# Patient Record
Sex: FEMALE | Race: BLACK OR AFRICAN AMERICAN | NOT HISPANIC OR LATINO
[De-identification: names, ages, dates, MRNs, and addresses within clinical notes are randomized per-mention and may not be internally consistent; named-entity substitution may affect disease eponyms.]

---

## 2017-01-13 PROBLEM — Z00.00 ENCOUNTER FOR PREVENTIVE HEALTH EXAMINATION: Status: ACTIVE | Noted: 2017-01-13

## 2017-01-25 ENCOUNTER — APPOINTMENT (OUTPATIENT)
Dept: ORTHOPEDIC SURGERY | Facility: CLINIC | Age: 82
End: 2017-01-25

## 2017-02-10 ENCOUNTER — APPOINTMENT (OUTPATIENT)
Dept: ORTHOPEDIC SURGERY | Facility: CLINIC | Age: 82
End: 2017-02-10

## 2017-02-23 ENCOUNTER — APPOINTMENT (OUTPATIENT)
Dept: ORTHOPEDIC SURGERY | Facility: CLINIC | Age: 82
End: 2017-02-23

## 2017-03-06 ENCOUNTER — APPOINTMENT (OUTPATIENT)
Dept: ORTHOPEDIC SURGERY | Facility: CLINIC | Age: 82
End: 2017-03-06

## 2017-03-13 ENCOUNTER — APPOINTMENT (OUTPATIENT)
Dept: ORTHOPEDIC SURGERY | Facility: CLINIC | Age: 82
End: 2017-03-13

## 2017-03-13 VITALS — HEIGHT: 65 IN | WEIGHT: 262 LBS | BODY MASS INDEX: 43.65 KG/M2

## 2017-03-13 VITALS
BODY MASS INDEX: 43.65 KG/M2 | SYSTOLIC BLOOD PRESSURE: 133 MMHG | DIASTOLIC BLOOD PRESSURE: 104 MMHG | HEART RATE: 139 BPM | HEIGHT: 65 IN | WEIGHT: 262 LBS

## 2017-03-13 DIAGNOSIS — M51.36 OTHER INTERVERTEBRAL DISC DEGENERATION, LUMBAR REGION: ICD-10-CM

## 2017-03-16 ENCOUNTER — INPATIENT (INPATIENT)
Facility: HOSPITAL | Age: 82
LOS: 6 days | Discharge: ANOTHER HOSPITAL | End: 2017-03-23
Attending: INTERNAL MEDICINE | Admitting: INTERNAL MEDICINE
Payer: MEDICARE

## 2017-03-16 VITALS
HEIGHT: 64 IN | RESPIRATION RATE: 20 BRPM | OXYGEN SATURATION: 92 % | DIASTOLIC BLOOD PRESSURE: 85 MMHG | WEIGHT: 266.1 LBS | SYSTOLIC BLOOD PRESSURE: 178 MMHG | HEART RATE: 86 BPM

## 2017-03-16 DIAGNOSIS — S81.812A LACERATION WITHOUT FOREIGN BODY, LEFT LOWER LEG, INITIAL ENCOUNTER: ICD-10-CM

## 2017-03-16 DIAGNOSIS — I63.9 CEREBRAL INFARCTION, UNSPECIFIED: ICD-10-CM

## 2017-03-16 DIAGNOSIS — I10 ESSENTIAL (PRIMARY) HYPERTENSION: ICD-10-CM

## 2017-03-16 DIAGNOSIS — I48.2 CHRONIC ATRIAL FIBRILLATION: ICD-10-CM

## 2017-03-16 DIAGNOSIS — J15.6 PNEUMONIA DUE TO OTHER GRAM-NEGATIVE BACTERIA: ICD-10-CM

## 2017-03-16 LAB
ALBUMIN SERPL ELPH-MCNC: 3.4 G/DL — SIGNIFICANT CHANGE UP (ref 3.3–5)
ALP SERPL-CCNC: 151 U/L — HIGH (ref 40–120)
ALT FLD-CCNC: 27 U/L — SIGNIFICANT CHANGE UP (ref 12–78)
ANION GAP SERPL CALC-SCNC: 6 MMOL/L — SIGNIFICANT CHANGE UP (ref 5–17)
APTT BLD: 35.3 SEC — SIGNIFICANT CHANGE UP (ref 27.5–37.4)
AST SERPL-CCNC: 32 U/L — SIGNIFICANT CHANGE UP (ref 15–37)
BASE EXCESS BLDA CALC-SCNC: 2.6 MMOL/L — HIGH (ref -2–2)
BASOPHILS # BLD AUTO: 0 K/UL — SIGNIFICANT CHANGE UP (ref 0–0.2)
BASOPHILS NFR BLD AUTO: 1.1 % — SIGNIFICANT CHANGE UP (ref 0–2)
BILIRUB SERPL-MCNC: 0.6 MG/DL — SIGNIFICANT CHANGE UP (ref 0.2–1.2)
BLOOD GAS COMMENTS: SIGNIFICANT CHANGE UP
BLOOD GAS COMMENTS: SIGNIFICANT CHANGE UP
BLOOD GAS SOURCE: SIGNIFICANT CHANGE UP
BUN SERPL-MCNC: 9 MG/DL — SIGNIFICANT CHANGE UP (ref 7–23)
CALCIUM SERPL-MCNC: 9.1 MG/DL — SIGNIFICANT CHANGE UP (ref 8.5–10.1)
CHLORIDE SERPL-SCNC: 105 MMOL/L — SIGNIFICANT CHANGE UP (ref 96–108)
CK MB BLD-MCNC: 2.2 % — SIGNIFICANT CHANGE UP (ref 0–3.5)
CK MB CFR SERPL CALC: 3.4 NG/ML — SIGNIFICANT CHANGE UP (ref 0.5–3.6)
CK SERPL-CCNC: 158 U/L — SIGNIFICANT CHANGE UP (ref 26–192)
CO2 SERPL-SCNC: 32 MMOL/L — HIGH (ref 22–31)
CREAT SERPL-MCNC: 0.63 MG/DL — SIGNIFICANT CHANGE UP (ref 0.5–1.3)
EOSINOPHIL # BLD AUTO: 0 K/UL — SIGNIFICANT CHANGE UP (ref 0–0.5)
EOSINOPHIL NFR BLD AUTO: 0.5 % — SIGNIFICANT CHANGE UP (ref 0–6)
GLUCOSE SERPL-MCNC: 85 MG/DL — SIGNIFICANT CHANGE UP (ref 70–99)
HCO3 BLDA-SCNC: 27 MMOL/L — SIGNIFICANT CHANGE UP (ref 21–29)
HCT VFR BLD CALC: 43.8 % — SIGNIFICANT CHANGE UP (ref 34.5–45)
HGB BLD-MCNC: 13.9 G/DL — SIGNIFICANT CHANGE UP (ref 11.5–15.5)
HOROWITZ INDEX BLDA+IHG-RTO: 28 — SIGNIFICANT CHANGE UP
INR BLD: 1.3 RATIO — HIGH (ref 0.88–1.16)
LACTATE SERPL-SCNC: 1.9 MMOL/L — SIGNIFICANT CHANGE UP (ref 0.7–2)
LYMPHOCYTES # BLD AUTO: 1.3 K/UL — SIGNIFICANT CHANGE UP (ref 1–3.3)
LYMPHOCYTES # BLD AUTO: 28.2 % — SIGNIFICANT CHANGE UP (ref 13–44)
MCHC RBC-ENTMCNC: 27.6 PG — SIGNIFICANT CHANGE UP (ref 27–34)
MCHC RBC-ENTMCNC: 31.7 GM/DL — LOW (ref 32–36)
MCV RBC AUTO: 87 FL — SIGNIFICANT CHANGE UP (ref 80–100)
MONOCYTES # BLD AUTO: 0.4 K/UL — SIGNIFICANT CHANGE UP (ref 0–0.9)
MONOCYTES NFR BLD AUTO: 7.9 % — SIGNIFICANT CHANGE UP (ref 2–14)
NEUTROPHILS # BLD AUTO: 2.8 K/UL — SIGNIFICANT CHANGE UP (ref 1.8–7.4)
NEUTROPHILS NFR BLD AUTO: 62.3 % — SIGNIFICANT CHANGE UP (ref 43–77)
NT-PROBNP SERPL-SCNC: 3400 PG/ML — HIGH (ref 0–450)
PCO2 BLDA: 41 MMHG — SIGNIFICANT CHANGE UP (ref 32–46)
PH BLD: 7.43 — SIGNIFICANT CHANGE UP (ref 7.35–7.45)
PLATELET # BLD AUTO: 153 K/UL — SIGNIFICANT CHANGE UP (ref 150–400)
PO2 BLDA: 92 MMHG — SIGNIFICANT CHANGE UP (ref 74–108)
POTASSIUM SERPL-MCNC: 3.9 MMOL/L — SIGNIFICANT CHANGE UP (ref 3.5–5.3)
POTASSIUM SERPL-SCNC: 3.9 MMOL/L — SIGNIFICANT CHANGE UP (ref 3.5–5.3)
PROT SERPL-MCNC: 7.3 GM/DL — SIGNIFICANT CHANGE UP (ref 6–8.3)
PROTHROM AB SERPL-ACNC: 14.6 SEC — HIGH (ref 10–13.1)
RBC # BLD: 5.04 M/UL — SIGNIFICANT CHANGE UP (ref 3.8–5.2)
RBC # FLD: 15.9 % — HIGH (ref 11–15)
SAO2 % BLDA: 97 % — HIGH (ref 92–96)
SODIUM SERPL-SCNC: 143 MMOL/L — SIGNIFICANT CHANGE UP (ref 135–145)
TROPONIN I SERPL-MCNC: 0.06 NG/ML — HIGH (ref 0.01–0.04)
WBC # BLD: 4.5 K/UL — SIGNIFICANT CHANGE UP (ref 3.8–10.5)
WBC # FLD AUTO: 4.5 K/UL — SIGNIFICANT CHANGE UP (ref 3.8–10.5)

## 2017-03-16 PROCEDURE — 70496 CT ANGIOGRAPHY HEAD: CPT | Mod: 26,59

## 2017-03-16 PROCEDURE — 99291 CRITICAL CARE FIRST HOUR: CPT

## 2017-03-16 PROCEDURE — 72125 CT NECK SPINE W/O DYE: CPT | Mod: 26

## 2017-03-16 PROCEDURE — 76377 3D RENDER W/INTRP POSTPROCES: CPT | Mod: 26

## 2017-03-16 PROCEDURE — 71010: CPT | Mod: 26,76

## 2017-03-16 PROCEDURE — 99285 EMERGENCY DEPT VISIT HI MDM: CPT

## 2017-03-16 PROCEDURE — 70450 CT HEAD/BRAIN W/O DYE: CPT | Mod: 26,76

## 2017-03-16 RX ORDER — BENZOCAINE AND MENTHOL 5; 1 G/100ML; G/100ML
1 LIQUID ORAL THREE TIMES A DAY
Qty: 0 | Refills: 0 | Status: COMPLETED | OUTPATIENT
Start: 2017-03-16 | End: 2017-03-17

## 2017-03-16 RX ORDER — ALTEPLASE 100 MG
9 KIT INTRAVENOUS ONCE
Qty: 0 | Refills: 0 | Status: COMPLETED | OUTPATIENT
Start: 2017-03-16 | End: 2017-03-16

## 2017-03-16 RX ORDER — HYDRALAZINE HCL 50 MG
5 TABLET ORAL ONCE
Qty: 0 | Refills: 0 | Status: COMPLETED | OUTPATIENT
Start: 2017-03-16 | End: 2017-03-17

## 2017-03-16 RX ORDER — MIDAZOLAM HYDROCHLORIDE 1 MG/ML
2 INJECTION, SOLUTION INTRAMUSCULAR; INTRAVENOUS ONCE
Qty: 0 | Refills: 0 | Status: DISCONTINUED | OUTPATIENT
Start: 2017-03-16 | End: 2017-03-16

## 2017-03-16 RX ORDER — SODIUM CHLORIDE 9 MG/ML
3 INJECTION INTRAMUSCULAR; INTRAVENOUS; SUBCUTANEOUS ONCE
Qty: 0 | Refills: 0 | Status: COMPLETED | OUTPATIENT
Start: 2017-03-16 | End: 2017-03-16

## 2017-03-16 RX ORDER — ASPIRIN/CALCIUM CARB/MAGNESIUM 324 MG
325 TABLET ORAL ONCE
Qty: 0 | Refills: 0 | Status: DISCONTINUED | OUTPATIENT
Start: 2017-03-16 | End: 2017-03-16

## 2017-03-16 RX ORDER — DIGOXIN 250 MCG
0.25 TABLET ORAL ONCE
Qty: 0 | Refills: 0 | Status: COMPLETED | OUTPATIENT
Start: 2017-03-16 | End: 2017-03-16

## 2017-03-16 RX ORDER — TETANUS TOXOID, REDUCED DIPHTHERIA TOXOID AND ACELLULAR PERTUSSIS VACCINE, ADSORBED 5; 2.5; 8; 8; 2.5 [IU]/.5ML; [IU]/.5ML; UG/.5ML; UG/.5ML; UG/.5ML
0.5 SUSPENSION INTRAMUSCULAR ONCE
Qty: 0 | Refills: 0 | Status: COMPLETED | OUTPATIENT
Start: 2017-03-16 | End: 2017-03-16

## 2017-03-16 RX ORDER — ALTEPLASE 100 MG
81 KIT INTRAVENOUS ONCE
Qty: 0 | Refills: 0 | Status: COMPLETED | OUTPATIENT
Start: 2017-03-16 | End: 2017-03-16

## 2017-03-16 RX ORDER — METOPROLOL TARTRATE 50 MG
5 TABLET ORAL ONCE
Qty: 0 | Refills: 0 | Status: COMPLETED | OUTPATIENT
Start: 2017-03-16 | End: 2017-03-16

## 2017-03-16 RX ADMIN — ALTEPLASE 81 MILLIGRAM(S): KIT at 18:27

## 2017-03-16 RX ADMIN — MIDAZOLAM HYDROCHLORIDE 2 MILLIGRAM(S): 1 INJECTION, SOLUTION INTRAMUSCULAR; INTRAVENOUS at 17:41

## 2017-03-16 RX ADMIN — Medication 5 MILLIGRAM(S): at 17:35

## 2017-03-16 RX ADMIN — SODIUM CHLORIDE 3 MILLILITER(S): 9 INJECTION INTRAMUSCULAR; INTRAVENOUS; SUBCUTANEOUS at 14:04

## 2017-03-16 RX ADMIN — MIDAZOLAM HYDROCHLORIDE 2 MILLIGRAM(S): 1 INJECTION, SOLUTION INTRAMUSCULAR; INTRAVENOUS at 17:42

## 2017-03-16 RX ADMIN — ALTEPLASE 540 MILLIGRAM(S): KIT at 19:27

## 2017-03-16 RX ADMIN — Medication 0.25 MILLIGRAM(S): at 21:40

## 2017-03-16 NOTE — CONSULT NOTE ADULT - ASSESSMENT
consult dict  confused and later afib r gaze preferance left heparesis  tpa given  9 cc stat and 81 cc over 1 hr.  discuss with family risk and benefit  for cta brain  will follow
84F PMH HTN, a-fib non compliant with anticoagulation, diastolic heart failure admitted to medicine for SOB with dry cough r/o PNA along with leg leg laceration requiring sutures . While waiting for medical bed in ER, pt developed a-fib RVR, acute mental status change with dysarthria, left hemiplegia due to acute R MCA infarct s/p tPA.     1. NEURO  - post tPA pt with significant improvement in speech. is now able to move left arm and leg spontaneously  - lethargy also improved  - no antiplatelets within 24 hrs of tPA, monitor for signs of bleed  - serial neuro checks  - 2D echo   - carotid dopplers  - speech and swallow eval  - PT/OT  - check lipid profile and HbA1C  - neurology consult called in ER  - avoid hypotension, maintain -170s in setting of acute ischemic stroke    2. CV  - afib RVR  - s/p cardizem and metoprolol push  - if remains in a-fib RVR can treat with amiodarone vs digoxin to avoid lower BP too much in setting of acute ischemic cerebral infarct  - ultimately will need anticoagulation for a-fib, full dose anticoagulation on hold now as pt is s/p tPA  - lasix diuresis as needed for underlying CHF    3. Pulm  - SOB with dry cough can be cardiac in origin vs pulmonary origin  - empirically on levaquin for the time being  - repeat CXR and follow up WBC  - if remains afebrile and without leukocytosis will d/c antibiotics    4. GEN  - pt full code  - plan of care and pt's condition d/w daughters    Total Critical Care Time: 65 min

## 2017-03-16 NOTE — ED PROVIDER NOTE - NEUROLOGICAL, MLM
Alert and oriented, no focal deficits, no motor or sensory deficits. Alert and oriented, no focal deficits, no motor or sensory deficits, pt states her numbness resolved

## 2017-03-16 NOTE — ED PROVIDER NOTE - SKIN WOUND TYPE
LACERATION(S) chronic hyperpigmented hardened skin of the lower extermities bilatearlly/LACERATION(S)

## 2017-03-16 NOTE — ED PROVIDER NOTE - MEDICAL DECISION MAKING DETAILS
pt presented with sob x 3 days, chest xray shows bilateral lung opacities, o2 sat on room air was 93%

## 2017-03-16 NOTE — H&P ADULT. - HISTORY OF PRESENT ILLNESS
84 year old female with h/o HTN presents today c/o sob for the last few days associated with a dry cough (-) chest pain (-) fevers (-). pt has h/o afib which pt has stopped taking coumadin, pt noted to have acute stroke in the ER, tPA was given and pt was transfer to ICU.

## 2017-03-16 NOTE — ED ADULT NURSE REASSESSMENT NOTE - NS ED NURSE REASSESS COMMENT FT1
1705pt found confused speech garbled left arm paresis 1709 code stroke called 1711 rapid response called see rapid response sheet for v/s and medications given pt transferred to ct accompanied by multiple md and rn versed 2mg ivp given  1827 activase 9ml given ivp by neurologist dr buckley tpa 81 ml over 1 hr in progress  at 1830

## 2017-03-16 NOTE — ED PROVIDER NOTE - OBJECTIVE STATEMENT
84 year old female with h/o HTN presents today c/o sob for the last few days associated with a dry cough (-) chest pain (-) fevers (-) recent travel (-) sick contacts (-) bodyaches (-) chills 84 year old female with h/o HTN presents today c/o sob for the last few days associated with a dry cough (-) chest pain (-) fevers (-) recent travel (-) sick contacts (-) bodyaches (-) chills, pt also c/o left arm paresthesia this morning at 10am which started in the left forearm radiating into the upper arm which resolved by the time she came to the ER (-) headache (-) lower extremity weakness

## 2017-03-17 LAB
ANION GAP SERPL CALC-SCNC: 11 MMOL/L — SIGNIFICANT CHANGE UP (ref 5–17)
APTT BLD: 38.1 SEC — HIGH (ref 27.5–37.4)
BUN SERPL-MCNC: 8 MG/DL — SIGNIFICANT CHANGE UP (ref 7–23)
CALCIUM SERPL-MCNC: 8.8 MG/DL — SIGNIFICANT CHANGE UP (ref 8.5–10.1)
CHLORIDE SERPL-SCNC: 105 MMOL/L — SIGNIFICANT CHANGE UP (ref 96–108)
CHOLEST SERPL-MCNC: 152 MG/DL — SIGNIFICANT CHANGE UP (ref 10–199)
CK MB BLD-MCNC: 1.8 % — SIGNIFICANT CHANGE UP (ref 0–3.5)
CK MB CFR SERPL CALC: 3.1 NG/ML — SIGNIFICANT CHANGE UP (ref 0.5–3.6)
CK SERPL-CCNC: 169 U/L — SIGNIFICANT CHANGE UP (ref 26–192)
CO2 SERPL-SCNC: 28 MMOL/L — SIGNIFICANT CHANGE UP (ref 22–31)
CREAT SERPL-MCNC: 0.66 MG/DL — SIGNIFICANT CHANGE UP (ref 0.5–1.3)
GLUCOSE SERPL-MCNC: 90 MG/DL — SIGNIFICANT CHANGE UP (ref 70–99)
HBA1C BLD-MCNC: 5.7 % — HIGH (ref 4–5.6)
HCT VFR BLD CALC: 40.6 % — SIGNIFICANT CHANGE UP (ref 34.5–45)
HDLC SERPL-MCNC: 44 MG/DL — SIGNIFICANT CHANGE UP (ref 40–125)
HGB BLD-MCNC: 13.2 G/DL — SIGNIFICANT CHANGE UP (ref 11.5–15.5)
INR BLD: 1.76 RATIO — HIGH (ref 0.88–1.16)
LIPID PNL WITH DIRECT LDL SERPL: 97 MG/DL — SIGNIFICANT CHANGE UP
MAGNESIUM SERPL-MCNC: 1.9 MG/DL — SIGNIFICANT CHANGE UP (ref 1.8–2.4)
MCHC RBC-ENTMCNC: 28.1 PG — SIGNIFICANT CHANGE UP (ref 27–34)
MCHC RBC-ENTMCNC: 32.5 GM/DL — SIGNIFICANT CHANGE UP (ref 32–36)
MCV RBC AUTO: 86.6 FL — SIGNIFICANT CHANGE UP (ref 80–100)
PHOSPHATE SERPL-MCNC: 2.8 MG/DL — SIGNIFICANT CHANGE UP (ref 2.5–4.5)
PLATELET # BLD AUTO: 162 K/UL — SIGNIFICANT CHANGE UP (ref 150–400)
POTASSIUM SERPL-MCNC: 3.3 MMOL/L — LOW (ref 3.5–5.3)
POTASSIUM SERPL-SCNC: 3.3 MMOL/L — LOW (ref 3.5–5.3)
PROTHROM AB SERPL-ACNC: 19.8 SEC — HIGH (ref 10–13.1)
RBC # BLD: 4.69 M/UL — SIGNIFICANT CHANGE UP (ref 3.8–5.2)
RBC # FLD: 15.7 % — HIGH (ref 11–15)
SODIUM SERPL-SCNC: 144 MMOL/L — SIGNIFICANT CHANGE UP (ref 135–145)
T4 FREE SERPL-MCNC: 1.7 NG/DL — SIGNIFICANT CHANGE UP (ref 0.9–1.8)
TOTAL CHOLESTEROL/HDL RATIO MEASUREMENT: 3.5 RATIO — SIGNIFICANT CHANGE UP (ref 3.3–7.1)
TRIGL SERPL-MCNC: 57 MG/DL — SIGNIFICANT CHANGE UP (ref 10–149)
TROPONIN I SERPL-MCNC: 0.03 NG/ML — SIGNIFICANT CHANGE UP (ref 0.01–0.04)
TSH SERPL-MCNC: 1.08 UIU/ML — SIGNIFICANT CHANGE UP (ref 0.36–3.74)
WBC # BLD: 5.3 K/UL — SIGNIFICANT CHANGE UP (ref 3.8–10.5)
WBC # FLD AUTO: 5.3 K/UL — SIGNIFICANT CHANGE UP (ref 3.8–10.5)

## 2017-03-17 PROCEDURE — 93306 TTE W/DOPPLER COMPLETE: CPT | Mod: 26

## 2017-03-17 PROCEDURE — 70450 CT HEAD/BRAIN W/O DYE: CPT | Mod: 26

## 2017-03-17 PROCEDURE — 99291 CRITICAL CARE FIRST HOUR: CPT

## 2017-03-17 RX ORDER — DIGOXIN 250 MCG
0.25 TABLET ORAL EVERY 6 HOURS
Qty: 0 | Refills: 0 | Status: COMPLETED | OUTPATIENT
Start: 2017-03-17 | End: 2017-03-17

## 2017-03-17 RX ORDER — ENOXAPARIN SODIUM 100 MG/ML
120 INJECTION SUBCUTANEOUS EVERY 12 HOURS
Qty: 0 | Refills: 0 | Status: DISCONTINUED | OUTPATIENT
Start: 2017-03-17 | End: 2017-03-20

## 2017-03-17 RX ORDER — METOPROLOL TARTRATE 50 MG
50 TABLET ORAL
Qty: 0 | Refills: 0 | Status: DISCONTINUED | OUTPATIENT
Start: 2017-03-17 | End: 2017-03-20

## 2017-03-17 RX ORDER — ATORVASTATIN CALCIUM 80 MG/1
80 TABLET, FILM COATED ORAL AT BEDTIME
Qty: 0 | Refills: 0 | Status: DISCONTINUED | OUTPATIENT
Start: 2017-03-17 | End: 2017-03-23

## 2017-03-17 RX ORDER — METOPROLOL TARTRATE 50 MG
5 TABLET ORAL ONCE
Qty: 0 | Refills: 0 | Status: COMPLETED | OUTPATIENT
Start: 2017-03-17 | End: 2017-03-17

## 2017-03-17 RX ORDER — METOPROLOL TARTRATE 50 MG
12.5 TABLET ORAL
Qty: 0 | Refills: 0 | Status: DISCONTINUED | OUTPATIENT
Start: 2017-03-17 | End: 2017-03-17

## 2017-03-17 RX ORDER — ASPIRIN/CALCIUM CARB/MAGNESIUM 324 MG
81 TABLET ORAL DAILY
Qty: 0 | Refills: 0 | Status: DISCONTINUED | OUTPATIENT
Start: 2017-03-17 | End: 2017-03-23

## 2017-03-17 RX ORDER — LOSARTAN POTASSIUM 100 MG/1
50 TABLET, FILM COATED ORAL DAILY
Qty: 0 | Refills: 0 | Status: DISCONTINUED | OUTPATIENT
Start: 2017-03-18 | End: 2017-03-23

## 2017-03-17 RX ORDER — POTASSIUM CHLORIDE 20 MEQ
20 PACKET (EA) ORAL ONCE
Qty: 0 | Refills: 0 | Status: COMPLETED | OUTPATIENT
Start: 2017-03-17 | End: 2017-03-17

## 2017-03-17 RX ADMIN — Medication 50 MILLIGRAM(S): at 18:16

## 2017-03-17 RX ADMIN — ENOXAPARIN SODIUM 120 MILLIGRAM(S): 100 INJECTION SUBCUTANEOUS at 21:36

## 2017-03-17 RX ADMIN — Medication 0.25 MILLIGRAM(S): at 06:25

## 2017-03-17 RX ADMIN — Medication 0.25 MILLIGRAM(S): at 12:16

## 2017-03-17 RX ADMIN — TETANUS TOXOID, REDUCED DIPHTHERIA TOXOID AND ACELLULAR PERTUSSIS VACCINE, ADSORBED 0.5 MILLILITER(S): 5; 2.5; 8; 8; 2.5 SUSPENSION INTRAMUSCULAR at 21:30

## 2017-03-17 RX ADMIN — BENZOCAINE AND MENTHOL 1 LOZENGE: 5; 1 LIQUID ORAL at 21:36

## 2017-03-17 RX ADMIN — ATORVASTATIN CALCIUM 80 MILLIGRAM(S): 80 TABLET, FILM COATED ORAL at 21:35

## 2017-03-17 RX ADMIN — BENZOCAINE AND MENTHOL 1 LOZENGE: 5; 1 LIQUID ORAL at 06:26

## 2017-03-17 RX ADMIN — Medication 5 MILLIGRAM(S): at 00:36

## 2017-03-17 RX ADMIN — Medication 5 MILLIGRAM(S): at 11:04

## 2017-03-17 RX ADMIN — Medication 20 MILLIEQUIVALENT(S): at 06:26

## 2017-03-17 RX ADMIN — Medication 81 MILLIGRAM(S): at 21:35

## 2017-03-17 NOTE — SWALLOW BEDSIDE ASSESSMENT ADULT - SWALLOW EVAL: DIAGNOSIS
Pt is an 84 y o female admitted with pneumonia and laceration to the left leg presented with oropharyngeal phases of the swallow marked by decreased mastication, reduced bolus formation causing moderate lingual and lateral stasis with soft solid, and suspect delay in swallow trigger. No overt signs of aspiration. Pt is an 84 y o female admitted with right CVA, pneumonia, and laceration to the left leg presented with oropharyngeal phases of the swallow marked by decreased mastication, reduced bolus formation causing moderate lingual and lateral stasis with soft solid, and suspect delay in swallow trigger. No overt signs of aspiration.

## 2017-03-17 NOTE — PHYSICAL THERAPY INITIAL EVALUATION ADULT - MODIFIED CLINICAL TEST OF SENSORY INTEGRATION IN BALANCE TEST
Barthel Index: Feeding Score _10__, Bathing Score _0__, Grooming Score _0__, Dressing Score _0__, Bowels Score _10__, Bladder Score _10__, Toilet Score _0__, Transfers Score _0__, Mobility Score _0__, Stairs Score _0__,     Total Score _30__

## 2017-03-17 NOTE — PHYSICAL THERAPY INITIAL EVALUATION ADULT - ADDITIONAL COMMENTS
Pt has both a rolling walker and a cane, using a rolling walker recently secondary to increased weakness. There are 3 steps with handrail to enter home followed by 2 more steps to get to kitchen. Pt has everything on the 1st floor. Pt does ambulate short distances into the community. Pt is independent with all ADLs and requires pacing and frequent rest during activities to minimize exertion and complete task. Pt has both a rolling walker and a cane, using a rolling walker recently secondary to increased weakness. There are 3 steps with handrail to enter home followed by 2 more steps to get to kitchen. Pt has everything on the 1st floor. Pt does ambulate short distances into the community. Pt is independent with all ADLs and requires pacing and frequent rest during activities to minimize exertion and complete task. Pt is R hand dominant.

## 2017-03-17 NOTE — PHYSICAL THERAPY INITIAL EVALUATION ADULT - PERTINENT HX OF CURRENT PROBLEM, REHAB EVAL
as per EMR: complaints of sob for the last few days associated with a dry cough. (-) chest pain (-) fevers (-) recent travel (-) sick contacts (-) bodyaches (-) chills, pt also c/o left arm paresthesia this morning at 10am which started in the left forearm radiating into the upper arm which resolved by the time she came to the ER .

## 2017-03-17 NOTE — PHYSICAL THERAPY INITIAL EVALUATION ADULT - ACTIVE RANGE OF MOTION EXAMINATION, REHAB EVAL
bilateral  lower extremity Active ROM was WFL (within functional limits)/eric. upper extremity Active ROM was WNL (within normal limits)

## 2017-03-17 NOTE — DISCHARGE NOTE ADULT - CARE PLAN
Principal Discharge DX:	Cerebrovascular accident (CVA) due to embolism of left vertebral artery  Goal:	pt will go to rehab after cath  Instructions for follow-up, activity and diet:	for cath  Secondary Diagnosis:	Chronic atrial fibrillation  Secondary Diagnosis:	Essential hypertension  Secondary Diagnosis:	Dilated cardiomyopathy  Secondary Diagnosis:	Leg laceration, left, initial encounter  Secondary Diagnosis:	NSTEMI (non-ST elevated myocardial infarction)  Secondary Diagnosis:	Pneumonia due to other aerobic gram-negative bacteria

## 2017-03-17 NOTE — SWALLOW BEDSIDE ASSESSMENT ADULT - SWALLOW EVAL: RECOMMENDED FEEDING/EATING TECHNIQUES
small sips/bites/allow for swallow between intakes/position upright (90 degrees)/maintain upright posture during/after eating for 30 mins

## 2017-03-17 NOTE — PHYSICAL THERAPY INITIAL EVALUATION ADULT - GENERAL OBSERVATIONS, REHAB EVAL
Pt encountered supine in bed, alert and oriented x4, in ICU, cardiac monitor, venodyne pumps in place, NG tube clamped, NAD. Pt is s/p TPA. Pt encountered supine in bed, alert and oriented x4, in ICU, cardiac monitor, venodyne pumps in place, NG tube clamped, NAD. Pt is s/p TPA, CT head with internal devleopment of dense R MCA suspicious for intraluminal thrombus.

## 2017-03-17 NOTE — DISCHARGE NOTE ADULT - NS AS DC STROKE ED MATERIALS
Need for Followup After Discharge/Risk Factors for Stroke/Stroke Education Booklet/Prescribed Medications/Stroke Warning Signs and Symptoms/Call 911 for Stroke

## 2017-03-17 NOTE — DISCHARGE NOTE ADULT - MEDICATION SUMMARY - MEDICATIONS TO TAKE
I will START or STAY ON the medications listed below when I get home from the hospital:    predniSONE 20 mg oral tablet  -- 2 tab(s) by mouth once a day  -- Indication: For sob    spironolactone 25 mg oral tablet  -- 1 tab(s) by mouth once a day  -- Indication: For Essential hypertension    aspirin 81 mg oral tablet, chewable  -- 1 tab(s) by mouth once a day  -- Indication: For NSTEMI (non-ST elevated myocardial infarction)    losartan 100 mg oral tablet  -- 1 tab(s) by mouth once a day  -- Indication: For Essential hypertension    carvedilol 25 mg oral tablet  -- 1 tab(s) by mouth every 12 hours  -- Indication: For Essential hypertension    Proventil HFA CFC free 90 mcg/inh inhalation aerosol  -- 2 puff(s) inhaled 4 times a day, As Needed for bronchospasm  -- For inhalation only.  It is very important that you take or use this exactly as directed.  Do not skip doses or discontinue unless directed by your doctor.  Obtain medical advice before taking any non-prescription drugs as some may affect the action of this medication.  Shake well before use.    -- Indication: For sob    Lasix 40 mg oral tablet  -- 1 tab(s) by mouth once a day  -- Avoid prolonged or excessive exposure to direct and/or artificial sunlight while taking this medication.  It is very important that you take or use this exactly as directed.  Do not skip doses or discontinue unless directed by your doctor.  It may be advisable to drink a full glass orange juice or eat a banana daily while taking this medication.    -- Indication: For Dilated cardiomyopathy I will START or STAY ON the medications listed below when I get home from the hospital:    predniSONE 20 mg oral tablet  -- 2 tab(s) by mouth once a day  -- Indication: For sob    spironolactone 25 mg oral tablet  -- 1 tab(s) by mouth once a day  -- Indication: For Essential hypertension    aspirin 81 mg oral tablet, chewable  -- 1 tab(s) by mouth once a day  -- Indication: For NSTEMI (non-ST elevated myocardial infarction)    losartan 100 mg oral tablet  -- 1 tab(s) by mouth once a day  -- Indication: For Essential hypertension    Lipitor 80 mg oral tablet  -- 1 tab(s) by mouth once a day (at bedtime)  -- Indication: For Cerebrovascular accident (CVA) due to embolism of left vertebral artery    carvedilol 25 mg oral tablet  -- 1 tab(s) by mouth every 12 hours  -- Indication: For Essential hypertension    Proventil HFA CFC free 90 mcg/inh inhalation aerosol  -- 2 puff(s) inhaled 4 times a day, As Needed for bronchospasm  -- For inhalation only.  It is very important that you take or use this exactly as directed.  Do not skip doses or discontinue unless directed by your doctor.  Obtain medical advice before taking any non-prescription drugs as some may affect the action of this medication.  Shake well before use.    -- Indication: For sob    Lasix 40 mg oral tablet  -- 1 tab(s) by mouth once a day  -- Avoid prolonged or excessive exposure to direct and/or artificial sunlight while taking this medication.  It is very important that you take or use this exactly as directed.  Do not skip doses or discontinue unless directed by your doctor.  It may be advisable to drink a full glass orange juice or eat a banana daily while taking this medication.    -- Indication: For Dilated cardiomyopathy

## 2017-03-17 NOTE — SWALLOW BEDSIDE ASSESSMENT ADULT - COMMENTS
CT Chest 3/16/2017 Impression: Nasogastric tube in satisfactory position. No lung consolidations.    CT Head 3/16/2017 Impression: Interval development of dense right MCA, suspicious for intraluminal thrombus. Involutional and ischemic gliotic changes. No acute intracranial hemorrhage. CT Chest 3/16/2017 Impression: Nasogastric tube in satisfactory position. No lung consolidations.    CT Head 3/16/2017 18:00 Impression: Interval development of dense right MCA, suspicious for intraluminal thrombus. Involutional and ischemic gliotic changes. No acute intracranial hemorrhage.

## 2017-03-17 NOTE — PROGRESS NOTE ADULT - SUBJECTIVE AND OBJECTIVE BOX
HPI:  84 year old female with h/o HTN presents today c/o sob for the last few days associated with a dry cough (-) chest pain (-) fevers (-). pt has h/o afib which pt has stopped taking coumadin, pt noted to have acute stroke in the ER, tPA was given and pt was transfer to ICU. (16 Mar 2017 22:56)    Over 24 Hrs: s/p TPA yesterday, remains stable    PAST MEDICAL & SURGICAL HISTORY:  HTN (hypertension)  No pertinent past medical history  No significant past surgical history      ## ROS:   CONSTITUTIONAL: No fever, chills  EYES: No eye pain, visual disturbances  ENMT:  No difficulty hearing, No sinus or throat pain  RESPIRATORY: No cough, wheezing, hemoptysis; No shortness of breath  CARDIOVASCULAR: No chest pain, palpitations  GASTROINTESTINAL: No abdominal or epigastric pain. No nausea, vomiting, or hematemesis; No diarrhea. No melena or hematochezia.  GENITOURINARY: No dysuria, frequency, hematuria  NEUROLOGICAL: No headaches  ENDOCRINE: No heat or cold intolerance  MUSCULOSKELETAL: No joint pain or swelling; No muscle, back, or extremity pain    ## O/E:  Vitals: T(C): 36.6, Max: 37.2 (03-16 @ 23:43)  HR: 108 (77 - 141)  BP: 158/87 (117/96 - 184/127)  BP(mean): 103 (92 - 151)  RR: 17 (13 - 48)  SpO2: 100% (79% - 100%)  Wt(kg): --  Gen: lying comfortably in bed in no apparent distress  HEENT: PERRL, EOMI  Resp: CTA B/L no c/r/w  CVS: S1S2 no m/r/g  Abd: soft NT/ND +BS  Ext: no c/c/e  Neuro: A&Ox3, moving all ext, mild facial droop      I & Os for current day (as of 03-17 @ 13:38)  =============================================  IN: 0 ml / OUT: 400 ml / NET: -400 ml        ## Labs:                        13.2   5.3   )-----------( 162      ( 17 Mar 2017 04:40 )             40.6     17 Mar 2017 04:40    144    |  105    |  8      ----------------------------<  90     3.3     |  28     |  0.66     Ca    8.8        17 Mar 2017 04:40  Phos  2.8       17 Mar 2017 04:40  Mg     1.9       17 Mar 2017 04:40    TPro  7.3    /  Alb  3.4    /  TBili  0.6    /  DBili  x      /  AST  32     /  ALT  27     /  AlkPhos  151    16 Mar 2017 14:06    PT/INR - ( 17 Mar 2017 04:40 )   PT: 19.8 sec;   INR: 1.76 ratio         PTT - ( 17 Mar 2017 04:40 )  PTT:38.1 sec  ABG - ( 16 Mar 2017 16:34 )  pH: x     /  pCO2: 41    /  pO2: 92    / HCO3: 27    / Base Excess: 2.6   /  SaO2: 97          CARDIAC MARKERS ( 17 Mar 2017 04:40 )  .032 ng/mL / x     / 169 U/L / x     / 3.1 ng/mL  CARDIAC MARKERS ( 16 Mar 2017 14:06 )  .059 ng/mL / x     / 158 U/L / x     / 3.4 ng/mL        ## Imaging: reviewed    MEDICATIONS  (STANDING):  levoFLOXacin IVPB 750milliGRAM(s) IV Intermittent once  diphtheria/tetanus/pertussis (acellular) Vaccine (ADAcel) 0.5milliLiter(s) IntraMuscular once  levoFLOXacin  Tablet 750milliGRAM(s) Oral every 24 hours  benzocaine 15 mG/menthol 3.6 mG Lozenge 1Lozenge Oral three times a day  metoprolol 12.5milliGRAM(s) Oral two times a day    MEDICATIONS  (PRN):      ## Code status:  Goals of care discussion: [x] yes [ ] no  [x] full code  [ ] DNR  [ ] DNI  [ ] AUSTEN

## 2017-03-17 NOTE — SWALLOW BEDSIDE ASSESSMENT ADULT - SLP GENERAL OBSERVATIONS
Pt was seen bedside alert and oriented x4. Pt followed 1-step directions and verbalized wants and needs. Pt was seen bedside alert and oriented x4. Pt followed 1-step directions and verbalized wants and needs. Speech intelligibility judged to be WNL. ? left visual neglect with noted fleeting eye contact.

## 2017-03-17 NOTE — DISCHARGE NOTE ADULT - SECONDARY DIAGNOSIS.
Chronic atrial fibrillation Essential hypertension Dilated cardiomyopathy Leg laceration, left, initial encounter NSTEMI (non-ST elevated myocardial infarction) Pneumonia due to other aerobic gram-negative bacteria

## 2017-03-17 NOTE — CHART NOTE - NSCHARTNOTEFT_GEN_A_CORE
[facility] NP NOTE    Results of repeat CT head 3/17/17 discussed with Dr. DEBBIE Sifuentes neurology    IMPRESSION:    Small area of subtle lucency in the right parietal lobe may represent   small evolving infarct.  Involutional and ischemic gliotic changes.  No acute intracranial hemorrhage.    Above reported to Neurology, approved to start  - Full dose anticoagulation - Lovenox 1 mg /Kg/ Q12 will start tonight for Afib.

## 2017-03-17 NOTE — PHYSICAL THERAPY INITIAL EVALUATION ADULT - CRITERIA FOR SKILLED THERAPEUTIC INTERVENTIONS
functional limitations in following categories/pending further assessment pt is s/p TPA/impairments found

## 2017-03-17 NOTE — PROGRESS NOTE ADULT - SUBJECTIVE AND OBJECTIVE BOX
Patient is a 84y old  Female who presents with a chief complaint of sob (17 Mar 2017 08:52)      INTERVAL HPI/OVERNIGHT EVENTS: pt has sob yesterday and put on BIPAP, now she on nasal canula    MEDICATIONS  (STANDING):  levoFLOXacin IVPB 750milliGRAM(s) IV Intermittent once  diphtheria/tetanus/pertussis (acellular) Vaccine (ADAcel) 0.5milliLiter(s) IntraMuscular once  levoFLOXacin  Tablet 750milliGRAM(s) Oral every 24 hours  benzocaine 15 mG/menthol 3.6 mG Lozenge 1Lozenge Oral three times a day  digoxin  Injectable 0.25milliGRAM(s) IV Push every 6 hours  metoprolol 12.5milliGRAM(s) Oral two times a day  metoprolol Injectable 5milliGRAM(s) IV Push once    MEDICATIONS  (PRN):      Allergies    penicillin (Unknown)    Intolerances        REVIEW OF SYSTEMS:  CONSTITUTIONAL: No fever, weight loss, or fatigue  EYES: No eye pain, visual disturbances, or discharge  ENMT:  No difficulty hearing, tinnitus, vertigo; No sinus or throat pain  NECK: No pain or stiffness  BREASTS: No pain, masses, or nipple discharge  RESPIRATORY: No cough, wheezing, chills or hemoptysis; No shortness of breath  CARDIOVASCULAR: No chest pain, palpitations, dizziness, or leg swelling  GASTROINTESTINAL: No abdominal or epigastric pain. No nausea, vomiting, or hematemesis; No diarrhea or constipation. No melena or hematochezia.  GENITOURINARY: No dysuria, frequency, hematuria, or incontinence  NEUROLOGICAL: No headaches, memory loss, loss of strength, numbness, or tremors  SKIN: No itching, burning, rashes, or lesions   LYMPH NODES: No enlarged glands  ENDOCRINE: No heat or cold intolerance; No hair loss  MUSCULOSKELETAL: No joint pain or swelling; No muscle, back, or extremity pain  PSYCHIATRIC: No depression, anxiety, mood swings, or difficulty sleeping  HEME/LYMPH: No easy bruising, or bleeding gums  ALLERGY AND IMMUNOLOGIC: No hives or eczema    Vital Signs Last 24 Hrs  T(C): 36.4, Max: 37.2 (03-16 @ 23:43)  T(F): 97.6, Max: 98.9 (03-16 @ 23:43)  HR: 120 (74 - 141)  BP: 161/98 (117/96 - 184/127)  BP(mean): 115 (92 - 151)  RR: 23 (13 - 48)  SpO2: 99% (79% - 100%)    PHYSICAL EXAM:  GENERAL: NAD, well-groomed, well-developed  HEAD:  Atraumatic, Normocephalic  EYES: EOMI, PERRLA, conjunctiva and sclera clear  ENMT: No tonsillar erythema, exudates, or enlargement; Moist mucous membranes, Good dentition, No lesions  NECK: Supple, No JVD, Normal thyroid  NERVOUS SYSTEM:  Alert & Oriented X3, Good concentration; Motor Strength 5/5 B/L upper and lower extremities; DTRs 2+ intact and symmetric  CHEST/LUNG: Clear to percussion bilaterally; No rales, rhonchi, wheezing, or rubs  HEART: Regular rate and rhythm; No murmurs, rubs, or gallops  ABDOMEN: Soft, Nontender, Nondistended; Bowel sounds present  EXTREMITIES:  2+ Peripheral Pulses, No clubbing, cyanosis, or edema  LYMPH: No lymphadenopathy noted  SKIN: No rashes or lesions    LABS:                        13.2   5.3   )-----------( 162      ( 17 Mar 2017 04:40 )             40.6     17 Mar 2017 04:40    144    |  105    |  8      ----------------------------<  90     3.3     |  28     |  0.66     Ca    8.8        17 Mar 2017 04:40  Phos  2.8       17 Mar 2017 04:40  Mg     1.9       17 Mar 2017 04:40    TPro  7.3    /  Alb  3.4    /  TBili  0.6    /  DBili  x      /  AST  32     /  ALT  27     /  AlkPhos  151    16 Mar 2017 14:06    PT/INR - ( 17 Mar 2017 04:40 )   PT: 19.8 sec;   INR: 1.76 ratio         PTT - ( 17 Mar 2017 04:40 )  PTT:38.1 sec    CAPILLARY BLOOD GLUCOSE  151 (16 Mar 2017 17:20)    CULTURES:    HEMOGLOBIN A1C:  Hemoglobin A1C, Whole Blood: 5.7 % (03-17 @ 08:29)    CHOLESTEROL:  Cholesterol, Serum: 152 mg/dL (03-17 @ 08:29)  HDL Cholesterol, Serum: 44 mg/dL (03-17 @ 08:29)  Triglycerides, Serum: 57 mg/dL (03-17 @ 08:29)    ABG - ( 16 Mar 2017 16:34 )  pH: x     /  pCO2: 41    /  pO2: 92    / HCO3: 27    / Base Excess: 2.6   /  SaO2: 97                  RADIOLOGY & ADDITIONAL TESTS:

## 2017-03-17 NOTE — DISCHARGE NOTE ADULT - HOSPITAL COURSE
84 year old female with h/o HTN presents today c/o sob for the last few days associated with a dry cough (-) chest pain (-) fevers (-). pt has h/o afib which pt has stopped taking coumadin, pt noted to have acute stroke in the ER, tPA was given and pt was transfer to ICU. echo done and she has EF of 30 %

## 2017-03-18 DIAGNOSIS — S81.812D LACERATION WITHOUT FOREIGN BODY, LEFT LOWER LEG, SUBSEQUENT ENCOUNTER: ICD-10-CM

## 2017-03-18 LAB
ANION GAP SERPL CALC-SCNC: 10 MMOL/L — SIGNIFICANT CHANGE UP (ref 5–17)
BUN SERPL-MCNC: 8 MG/DL — SIGNIFICANT CHANGE UP (ref 7–23)
CALCIUM SERPL-MCNC: 8.4 MG/DL — LOW (ref 8.5–10.1)
CHLORIDE SERPL-SCNC: 104 MMOL/L — SIGNIFICANT CHANGE UP (ref 96–108)
CO2 SERPL-SCNC: 26 MMOL/L — SIGNIFICANT CHANGE UP (ref 22–31)
CREAT SERPL-MCNC: 0.62 MG/DL — SIGNIFICANT CHANGE UP (ref 0.5–1.3)
GLUCOSE SERPL-MCNC: 68 MG/DL — LOW (ref 70–99)
HCT VFR BLD CALC: 37.9 % — SIGNIFICANT CHANGE UP (ref 34.5–45)
HGB BLD-MCNC: 12.8 G/DL — SIGNIFICANT CHANGE UP (ref 11.5–15.5)
MAGNESIUM SERPL-MCNC: 1.9 MG/DL — SIGNIFICANT CHANGE UP (ref 1.8–2.4)
MCHC RBC-ENTMCNC: 29.1 PG — SIGNIFICANT CHANGE UP (ref 27–34)
MCHC RBC-ENTMCNC: 33.7 GM/DL — SIGNIFICANT CHANGE UP (ref 32–36)
MCV RBC AUTO: 86.3 FL — SIGNIFICANT CHANGE UP (ref 80–100)
PHOSPHATE SERPL-MCNC: 2.9 MG/DL — SIGNIFICANT CHANGE UP (ref 2.5–4.5)
PLATELET # BLD AUTO: 149 K/UL — LOW (ref 150–400)
POTASSIUM SERPL-MCNC: 3.9 MMOL/L — SIGNIFICANT CHANGE UP (ref 3.5–5.3)
POTASSIUM SERPL-SCNC: 3.9 MMOL/L — SIGNIFICANT CHANGE UP (ref 3.5–5.3)
RBC # BLD: 4.39 M/UL — SIGNIFICANT CHANGE UP (ref 3.8–5.2)
RBC # FLD: 15.2 % — HIGH (ref 11–15)
SODIUM SERPL-SCNC: 140 MMOL/L — SIGNIFICANT CHANGE UP (ref 135–145)
WBC # BLD: 6 K/UL — SIGNIFICANT CHANGE UP (ref 3.8–10.5)
WBC # FLD AUTO: 6 K/UL — SIGNIFICANT CHANGE UP (ref 3.8–10.5)

## 2017-03-18 PROCEDURE — 93880 EXTRACRANIAL BILAT STUDY: CPT | Mod: 26

## 2017-03-18 RX ORDER — SODIUM CHLORIDE 9 MG/ML
1000 INJECTION, SOLUTION INTRAVENOUS
Qty: 0 | Refills: 0 | Status: DISCONTINUED | OUTPATIENT
Start: 2017-03-18 | End: 2017-03-23

## 2017-03-18 RX ORDER — ONDANSETRON 8 MG/1
4 TABLET, FILM COATED ORAL ONCE
Qty: 0 | Refills: 0 | Status: COMPLETED | OUTPATIENT
Start: 2017-03-18 | End: 2017-03-18

## 2017-03-18 RX ADMIN — ENOXAPARIN SODIUM 120 MILLIGRAM(S): 100 INJECTION SUBCUTANEOUS at 21:59

## 2017-03-18 RX ADMIN — ATORVASTATIN CALCIUM 80 MILLIGRAM(S): 80 TABLET, FILM COATED ORAL at 21:59

## 2017-03-18 RX ADMIN — LOSARTAN POTASSIUM 50 MILLIGRAM(S): 100 TABLET, FILM COATED ORAL at 06:19

## 2017-03-18 RX ADMIN — Medication 50 MILLIGRAM(S): at 19:10

## 2017-03-18 RX ADMIN — SODIUM CHLORIDE 30 MILLILITER(S): 9 INJECTION, SOLUTION INTRAVENOUS at 09:45

## 2017-03-18 RX ADMIN — Medication 81 MILLIGRAM(S): at 11:18

## 2017-03-18 RX ADMIN — ONDANSETRON 4 MILLIGRAM(S): 8 TABLET, FILM COATED ORAL at 07:50

## 2017-03-18 RX ADMIN — Medication 50 MILLIGRAM(S): at 06:19

## 2017-03-18 RX ADMIN — ENOXAPARIN SODIUM 120 MILLIGRAM(S): 100 INJECTION SUBCUTANEOUS at 08:51

## 2017-03-18 NOTE — PROGRESS NOTE ADULT - SUBJECTIVE AND OBJECTIVE BOX
pt appears completely alert and awake.  seen w family and nurse at bedside  vss  appears oriented  left leg wound clean but w a small amount of oozing, suture line ia intact and stable  still somewhat swollen and tender to touch  discussed w pt and family as well as icu team  dressing change  continue abx/observation/elevation  no surgical intervention at this time  reassured.

## 2017-03-18 NOTE — PROGRESS NOTE ADULT - SUBJECTIVE AND OBJECTIVE BOX
Patient is a 84y old  Female who presents with a chief complaint of sob (17 Mar 2017 08:52)      INTERVAL HPI/OVERNIGHT EVENTS: pt is doing well    MEDICATIONS  (STANDING):  levoFLOXacin IVPB 750milliGRAM(s) IV Intermittent once  levoFLOXacin  Tablet 750milliGRAM(s) Oral every 24 hours  metoprolol 50milliGRAM(s) Oral two times a day  enoxaparin Injectable 120milliGRAM(s) SubCutaneous every 12 hours  losartan 50milliGRAM(s) Oral daily  atorvastatin 80milliGRAM(s) Oral at bedtime  aspirin  chewable 81milliGRAM(s) Oral daily  dextrose 5%. 1000milliLiter(s) IV Continuous <Continuous>    MEDICATIONS  (PRN):      Allergies    penicillin (Unknown)    Intolerances        REVIEW OF SYSTEMS:  CONSTITUTIONAL: No fever, weight loss, or fatigue  EYES: No eye pain, visual disturbances, or discharge  ENMT:  No difficulty hearing, tinnitus, vertigo; No sinus or throat pain  NECK: No pain or stiffness  BREASTS: No pain, masses, or nipple discharge  RESPIRATORY: No cough, wheezing, chills or hemoptysis; No shortness of breath  CARDIOVASCULAR: No chest pain, palpitations, dizziness, or leg swelling  GASTROINTESTINAL: No abdominal or epigastric pain. No nausea, vomiting, or hematemesis; No diarrhea or constipation. No melena or hematochezia.  GENITOURINARY: No dysuria, frequency, hematuria, or incontinence  NEUROLOGICAL: No headaches, memory loss, loss of strength, numbness, or tremors  SKIN: No itching, burning, rashes, or lesions   LYMPH NODES: No enlarged glands  ENDOCRINE: No heat or cold intolerance; No hair loss  MUSCULOSKELETAL: No joint pain or swelling; No muscle, back, or extremity pain  PSYCHIATRIC: No depression, anxiety, mood swings, or difficulty sleeping  HEME/LYMPH: No easy bruising, or bleeding gums  ALLERGY AND IMMUNOLOGIC: No hives or eczema    Vital Signs Last 24 Hrs  T(C): 36.1, Max: 37.1 (03-17 @ 16:30)  T(F): 97, Max: 98.8 (03-17 @ 16:30)  HR: 92 (92 - 129)  BP: 117/70 (107/67 - 166/108)  BP(mean): 92 (81 - 145)  RR: 16 (16 - 28)  SpO2: 98% (92% - 100%)    PHYSICAL EXAM:  GENERAL: NAD, well-groomed, well-developed  HEAD:  Atraumatic, Normocephalic  EYES: EOMI, PERRLA, conjunctiva and sclera clear  ENMT: No tonsillar erythema, exudates, or enlargement; Moist mucous membranes, Good dentition, No lesions  NECK: Supple, No JVD, Normal thyroid  NERVOUS SYSTEM:  Alert & Oriented X3, Good concentration; Motor Strength 5/5 B/L upper and lower extremities; DTRs 2+ intact and symmetric  CHEST/LUNG: Clear to percussion bilaterally; No rales, rhonchi, wheezing, or rubs  HEART: Regular rate and rhythm; No murmurs, rubs, or gallops  ABDOMEN: Soft, Nontender, Nondistended; Bowel sounds present  EXTREMITIES:  2+ Peripheral Pulses, No clubbing, cyanosis, or edema  LYMPH: No lymphadenopathy noted  SKIN: No rashes or lesions    LABS:                        12.8   6.0   )-----------( 149      ( 18 Mar 2017 03:41 )             37.9     18 Mar 2017 03:41    140    |  104    |  8      ----------------------------<  68     3.9     |  26     |  0.62     Ca    8.4        18 Mar 2017 03:41  Phos  2.9       18 Mar 2017 03:41  Mg     1.9       18 Mar 2017 03:41    TPro  7.3    /  Alb  3.4    /  TBili  0.6    /  DBili  x      /  AST  32     /  ALT  27     /  AlkPhos  151    16 Mar 2017 14:06    PT/INR - ( 17 Mar 2017 04:40 )   PT: 19.8 sec;   INR: 1.76 ratio         PTT - ( 17 Mar 2017 04:40 )  PTT:38.1 sec    CAPILLARY BLOOD GLUCOSE  100 (18 Mar 2017 11:25)  68 (18 Mar 2017 09:00)    CULTURES:    HEMOGLOBIN A1C:    CHOLESTEROL:  Cholesterol, Serum: 152 mg/dL (03-17 @ 08:29)  HDL Cholesterol, Serum: 44 mg/dL (03-17 @ 08:29)  Triglycerides, Serum: 57 mg/dL (03-17 @ 08:29)    ABG - ( 16 Mar 2017 16:34 )  pH: x     /  pCO2: 41    /  pO2: 92    / HCO3: 27    / Base Excess: 2.6   /  SaO2: 97                  RADIOLOGY & ADDITIONAL TESTS:

## 2017-03-18 NOTE — CHART NOTE - NSCHARTNOTEFT_GEN_A_CORE
Patient admitted to ICU for CVA s/p t-pa, now with improvement.   Pt has Afib started on AC lovenox 120mg bid.    Pt pass speech and swallow, and stable for transfer to telemetry.  Called to Dr Mcwilliams service.

## 2017-03-18 NOTE — PROGRESS NOTE ADULT - SUBJECTIVE AND OBJECTIVE BOX
HPI:  Pt is an 83 yo BF with h/o A fib and HTN initially presented 2 to SOB. While in the ER became altered, with L facial droop and L sided weakness. Pt was able to move the R side and follow simple commands; code stroke and pt sent for CT head. s/p TPA    ## Labs:  CBC:                        12.8   6.0   )-----------( 149      ( 18 Mar 2017 03:41 )             37.9     Chem:  18 Mar 2017 03:41    140    |  104    |  8      ----------------------------<  68     3.9     |  26     |  0.62     Ca    8.4        18 Mar 2017 03:41  Phos  2.9       18 Mar 2017 03:41  Mg     1.9       18 Mar 2017 03:41      Coags:  PT/INR - ( 17 Mar 2017 04:40 )   PT: 19.8 sec;   INR: 1.76 ratio         PTT - ( 17 Mar 2017 04:40 )  PTT:38.1 sec    ## Imaging:    ## Medications:  levoFLOXacin IVPB 750milliGRAM(s) IV Intermittent once  levoFLOXacin  Tablet 750milliGRAM(s) Oral every 24 hours    metoprolol 50milliGRAM(s) Oral two times a day  losartan 50milliGRAM(s) Oral daily    atorvastatin 80milliGRAM(s) Oral at bedtime    enoxaparin Injectable 120milliGRAM(s) SubCutaneous every 12 hours  aspirin  chewable 81milliGRAM(s) Oral daily    ## Vitals:  T(C): 35.9, Max: 37.1 (03-17 @ 20:10)  HR: 88 (88 - 125)  BP: 131/111 (107/67 - 154/139)  BP(mean): 92 (81 - 145)  RR: 18 (16 - 27)  SpO2: 95% (92% - 100%)  Wt(kg): --  Vent:   ABG:     I & Os for 24h ending 03-18 @ 07:00  =============================================  IN: 0 ml / OUT: 950 ml / NET: -950 ml    I & Os for current day (as of 03-18 @ 19:05)  =============================================  IN: 0 ml / OUT: 300 ml / NET: -300 ml    ## P/E:  Gen: lying comfortably in bed in no apparent distress  Resp: Decreased BS 2 to resp effort  CVS: Tachy and Irregular  Abd: Soft/+BS  Ext: No sign edema  Neuro: Awake, alert with motor strength = b/l    CENTRAL LINE: [ ] YES [x ] NO  LOCATION:   DATE INSERTED:  REMOVE: [ ] YES [ ] NO      MOBLEY: [ ] YES [x ] NO    DATE INSERTED:  REMOVE:  [ ] YES [ ] NO      A-LINE:  [ ] YES [ x] NO  LOCATION:   DATE INSERTED:  REMOVE:  [ ] YES [ ] NO  EXPLAIN:    CODE STATUS: [x ] full code  [ ] DNR  [ ] DNI  [ ] MOLST  Goals of care discussion: [ ] yes

## 2017-03-19 DIAGNOSIS — I63.112 CEREBRAL INFARCTION DUE TO EMBOLISM OF LEFT VERTEBRAL ARTERY: ICD-10-CM

## 2017-03-19 RX ADMIN — Medication 81 MILLIGRAM(S): at 12:47

## 2017-03-19 RX ADMIN — LOSARTAN POTASSIUM 50 MILLIGRAM(S): 100 TABLET, FILM COATED ORAL at 06:45

## 2017-03-19 RX ADMIN — ATORVASTATIN CALCIUM 80 MILLIGRAM(S): 80 TABLET, FILM COATED ORAL at 21:57

## 2017-03-19 RX ADMIN — Medication 50 MILLIGRAM(S): at 06:45

## 2017-03-19 RX ADMIN — ENOXAPARIN SODIUM 120 MILLIGRAM(S): 100 INJECTION SUBCUTANEOUS at 12:43

## 2017-03-19 RX ADMIN — ENOXAPARIN SODIUM 120 MILLIGRAM(S): 100 INJECTION SUBCUTANEOUS at 21:57

## 2017-03-19 NOTE — PROGRESS NOTE ADULT - SUBJECTIVE AND OBJECTIVE BOX
Patient is a 84y old  Female who presents with a chief complaint of sob (17 Mar 2017 08:52)      INTERVAL HPI/OVERNIGHT EVENTS: pr is doing better    MEDICATIONS  (STANDING):  metoprolol 50milliGRAM(s) Oral two times a day  enoxaparin Injectable 120milliGRAM(s) SubCutaneous every 12 hours  losartan 50milliGRAM(s) Oral daily  atorvastatin 80milliGRAM(s) Oral at bedtime  aspirin  chewable 81milliGRAM(s) Oral daily  dextrose 5%. 1000milliLiter(s) IV Continuous <Continuous>    MEDICATIONS  (PRN):  guaiFENesin/dextromethorphan  Syrup 10milliLiter(s) Oral every 6 hours PRN Cough      Allergies    penicillin (Unknown)    Intolerances        REVIEW OF SYSTEMS:  CONSTITUTIONAL: No fever, weight loss, or fatigue  EYES: No eye pain, visual disturbances, or discharge  ENMT:  No difficulty hearing, tinnitus, vertigo; No sinus or throat pain  NECK: No pain or stiffness  BREASTS: No pain, masses, or nipple discharge  RESPIRATORY: No cough, wheezing, chills or hemoptysis; No shortness of breath  CARDIOVASCULAR: No chest pain, palpitations, dizziness, or leg swelling  GASTROINTESTINAL: No abdominal or epigastric pain. No nausea, vomiting, or hematemesis; No diarrhea or constipation. No melena or hematochezia.  GENITOURINARY: No dysuria, frequency, hematuria, or incontinence  NEUROLOGICAL: No headaches, memory loss, loss of strength, numbness, or tremors  SKIN: No itching, burning, rashes, or lesions   LYMPH NODES: No enlarged glands  ENDOCRINE: No heat or cold intolerance; No hair loss  MUSCULOSKELETAL: No joint pain or swelling; No muscle, back, or extremity pain  PSYCHIATRIC: No depression, anxiety, mood swings, or difficulty sleeping  HEME/LYMPH: No easy bruising, or bleeding gums  ALLERGY AND IMMUNOLOGIC: No hives or eczema    Vital Signs Last 24 Hrs  T(C): 36.1, Max: 37.2 (03-19 @ 06:42)  T(F): 97, Max: 98.9 (03-19 @ 06:42)  HR: 87 (55 - 94)  BP: 93/75 (93/64 - 130/77)  BP(mean): --  RR: 18 (17 - 20)  SpO2: 96% (92% - 98%)    PHYSICAL EXAM:  GENERAL: NAD, well-groomed, well-developed  HEAD:  Atraumatic, Normocephalic  EYES: EOMI, PERRLA, conjunctiva and sclera clear  ENMT: No tonsillar erythema, exudates, or enlargement; Moist mucous membranes, Good dentition, No lesions  NECK: Supple, No JVD, Normal thyroid  NERVOUS SYSTEM:  Alert & Oriented X3, Good concentration; Motor Strength 5/5 B/L upper and lower extremities; DTRs 2+ intact and symmetric  CHEST/LUNG: Clear to percussion bilaterally; No rales, rhonchi, wheezing, or rubs  HEART: Regular rate and rhythm; No murmurs, rubs, or gallops  ABDOMEN: Soft, Nontender, Nondistended; Bowel sounds present  EXTREMITIES:  2+ Peripheral Pulses, No clubbing, cyanosis, or edema  LYMPH: No lymphadenopathy noted  SKIN: No rashes or lesions    LABS:                        12.8   6.0   )-----------( 149      ( 18 Mar 2017 03:41 )             37.9     18 Mar 2017 03:41    140    |  104    |  8      ----------------------------<  68     3.9     |  26     |  0.62     Ca    8.4        18 Mar 2017 03:41  Phos  2.9       18 Mar 2017 03:41  Mg     1.9       18 Mar 2017 03:41          CAPILLARY BLOOD GLUCOSE    CULTURES:    HEMOGLOBIN A1C:    CHOLESTEROL:  Cholesterol, Serum: 152 mg/dL (03-17 @ 08:29)  HDL Cholesterol, Serum: 44 mg/dL (03-17 @ 08:29)  Triglycerides, Serum: 57 mg/dL (03-17 @ 08:29)        RADIOLOGY & ADDITIONAL TESTS:

## 2017-03-19 NOTE — PROGRESS NOTE ADULT - SUBJECTIVE AND OBJECTIVE BOX
pt seen w family at her side, sitting up eating  vss  alert and awake  left leg with moderate swelling and weak pulses, still oozing, but with no signs of infx or drainage or erythema.  although there is no surgical intervention at this time, however, pt advised to keep it elevated as much as possible as she has woody type edema. continued oozing, ieven in presence of abx, may ultimately result in wound infx.  i have reached out to vascular surgery, Dr Zarate, to consult for any further recommendations.

## 2017-03-20 DIAGNOSIS — I42.0 DILATED CARDIOMYOPATHY: ICD-10-CM

## 2017-03-20 LAB
ANION GAP SERPL CALC-SCNC: 10 MMOL/L — SIGNIFICANT CHANGE UP (ref 5–17)
BUN SERPL-MCNC: 10 MG/DL — SIGNIFICANT CHANGE UP (ref 7–23)
CALCIUM SERPL-MCNC: 8.2 MG/DL — LOW (ref 8.5–10.1)
CHLORIDE SERPL-SCNC: 105 MMOL/L — SIGNIFICANT CHANGE UP (ref 96–108)
CO2 SERPL-SCNC: 29 MMOL/L — SIGNIFICANT CHANGE UP (ref 22–31)
CREAT SERPL-MCNC: 0.72 MG/DL — SIGNIFICANT CHANGE UP (ref 0.5–1.3)
GLUCOSE SERPL-MCNC: 77 MG/DL — SIGNIFICANT CHANGE UP (ref 70–99)
HCT VFR BLD CALC: 37.5 % — SIGNIFICANT CHANGE UP (ref 34.5–45)
HGB BLD-MCNC: 12.7 G/DL — SIGNIFICANT CHANGE UP (ref 11.5–15.5)
INR BLD: 1.47 RATIO — HIGH (ref 0.88–1.16)
MCHC RBC-ENTMCNC: 29.6 PG — SIGNIFICANT CHANGE UP (ref 27–34)
MCHC RBC-ENTMCNC: 33.9 GM/DL — SIGNIFICANT CHANGE UP (ref 32–36)
MCV RBC AUTO: 87.2 FL — SIGNIFICANT CHANGE UP (ref 80–100)
PLATELET # BLD AUTO: 145 K/UL — LOW (ref 150–400)
POTASSIUM SERPL-MCNC: 3.7 MMOL/L — SIGNIFICANT CHANGE UP (ref 3.5–5.3)
POTASSIUM SERPL-SCNC: 3.7 MMOL/L — SIGNIFICANT CHANGE UP (ref 3.5–5.3)
PROTHROM AB SERPL-ACNC: 16.5 SEC — HIGH (ref 10–13.1)
RBC # BLD: 4.3 M/UL — SIGNIFICANT CHANGE UP (ref 3.8–5.2)
RBC # FLD: 15.7 % — HIGH (ref 11–15)
SODIUM SERPL-SCNC: 144 MMOL/L — SIGNIFICANT CHANGE UP (ref 135–145)
WBC # BLD: 5.5 K/UL — SIGNIFICANT CHANGE UP (ref 3.8–10.5)
WBC # FLD AUTO: 5.5 K/UL — SIGNIFICANT CHANGE UP (ref 3.8–10.5)

## 2017-03-20 PROCEDURE — 70551 MRI BRAIN STEM W/O DYE: CPT | Mod: 26

## 2017-03-20 PROCEDURE — 99291 CRITICAL CARE FIRST HOUR: CPT

## 2017-03-20 RX ORDER — MAGNESIUM HYDROXIDE 400 MG/1
30 TABLET, CHEWABLE ORAL DAILY
Qty: 0 | Refills: 0 | Status: DISCONTINUED | OUTPATIENT
Start: 2017-03-20 | End: 2017-03-23

## 2017-03-20 RX ORDER — SPIRONOLACTONE 25 MG/1
25 TABLET, FILM COATED ORAL DAILY
Qty: 0 | Refills: 0 | Status: DISCONTINUED | OUTPATIENT
Start: 2017-03-20 | End: 2017-03-23

## 2017-03-20 RX ORDER — APIXABAN 2.5 MG/1
5 TABLET, FILM COATED ORAL
Qty: 0 | Refills: 0 | Status: DISCONTINUED | OUTPATIENT
Start: 2017-03-20 | End: 2017-03-22

## 2017-03-20 RX ORDER — CARVEDILOL PHOSPHATE 80 MG/1
25 CAPSULE, EXTENDED RELEASE ORAL EVERY 12 HOURS
Qty: 0 | Refills: 0 | Status: DISCONTINUED | OUTPATIENT
Start: 2017-03-20 | End: 2017-03-23

## 2017-03-20 RX ADMIN — MAGNESIUM HYDROXIDE 30 MILLILITER(S): 400 TABLET, CHEWABLE ORAL at 11:52

## 2017-03-20 RX ADMIN — Medication 50 MILLIGRAM(S): at 06:40

## 2017-03-20 RX ADMIN — APIXABAN 5 MILLIGRAM(S): 2.5 TABLET, FILM COATED ORAL at 17:26

## 2017-03-20 RX ADMIN — Medication 81 MILLIGRAM(S): at 11:52

## 2017-03-20 RX ADMIN — ATORVASTATIN CALCIUM 80 MILLIGRAM(S): 80 TABLET, FILM COATED ORAL at 21:50

## 2017-03-20 RX ADMIN — ENOXAPARIN SODIUM 120 MILLIGRAM(S): 100 INJECTION SUBCUTANEOUS at 10:13

## 2017-03-20 RX ADMIN — Medication 50 MILLIGRAM(S): at 17:26

## 2017-03-20 RX ADMIN — LOSARTAN POTASSIUM 50 MILLIGRAM(S): 100 TABLET, FILM COATED ORAL at 06:40

## 2017-03-20 NOTE — OCCUPATIONAL THERAPY INITIAL EVALUATION ADULT - LEVEL OF INDEPENDENCE: BED TO CHAIR, REHAB EVAL
Patient feeling weak and not able to perform task at this time. Will asses when appropriate/unable to perform

## 2017-03-20 NOTE — OCCUPATIONAL THERAPY INITIAL EVALUATION ADULT - GENERAL OBSERVATIONS, REHAB EVAL
Pt was encountered supine in bed; NAD, IV, nasal cannula on 2LO2, cardiac monitor on, LLE foam dressing clean, dry and intact, AXOX4,  cooperative, decreased processing speeds noticed, slow speech, required verbal cues for hand/foot placement during tasks due to decreased safety awareness and followed commands.

## 2017-03-20 NOTE — SPEECH LANGUAGE PATHOLOGY EVALUATION - COMMENTS
CT Head 3/17/2017 Impression: Small area of subtle lucency in the right parietal lobe may represent   small evolving infarct. Involutional and ischemic gliotic changes. No acute intracranial hemorrhage.

## 2017-03-20 NOTE — OCCUPATIONAL THERAPY INITIAL EVALUATION ADULT - ORIENTATION, REHAB EVAL
oriented to person, place, time and situation oriented to person, place, time and situation/Patient is functioning at a level 4B-Organization High Level on the [facility] Cognitive Continuum. Pt. can use selective attention to perceive stimuli or task elements accurately, select a strategy and reach a solution. Pt. continues to be concrete, and has trouble generalizing and carrying over learning from one setting to another. In stressful situations, shows breakdown in cognitive function.

## 2017-03-20 NOTE — SPEECH LANGUAGE PATHOLOGY EVALUATION - SLP DIAGNOSIS
Pt is an 84 y o female dx right parietal CVA with left sided weakness presented with expressive and receptive language abilities WNL. Speech intelligibility was judged to be good.

## 2017-03-20 NOTE — OCCUPATIONAL THERAPY INITIAL EVALUATION ADULT - SOCIAL CONCERNS
Complex psychosocial needs/coping issues/Patient is having difficult  time adjusting to dx of stroke.

## 2017-03-20 NOTE — OCCUPATIONAL THERAPY INITIAL EVALUATION ADULT - ADDITIONAL COMMENTS
Patient lives in a private house with 3 steps to enter with railings. Once inside, the patient is on the main floor which has a bathroom abd bedroom. The patient bathroom has a standard toilet with a shower stall with 3 grab bars inside the shower. The patient walks in the house without a device unless feeling weak, then a patient uses a cane. When outside in the community, the patient ambulates with a rolling walker. The patient does not drive and has family take patient to and from places. The patient is able to state wants and needs at this time.

## 2017-03-20 NOTE — OCCUPATIONAL THERAPY INITIAL EVALUATION ADULT - MODIFIED CLINICAL TEST OF SENSORY INTEGRATION IN BALANCE TEST
Barthel Index: Feeding Score___10___, Bathing Score___0___, Grooming Score__5___, Dressing Score__5___, Bowel Score__10___, Bladder Score___10___, Toilet Score__0___, Transfer Score___5___, Mobility Score__0___, Stairs Score__0___, Total Score__45___.

## 2017-03-20 NOTE — OCCUPATIONAL THERAPY INITIAL EVALUATION ADULT - TRANSFER SAFETY CONCERNS NOTED: SIT/STAND, REHAB EVAL
losing balance/decreased step length/stepping too close to front of assistive device/decreased weight-shifting ability/decreased safety awareness/decreased sequencing ability

## 2017-03-20 NOTE — OCCUPATIONAL THERAPY INITIAL EVALUATION ADULT - PLANNED THERAPY INTERVENTIONS, OT EVAL
transfer training/motor coordination training/bed mobility training/ADL retraining/balance training/strengthening/fine motor coordination training/cognitive, visual perceptual/neuromuscular re-education

## 2017-03-20 NOTE — OCCUPATIONAL THERAPY INITIAL EVALUATION ADULT - RANGE OF MOTION EXAMINATION, UPPER EXTREMITY
bilateral UE Passive ROM was WNL (within normal limits)/bilateral UE Active ROM was WNL (within normal limits)

## 2017-03-20 NOTE — OCCUPATIONAL THERAPY INITIAL EVALUATION ADULT - LEVEL OF INDEPENDENCE: CHAIR TO BED, REHAB EVAL
unable to perform/Patient feeling weak and not able to perform task at this time. Will asses when appropriate

## 2017-03-20 NOTE — PROGRESS NOTE ADULT - SUBJECTIVE AND OBJECTIVE BOX
Patient is a 84y old  Female who presents with a chief complaint of sob (17 Mar 2017 08:52)      INTERVAL HPI/OVERNIGHT EVENTS: pt c/o left arm numbness and weakness    MEDICATIONS  (STANDING):  metoprolol 50milliGRAM(s) Oral two times a day  enoxaparin Injectable 120milliGRAM(s) SubCutaneous every 12 hours  losartan 50milliGRAM(s) Oral daily  atorvastatin 80milliGRAM(s) Oral at bedtime  aspirin  chewable 81milliGRAM(s) Oral daily  dextrose 5%. 1000milliLiter(s) IV Continuous <Continuous>    MEDICATIONS  (PRN):  guaiFENesin/dextromethorphan  Syrup 10milliLiter(s) Oral every 6 hours PRN Cough  magnesium hydroxide Suspension 30milliLiter(s) Oral daily PRN Constipation      Allergies    penicillin (Unknown)    Intolerances        REVIEW OF SYSTEMS:  CONSTITUTIONAL: No fever, weight loss, or fatigue  EYES: No eye pain, visual disturbances, or discharge  ENMT:  No difficulty hearing, tinnitus, vertigo; No sinus or throat pain  NECK: No pain or stiffness  BREASTS: No pain, masses, or nipple discharge  RESPIRATORY: No cough, wheezing, chills or hemoptysis; No shortness of breath  CARDIOVASCULAR: No chest pain, palpitations, dizziness, or leg swelling  GASTROINTESTINAL: No abdominal or epigastric pain. No nausea, vomiting, or hematemesis; No diarrhea or constipation. No melena or hematochezia.  GENITOURINARY: No dysuria, frequency, hematuria, or incontinence  NEUROLOGICAL: No headaches, memory loss, loss of strength, numbness, or tremors  SKIN: No itching, burning, rashes, or lesions   LYMPH NODES: No enlarged glands  ENDOCRINE: No heat or cold intolerance; No hair loss  MUSCULOSKELETAL: No joint pain or swelling; No muscle, back, or extremity pain  PSYCHIATRIC: No depression, anxiety, mood swings, or difficulty sleeping  HEME/LYMPH: No easy bruising, or bleeding gums  ALLERGY AND IMMUNOLOGIC: No hives or eczema    Vital Signs Last 24 Hrs  T(C): 37, Max: 37.3 (03-20 @ 01:20)  T(F): 98.6, Max: 99.2 (03-20 @ 01:20)  HR: 99 (62 - 102)  BP: 124/65 (93/75 - 145/84)  BP(mean): --  RR: 17 (17 - 20)  SpO2: 99% (96% - 99%)    PHYSICAL EXAM:  GENERAL: NAD, well-groomed, well-developed  HEAD:  Atraumatic, Normocephalic  EYES: EOMI, PERRLA, conjunctiva and sclera clear  ENMT: No tonsillar erythema, exudates, or enlargement; Moist mucous membranes, Good dentition, No lesions  NECK: Supple, No JVD, Normal thyroid  NERVOUS SYSTEM:  Alert & Oriented X3, Good concentration; Motor Strength 5/5 B/L upper and lower extremities; DTRs 2+ intact and symmetric  CHEST/LUNG: Clear to percussion bilaterally; No rales, rhonchi, wheezing, or rubs  HEART: Regular rate and rhythm; No murmurs, rubs, or gallops  ABDOMEN: Soft, Nontender, Nondistended; Bowel sounds present  EXTREMITIES:  2+ Peripheral Pulses, No clubbing, cyanosis, or edema  LYMPH: No lymphadenopathy noted  SKIN: No rashes or lesions    LABS:                        12.7   5.5   )-----------( 145      ( 20 Mar 2017 08:17 )             37.5     20 Mar 2017 08:17    144    |  105    |  10     ----------------------------<  77     3.7     |  29     |  0.72     Ca    8.2        20 Mar 2017 08:17      PT/INR - ( 20 Mar 2017 08:17 )   PT: 16.5 sec;   INR: 1.47 ratio             CAPILLARY BLOOD GLUCOSE    CULTURES:    HEMOGLOBIN A1C:    CHOLESTEROL:        RADIOLOGY & ADDITIONAL TESTS:

## 2017-03-20 NOTE — OCCUPATIONAL THERAPY INITIAL EVALUATION ADULT - NS ASR FOLLOW COMMAND OT EVAL
75% of the time/slower processing speeds during tasks. Slower rate of speech./able to follow single-step instructions

## 2017-03-20 NOTE — OCCUPATIONAL THERAPY INITIAL EVALUATION ADULT - PERTINENT HX OF CURRENT PROBLEM, REHAB EVAL
Patient admitted to [facility] with c/o of SOB for a few days. When in ED, Patient admitted to [facility] 3/16/17 with c/o of SOB for a few days. When in ED, around 17:05 patient  left facial droop and left sided weakness, not as responsive, altered compared to her initial presentation, she is able to move the right side and follow simple commands, code stroke called, rapid response called. Cat scan on 3/16/17 showed Interval development of dense right MCA, suspicious for intraluminal thrombus. Patient admitted to [facility] 3/16/17 with c/o of SOB for a few days. When in ED, around 17:05 patient  left facial droop and left sided weakness, not as responsive, altered compared to her initial presentation, she is able to move the right side and follow simple commands, code stroke called, rapid response called. Patient received TPA 3/16/17. Cat scan on 3/16/17 showed Interval development of dense right MCA, suspicious for intraluminal thrombus.

## 2017-03-20 NOTE — OCCUPATIONAL THERAPY INITIAL EVALUATION ADULT - IMPAIRED TRANSFERS: SIT/STAND, REHAB EVAL
impaired postural control/impaired balance/impaired motor control/decreased strength/cognition/impaired coordination

## 2017-03-20 NOTE — OCCUPATIONAL THERAPY INITIAL EVALUATION ADULT - RANGE OF MOTION EXAMINATION, LOWER EXTREMITY
bilateral LE Active ROM was WNL (within normal limits)/bilateral LE Passive ROM was WNL (within normal limits)

## 2017-03-20 NOTE — PROGRESS NOTE ADULT - SUBJECTIVE AND OBJECTIVE BOX
Asked to see because patient complaint of increased weakness on left side. Pt feels that she is unable to grasp left had or  objects,     Pt S/P TPA 3/16/2017    84yFemale admitted with pneumonia, left leg lacertaion ,         No pertinent past medical history  Pneumonia  Leg laceration, left, subsequent encounter  CVA (cerebral vascular accident)  Chronic atrial fibrillation  Essential hypertension  Pneumonia due to other aerobic gram-negative bacteria  Leg laceration, left, initial encounter      T(C): 37, Max: 37.3 (03-20 @ 01:20)  HR: 99 (62 - 102)  BP: 124/65 (93/75 - 145/84)  RR: 17 (17 - 20)  SpO2: 99% (96% - 99%)  Wt(kg): --    Allergy: penicillin (Unknown)    medications   metoprolol 50milliGRAM(s) Oral two times a day  enoxaparin Injectable 120milliGRAM(s) SubCutaneous every 12 hours  losartan 50milliGRAM(s) Oral daily  atorvastatin 80milliGRAM(s) Oral at bedtime  aspirin  chewable 81milliGRAM(s) Oral daily  dextrose 5%. 1000milliLiter(s) IV Continuous <Continuous>  guaiFENesin/dextromethorphan  Syrup 10milliLiter(s) Oral every 6 hours PRN    NIHSS    1a LOC====0  1b LOC ===0   1c LOC Commands==0	  2 Gaze 0   3Visual Fields= 0   4 Facial Palsy= 0  5a	Left Arm Motor	1 = Drift before 10 seconds  5b	Right Arm Motor	0 = No drift  6a	Left Leg Motor	0 = No drift  6b	Right Leg Motor	0 = No drift  7           Limb ataxia 0= absent   8	Sensory	0 = Normal  9	Language	0 = Normal  10         dysarthia          0  11	Neglect	0 = Normal      NIHSS Total= 1

## 2017-03-21 DIAGNOSIS — I63.411 CEREBRAL INFARCTION DUE TO EMBOLISM OF RIGHT MIDDLE CEREBRAL ARTERY: ICD-10-CM

## 2017-03-21 LAB
ANION GAP SERPL CALC-SCNC: 7 MMOL/L — SIGNIFICANT CHANGE UP (ref 5–17)
BUN SERPL-MCNC: 9 MG/DL — SIGNIFICANT CHANGE UP (ref 7–23)
CALCIUM SERPL-MCNC: 8.2 MG/DL — LOW (ref 8.5–10.1)
CHLORIDE SERPL-SCNC: 107 MMOL/L — SIGNIFICANT CHANGE UP (ref 96–108)
CO2 SERPL-SCNC: 29 MMOL/L — SIGNIFICANT CHANGE UP (ref 22–31)
CREAT SERPL-MCNC: 0.69 MG/DL — SIGNIFICANT CHANGE UP (ref 0.5–1.3)
GLUCOSE SERPL-MCNC: 82 MG/DL — SIGNIFICANT CHANGE UP (ref 70–99)
HCT VFR BLD CALC: 38.5 % — SIGNIFICANT CHANGE UP (ref 34.5–45)
HGB BLD-MCNC: 12.2 G/DL — SIGNIFICANT CHANGE UP (ref 11.5–15.5)
MCHC RBC-ENTMCNC: 28.1 PG — SIGNIFICANT CHANGE UP (ref 27–34)
MCHC RBC-ENTMCNC: 31.8 GM/DL — LOW (ref 32–36)
MCV RBC AUTO: 88.4 FL — SIGNIFICANT CHANGE UP (ref 80–100)
PLATELET # BLD AUTO: 129 K/UL — LOW (ref 150–400)
POTASSIUM SERPL-MCNC: 4 MMOL/L — SIGNIFICANT CHANGE UP (ref 3.5–5.3)
POTASSIUM SERPL-SCNC: 4 MMOL/L — SIGNIFICANT CHANGE UP (ref 3.5–5.3)
RBC # BLD: 4.36 M/UL — SIGNIFICANT CHANGE UP (ref 3.8–5.2)
RBC # FLD: 15.9 % — HIGH (ref 11–15)
SODIUM SERPL-SCNC: 143 MMOL/L — SIGNIFICANT CHANGE UP (ref 135–145)
WBC # BLD: 4.3 K/UL — SIGNIFICANT CHANGE UP (ref 3.8–10.5)
WBC # FLD AUTO: 4.3 K/UL — SIGNIFICANT CHANGE UP (ref 3.8–10.5)

## 2017-03-21 RX ADMIN — Medication 81 MILLIGRAM(S): at 13:42

## 2017-03-21 RX ADMIN — APIXABAN 5 MILLIGRAM(S): 2.5 TABLET, FILM COATED ORAL at 06:08

## 2017-03-21 RX ADMIN — LOSARTAN POTASSIUM 50 MILLIGRAM(S): 100 TABLET, FILM COATED ORAL at 06:08

## 2017-03-21 RX ADMIN — APIXABAN 5 MILLIGRAM(S): 2.5 TABLET, FILM COATED ORAL at 17:49

## 2017-03-21 RX ADMIN — CARVEDILOL PHOSPHATE 25 MILLIGRAM(S): 80 CAPSULE, EXTENDED RELEASE ORAL at 06:08

## 2017-03-21 RX ADMIN — CARVEDILOL PHOSPHATE 25 MILLIGRAM(S): 80 CAPSULE, EXTENDED RELEASE ORAL at 17:49

## 2017-03-21 RX ADMIN — SPIRONOLACTONE 25 MILLIGRAM(S): 25 TABLET, FILM COATED ORAL at 06:08

## 2017-03-21 RX ADMIN — ATORVASTATIN CALCIUM 80 MILLIGRAM(S): 80 TABLET, FILM COATED ORAL at 23:40

## 2017-03-21 NOTE — PROGRESS NOTE ADULT - SUBJECTIVE AND OBJECTIVE BOX
Patient is a 84y old  Female who presents with a chief complaint of sob (17 Mar 2017 08:52) CVA SOB        PAST MEDICAL & SURGICAL HISTORY:  HTN (hypertension)  No pertinent past medical history  No significant past surgical history      Summary of admission HPI:                PREVIOUS DIAGNOSTIC TESTING:      ECHO  FINDINGS:    STRESS  FINDINGS:    CATHETERIZATION  FINDINGS:    ELECTROPHYSIOLOGY STUDY  FINDINGS:    CAROTID ULTRASOUND:  FINDINGS    VENOUS DUPLEX SCAN:  FINDINGS:    CHEST CT PULMONARY ANGIO with IV Contrast:  FINDINGS:  MEDICATIONS  (STANDING):  losartan 50milliGRAM(s) Oral daily  atorvastatin 80milliGRAM(s) Oral at bedtime  aspirin  chewable 81milliGRAM(s) Oral daily  dextrose 5%. 1000milliLiter(s) IV Continuous <Continuous>  apixaban 5milliGRAM(s) Oral two times a day  carvedilol 25milliGRAM(s) Oral every 12 hours  spironolactone 25milliGRAM(s) Oral daily    MEDICATIONS  (PRN):  guaiFENesin/dextromethorphan  Syrup 10milliLiter(s) Oral every 6 hours PRN Cough  magnesium hydroxide Suspension 30milliLiter(s) Oral daily PRN Constipation      FAMILY HISTORY:      SOCIAL HISTORY:    CIGARETTES:    ALCOHOL:    REVIEW OF SYSTEMS:    CONSTITUTIONAL: No fever, weight loss, chills, shakes, or fatigue  EYES: No eye pain, visual disturbances, or discharge  ENMT:  No difficulty hearing, tinnitus, vertigo; No sinus or throat pain  NECK: No pain or stiffness  BREASTS: No pain, masses, or nipple discharge  RESPIRATORY: No cough, wheezing, hemoptysis, or shortness of breath  CARDIOVASCULAR: No chest pain, dyspnea, palpitations, dizziness, syncope, paroxysmal nocturnal dyspnea, orthopnea, or arm or leg swelling  GASTROINTESTINAL: No abdominal  or epigastric pain, nausea, vomiting, hematemesis, diarrhea, constipation, melena or bright red blood.  GENITOURINARY: No dysuria, nocturia, hematuria, or urinary incontinence  NEUROLOGICAL: No headaches, memory loss, slurred speech, limb weakness, loss of strength, numbness, or tremors  SKIN: No itching, burning, rashes, or lesions   LYMPH NODES: No enlarged glands  ENDOCRINE: No heat or cold intolerance, or hair loss  MUSCULOSKELETAL: No joint pain or swelling, muscle, back, or extremity pain  PSYCHIATRIC: No depression, anxiety, or difficulty sleeping  HEME/LYMPH: No easy bruising or bleeding gums  ALLERY AND IMMUNOLOGIC: No hives or rash.      Vital Signs Last 24 Hrs  T(C): 36, Max: 37.2 (03-21 @ 04:44)  T(F): 96.8, Max: 98.9 (03-21 @ 04:44)  HR: 83 (66 - 108)  BP: 114/48 (114/48 - 150/63)  BP(mean): --  RR: 23 (18 - 23)  SpO2: 96% (96% - 100%)    PHYSICAL EXAM:    GENERAL: In no apparent distress, well nourished, and hydrated.  HEAD:  Atraumatic, Normocephalic  EYES: EOMI, PERRLA, conjunctiva and sclera clear  ENMT: No tonsillar erythema, exudates, or enlargement; Moist mucous membranes, Good dentition, No lesions  NECK: Supple and normal thyroid.  No JVD or carotid bruit.  Carotid pulse is 2+ bilaterally.  HEART: Regular rate and rhythm; No murmurs, rubs, or gallops.  PULMONARY: Clear to auscultation and perfusion.  No rales, wheezing, or rhonchi bilaterally.  ABDOMEN: Soft, Nontender, Nondistended; Bowel sounds present  EXTREMITIES:  2+ Peripheral Pulses, No clubbing, cyanosis, or edema  LYMPH: No lymphadenopathy noted  NEUROLOGICAL: Grossly nonfocal          INTERPRETATION OF TELEMETRY:    ECG:    I&O's Detail    I & Os for current day (as of 21 Mar 2017 12:08)  =============================================  IN:    dextrose 5%.: 720 ml    Oral Fluid: 300 ml    Total IN: 1020 ml  ---------------------------------------------  OUT:    Total OUT: 0 ml  ---------------------------------------------  Total NET: 1020 ml      LABS:                        12.2   4.3   )-----------( 129      ( 21 Mar 2017 07:57 )             38.5     21 Mar 2017 07:57    143    |  107    |  9      ----------------------------<  82     4.0     |  29     |  0.69     Ca    8.2        21 Mar 2017 07:57          PT/INR - ( 20 Mar 2017 08:17 )   PT: 16.5 sec;   INR: 1.47 ratio             BNP  I&O's Detail    I & Os for current day (as of 21 Mar 2017 12:08)  =============================================  IN:    dextrose 5%.: 720 ml    Oral Fluid: 300 ml    Total IN: 1020 ml  ---------------------------------------------  OUT:    Total OUT: 0 ml  ---------------------------------------------  Total NET: 1020 ml    Daily     Daily     RADIOLOGY & ADDITIONAL STUDIES:

## 2017-03-21 NOTE — PROGRESS NOTE ADULT - SUBJECTIVE AND OBJECTIVE BOX
Patient is a 84y old  Female who presents with a chief complaint of sob (17 Mar 2017 08:52)      INTERVAL HPI/OVERNIGHT EVENTS: no new e;pisode    MEDICATIONS  (STANDING):  losartan 50milliGRAM(s) Oral daily  atorvastatin 80milliGRAM(s) Oral at bedtime  aspirin  chewable 81milliGRAM(s) Oral daily  dextrose 5%. 1000milliLiter(s) IV Continuous <Continuous>  apixaban 5milliGRAM(s) Oral two times a day  carvedilol 25milliGRAM(s) Oral every 12 hours  spironolactone 25milliGRAM(s) Oral daily    MEDICATIONS  (PRN):  guaiFENesin/dextromethorphan  Syrup 10milliLiter(s) Oral every 6 hours PRN Cough  magnesium hydroxide Suspension 30milliLiter(s) Oral daily PRN Constipation      Allergies    penicillin (Unknown)    Intolerances        REVIEW OF SYSTEMS:  CONSTITUTIONAL: No fever, weight loss, or fatigue  EYES: No eye pain, visual disturbances, or discharge  ENMT:  No difficulty hearing, tinnitus, vertigo; No sinus or throat pain  NECK: No pain or stiffness  BREASTS: No pain, masses, or nipple discharge  RESPIRATORY: No cough, wheezing, chills or hemoptysis; No shortness of breath  CARDIOVASCULAR: No chest pain, palpitations, dizziness, or leg swelling  GASTROINTESTINAL: No abdominal or epigastric pain. No nausea, vomiting, or hematemesis; No diarrhea or constipation. No melena or hematochezia.  GENITOURINARY: No dysuria, frequency, hematuria, or incontinence  NEUROLOGICAL: No headaches, memory loss, loss of strength, numbness, or tremors  SKIN: No itching, burning, rashes, or lesions   LYMPH NODES: No enlarged glands  ENDOCRINE: No heat or cold intolerance; No hair loss  MUSCULOSKELETAL: No joint pain or swelling; No muscle, back, or extremity pain  PSYCHIATRIC: No depression, anxiety, mood swings, or difficulty sleeping  HEME/LYMPH: No easy bruising, or bleeding gums  ALLERGY AND IMMUNOLOGIC: No hives or eczema    Vital Signs Last 24 Hrs  T(C): 37.2, Max: 37.2 (03-21 @ 04:44)  T(F): 98.9, Max: 98.9 (03-21 @ 04:44)  HR: 90 (66 - 108)  BP: 122/68 (112/73 - 150/63)  BP(mean): --  RR: 20 (17 - 20)  SpO2: 99% (98% - 100%)    PHYSICAL EXAM:  GENERAL: NAD, well-groomed, well-developed  HEAD:  Atraumatic, Normocephalic  EYES: EOMI, PERRLA, conjunctiva and sclera clear  ENMT: No tonsillar erythema, exudates, or enlargement; Moist mucous membranes, Good dentition, No lesions  NECK: Supple, No JVD, Normal thyroid  NERVOUS SYSTEM:  Alert & Oriented X3, Good concentration; Motor Strength 5/5 B/L upper and lower extremities; DTRs 2+ intact and symmetric  CHEST/LUNG: Clear to percussion bilaterally; No rales, rhonchi, wheezing, or rubs  HEART: Regular rate and rhythm; No murmurs, rubs, or gallops  ABDOMEN: Soft, Nontender, Nondistended; Bowel sounds present  EXTREMITIES:  2+ Peripheral Pulses, No clubbing, cyanosis, or edema  LYMPH: No lymphadenopathy noted  SKIN: No rashes or lesions    LABS:                        12.2   4.3   )-----------( 129      ( 21 Mar 2017 07:57 )             38.5     21 Mar 2017 07:57    143    |  107    |  9      ----------------------------<  82     4.0     |  29     |  0.69     Ca    8.2        21 Mar 2017 07:57      PT/INR - ( 20 Mar 2017 08:17 )   PT: 16.5 sec;   INR: 1.47 ratio             CAPILLARY BLOOD GLUCOSE    CULTURES:    HEMOGLOBIN A1C:    CHOLESTEROL:        RADIOLOGY & ADDITIONAL TESTS:

## 2017-03-22 DIAGNOSIS — I21.4 NON-ST ELEVATION (NSTEMI) MYOCARDIAL INFARCTION: ICD-10-CM

## 2017-03-22 LAB
ANION GAP SERPL CALC-SCNC: 8 MMOL/L — SIGNIFICANT CHANGE UP (ref 5–17)
BUN SERPL-MCNC: 8 MG/DL — SIGNIFICANT CHANGE UP (ref 7–23)
CALCIUM SERPL-MCNC: 8.2 MG/DL — LOW (ref 8.5–10.1)
CHLORIDE SERPL-SCNC: 106 MMOL/L — SIGNIFICANT CHANGE UP (ref 96–108)
CO2 SERPL-SCNC: 29 MMOL/L — SIGNIFICANT CHANGE UP (ref 22–31)
CREAT SERPL-MCNC: 0.63 MG/DL — SIGNIFICANT CHANGE UP (ref 0.5–1.3)
GLUCOSE SERPL-MCNC: 86 MG/DL — SIGNIFICANT CHANGE UP (ref 70–99)
HCT VFR BLD CALC: 37.4 % — SIGNIFICANT CHANGE UP (ref 34.5–45)
HGB BLD-MCNC: 12.5 G/DL — SIGNIFICANT CHANGE UP (ref 11.5–15.5)
MCHC RBC-ENTMCNC: 29.3 PG — SIGNIFICANT CHANGE UP (ref 27–34)
MCHC RBC-ENTMCNC: 33.3 GM/DL — SIGNIFICANT CHANGE UP (ref 32–36)
MCV RBC AUTO: 88 FL — SIGNIFICANT CHANGE UP (ref 80–100)
PLATELET # BLD AUTO: 138 K/UL — LOW (ref 150–400)
POTASSIUM SERPL-MCNC: 4.3 MMOL/L — SIGNIFICANT CHANGE UP (ref 3.5–5.3)
POTASSIUM SERPL-SCNC: 4.3 MMOL/L — SIGNIFICANT CHANGE UP (ref 3.5–5.3)
RBC # BLD: 4.26 M/UL — SIGNIFICANT CHANGE UP (ref 3.8–5.2)
RBC # FLD: 15.7 % — HIGH (ref 11–15)
SODIUM SERPL-SCNC: 143 MMOL/L — SIGNIFICANT CHANGE UP (ref 135–145)
WBC # BLD: 4.8 K/UL — SIGNIFICANT CHANGE UP (ref 3.8–10.5)
WBC # FLD AUTO: 4.8 K/UL — SIGNIFICANT CHANGE UP (ref 3.8–10.5)

## 2017-03-22 RX ORDER — HEPARIN SODIUM 5000 [USP'U]/ML
INJECTION INTRAVENOUS; SUBCUTANEOUS
Qty: 25000 | Refills: 0 | Status: DISCONTINUED | OUTPATIENT
Start: 2017-03-22 | End: 2017-03-23

## 2017-03-22 RX ADMIN — SPIRONOLACTONE 25 MILLIGRAM(S): 25 TABLET, FILM COATED ORAL at 06:24

## 2017-03-22 RX ADMIN — LOSARTAN POTASSIUM 50 MILLIGRAM(S): 100 TABLET, FILM COATED ORAL at 12:06

## 2017-03-22 RX ADMIN — CARVEDILOL PHOSPHATE 25 MILLIGRAM(S): 80 CAPSULE, EXTENDED RELEASE ORAL at 18:39

## 2017-03-22 RX ADMIN — ATORVASTATIN CALCIUM 80 MILLIGRAM(S): 80 TABLET, FILM COATED ORAL at 22:10

## 2017-03-22 RX ADMIN — CARVEDILOL PHOSPHATE 25 MILLIGRAM(S): 80 CAPSULE, EXTENDED RELEASE ORAL at 06:23

## 2017-03-22 RX ADMIN — Medication 81 MILLIGRAM(S): at 12:06

## 2017-03-22 RX ADMIN — HEPARIN SODIUM 1000 UNIT(S)/HR: 5000 INJECTION INTRAVENOUS; SUBCUTANEOUS at 18:19

## 2017-03-22 RX ADMIN — APIXABAN 5 MILLIGRAM(S): 2.5 TABLET, FILM COATED ORAL at 06:23

## 2017-03-22 NOTE — PROGRESS NOTE ADULT - SUBJECTIVE AND OBJECTIVE BOX
Patient is a 84y old  Female who presents with a chief complaint of sob (17 Mar 2017 08:52)      INTERVAL HPI/OVERNIGHT EVENTS:  pt is doing well   MEDICATIONS  (STANDING):  losartan 50milliGRAM(s) Oral daily  atorvastatin 80milliGRAM(s) Oral at bedtime  aspirin  chewable 81milliGRAM(s) Oral daily  dextrose 5%. 1000milliLiter(s) IV Continuous <Continuous>  carvedilol 25milliGRAM(s) Oral every 12 hours  spironolactone 25milliGRAM(s) Oral daily  heparin  Infusion. Unit(s)/Hr IV Continuous <Continuous>    MEDICATIONS  (PRN):  guaiFENesin/dextromethorphan  Syrup 10milliLiter(s) Oral every 6 hours PRN Cough  magnesium hydroxide Suspension 30milliLiter(s) Oral daily PRN Constipation      Allergies    penicillin (Unknown)    Intolerances        REVIEW OF SYSTEMS:  CONSTITUTIONAL: No fever, weight loss, or fatigue  EYES: No eye pain, visual disturbances, or discharge  ENMT:  No difficulty hearing, tinnitus, vertigo; No sinus or throat pain  NECK: No pain or stiffness  BREASTS: No pain, masses, or nipple discharge  RESPIRATORY: No cough, wheezing, chills or hemoptysis; No shortness of breath  CARDIOVASCULAR: No chest pain, palpitations, dizziness, or leg swelling  GASTROINTESTINAL: No abdominal or epigastric pain. No nausea, vomiting, or hematemesis; No diarrhea or constipation. No melena or hematochezia.  GENITOURINARY: No dysuria, frequency, hematuria, or incontinence  NEUROLOGICAL: No headaches, memory loss, loss of strength, numbness, or tremors  SKIN: No itching, burning, rashes, or lesions   LYMPH NODES: No enlarged glands  ENDOCRINE: No heat or cold intolerance; No hair loss  MUSCULOSKELETAL: No joint pain or swelling; No muscle, back, or extremity pain  PSYCHIATRIC: No depression, anxiety, mood swings, or difficulty sleeping  HEME/LYMPH: No easy bruising, or bleeding gums  ALLERGY AND IMMUNOLOGIC: No hives or eczema    Vital Signs Last 24 Hrs  T(C): 36.1, Max: 36.8 (03-21 @ 17:55)  T(F): 97, Max: 98.2 (03-21 @ 17:55)  HR: 61 (61 - 88)  BP: 114/58 (91/44 - 143/77)  BP(mean): --  RR: 17 (17 - 18)  SpO2: 96% (96% - 98%)    PHYSICAL EXAM:  GENERAL: NAD, well-groomed, well-developed  HEAD:  Atraumatic, Normocephalic  EYES: EOMI, PERRLA, conjunctiva and sclera clear  ENMT: No tonsillar erythema, exudates, or enlargement; Moist mucous membranes, Good dentition, No lesions  NECK: Supple, No JVD, Normal thyroid  NERVOUS SYSTEM:  Alert & Oriented X3, Good concentration; Motor Strength 5/5 B/L upper and lower extremities; DTRs 2+ intact and symmetric  CHEST/LUNG: Clear to percussion bilaterally; No rales, rhonchi, wheezing, or rubs  HEART: Regular rate and rhythm; No murmurs, rubs, or gallops  ABDOMEN: Soft, Nontender, Nondistended; Bowel sounds present  EXTREMITIES:  2+ Peripheral Pulses, No clubbing, cyanosis, or edema  LYMPH: No lymphadenopathy noted  SKIN: No rashes or lesions    LABS:                        12.5   4.8   )-----------( 138      ( 22 Mar 2017 07:19 )             37.4     22 Mar 2017 07:19    143    |  106    |  8      ----------------------------<  86     4.3     |  29     |  0.63     Ca    8.2        22 Mar 2017 07:19          CAPILLARY BLOOD GLUCOSE    CULTURES:    HEMOGLOBIN A1C:    CHOLESTEROL:        RADIOLOGY & ADDITIONAL TESTS:

## 2017-03-22 NOTE — CONSULT NOTE ADULT - SUBJECTIVE AND OBJECTIVE BOX
see dictated note.  discussed with ICU attending re; wound care for L. leg  cont current abx.  no further surgical intervention indicated at this time.
CC: unable to obtain (pt unable to articulate due to dysarthria)    HPI:  84F PMH HTN, a-fib non compliant with anticoagulation, diastolic heart failure, influenza B in March of 2016  presents with c/o sob associated with dry cough for the last few days. No fevers. Pt also complaining of left arm paresthesia starting at 10A on day of presentation, denied weakness, HA, CP. CT head negative. Pt awake, alert, responsive. Pt admitted to medicine for r/o PNA. Family left bedside briefly with pt awake, responsive, feeding self and eating from food tray in. Approximately 15-20 minutes later pt noted to have mental status change, noted by nurse to be in a-fib RVR HR 160s and with acute left sided weakness and slurred speech. CODE stroke and RRT activated to ER.     Pt found dysarthric with mouth full of food requiring suctioning and removal of food bolus from mouth. Pt noted to have left facial droop. Lethargic but arousable. Pt leaning over to left side of stretcher. Left upper extremity and left lower extremity flaccid. Pt with left sided neglect. HR was noted to be in the 140-150s. SBP 160s.     Cardizem 10mg IVP x2 given, lopessor 5mg IVP x1 given for a-fib RVR    STAT repeat CT brain performed. Pt in CT unable to tolerate supine positioning, thrashing about, moving right arm around in air. Agitated. Versed 2mg IVP x1 given for sedation to complete CT brain. Repeat CT brain with Right MCA thrombus seen. Discussed with daughter at bedside risks/benefits of tPA. Family in agreement for administration of tPA.     Allergies: penicillin - respiratory distress      MEDICATIONS  (STANDING):  levoFLOXacin IVPB 750milliGRAM(s) IV Intermittent once  diphtheria/tetanus/pertussis (acellular) Vaccine (ADAcel) 0.5milliLiter(s) IntraMuscular once  benzocaine 15 mG/menthol 3.6 mG Lozenge 1Lozenge Oral three times a day      Daily Height in cm: 162.56 (16 Mar 2017 11:52)      Drug Dosing Weight  Height (cm): 162.6 (16 Mar 2017 11:52)  Weight (kg): 120.7 (16 Mar 2017 11:52)  BMI (kg/m2): 45.7 (16 Mar 2017 11:52)  BSA (m2): 2.21 (16 Mar 2017 11:52)    PAST MEDICAL & SURGICAL HISTORY:  HTN (hypertension)  atrial fibrillation  congestive heart failure      FAMILY HISTORY:  non contributory    SOCIAL HISTORY:  non smoker    ADVANCE DIRECTIVES:  full code    REVIEW OF SYSTEMS:  [x] unable to obtain due to dysarthria in setting of acute stroke    ICU Vital Signs Last 24 Hrs  T(C): 37.2, Max: 37.2   T(F): 98.9, Max: 98.9   HR: 127 (74 - 138)  BP: 157/107 (117/96 - 178/85)  BP(mean): 123 (99 - 151)  RR: 24 (13 - 48)  SpO2: 100% (92% - 100%)      ABG - ( 16 Mar 2017 16:34 )  pH: 7.43    /  pCO2: 41    /  pO2: 92    / HCO3: 27    / Base Excess: 2.6   /  SaO2: 97          PHYSICAL EXAM:    GENERAL: well-groomed, well-developed, left neglect, left facial droop  HEAD:  Atraumatic, Normocephalic  EYES: conjunctiva and sclera clear, pupils reactive  ENMT: mouth full of food bolus, left tongue deviation  NECK: Supple, No JVD  NERVOUS SYSTEM:  left upper and lower extremity flaccid; unable to resist against gravity, no spontaneous movement of left side; right upper extremity 4/5 motor strength, right lower extremity 4/5 motor  CHEST/LUNG: Clear to auscultation bilaterally; No rales, rhonchi, wheezing  HEART: irregularly irregular  ABDOMEN: Soft, Nontender, protuberant; Bowel sounds present  EXTREMITIES:  2+ Peripheral Pulses, No clubbing, cyanosis, or edema; left lower extremity with skin avulsion (per family pt cut leg on car door when getting out of car when she arrived to ER) - sutured in ER by ER attending with pressure bandage applied    LABS:  CBC Full  -  ( 16 Mar 2017 14:06 )  WBC Count : 4.5 K/uL  Hemoglobin : 13.9 g/dL  Hematocrit : 43.8 %  Platelet Count - Automated : 153 K/uL  Mean Cell Volume : 87.0 fl  Mean Cell Hemoglobin : 27.6 pg  Mean Cell Hemoglobin Concentration : 31.7 gm/dL  Auto Neutrophil # : 2.8 K/uL  Auto Lymphocyte # : 1.3 K/uL  Auto Monocyte # : 0.4 K/uL  Auto Eosinophil # : 0.0 K/uL  Auto Basophil # : 0.0 K/uL  Auto Neutrophil % : 62.3 %  Auto Lymphocyte % : 28.2 %  Auto Monocyte % : 7.9 %  Auto Eosinophil % : 0.5 %  Auto Basophil % : 1.1 %    16 Mar 2017 14:06    143    |  105    |  9      ----------------------------<  85     3.9     |  32     |  0.63     Ca    9.1        16 Mar 2017 14:06    TPro  7.3    /  Alb  3.4    /  TBili  0.6    /  AST  32     /  ALT  27     /  AlkPhos  151    16 Mar 2017 14:06    CAPILLARY BLOOD GLUCOSE    PT/INR - ( 16 Mar 2017 14:06 )   PT: 14.6 sec;   INR: 1.30 ratio         PTT - ( 16 Mar 2017 14:06 )  PTT:35.3 sec      RADIOLOGY:  CT brain @15:54: Involutional and ischemic gliotic changes. No acute intracranial hemorrhage.    CT brain @ 17:44: Interval development of dense right MCA, suspicious for intraluminal   thrombus.     CT angio brain @ 2018: No acute intracranial hemorrhage, mass effect or acute territorial infarction. No vessel occlusion or hemodynamically significant stenosis.
Patient is a 84y old  Female who presents with a chief complaint of sob (17 Mar 2017 08:52) CVA Afib        PAST MEDICAL & SURGICAL HISTORY:  HTN (hypertension)  No pertinent past medical history  No significant past surgical history      Summary of admission HPI: Afib CVA                PREVIOUS DIAGNOSTIC TESTING:      ECHO  FINDINGS:    STRESS  FINDINGS:    CATHETERIZATION  FINDINGS:    ELECTROPHYSIOLOGY STUDY  FINDINGS:    CAROTID ULTRASOUND:  FINDINGS    VENOUS DUPLEX SCAN:  FINDINGS:    CHEST CT PULMONARY ANGIO with IV Contrast:  FINDINGS:  MEDICATIONS  (STANDING):  losartan 50milliGRAM(s) Oral daily  atorvastatin 80milliGRAM(s) Oral at bedtime  aspirin  chewable 81milliGRAM(s) Oral daily  dextrose 5%. 1000milliLiter(s) IV Continuous <Continuous>  apixaban 5milliGRAM(s) Oral two times a day  carvedilol 25milliGRAM(s) Oral every 12 hours  spironolactone 25milliGRAM(s) Oral daily    MEDICATIONS  (PRN):  guaiFENesin/dextromethorphan  Syrup 10milliLiter(s) Oral every 6 hours PRN Cough  magnesium hydroxide Suspension 30milliLiter(s) Oral daily PRN Constipation      FAMILY HISTORY:      SOCIAL HISTORY:    CIGARETTES:    ALCOHOL:    REVIEW OF SYSTEMS:    CONSTITUTIONAL: No fever, weight loss, chills, shakes, or fatigue  EYES: No eye pain, visual disturbances, or discharge  ENMT:  No difficulty hearing, tinnitus, vertigo; No sinus or throat pain  NECK: No pain or stiffness  BREASTS: No pain, masses, or nipple discharge  RESPIRATORY: No cough, wheezing, hemoptysis, or shortness of breath  CARDIOVASCULAR: No chest pain, dyspnea, palpitations, dizziness, syncope, paroxysmal nocturnal dyspnea, orthopnea, or arm or leg swelling  GASTROINTESTINAL: No abdominal  or epigastric pain, nausea, vomiting, hematemesis, diarrhea, constipation, melena or bright red blood.  GENITOURINARY: No dysuria, nocturia, hematuria, or urinary incontinence  NEUROLOGICAL: No headaches, memory loss, slurred speech, limb weakness, loss of strength, numbness, or tremors  SKIN: No itching, burning, rashes, or lesions   LYMPH NODES: No enlarged glands  ENDOCRINE: No heat or cold intolerance, or hair loss  MUSCULOSKELETAL: No joint pain or swelling, muscle, back, or extremity pain  PSYCHIATRIC: No depression, anxiety, or difficulty sleeping  HEME/LYMPH: No easy bruising or bleeding gums  ALLERY AND IMMUNOLOGIC: No hives or rash.      Vital Signs Last 24 Hrs  T(C): 36.6, Max: 37.3 (03-20 @ 01:20)  T(F): 97.8, Max: 99.2 (03-20 @ 01:20)  HR: 66 (66 - 108)  BP: 150/63 (101/75 - 150/63)  BP(mean): --  RR: 18 (17 - 20)  SpO2: 100% (97% - 100%)    PHYSICAL EXAM:    GENERAL: In no apparent distress, well nourished, and hydrated.  HEAD:  Atraumatic, Normocephalic  EYES: EOMI, PERRLA, conjunctiva and sclera clear  ENMT: No tonsillar erythema, exudates, or enlargement; Moist mucous membranes, Good dentition, No lesions  NECK: Supple and normal thyroid.  No JVD or carotid bruit.  Carotid pulse is 2+ bilaterally.  HEART: Regular rate and rhythm; No murmurs, rubs, or gallops.  PULMONARY: rhonchi  to auscultation and perfusion.  No rales, wheezing, or rhonchi bilaterally.  ABDOMEN: Soft, Nontender, Nondistended; Bowel sounds present  EXTREMITIES:  2+ Peripheral Pulses, No clubbing, cyanosis, +edema  LYMPH: No lymphadenopathy noted  NEUROLOGICAL: Grossly nonfocal          INTERPRETATION OF TELEMETRY:    ECG:    I&O's Detail    I & Os for current day (as of 20 Mar 2017 21:50)  =============================================  IN:    dextrose 5%.: 360 ml    Oral Fluid: 300 ml    Total IN: 660 ml  ---------------------------------------------  OUT:    Total OUT: 0 ml  ---------------------------------------------  Total NET: 660 ml      LABS:                        12.7   5.5   )-----------( 145      ( 20 Mar 2017 08:17 )             37.5     20 Mar 2017 08:17    144    |  105    |  10     ----------------------------<  77     3.7     |  29     |  0.72     Ca    8.2        20 Mar 2017 08:17          PT/INR - ( 20 Mar 2017 08:17 )   PT: 16.5 sec;   INR: 1.47 ratio             BNP  I&O's Detail    I & Os for current day (as of 20 Mar 2017 21:50)  =============================================  IN:    dextrose 5%.: 360 ml    Oral Fluid: 300 ml    Total IN: 660 ml  ---------------------------------------------  OUT:    Total OUT: 0 ml  ---------------------------------------------  Total NET: 660 ml    Daily     Daily     RADIOLOGY & ADDITIONAL STUDIES:
Vascular Attending: Dr. Wade      HPI:  84 year old female with h/o HTN presented to the ED c/o sob for the last few days associated with a dry cough (-) chest pain (-) fevers (-). Patient has h/o afib which pt has stopped taking coumadin, pt noted to have acute stroke in the ER, tPA was given and pt was transfer to ICU. (16 Mar 2017 22:56)  Patient cut LLE on car door en route to ED; ED attending sutured laceration.     PAST MEDICAL & SURGICAL HISTORY:  HTN (hypertension)  No pertinent past medical history  No significant past surgical history      REVIEW OF SYSTEMS unremarkable    MEDICATIONS  (STANDING):  losartan 50milliGRAM(s) Oral daily  atorvastatin 80milliGRAM(s) Oral at bedtime  aspirin  chewable 81milliGRAM(s) Oral daily  dextrose 5%. 1000milliLiter(s) IV Continuous <Continuous>  carvedilol 25milliGRAM(s) Oral every 12 hours  spironolactone 25milliGRAM(s) Oral daily  heparin  Infusion. Unit(s)/Hr IV Continuous <Continuous>    MEDICATIONS  (PRN):  guaiFENesin/dextromethorphan  Syrup 10milliLiter(s) Oral every 6 hours PRN Cough  magnesium hydroxide Suspension 30milliLiter(s) Oral daily PRN Constipation      Allergies    penicillin (Unknown)    Vital Signs Last 24 Hrs  T(C): 36.1, Max: 36.8 (03-21 @ 17:55)  T(F): 97, Max: 98.2 (03-21 @ 17:55)  HR: 81 (61 - 88)  BP: 101/66 (91/44 - 143/77)  BP(mean): --  RR: 17 (17 - 18)  SpO2: 96% (96% - 98%)    PHYSICAL EXAM:  GENERAL: well-groomed, well-developed, left facial droop  NECK: Supple, No JVD  CHEST/LUNG: Clear to auscultation bilaterally; No rales, rhonchi, wheezing  HEART: irregularly irregular  ABDOMEN: +BS, soft, nontender  EXTREMITIES: Anterior LLE at midcalf with sutures and venous insufficiency.  2+ Peripheral Pulses, No clubbing, cyanosis, or edema    LABS:                        12.5   4.8   )-----------( 138      ( 22 Mar 2017 07:19 )             37.4     22 Mar 2017 07:19    143    |  106    |  8      ----------------------------<  86     4.3     |  29     |  0.63     Ca    8.2        22 Mar 2017 07:19      Impression: 84 year old female PMH HTN, a-fib non compliant with anticoagulation, diastolic heart failure admitted with pneumonia and leg leg laceration requiring sutures . While waiting for medical bed in ER, pt developed a-fib RVR, acute mental status change with dysarthria, left hemiplegia due to acute R MCA infarct s/p tPA.   Plan:  -dress wound with xeroform and ACE wrap daily  -patient to follow up as outpatient in wound care center  -continue all medical management/supportive care

## 2017-03-22 NOTE — PROGRESS NOTE ADULT - ASSESSMENT
awaek alert speech fluent s/p tpa  left arm 4/5 improved  for  pt rehab  hx opf afib  on  lovenox
awaek alert speepch fluent  afib s/p tpa  r cva  arm leg 4/5  discuss with family  for rehab htn chf
awake alert s/p tpa  arm leg 4/5 htn for pt rehab
awake alert speech fluent  arm leg 4/5 r cva s/p tpa htn chf  afib on eliquis  for pt rehab
events noted mri brain r cva  arm leg 3/5 for rehab  afib onn eliquis 2.5 mg bid  will follow
s/p tpa ct head no acute path hx of afib  on lovenox  arm leg 4/5 f or echo doppler pt for rehab
84 year old female S/P Tpa for CVA  now with complaint of increased weakness on left
84 year old female with h/o HTN presents today c/o sob for the last few days associated with a dry cough (-) chest pain (-) fevers (-). pt has h/o afib which pt has stopped taking coumadin, pt noted to have acute stroke in the ER, tPA was given and pt was transfer to ICU.    -Repeat CT head without any bleed  -carotid doppler  -MRI head today  -Neurolgy fup for restarting the AC for afib  -RML infiltrate, possible asp? pna, cont on levaquin  -start on metoprolol for rate control  -resume anti-HTN if the BP is above 160  -wound care for the leg wound    DVT PPX
Pt is an 85 yo BF with h/o A fib and HTN initially presented 2 to SOB. While in the ER became altered, with L facial droop and L sided weakness. Pt was able to move the R side and follow simple commands; code stroke and pt sent for CT head. s/p TPA    CVS/ Neuro: Cont Asa + Statin + ARB and BB/ F/u as per Neuro  HEME: Pt started on full AC as A fib with the most likely cause of the stroke  FEN: Po diet  PT/OT  May transfer to tele    CCT 35 min

## 2017-03-22 NOTE — PROGRESS NOTE ADULT - SUBJECTIVE AND OBJECTIVE BOX
Patient is a 84y old  Female who presents with a chief complaint of sob (17 Mar 2017 08:52) SOB CVA NSTEMI        PAST MEDICAL & SURGICAL HISTORY:  HTN (hypertension)  No pertinent past medical history  No significant past surgical history      Summary of admission HPI: NSTEMI CVA                 PREVIOUS DIAGNOSTIC TESTING:      ECHO  FINDINGS: LVEF 35%    STRESS  FINDINGS:    CATHETERIZATION  FINDINGS:    ELECTROPHYSIOLOGY STUDY  FINDINGS:    CAROTID ULTRASOUND:  FINDINGS    VENOUS DUPLEX SCAN:  FINDINGS:    CHEST CT PULMONARY ANGIO with IV Contrast:  FINDINGS:  MEDICATIONS  (STANDING):  losartan 50milliGRAM(s) Oral daily  atorvastatin 80milliGRAM(s) Oral at bedtime  aspirin  chewable 81milliGRAM(s) Oral daily  dextrose 5%. 1000milliLiter(s) IV Continuous <Continuous>  carvedilol 25milliGRAM(s) Oral every 12 hours  spironolactone 25milliGRAM(s) Oral daily  heparin  Infusion. Unit(s)/Hr IV Continuous <Continuous>    MEDICATIONS  (PRN):  guaiFENesin/dextromethorphan  Syrup 10milliLiter(s) Oral every 6 hours PRN Cough  magnesium hydroxide Suspension 30milliLiter(s) Oral daily PRN Constipation      FAMILY HISTORY:      SOCIAL HISTORY:    CIGARETTES:    ALCOHOL:    REVIEW OF SYSTEMS:    CONSTITUTIONAL: No fever, weight loss, chills, shakes, or fatigue  EYES: No eye pain, visual disturbances, or discharge  ENMT:  No difficulty hearing, tinnitus, vertigo; No sinus or throat pain  NECK: No pain or stiffness  BREASTS: No pain, masses, or nipple discharge  RESPIRATORY: No cough, wheezing, hemoptysis, or shortness of breath  CARDIOVASCULAR: No chest pain, dyspnea, palpitations, dizziness, syncope, paroxysmal nocturnal dyspnea, orthopnea, or arm or leg swelling  GASTROINTESTINAL: No abdominal  or epigastric pain, nausea, vomiting, hematemesis, diarrhea, constipation, melena or bright red blood.  GENITOURINARY: No dysuria, nocturia, hematuria, or urinary incontinence  NEUROLOGICAL: No headaches, memory loss, slurred speech, limb weakness, loss of strength, numbness, or tremors  SKIN: No itching, burning, rashes, or lesions   LYMPH NODES: No enlarged glands  ENDOCRINE: No heat or cold intolerance, or hair loss  MUSCULOSKELETAL: No joint pain or swelling, muscle, back, or extremity pain  PSYCHIATRIC: No depression, anxiety, or difficulty sleeping  HEME/LYMPH: No easy bruising or bleeding gums  ALLERY AND IMMUNOLOGIC: No hives or rash.      Vital Signs Last 24 Hrs  T(C): 36.3, Max: 36.8 (03-21 @ 17:55)  T(F): 97.4, Max: 98.2 (03-21 @ 17:55)  HR: 88 (70 - 88)  BP: 108/51 (91/44 - 143/77)  BP(mean): --  RR: 17 (17 - 23)  SpO2: 96% (96% - 98%)    PHYSICAL EXAM:    GENERAL: In no apparent distress, well nourished, and hydrated.  HEAD:  Atraumatic, Normocephalic  EYES: EOMI, PERRLA, conjunctiva and sclera clear  ENMT: No tonsillar erythema, exudates, or enlargement; Moist mucous membranes, Good dentition, No lesions  NECK: Supple and normal thyroid.  No JVD or carotid bruit.  Carotid pulse is 2+ bilaterally.  HEART: Regular rate and rhythm; No murmurs, rubs, or gallops.  PULMONARY: Rhonchi  to auscultation and perfusion.  No rales, wheezing, or rhonchi bilaterally.  ABDOMEN: Soft, Nontender, Nondistended; Bowel sounds present  EXTREMITIES:  2+ Peripheral Pulses, No clubbing, cyanosis, + edema  LYMPH: No lymphadenopathy noted  NEUROLOGICAL: Grossly nonfocal          INTERPRETATION OF TELEMETRY:    ECG:    I&O's Detail    I & Os for current day (as of 22 Mar 2017 09:15)  =============================================  IN:    Oral Fluid: 720 ml    dextrose 5%.: 360 ml    Total IN: 1080 ml  ---------------------------------------------  OUT:    Voided: 300 ml    Total OUT: 300 ml  ---------------------------------------------  Total NET: 780 ml      LABS:                        12.5   4.8   )-----------( 138      ( 22 Mar 2017 07:19 )             37.4     22 Mar 2017 07:19    143    |  106    |  8      ----------------------------<  86     4.3     |  29     |  0.63     Ca    8.2        22 Mar 2017 07:19              BNP  I&O's Detail    I & Os for current day (as of 22 Mar 2017 09:15)  =============================================  IN:    Oral Fluid: 720 ml    dextrose 5%.: 360 ml    Total IN: 1080 ml  ---------------------------------------------  OUT:    Voided: 300 ml    Total OUT: 300 ml  ---------------------------------------------  Total NET: 780 ml    Daily     Daily     RADIOLOGY & ADDITIONAL STUDIES:

## 2017-03-23 ENCOUNTER — INPATIENT (INPATIENT)
Facility: HOSPITAL | Age: 82
LOS: 4 days | Discharge: HOME HEALTH | DRG: 281 | End: 2017-03-28
Attending: SPECIALIST | Admitting: SPECIALIST
Payer: MEDICARE

## 2017-03-23 VITALS
WEIGHT: 266.1 LBS | OXYGEN SATURATION: 98 % | HEIGHT: 64 IN | HEART RATE: 69 BPM | RESPIRATION RATE: 18 BRPM | TEMPERATURE: 98 F | SYSTOLIC BLOOD PRESSURE: 118 MMHG | DIASTOLIC BLOOD PRESSURE: 84 MMHG

## 2017-03-23 VITALS — HEART RATE: 59 BPM | OXYGEN SATURATION: 98 % | SYSTOLIC BLOOD PRESSURE: 115 MMHG | DIASTOLIC BLOOD PRESSURE: 68 MMHG

## 2017-03-23 DIAGNOSIS — R06.02 SHORTNESS OF BREATH: ICD-10-CM

## 2017-03-23 LAB
APTT BLD: 41 SEC — HIGH (ref 27.5–37.4)
APTT BLD: 80.3 SEC — HIGH (ref 27.5–37.4)
HCT VFR BLD CALC: 35.3 % — SIGNIFICANT CHANGE UP (ref 34.5–45)
HGB BLD-MCNC: 11.7 G/DL — SIGNIFICANT CHANGE UP (ref 11.5–15.5)
MCHC RBC-ENTMCNC: 28.6 PG — SIGNIFICANT CHANGE UP (ref 27–34)
MCHC RBC-ENTMCNC: 33.1 GM/DL — SIGNIFICANT CHANGE UP (ref 32–36)
MCV RBC AUTO: 86.6 FL — SIGNIFICANT CHANGE UP (ref 80–100)
PLATELET # BLD AUTO: 134 K/UL — LOW (ref 150–400)
RBC # BLD: 4.08 M/UL — SIGNIFICANT CHANGE UP (ref 3.8–5.2)
RBC # FLD: 15.8 % — HIGH (ref 11–15)
WBC # BLD: 4.9 K/UL — SIGNIFICANT CHANGE UP (ref 3.8–10.5)
WBC # FLD AUTO: 4.9 K/UL — SIGNIFICANT CHANGE UP (ref 3.8–10.5)

## 2017-03-23 PROCEDURE — 93010 ELECTROCARDIOGRAM REPORT: CPT

## 2017-03-23 RX ORDER — ATORVASTATIN CALCIUM 80 MG/1
1 TABLET, FILM COATED ORAL
Qty: 30 | Refills: 0 | OUTPATIENT
Start: 2017-03-23 | End: 2017-04-22

## 2017-03-23 RX ORDER — SODIUM CHLORIDE 9 MG/ML
3 INJECTION INTRAMUSCULAR; INTRAVENOUS; SUBCUTANEOUS EVERY 8 HOURS
Qty: 0 | Refills: 0 | Status: DISCONTINUED | OUTPATIENT
Start: 2017-03-23 | End: 2017-03-28

## 2017-03-23 RX ORDER — ASPIRIN/CALCIUM CARB/MAGNESIUM 324 MG
1 TABLET ORAL
Qty: 0 | Refills: 0 | COMMUNITY
Start: 2017-03-23

## 2017-03-23 RX ORDER — SPIRONOLACTONE 25 MG/1
25 TABLET, FILM COATED ORAL DAILY
Qty: 0 | Refills: 0 | Status: DISCONTINUED | OUTPATIENT
Start: 2017-03-23 | End: 2017-03-28

## 2017-03-23 RX ORDER — CARVEDILOL PHOSPHATE 80 MG/1
1 CAPSULE, EXTENDED RELEASE ORAL
Qty: 0 | Refills: 0 | COMMUNITY
Start: 2017-03-23

## 2017-03-23 RX ORDER — SPIRONOLACTONE 25 MG/1
1 TABLET, FILM COATED ORAL
Qty: 0 | Refills: 0 | COMMUNITY
Start: 2017-03-23

## 2017-03-23 RX ORDER — HEPARIN SODIUM 5000 [USP'U]/ML
1100 INJECTION INTRAVENOUS; SUBCUTANEOUS
Qty: 25000 | Refills: 0 | Status: DISCONTINUED | OUTPATIENT
Start: 2017-03-23 | End: 2017-03-24

## 2017-03-23 RX ORDER — PANTOPRAZOLE SODIUM 20 MG/1
40 TABLET, DELAYED RELEASE ORAL
Qty: 0 | Refills: 0 | Status: DISCONTINUED | OUTPATIENT
Start: 2017-03-23 | End: 2017-03-27

## 2017-03-23 RX ORDER — CARVEDILOL PHOSPHATE 80 MG/1
25 CAPSULE, EXTENDED RELEASE ORAL EVERY 12 HOURS
Qty: 0 | Refills: 0 | Status: DISCONTINUED | OUTPATIENT
Start: 2017-03-23 | End: 2017-03-28

## 2017-03-23 RX ORDER — ISOSORBIDE MONONITRATE 60 MG/1
30 TABLET, EXTENDED RELEASE ORAL DAILY
Qty: 0 | Refills: 0 | Status: DISCONTINUED | OUTPATIENT
Start: 2017-03-23 | End: 2017-03-28

## 2017-03-23 RX ORDER — ASPIRIN/CALCIUM CARB/MAGNESIUM 324 MG
81 TABLET ORAL DAILY
Qty: 0 | Refills: 0 | Status: DISCONTINUED | OUTPATIENT
Start: 2017-03-23 | End: 2017-03-28

## 2017-03-23 RX ORDER — HEPARIN SODIUM 5000 [USP'U]/ML
6000 INJECTION INTRAVENOUS; SUBCUTANEOUS EVERY 6 HOURS
Qty: 0 | Refills: 0 | Status: DISCONTINUED | OUTPATIENT
Start: 2017-03-23 | End: 2017-03-24

## 2017-03-23 RX ORDER — LOSARTAN POTASSIUM 100 MG/1
50 TABLET, FILM COATED ORAL DAILY
Qty: 0 | Refills: 0 | Status: DISCONTINUED | OUTPATIENT
Start: 2017-03-23 | End: 2017-03-24

## 2017-03-23 RX ORDER — ATORVASTATIN CALCIUM 80 MG/1
80 TABLET, FILM COATED ORAL AT BEDTIME
Qty: 0 | Refills: 0 | Status: DISCONTINUED | OUTPATIENT
Start: 2017-03-23 | End: 2017-03-28

## 2017-03-23 RX ADMIN — HEPARIN SODIUM 900 UNIT(S)/HR: 5000 INJECTION INTRAVENOUS; SUBCUTANEOUS at 04:08

## 2017-03-23 RX ADMIN — ISOSORBIDE MONONITRATE 30 MILLIGRAM(S): 60 TABLET, EXTENDED RELEASE ORAL at 20:51

## 2017-03-23 RX ADMIN — HEPARIN SODIUM 1100 UNIT(S)/HR: 5000 INJECTION INTRAVENOUS; SUBCUTANEOUS at 11:52

## 2017-03-23 RX ADMIN — CARVEDILOL PHOSPHATE 25 MILLIGRAM(S): 80 CAPSULE, EXTENDED RELEASE ORAL at 16:53

## 2017-03-23 RX ADMIN — LOSARTAN POTASSIUM 50 MILLIGRAM(S): 100 TABLET, FILM COATED ORAL at 06:31

## 2017-03-23 RX ADMIN — PANTOPRAZOLE SODIUM 40 MILLIGRAM(S): 20 TABLET, DELAYED RELEASE ORAL at 20:51

## 2017-03-23 RX ADMIN — HEPARIN SODIUM 1100 UNIT(S)/HR: 5000 INJECTION INTRAVENOUS; SUBCUTANEOUS at 16:15

## 2017-03-23 RX ADMIN — Medication 81 MILLIGRAM(S): at 11:51

## 2017-03-23 RX ADMIN — SODIUM CHLORIDE 3 MILLILITER(S): 9 INJECTION INTRAMUSCULAR; INTRAVENOUS; SUBCUTANEOUS at 20:51

## 2017-03-23 RX ADMIN — CARVEDILOL PHOSPHATE 25 MILLIGRAM(S): 80 CAPSULE, EXTENDED RELEASE ORAL at 06:31

## 2017-03-23 RX ADMIN — SPIRONOLACTONE 25 MILLIGRAM(S): 25 TABLET, FILM COATED ORAL at 06:31

## 2017-03-23 RX ADMIN — ATORVASTATIN CALCIUM 80 MILLIGRAM(S): 80 TABLET, FILM COATED ORAL at 20:51

## 2017-03-23 NOTE — PROGRESS NOTE ADULT - SUBJECTIVE AND OBJECTIVE BOX
Patient is a 84y old  Female who presents with a chief complaint of sob (17 Mar 2017 08:52) NSTEMI        PAST MEDICAL & SURGICAL HISTORY:  HTN (hypertension)  No pertinent past medical history  No significant past surgical history      Summary of admission HPI: NSTEMI CHF                PREVIOUS DIAGNOSTIC TESTING:      ECHO  FINDINGS:    STRESS  FINDINGS:    CATHETERIZATION  FINDINGS:    ELECTROPHYSIOLOGY STUDY  FINDINGS:    CAROTID ULTRASOUND:  FINDINGS    VENOUS DUPLEX SCAN:  FINDINGS:    CHEST CT PULMONARY ANGIO with IV Contrast:  FINDINGS:  MEDICATIONS  (STANDING):  losartan 50milliGRAM(s) Oral daily  atorvastatin 80milliGRAM(s) Oral at bedtime  aspirin  chewable 81milliGRAM(s) Oral daily  dextrose 5%. 1000milliLiter(s) IV Continuous <Continuous>  carvedilol 25milliGRAM(s) Oral every 12 hours  spironolactone 25milliGRAM(s) Oral daily  heparin  Infusion. Unit(s)/Hr IV Continuous <Continuous>    MEDICATIONS  (PRN):  guaiFENesin/dextromethorphan  Syrup 10milliLiter(s) Oral every 6 hours PRN Cough  magnesium hydroxide Suspension 30milliLiter(s) Oral daily PRN Constipation      FAMILY HISTORY:      SOCIAL HISTORY:    CIGARETTES:    ALCOHOL:    REVIEW OF SYSTEMS:    CONSTITUTIONAL: No fever, weight loss, chills, shakes, or fatigue  EYES: No eye pain, visual disturbances, or discharge  ENMT:  No difficulty hearing, tinnitus, vertigo; No sinus or throat pain  NECK: No pain or stiffness  BREASTS: No pain, masses, or nipple discharge  RESPIRATORY: No cough, wheezing, hemoptysis, or shortness of breath  CARDIOVASCULAR: No chest pain, dyspnea, palpitations, dizziness, syncope, paroxysmal nocturnal dyspnea, orthopnea, or arm or leg swelling  GASTROINTESTINAL: No abdominal  or epigastric pain, nausea, vomiting, hematemesis, diarrhea, constipation, melena or bright red blood.  GENITOURINARY: No dysuria, nocturia, hematuria, or urinary incontinence  NEUROLOGICAL: No headaches, memory loss, slurred speech, limb weakness, loss of strength, numbness, or tremors  SKIN: No itching, burning, rashes, or lesions   LYMPH NODES: No enlarged glands  ENDOCRINE: No heat or cold intolerance, or hair loss  MUSCULOSKELETAL: No joint pain or swelling, muscle, back, or extremity pain  PSYCHIATRIC: No depression, anxiety, or difficulty sleeping  HEME/LYMPH: No easy bruising or bleeding gums  ALLERY AND IMMUNOLOGIC: No hives or rash.      Vital Signs Last 24 Hrs  T(C): 37.6, Max: 37.6 (03-23 @ 04:54)  T(F): 99.6, Max: 99.6 (03-23 @ 04:54)  HR: 97 (42 - 97)  BP: 119/59 (101/66 - 148/70)  BP(mean): --  RR: 16 (16 - 20)  SpO2: 100% (96% - 100%)    PHYSICAL EXAM:    GENERAL: In no apparent distress, well nourished, and hydrated.  HEAD:  Atraumatic, Normocephalic  EYES: EOMI, PERRLA, conjunctiva and sclera clear  ENMT: No tonsillar erythema, exudates, or enlargement; Moist mucous membranes, Good dentition, No lesions  NECK: Supple and normal thyroid.  No JVD or carotid bruit.  Carotid pulse is 2+ bilaterally.  HEART: Regular rate and rhythm; No murmurs, rubs, or gallops.  PULMONARY: Clear to auscultation and perfusion.  No rales, wheezing, or rhonchi bilaterally.  ABDOMEN: Soft, Nontender, Nondistended; Bowel sounds present  EXTREMITIES:  2+ Peripheral Pulses, No clubbing, cyanosis, or edema  LYMPH: No lymphadenopathy noted  NEUROLOGICAL: Grossly nonfocal          INTERPRETATION OF TELEMETRY:    ECG:    I&O's Detail    I & Os for current day (as of 23 Mar 2017 10:24)  =============================================  IN:    Oral Fluid: 720 ml    dextrose 5%.: 360 ml    heparin  Infusion.: 127 ml    Total IN: 1207 ml  ---------------------------------------------  OUT:    Voided: 550 ml    Total OUT: 550 ml  ---------------------------------------------  Total NET: 657 ml      LABS:                        11.7   4.9   )-----------( 134      ( 23 Mar 2017 01:31 )             35.3     22 Mar 2017 07:19    143    |  106    |  8      ----------------------------<  86     4.3     |  29     |  0.63     Ca    8.2        22 Mar 2017 07:19          PTT - ( 23 Mar 2017 01:31 )  PTT:80.3 sec    BNP  I&O's Detail    I & Os for current day (as of 23 Mar 2017 10:24)  =============================================  IN:    Oral Fluid: 720 ml    dextrose 5%.: 360 ml    heparin  Infusion.: 127 ml    Total IN: 1207 ml  ---------------------------------------------  OUT:    Voided: 550 ml    Total OUT: 550 ml  ---------------------------------------------  Total NET: 657 ml    Daily     Daily     RADIOLOGY & ADDITIONAL STUDIES:

## 2017-03-23 NOTE — H&P CARDIOLOGY - HISTORY OF PRESENT ILLNESS
84 year old female with h/o HTN presents today c/o sob for the last few days associated with a dry cough (-) chest pain (-) fevers (-). pt has h/o afib which pt has stopped taking coumadin, pt noted to have acute stroke in the ER, tPA was given and pt was transfer to ICU. echo done and she has EF of 30 % 84 year old female with h/o HTN & Afib presented to [facility] on 3/16/17 presents with the  c/o sob for the last few days associated with a dry cough (-) chest pain (-) fevers (-). Patient was on Coumadin, which she stopped several months ago by herself.  Patient noted to have acute stroke in the ER, tPA was given and pt was transfer to ICU. Patient has left side weakness from the stroke ( walks with walker). Noticed to have elevated troponin on the 16th, which came down to normal the next day. Echo done and she has EF of 30 %. Denies any chest pain, palpitation, dizziness/ syncope. Seen & evaluated by cardiologist, dr. Fairchild & transferred  to [facility] for further cardiac evaluation. Patient has LE swelling with left leg venous stasis ulcer.    Patient was started on Eliquis which is on hold since the 22nd. heparin gtt @ 11 cc/hour. 84 year old female with h/o HTN & Afib presented to [facility] on 3/16/17 with the  c/o sob for the last few days associated with a dry cough (-) chest pain (-) fevers (-). Patient was on Coumadin, which she stopped several months ago by herself.  Patient noted to have acute stroke in the ER, tPA was given and pt was transfer to ICU. Patient has left side weakness from the stroke ( walks with walker). Noticed to have elevated troponin on the 16th, which came down to normal the next day. Echo done and she has EF of 30 %. Denies any chest pain, palpitation, dizziness/ syncope. Seen & evaluated by cardiologist, dr. Fairchild & transferred  to [facility] for further cardiac evaluation. Patient has LE swelling with left leg venous stasis ulcer.    Patient was started on Eliquis which is on hold since the 22nd. heparin gtt @ 11 cc/hour. 84 year old female with h/o HTN & Afib presented to [facility] on 3/16/17 with the  c/o sob for the last few days associated with a dry cough. Patient was on Coumadin, which she stopped several months ago by herself.  Patient noted to have acute stroke in the ER, tPA was given and pt was transfer to ICU, now has left side weakness from the stroke ( walks with walker). Noticed to have elevated troponin on the 16th, which came down to normal the next day. Echo done and she has EF of 30 %. Denies any chest pain, palpitation, dizziness/ syncope. Seen & evaluated by cardiologist, dr. Fairchild & transferred  to [facility] for further cardiac evaluation. Patient has LE swelling with left leg venous stasis ulcer.    Patient was started on Eliquis which is on hold since the 22nd. heparin gtt @ 11 cc/hour. 84 year old female with h/o HTN & Afib presented to [facility] on 3/16/17 with the  c/o sob for the last few days associated with a dry cough. Patient was on Coumadin, which she stopped several months ago by herself.  Patient noted to have acute stroke in the ER, tPA was given and pt was transfer to ICU, now has left side weakness from the stroke ( walks with walker). Noticed to have elevated troponin on the 16th,( Troponin I0.059-0.032), which came down to normal the next day. Echo done and she has EF of 30 %. Denies any chest pain, palpitation, dizziness/ syncope. Seen & evaluated by cardiologist, dr. Fairchild & transferred  to [facility] for further cardiac evaluation. Patient has LE swelling with left leg venous stasis ulcer.    Patient was started on Eliquis which is on hold since the 22nd. heparin gtt @ 11 cc/hour.  BNP on 16th 3400

## 2017-03-23 NOTE — PROGRESS NOTE ADULT - PROBLEM SELECTOR PROBLEM 1
CVA (cerebral vascular accident)
CVA (cerebral vascular accident)
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
Dilated cardiomyopathy
NSTEMI (non-ST elevated myocardial infarction)
CVA (cerebral vascular accident)

## 2017-03-23 NOTE — PROGRESS NOTE ADULT - PROBLEM SELECTOR PLAN 1
MRI brain
MRI pending
aspirin betablocker schedule cardiac cath today.
aspirin heparin would need cardiac cath.
diuresis preload and afterload reduction. would need cardiac cath.
for cath
neuro follow up
neuro follow up
neurology follow up
Pt with new complaint of increased weakness   will repeat CT scan urgent   discussed with Dr Sifuentes

## 2017-03-24 LAB
ANION GAP SERPL CALC-SCNC: 10 MMOL/L — SIGNIFICANT CHANGE UP (ref 5–17)
APTT BLD: 128.9 SEC — CRITICAL HIGH (ref 27.5–37.4)
APTT BLD: 85.5 SEC — HIGH (ref 27.5–37.4)
BUN SERPL-MCNC: 9 MG/DL — SIGNIFICANT CHANGE UP (ref 7–23)
CALCIUM SERPL-MCNC: 8.9 MG/DL — SIGNIFICANT CHANGE UP (ref 8.4–10.5)
CHLORIDE SERPL-SCNC: 102 MMOL/L — SIGNIFICANT CHANGE UP (ref 96–108)
CO2 SERPL-SCNC: 25 MMOL/L — SIGNIFICANT CHANGE UP (ref 22–31)
CREAT SERPL-MCNC: 0.73 MG/DL — SIGNIFICANT CHANGE UP (ref 0.5–1.3)
GLUCOSE SERPL-MCNC: 96 MG/DL — SIGNIFICANT CHANGE UP (ref 70–99)
HCT VFR BLD CALC: 37.5 % — SIGNIFICANT CHANGE UP (ref 34.5–45)
HGB BLD-MCNC: 12 G/DL — SIGNIFICANT CHANGE UP (ref 11.5–15.5)
MCHC RBC-ENTMCNC: 29.1 PG — SIGNIFICANT CHANGE UP (ref 27–34)
MCHC RBC-ENTMCNC: 32.1 GM/DL — SIGNIFICANT CHANGE UP (ref 32–36)
MCV RBC AUTO: 90.7 FL — SIGNIFICANT CHANGE UP (ref 80–100)
PLATELET # BLD AUTO: 135 K/UL — LOW (ref 150–400)
POTASSIUM SERPL-MCNC: 4.6 MMOL/L — SIGNIFICANT CHANGE UP (ref 3.5–5.3)
POTASSIUM SERPL-SCNC: 4.6 MMOL/L — SIGNIFICANT CHANGE UP (ref 3.5–5.3)
RBC # BLD: 4.13 M/UL — SIGNIFICANT CHANGE UP (ref 3.8–5.2)
RBC # FLD: 14.5 % — SIGNIFICANT CHANGE UP (ref 10.3–14.5)
SODIUM SERPL-SCNC: 137 MMOL/L — SIGNIFICANT CHANGE UP (ref 135–145)
WBC # BLD: 5 K/UL — SIGNIFICANT CHANGE UP (ref 3.8–10.5)
WBC # FLD AUTO: 5 K/UL — SIGNIFICANT CHANGE UP (ref 3.8–10.5)

## 2017-03-24 PROCEDURE — 73030 X-RAY EXAM OF SHOULDER: CPT | Mod: 26,LT

## 2017-03-24 PROCEDURE — 93010 ELECTROCARDIOGRAM REPORT: CPT

## 2017-03-24 RX ORDER — LISINOPRIL 2.5 MG/1
5 TABLET ORAL DAILY
Qty: 0 | Refills: 0 | Status: DISCONTINUED | OUTPATIENT
Start: 2017-03-24 | End: 2017-03-28

## 2017-03-24 RX ORDER — APIXABAN 2.5 MG/1
5 TABLET, FILM COATED ORAL
Qty: 0 | Refills: 0 | Status: DISCONTINUED | OUTPATIENT
Start: 2017-03-25 | End: 2017-03-28

## 2017-03-24 RX ORDER — FUROSEMIDE 40 MG
40 TABLET ORAL DAILY
Qty: 0 | Refills: 0 | Status: DISCONTINUED | OUTPATIENT
Start: 2017-03-25 | End: 2017-03-27

## 2017-03-24 RX ORDER — ACETAMINOPHEN 500 MG
650 TABLET ORAL ONCE
Qty: 0 | Refills: 0 | Status: COMPLETED | OUTPATIENT
Start: 2017-03-24 | End: 2017-03-24

## 2017-03-24 RX ORDER — FUROSEMIDE 40 MG
40 TABLET ORAL ONCE
Qty: 0 | Refills: 0 | Status: COMPLETED | OUTPATIENT
Start: 2017-03-24 | End: 2017-03-24

## 2017-03-24 RX ADMIN — HEPARIN SODIUM 800 UNIT(S)/HR: 5000 INJECTION INTRAVENOUS; SUBCUTANEOUS at 02:41

## 2017-03-24 RX ADMIN — SODIUM CHLORIDE 3 MILLILITER(S): 9 INJECTION INTRAMUSCULAR; INTRAVENOUS; SUBCUTANEOUS at 22:33

## 2017-03-24 RX ADMIN — CARVEDILOL PHOSPHATE 25 MILLIGRAM(S): 80 CAPSULE, EXTENDED RELEASE ORAL at 17:01

## 2017-03-24 RX ADMIN — Medication 650 MILLIGRAM(S): at 00:34

## 2017-03-24 RX ADMIN — CARVEDILOL PHOSPHATE 25 MILLIGRAM(S): 80 CAPSULE, EXTENDED RELEASE ORAL at 05:27

## 2017-03-24 RX ADMIN — HEPARIN SODIUM 0 UNIT(S)/HR: 5000 INJECTION INTRAVENOUS; SUBCUTANEOUS at 01:33

## 2017-03-24 RX ADMIN — ISOSORBIDE MONONITRATE 30 MILLIGRAM(S): 60 TABLET, EXTENDED RELEASE ORAL at 14:45

## 2017-03-24 RX ADMIN — HEPARIN SODIUM 0 UNIT(S)/HR: 5000 INJECTION INTRAVENOUS; SUBCUTANEOUS at 09:43

## 2017-03-24 RX ADMIN — ATORVASTATIN CALCIUM 80 MILLIGRAM(S): 80 TABLET, FILM COATED ORAL at 22:43

## 2017-03-24 RX ADMIN — SODIUM CHLORIDE 3 MILLILITER(S): 9 INJECTION INTRAMUSCULAR; INTRAVENOUS; SUBCUTANEOUS at 05:28

## 2017-03-24 RX ADMIN — Medication 650 MILLIGRAM(S): at 01:30

## 2017-03-24 RX ADMIN — Medication 40 MILLIGRAM(S): at 14:45

## 2017-03-24 RX ADMIN — SODIUM CHLORIDE 3 MILLILITER(S): 9 INJECTION INTRAMUSCULAR; INTRAVENOUS; SUBCUTANEOUS at 14:47

## 2017-03-24 RX ADMIN — Medication 81 MILLIGRAM(S): at 14:45

## 2017-03-24 RX ADMIN — PANTOPRAZOLE SODIUM 40 MILLIGRAM(S): 20 TABLET, DELAYED RELEASE ORAL at 05:27

## 2017-03-24 RX ADMIN — LOSARTAN POTASSIUM 50 MILLIGRAM(S): 100 TABLET, FILM COATED ORAL at 05:27

## 2017-03-24 RX ADMIN — SPIRONOLACTONE 25 MILLIGRAM(S): 25 TABLET, FILM COATED ORAL at 05:27

## 2017-03-24 NOTE — PHYSICAL THERAPY INITIAL EVALUATION ADULT - MODALITIES TREATMENT COMMENTS
PT WC Eval for R lateral shin laceration from a trauma when pt closed the car door & hit her shin before entering [facility]. While at [place] pt's laceration sutured. Cleansed w/ NS, adaptic touch to wound, DSD to cover, sandy to secure. RN/Meka made aware PT WC Eval for L lateral shin laceration from a trauma when pt closed the car door & hit her shin before entering [facility]. While at [place] pt's laceration sutured. Cleansed w/ NS, adaptic touch to wound, DSD to cover, sandy to secure. RN/Meka made aware

## 2017-03-24 NOTE — PROVIDER CONTACT NOTE (OTHER) - BACKGROUND
Pt is s/p TPA for stroke on 3/18/17. Pt states that she hit her hand on the MRI machine. left hand is ecchymotic at the elbow and has slight swelling on the left shoulder.

## 2017-03-24 NOTE — PHYSICAL THERAPY INITIAL EVALUATION ADULT - PERTINENT HX OF CURRENT PROBLEM, REHAB EVAL
84yoF w/ h/o HTN & Afib presented to [facility] on 3/16/17 w/ c/o SOB xfew days associated w/ dry cough. Pt was on Coumadin, which she stopped several months ago by herself. Pt noted to have acute stroke in the ER, tPA was given & pt was transfer to ICU, now has L side weakness from the stroke ( walks w/ RW). Noticed to have elevated troponin on the 16th,(Troponin I0.059-0.032), which came down to normal the next day.

## 2017-03-24 NOTE — PROVIDER CONTACT NOTE (OTHER) - SITUATION
Pt c/o left shoulder pain initially she denied that she fell, but later remembered that she hit her shoulder while getting on for the MRI in [facility].

## 2017-03-24 NOTE — PHYSICAL THERAPY INITIAL EVALUATION ADULT - LEVEL OF INDEPENDENCE, REHAB EVAL
unable to assess bed mobility at time of eval as pt on bedrest until 14:00 pt rec'ed seated in chair besides bed

## 2017-03-24 NOTE — PHYSICAL THERAPY INITIAL EVALUATION ADULT - GENERAL OBSERVATIONS, REHAB EVAL
Pt rec'ed supine in bed in [facility], +tele, NAD, call bell in reach, pt on bedrest until 14:00 on 3/24 s/p angiogram

## 2017-03-24 NOTE — PHYSICAL THERAPY INITIAL EVALUATION ADULT - ADDITIONAL COMMENTS
Pt lives in a house w/ son, 3 steps to enter, no steps to negotiate once inside. PTA pt (I) w/ functional mobility & ADL w/ use of RW. Pt has grab bars in the bathroom.

## 2017-03-24 NOTE — PHYSICAL THERAPY INITIAL EVALUATION ADULT - PRECAUTIONS/LIMITATIONS, REHAB EVAL
Echo done & she has EF of 30 %. Denies any chest pain, palpitation, dizziness/ syncope. Seen & evaluated by cardiologist, Dr. Fairchild & transferred  to [facility] for further cardiac evaluation. Pt has LE swelling w/ left leg venous stasis ulcer. Echo done & she has EF of 30 %. Denies any chest pain, palpitation, dizziness/ syncope. Seen & evaluated by cardiologist, Dr. Fairchild & transferred  to [facility] for further cardiac evaluation. Pt has LE swelling w/ left leg venous stasis ulcer./no known precautions/limitations

## 2017-03-25 LAB
ANION GAP SERPL CALC-SCNC: 12 MMOL/L — SIGNIFICANT CHANGE UP (ref 5–17)
APTT BLD: 29.9 SEC — SIGNIFICANT CHANGE UP (ref 27.5–37.4)
BUN SERPL-MCNC: 14 MG/DL — SIGNIFICANT CHANGE UP (ref 7–23)
CALCIUM SERPL-MCNC: 9.1 MG/DL — SIGNIFICANT CHANGE UP (ref 8.4–10.5)
CHLORIDE SERPL-SCNC: 102 MMOL/L — SIGNIFICANT CHANGE UP (ref 96–108)
CO2 SERPL-SCNC: 28 MMOL/L — SIGNIFICANT CHANGE UP (ref 22–31)
CREAT SERPL-MCNC: 0.71 MG/DL — SIGNIFICANT CHANGE UP (ref 0.5–1.3)
GLUCOSE SERPL-MCNC: 85 MG/DL — SIGNIFICANT CHANGE UP (ref 70–99)
HCT VFR BLD CALC: 36.5 % — SIGNIFICANT CHANGE UP (ref 34.5–45)
HGB BLD-MCNC: 11.8 G/DL — SIGNIFICANT CHANGE UP (ref 11.5–15.5)
INR BLD: 1.28 RATIO — HIGH (ref 0.88–1.16)
MAGNESIUM SERPL-MCNC: 3.1 MG/DL — HIGH (ref 1.6–2.6)
MCHC RBC-ENTMCNC: 28.9 PG — SIGNIFICANT CHANGE UP (ref 27–34)
MCHC RBC-ENTMCNC: 32.3 GM/DL — SIGNIFICANT CHANGE UP (ref 32–36)
MCV RBC AUTO: 89.4 FL — SIGNIFICANT CHANGE UP (ref 80–100)
PHOSPHATE SERPL-MCNC: 3.6 MG/DL — SIGNIFICANT CHANGE UP (ref 2.5–4.5)
PLATELET # BLD AUTO: 146 K/UL — LOW (ref 150–400)
POTASSIUM SERPL-MCNC: 4.1 MMOL/L — SIGNIFICANT CHANGE UP (ref 3.5–5.3)
POTASSIUM SERPL-SCNC: 4.1 MMOL/L — SIGNIFICANT CHANGE UP (ref 3.5–5.3)
PROTHROM AB SERPL-ACNC: 14 SEC — HIGH (ref 9.8–12.7)
RBC # BLD: 4.09 M/UL — SIGNIFICANT CHANGE UP (ref 3.8–5.2)
RBC # FLD: 14.6 % — HIGH (ref 10.3–14.5)
SODIUM SERPL-SCNC: 142 MMOL/L — SIGNIFICANT CHANGE UP (ref 135–145)
WBC # BLD: 4.8 K/UL — SIGNIFICANT CHANGE UP (ref 3.8–10.5)
WBC # FLD AUTO: 4.8 K/UL — SIGNIFICANT CHANGE UP (ref 3.8–10.5)

## 2017-03-25 PROCEDURE — 99223 1ST HOSP IP/OBS HIGH 75: CPT | Mod: GC

## 2017-03-25 RX ADMIN — SODIUM CHLORIDE 3 MILLILITER(S): 9 INJECTION INTRAMUSCULAR; INTRAVENOUS; SUBCUTANEOUS at 22:02

## 2017-03-25 RX ADMIN — ATORVASTATIN CALCIUM 80 MILLIGRAM(S): 80 TABLET, FILM COATED ORAL at 22:01

## 2017-03-25 RX ADMIN — APIXABAN 5 MILLIGRAM(S): 2.5 TABLET, FILM COATED ORAL at 05:32

## 2017-03-25 RX ADMIN — ISOSORBIDE MONONITRATE 30 MILLIGRAM(S): 60 TABLET, EXTENDED RELEASE ORAL at 12:55

## 2017-03-25 RX ADMIN — CARVEDILOL PHOSPHATE 25 MILLIGRAM(S): 80 CAPSULE, EXTENDED RELEASE ORAL at 05:32

## 2017-03-25 RX ADMIN — Medication 40 MILLIGRAM(S): at 05:33

## 2017-03-25 RX ADMIN — APIXABAN 5 MILLIGRAM(S): 2.5 TABLET, FILM COATED ORAL at 17:24

## 2017-03-25 RX ADMIN — LISINOPRIL 5 MILLIGRAM(S): 2.5 TABLET ORAL at 05:32

## 2017-03-25 RX ADMIN — SODIUM CHLORIDE 3 MILLILITER(S): 9 INJECTION INTRAMUSCULAR; INTRAVENOUS; SUBCUTANEOUS at 05:30

## 2017-03-25 RX ADMIN — Medication 81 MILLIGRAM(S): at 12:55

## 2017-03-25 RX ADMIN — CARVEDILOL PHOSPHATE 25 MILLIGRAM(S): 80 CAPSULE, EXTENDED RELEASE ORAL at 17:24

## 2017-03-25 RX ADMIN — PANTOPRAZOLE SODIUM 40 MILLIGRAM(S): 20 TABLET, DELAYED RELEASE ORAL at 05:32

## 2017-03-25 RX ADMIN — SODIUM CHLORIDE 3 MILLILITER(S): 9 INJECTION INTRAMUSCULAR; INTRAVENOUS; SUBCUTANEOUS at 10:54

## 2017-03-25 RX ADMIN — SPIRONOLACTONE 25 MILLIGRAM(S): 25 TABLET, FILM COATED ORAL at 05:33

## 2017-03-25 NOTE — PROVIDER CONTACT NOTE (OTHER) - BACKGROUND
Pt intially admitted to [facility] on the 16th of march for a CVA, transferred to [facility] due to complaints of SOB. Diagnostic cath performed on 3/24.

## 2017-03-26 LAB
ANION GAP SERPL CALC-SCNC: 12 MMOL/L — SIGNIFICANT CHANGE UP (ref 5–17)
BUN SERPL-MCNC: 12 MG/DL — SIGNIFICANT CHANGE UP (ref 7–23)
CALCIUM SERPL-MCNC: 8.9 MG/DL — SIGNIFICANT CHANGE UP (ref 8.4–10.5)
CHLORIDE SERPL-SCNC: 101 MMOL/L — SIGNIFICANT CHANGE UP (ref 96–108)
CO2 SERPL-SCNC: 26 MMOL/L — SIGNIFICANT CHANGE UP (ref 22–31)
CREAT SERPL-MCNC: 0.67 MG/DL — SIGNIFICANT CHANGE UP (ref 0.5–1.3)
GLUCOSE SERPL-MCNC: 83 MG/DL — SIGNIFICANT CHANGE UP (ref 70–99)
HCT VFR BLD CALC: 36.1 % — SIGNIFICANT CHANGE UP (ref 34.5–45)
HGB BLD-MCNC: 11.6 G/DL — SIGNIFICANT CHANGE UP (ref 11.5–15.5)
MAGNESIUM SERPL-MCNC: 1.8 MG/DL — SIGNIFICANT CHANGE UP (ref 1.6–2.6)
MCHC RBC-ENTMCNC: 28.6 PG — SIGNIFICANT CHANGE UP (ref 27–34)
MCHC RBC-ENTMCNC: 32.2 GM/DL — SIGNIFICANT CHANGE UP (ref 32–36)
MCV RBC AUTO: 88.7 FL — SIGNIFICANT CHANGE UP (ref 80–100)
PLATELET # BLD AUTO: 135 K/UL — LOW (ref 150–400)
POTASSIUM SERPL-MCNC: 4 MMOL/L — SIGNIFICANT CHANGE UP (ref 3.5–5.3)
POTASSIUM SERPL-SCNC: 4 MMOL/L — SIGNIFICANT CHANGE UP (ref 3.5–5.3)
RBC # BLD: 4.07 M/UL — SIGNIFICANT CHANGE UP (ref 3.8–5.2)
RBC # FLD: 14.4 % — SIGNIFICANT CHANGE UP (ref 10.3–14.5)
SODIUM SERPL-SCNC: 139 MMOL/L — SIGNIFICANT CHANGE UP (ref 135–145)
WBC # BLD: 4.9 K/UL — SIGNIFICANT CHANGE UP (ref 3.8–10.5)
WBC # FLD AUTO: 4.9 K/UL — SIGNIFICANT CHANGE UP (ref 3.8–10.5)

## 2017-03-26 PROCEDURE — 71010: CPT | Mod: 26

## 2017-03-26 PROCEDURE — 93010 ELECTROCARDIOGRAM REPORT: CPT

## 2017-03-26 RX ADMIN — ATORVASTATIN CALCIUM 80 MILLIGRAM(S): 80 TABLET, FILM COATED ORAL at 22:22

## 2017-03-26 RX ADMIN — SPIRONOLACTONE 25 MILLIGRAM(S): 25 TABLET, FILM COATED ORAL at 06:01

## 2017-03-26 RX ADMIN — Medication 40 MILLIGRAM(S): at 06:01

## 2017-03-26 RX ADMIN — APIXABAN 5 MILLIGRAM(S): 2.5 TABLET, FILM COATED ORAL at 17:57

## 2017-03-26 RX ADMIN — APIXABAN 5 MILLIGRAM(S): 2.5 TABLET, FILM COATED ORAL at 06:06

## 2017-03-26 RX ADMIN — SODIUM CHLORIDE 3 MILLILITER(S): 9 INJECTION INTRAMUSCULAR; INTRAVENOUS; SUBCUTANEOUS at 17:53

## 2017-03-26 RX ADMIN — SODIUM CHLORIDE 3 MILLILITER(S): 9 INJECTION INTRAMUSCULAR; INTRAVENOUS; SUBCUTANEOUS at 22:04

## 2017-03-26 RX ADMIN — ISOSORBIDE MONONITRATE 30 MILLIGRAM(S): 60 TABLET, EXTENDED RELEASE ORAL at 11:34

## 2017-03-26 RX ADMIN — CARVEDILOL PHOSPHATE 25 MILLIGRAM(S): 80 CAPSULE, EXTENDED RELEASE ORAL at 06:01

## 2017-03-26 RX ADMIN — PANTOPRAZOLE SODIUM 40 MILLIGRAM(S): 20 TABLET, DELAYED RELEASE ORAL at 06:02

## 2017-03-26 RX ADMIN — Medication 81 MILLIGRAM(S): at 11:34

## 2017-03-26 RX ADMIN — SODIUM CHLORIDE 3 MILLILITER(S): 9 INJECTION INTRAMUSCULAR; INTRAVENOUS; SUBCUTANEOUS at 06:08

## 2017-03-26 RX ADMIN — CARVEDILOL PHOSPHATE 25 MILLIGRAM(S): 80 CAPSULE, EXTENDED RELEASE ORAL at 17:57

## 2017-03-26 RX ADMIN — LISINOPRIL 5 MILLIGRAM(S): 2.5 TABLET ORAL at 06:01

## 2017-03-26 NOTE — PROVIDER CONTACT NOTE (OTHER) - ASSESSMENT
Patient complaining of shortness of breath. Patient states that at approximately 0130, patient states she started having a coughing fit and had 4/10 substernal chest pain that resolved 30 seconds following her coughing episode. Patient only notified RN of these complaints at approximately 0200. 2L NC applied by Urmila VALLECILLO.  0200 vitals: T = 98.3; HR = 105; BP = 110/71; RR = 16; SpO2 = 99% on supplemental oxygen

## 2017-03-26 NOTE — OCCUPATIONAL THERAPY INITIAL EVALUATION ADULT - ADDITIONAL COMMENTS
Echo done & EF=30%.  Transferred to [facility] for further cardiac evaluation.  Pt has LE swelling with L leg venous stasis ulcer. L shoulder xray (-)

## 2017-03-26 NOTE — OCCUPATIONAL THERAPY INITIAL EVALUATION ADULT - PERTINENT HX OF CURRENT PROBLEM, REHAB EVAL
85 y/o female presented to [facility] on 3/16/17 with c/o shortness of breath for the last few days, associated with a dry cough.  Pt was on coumadin, which she stopped herself several months ago.  Pt noted to have acute CVA in ED, tPA given & pt was transferred to ICU.  Pt was noticed to have elevated troponin on 3/16, which came down to normal the next day.  (see below)...

## 2017-03-27 LAB
HCT VFR BLD CALC: 35.3 % — SIGNIFICANT CHANGE UP (ref 34.5–45)
HGB BLD-MCNC: 11.6 G/DL — SIGNIFICANT CHANGE UP (ref 11.5–15.5)
MCHC RBC-ENTMCNC: 29.4 PG — SIGNIFICANT CHANGE UP (ref 27–34)
MCHC RBC-ENTMCNC: 32.8 GM/DL — SIGNIFICANT CHANGE UP (ref 32–36)
MCV RBC AUTO: 89.5 FL — SIGNIFICANT CHANGE UP (ref 80–100)
PLATELET # BLD AUTO: 137 K/UL — LOW (ref 150–400)
RBC # BLD: 3.95 M/UL — SIGNIFICANT CHANGE UP (ref 3.8–5.2)
RBC # FLD: 14.5 % — SIGNIFICANT CHANGE UP (ref 10.3–14.5)
WBC # BLD: 4.9 K/UL — SIGNIFICANT CHANGE UP (ref 3.8–10.5)
WBC # FLD AUTO: 4.9 K/UL — SIGNIFICANT CHANGE UP (ref 3.8–10.5)

## 2017-03-27 PROCEDURE — 93010 ELECTROCARDIOGRAM REPORT: CPT

## 2017-03-27 RX ORDER — FUROSEMIDE 40 MG
40 TABLET ORAL EVERY 12 HOURS
Qty: 0 | Refills: 0 | Status: DISCONTINUED | OUTPATIENT
Start: 2017-03-27 | End: 2017-03-28

## 2017-03-27 RX ORDER — CARVEDILOL PHOSPHATE 80 MG/1
1 CAPSULE, EXTENDED RELEASE ORAL
Qty: 0 | Refills: 0 | COMMUNITY
Start: 2017-03-27

## 2017-03-27 RX ORDER — ATORVASTATIN CALCIUM 80 MG/1
1 TABLET, FILM COATED ORAL
Qty: 0 | Refills: 0 | COMMUNITY
Start: 2017-03-27

## 2017-03-27 RX ORDER — LOSARTAN POTASSIUM 100 MG/1
1 TABLET, FILM COATED ORAL
Qty: 0 | Refills: 0 | COMMUNITY

## 2017-03-27 RX ORDER — APIXABAN 2.5 MG/1
1 TABLET, FILM COATED ORAL
Qty: 0 | Refills: 0 | COMMUNITY
Start: 2017-03-27

## 2017-03-27 RX ORDER — PANTOPRAZOLE SODIUM 20 MG/1
40 TABLET, DELAYED RELEASE ORAL
Qty: 0 | Refills: 0 | Status: DISCONTINUED | OUTPATIENT
Start: 2017-03-27 | End: 2017-03-28

## 2017-03-27 RX ORDER — FUROSEMIDE 40 MG
1 TABLET ORAL
Qty: 0 | Refills: 0 | COMMUNITY
Start: 2017-03-27

## 2017-03-27 RX ORDER — HEPARIN SODIUM 5000 [USP'U]/ML
0 INJECTION INTRAVENOUS; SUBCUTANEOUS
Qty: 0 | Refills: 0 | COMMUNITY

## 2017-03-27 RX ORDER — IPRATROPIUM/ALBUTEROL SULFATE 18-103MCG
3 AEROSOL WITH ADAPTER (GRAM) INHALATION ONCE
Qty: 0 | Refills: 0 | Status: COMPLETED | OUTPATIENT
Start: 2017-03-27 | End: 2017-03-27

## 2017-03-27 RX ORDER — LISINOPRIL 2.5 MG/1
1 TABLET ORAL
Qty: 0 | Refills: 0 | COMMUNITY
Start: 2017-03-27

## 2017-03-27 RX ORDER — ISOSORBIDE MONONITRATE 60 MG/1
1 TABLET, EXTENDED RELEASE ORAL
Qty: 0 | Refills: 0 | COMMUNITY
Start: 2017-03-27

## 2017-03-27 RX ORDER — APIXABAN 2.5 MG/1
1 TABLET, FILM COATED ORAL
Qty: 0 | Refills: 0 | COMMUNITY

## 2017-03-27 RX ORDER — SPIRONOLACTONE 25 MG/1
1 TABLET, FILM COATED ORAL
Qty: 0 | Refills: 0 | COMMUNITY
Start: 2017-03-27

## 2017-03-27 RX ORDER — PANTOPRAZOLE SODIUM 20 MG/1
1 TABLET, DELAYED RELEASE ORAL
Qty: 0 | Refills: 0 | COMMUNITY
Start: 2017-03-27

## 2017-03-27 RX ORDER — ASPIRIN/CALCIUM CARB/MAGNESIUM 324 MG
1 TABLET ORAL
Qty: 0 | Refills: 0 | COMMUNITY
Start: 2017-03-27

## 2017-03-27 RX ADMIN — ATORVASTATIN CALCIUM 80 MILLIGRAM(S): 80 TABLET, FILM COATED ORAL at 22:04

## 2017-03-27 RX ADMIN — APIXABAN 5 MILLIGRAM(S): 2.5 TABLET, FILM COATED ORAL at 05:18

## 2017-03-27 RX ADMIN — Medication 40 MILLIGRAM(S): at 08:48

## 2017-03-27 RX ADMIN — CARVEDILOL PHOSPHATE 25 MILLIGRAM(S): 80 CAPSULE, EXTENDED RELEASE ORAL at 05:17

## 2017-03-27 RX ADMIN — SODIUM CHLORIDE 3 MILLILITER(S): 9 INJECTION INTRAMUSCULAR; INTRAVENOUS; SUBCUTANEOUS at 13:08

## 2017-03-27 RX ADMIN — SODIUM CHLORIDE 3 MILLILITER(S): 9 INJECTION INTRAMUSCULAR; INTRAVENOUS; SUBCUTANEOUS at 21:56

## 2017-03-27 RX ADMIN — SPIRONOLACTONE 25 MILLIGRAM(S): 25 TABLET, FILM COATED ORAL at 05:17

## 2017-03-27 RX ADMIN — PANTOPRAZOLE SODIUM 40 MILLIGRAM(S): 20 TABLET, DELAYED RELEASE ORAL at 18:37

## 2017-03-27 RX ADMIN — ISOSORBIDE MONONITRATE 30 MILLIGRAM(S): 60 TABLET, EXTENDED RELEASE ORAL at 11:36

## 2017-03-27 RX ADMIN — Medication 3 MILLILITER(S): at 19:15

## 2017-03-27 RX ADMIN — Medication 100 MILLIGRAM(S): at 22:04

## 2017-03-27 RX ADMIN — PANTOPRAZOLE SODIUM 40 MILLIGRAM(S): 20 TABLET, DELAYED RELEASE ORAL at 05:17

## 2017-03-27 RX ADMIN — APIXABAN 5 MILLIGRAM(S): 2.5 TABLET, FILM COATED ORAL at 18:37

## 2017-03-27 RX ADMIN — Medication 40 MILLIGRAM(S): at 18:37

## 2017-03-27 RX ADMIN — SODIUM CHLORIDE 3 MILLILITER(S): 9 INJECTION INTRAMUSCULAR; INTRAVENOUS; SUBCUTANEOUS at 05:20

## 2017-03-27 RX ADMIN — Medication 81 MILLIGRAM(S): at 11:36

## 2017-03-27 RX ADMIN — CARVEDILOL PHOSPHATE 25 MILLIGRAM(S): 80 CAPSULE, EXTENDED RELEASE ORAL at 18:38

## 2017-03-27 RX ADMIN — LISINOPRIL 5 MILLIGRAM(S): 2.5 TABLET ORAL at 08:48

## 2017-03-28 ENCOUNTER — TRANSCRIPTION ENCOUNTER (OUTPATIENT)
Age: 82
End: 2017-03-28

## 2017-03-28 VITALS — SYSTOLIC BLOOD PRESSURE: 105 MMHG | HEART RATE: 100 BPM | DIASTOLIC BLOOD PRESSURE: 75 MMHG

## 2017-03-28 DIAGNOSIS — R40.4 TRANSIENT ALTERATION OF AWARENESS: ICD-10-CM

## 2017-03-28 DIAGNOSIS — E66.9 OBESITY, UNSPECIFIED: ICD-10-CM
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
## 2017-03-28 DIAGNOSIS — I11.0 HYPERTENSIVE HEART DISEASE WITH HEART FAILURE: ICD-10-CM

## 2017-03-28 DIAGNOSIS — Z91.14 PATIENT'S OTHER NONCOMPLIANCE WITH MEDICATION REGIMEN: ICD-10-CM

## 2017-03-28 DIAGNOSIS — Z88.0 ALLERGY STATUS TO PENICILLIN: ICD-10-CM

## 2017-03-28 DIAGNOSIS — I21.4 NON-ST ELEVATION (NSTEMI) MYOCARDIAL INFARCTION: ICD-10-CM

## 2017-03-28 DIAGNOSIS — J15.6 PNEUMONIA DUE TO OTHER GRAM-NEGATIVE BACTERIA: ICD-10-CM

## 2017-03-28 DIAGNOSIS — I50.30 UNSPECIFIED DIASTOLIC (CONGESTIVE) HEART FAILURE: ICD-10-CM

## 2017-03-28 DIAGNOSIS — R29.810 FACIAL WEAKNESS: ICD-10-CM

## 2017-03-28 DIAGNOSIS — R47.1 DYSARTHRIA AND ANARTHRIA: ICD-10-CM

## 2017-03-28 DIAGNOSIS — G81.04 FLACCID HEMIPLEGIA AFFECTING LEFT NONDOMINANT SIDE: ICD-10-CM

## 2017-03-28 DIAGNOSIS — I63.411 CEREBRAL INFARCTION DUE TO EMBOLISM OF RIGHT MIDDLE CEREBRAL ARTERY: ICD-10-CM

## 2017-03-28 DIAGNOSIS — I87.8 OTHER SPECIFIED DISORDERS OF VEINS: ICD-10-CM

## 2017-03-28 DIAGNOSIS — I48.2 CHRONIC ATRIAL FIBRILLATION: ICD-10-CM

## 2017-03-28 DIAGNOSIS — S81.812A LACERATION WITHOUT FOREIGN BODY, LEFT LOWER LEG, INITIAL ENCOUNTER: ICD-10-CM

## 2017-03-28 DIAGNOSIS — I42.0 DILATED CARDIOMYOPATHY: ICD-10-CM

## 2017-03-28 DIAGNOSIS — R47.81 SLURRED SPEECH: ICD-10-CM

## 2017-03-28 LAB
ANION GAP SERPL CALC-SCNC: 11 MMOL/L — SIGNIFICANT CHANGE UP (ref 5–17)
BUN SERPL-MCNC: 10 MG/DL — SIGNIFICANT CHANGE UP (ref 7–23)
CALCIUM SERPL-MCNC: 8.8 MG/DL — SIGNIFICANT CHANGE UP (ref 8.4–10.5)
CHLORIDE SERPL-SCNC: 104 MMOL/L — SIGNIFICANT CHANGE UP (ref 96–108)
CO2 SERPL-SCNC: 27 MMOL/L — SIGNIFICANT CHANGE UP (ref 22–31)
CREAT SERPL-MCNC: 0.71 MG/DL — SIGNIFICANT CHANGE UP (ref 0.5–1.3)
GLUCOSE SERPL-MCNC: 89 MG/DL — SIGNIFICANT CHANGE UP (ref 70–99)
HCT VFR BLD CALC: 36.6 % — SIGNIFICANT CHANGE UP (ref 34.5–45)
HGB BLD-MCNC: 11.7 G/DL — SIGNIFICANT CHANGE UP (ref 11.5–15.5)
MCHC RBC-ENTMCNC: 28.6 PG — SIGNIFICANT CHANGE UP (ref 27–34)
MCHC RBC-ENTMCNC: 32.1 GM/DL — SIGNIFICANT CHANGE UP (ref 32–36)
MCV RBC AUTO: 89.2 FL — SIGNIFICANT CHANGE UP (ref 80–100)
PLATELET # BLD AUTO: 150 K/UL — SIGNIFICANT CHANGE UP (ref 150–400)
POTASSIUM SERPL-MCNC: 4 MMOL/L — SIGNIFICANT CHANGE UP (ref 3.5–5.3)
POTASSIUM SERPL-SCNC: 4 MMOL/L — SIGNIFICANT CHANGE UP (ref 3.5–5.3)
RBC # BLD: 4.1 M/UL — SIGNIFICANT CHANGE UP (ref 3.8–5.2)
RBC # FLD: 14.6 % — HIGH (ref 10.3–14.5)
SODIUM SERPL-SCNC: 142 MMOL/L — SIGNIFICANT CHANGE UP (ref 135–145)
WBC # BLD: 4.9 K/UL — SIGNIFICANT CHANGE UP (ref 3.8–10.5)
WBC # FLD AUTO: 4.9 K/UL — SIGNIFICANT CHANGE UP (ref 3.8–10.5)

## 2017-03-28 PROCEDURE — 97162 PT EVAL MOD COMPLEX 30 MIN: CPT

## 2017-03-28 PROCEDURE — 97116 GAIT TRAINING THERAPY: CPT

## 2017-03-28 PROCEDURE — 94640 AIRWAY INHALATION TREATMENT: CPT

## 2017-03-28 PROCEDURE — 85027 COMPLETE CBC AUTOMATED: CPT

## 2017-03-28 PROCEDURE — 84100 ASSAY OF PHOSPHORUS: CPT

## 2017-03-28 PROCEDURE — 71045 X-RAY EXAM CHEST 1 VIEW: CPT

## 2017-03-28 PROCEDURE — C1769: CPT

## 2017-03-28 PROCEDURE — 93005 ELECTROCARDIOGRAM TRACING: CPT

## 2017-03-28 PROCEDURE — C1887: CPT

## 2017-03-28 PROCEDURE — C1894: CPT

## 2017-03-28 PROCEDURE — 97165 OT EVAL LOW COMPLEX 30 MIN: CPT

## 2017-03-28 PROCEDURE — 97110 THERAPEUTIC EXERCISES: CPT

## 2017-03-28 PROCEDURE — 80048 BASIC METABOLIC PNL TOTAL CA: CPT

## 2017-03-28 PROCEDURE — 93458 L HRT ARTERY/VENTRICLE ANGIO: CPT

## 2017-03-28 PROCEDURE — 83735 ASSAY OF MAGNESIUM: CPT

## 2017-03-28 PROCEDURE — 97602 WOUND(S) CARE NON-SELECTIVE: CPT

## 2017-03-28 PROCEDURE — 85610 PROTHROMBIN TIME: CPT

## 2017-03-28 PROCEDURE — 85730 THROMBOPLASTIN TIME PARTIAL: CPT

## 2017-03-28 PROCEDURE — 73030 X-RAY EXAM OF SHOULDER: CPT

## 2017-03-28 RX ORDER — CARVEDILOL PHOSPHATE 80 MG/1
1 CAPSULE, EXTENDED RELEASE ORAL
Qty: 60 | Refills: 0 | OUTPATIENT
Start: 2017-03-28 | End: 2017-04-27

## 2017-03-28 RX ORDER — PANTOPRAZOLE SODIUM 20 MG/1
1 TABLET, DELAYED RELEASE ORAL
Qty: 60 | Refills: 0 | COMMUNITY
Start: 2017-03-28 | End: 2017-04-27

## 2017-03-28 RX ORDER — ATORVASTATIN CALCIUM 80 MG/1
1 TABLET, FILM COATED ORAL
Qty: 30 | Refills: 0 | OUTPATIENT
Start: 2017-03-28 | End: 2017-04-27

## 2017-03-28 RX ORDER — LISINOPRIL 2.5 MG/1
1 TABLET ORAL
Qty: 30 | Refills: 0 | OUTPATIENT
Start: 2017-03-28 | End: 2017-04-27

## 2017-03-28 RX ORDER — PANTOPRAZOLE SODIUM 20 MG/1
1 TABLET, DELAYED RELEASE ORAL
Qty: 60 | Refills: 0 | OUTPATIENT
Start: 2017-03-28 | End: 2017-04-27

## 2017-03-28 RX ORDER — ASPIRIN/CALCIUM CARB/MAGNESIUM 324 MG
1 TABLET ORAL
Qty: 30 | Refills: 0 | OUTPATIENT
Start: 2017-03-28 | End: 2017-04-27

## 2017-03-28 RX ORDER — ISOSORBIDE MONONITRATE 60 MG/1
1 TABLET, EXTENDED RELEASE ORAL
Qty: 30 | Refills: 0 | OUTPATIENT
Start: 2017-03-28 | End: 2017-04-27

## 2017-03-28 RX ORDER — APIXABAN 2.5 MG/1
1 TABLET, FILM COATED ORAL
Qty: 60 | Refills: 0 | OUTPATIENT
Start: 2017-03-28 | End: 2017-04-27

## 2017-03-28 RX ORDER — SPIRONOLACTONE 25 MG/1
1 TABLET, FILM COATED ORAL
Qty: 30 | Refills: 0 | OUTPATIENT
Start: 2017-03-28 | End: 2017-04-27

## 2017-03-28 RX ORDER — FUROSEMIDE 40 MG
1 TABLET ORAL
Qty: 60 | Refills: 0 | OUTPATIENT
Start: 2017-03-28 | End: 2017-04-27

## 2017-03-28 RX ADMIN — Medication 81 MILLIGRAM(S): at 11:22

## 2017-03-28 RX ADMIN — Medication 40 MILLIGRAM(S): at 05:46

## 2017-03-28 RX ADMIN — ISOSORBIDE MONONITRATE 30 MILLIGRAM(S): 60 TABLET, EXTENDED RELEASE ORAL at 11:22

## 2017-03-28 RX ADMIN — APIXABAN 5 MILLIGRAM(S): 2.5 TABLET, FILM COATED ORAL at 05:47

## 2017-03-28 RX ADMIN — SPIRONOLACTONE 25 MILLIGRAM(S): 25 TABLET, FILM COATED ORAL at 05:47

## 2017-03-28 RX ADMIN — SODIUM CHLORIDE 3 MILLILITER(S): 9 INJECTION INTRAMUSCULAR; INTRAVENOUS; SUBCUTANEOUS at 05:47

## 2017-03-28 RX ADMIN — SODIUM CHLORIDE 3 MILLILITER(S): 9 INJECTION INTRAMUSCULAR; INTRAVENOUS; SUBCUTANEOUS at 13:05

## 2017-03-28 RX ADMIN — CARVEDILOL PHOSPHATE 25 MILLIGRAM(S): 80 CAPSULE, EXTENDED RELEASE ORAL at 17:03

## 2017-03-28 RX ADMIN — PANTOPRAZOLE SODIUM 40 MILLIGRAM(S): 20 TABLET, DELAYED RELEASE ORAL at 17:03

## 2017-03-28 RX ADMIN — CARVEDILOL PHOSPHATE 25 MILLIGRAM(S): 80 CAPSULE, EXTENDED RELEASE ORAL at 05:46

## 2017-03-28 RX ADMIN — PANTOPRAZOLE SODIUM 40 MILLIGRAM(S): 20 TABLET, DELAYED RELEASE ORAL at 05:46

## 2017-03-28 RX ADMIN — APIXABAN 5 MILLIGRAM(S): 2.5 TABLET, FILM COATED ORAL at 17:03

## 2017-03-28 RX ADMIN — LISINOPRIL 5 MILLIGRAM(S): 2.5 TABLET ORAL at 05:46

## 2017-03-28 RX ADMIN — Medication 40 MILLIGRAM(S): at 17:03

## 2017-03-28 NOTE — DISCHARGE NOTE ADULT - PATIENT PORTAL LINK FT
“You can access the [facility] Patient Portal, offered by [facility], by registering with the following website: [email or web address]”

## 2017-03-28 NOTE — DISCHARGE NOTE ADULT - PLAN OF CARE
stable Low salt, low fat diet.   Weight management.   Take medications as prescribed.    No smoking.  Follow up appointments with your doctor(s)  as instructed. continue with current treatment plan discharge on Eliquis 5mg oral twice daily Low salt diet  Activity as tolerated.  Take all medication as prescribed.  Follow up with your medical doctor for routine blood pressure monitoring at your next visit.  Notify your doctor if you have any of the following symptoms:   Dizziness, Lightheadedness, Blurry vision, Headache, Chest pain, Shortness of breath Weigh yourself daily.  If you gain 3lbs in 3 days, or 5lbs in a week call your Health Care Provider.  Do not eat or drink foods containing more than 2000mg of salt (sodium) in your diet every day.  Call your Health Care Provider if you have any swelling or increased swelling in your feet, ankles, and/or stomach.  Take all of your medication as directed.  If you become dizzy call your Health Care Provider. Patient found with Acute stroke at [facility] and treatment at [facility] for Stroke   Seen by Neurology for Cath clearance   continue with current treatment plan discharge on Eliquis 5mg oral twice daily  monitor for sign of bleeding

## 2017-03-28 NOTE — DISCHARGE NOTE ADULT - MEDICATION SUMMARY - MEDICATIONS TO STOP TAKING
I will STOP taking the medications listed below when I get home from the hospital:    losartan-hydroCHLOROthiazide 100mg-25mg oral tablet  -- 1 tab(s) by mouth once a day, home    heparin 1 unit/mL-NaCl 0.9% intravenous solution (obsolete)  --  intravenous , 11cc/ hour, hosp    losartan 50 mg oral tablet  -- 1 tab(s) by mouth once a day

## 2017-03-28 NOTE — DISCHARGE NOTE ADULT - CARE PLAN
Principal Discharge DX:	NSTEMI (non-ST elevated myocardial infarction)  Goal:	stable  Instructions for follow-up, activity and diet:	Low salt, low fat diet.   Weight management.   Take medications as prescribed.    No smoking.  Follow up appointments with your doctor(s)  as instructed.  Secondary Diagnosis:	CVA (cerebral vascular accident)  Instructions for follow-up, activity and diet:	continue with current treatment plan  Secondary Diagnosis:	Atrial fibrillation, unspecified type  Instructions for follow-up, activity and diet:	discharge on Eliquis 5mg oral twice daily  Secondary Diagnosis:	Essential hypertension  Instructions for follow-up, activity and diet:	Low salt diet  Activity as tolerated.  Take all medication as prescribed.  Follow up with your medical doctor for routine blood pressure monitoring at your next visit.  Notify your doctor if you have any of the following symptoms:   Dizziness, Lightheadedness, Blurry vision, Headache, Chest pain, Shortness of breath  Secondary Diagnosis:	Venous stasis Principal Discharge DX:	NSTEMI (non-ST elevated myocardial infarction)  Goal:	stable  Instructions for follow-up, activity and diet:	Low salt, low fat diet.   Weight management.   Take medications as prescribed.    No smoking.  Follow up appointments with your doctor(s)  as instructed.  Secondary Diagnosis:	CVA (cerebral vascular accident)  Instructions for follow-up, activity and diet:	Patient found with Acute stroke at [facility] and treatment at [facility] for Stroke   Seen by Neurology for Cath clearance   continue with current treatment plan  Secondary Diagnosis:	Atrial fibrillation, unspecified type  Instructions for follow-up, activity and diet:	discharge on Eliquis 5mg oral twice daily  monitor for sign of bleeding  Secondary Diagnosis:	Essential hypertension  Instructions for follow-up, activity and diet:	Low salt diet  Activity as tolerated.  Take all medication as prescribed.  Follow up with your medical doctor for routine blood pressure monitoring at your next visit.  Notify your doctor if you have any of the following symptoms:   Dizziness, Lightheadedness, Blurry vision, Headache, Chest pain, Shortness of breath  Secondary Diagnosis:	Venous stasis  Secondary Diagnosis:	CHF (congestive heart failure), NYHA class I, chronic, systolic  Instructions for follow-up, activity and diet:	Weigh yourself daily.  If you gain 3lbs in 3 days, or 5lbs in a week call your Health Care Provider.  Do not eat or drink foods containing more than 2000mg of salt (sodium) in your diet every day.  Call your Health Care Provider if you have any swelling or increased swelling in your feet, ankles, and/or stomach.  Take all of your medication as directed.  If you become dizzy call your Health Care Provider. Principal Discharge DX:	NSTEMI (non-ST elevated myocardial infarction)  Goal:	stable  Instructions for follow-up, activity and diet:	Low salt, low fat diet.   Weight management.   Take medications as prescribed.    No smoking.  Follow up appointments with your doctor(s)  as instructed.  Secondary Diagnosis:	CVA (cerebral vascular accident)  Instructions for follow-up, activity and diet:	Patient found with Acute stroke at [facility] and treatment at [facility] for Stroke   Seen by Neurology for Cath clearance   continue with current treatment plan  Secondary Diagnosis:	Atrial fibrillation, unspecified type  Instructions for follow-up, activity and diet:	discharge on Eliquis 5mg oral twice daily  monitor for sign of bleeding  Secondary Diagnosis:	Essential hypertension  Instructions for follow-up, activity and diet:	Low salt diet  Activity as tolerated.  Take all medication as prescribed.  Follow up with your medical doctor for routine blood pressure monitoring at your next visit.  Notify your doctor if you have any of the following symptoms:   Dizziness, Lightheadedness, Blurry vision, Headache, Chest pain, Shortness of breath  Secondary Diagnosis:	Venous stasis  Secondary Diagnosis:	CHF (congestive heart failure), NYHA class I, chronic, systolic  Instructions for follow-up, activity and diet:	Weigh yourself daily.  If you gain 3lbs in 3 days, or 5lbs in a week call your Health Care Provider.  Do not eat or drink foods containing more than 2000mg of salt (sodium) in your diet every day.  Call your Health Care Provider if you have any swelling or increased swelling in your feet, ankles, and/or stomach.  Take all of your medication as directed.  If you become dizzy call your Health Care Provider.

## 2017-03-28 NOTE — DISCHARGE NOTE ADULT - CARE PROVIDER_API CALL
Canelo Fairchild (MD), Cardiovascular Disease; Internal Medicine  [address]  [place] [address]  Phone: [phone]  Fax: [phone]

## 2017-03-28 NOTE — PROVIDER CONTACT NOTE (OTHER) - REASON
Ectopy
Patient complaining of shortness of breath
Pt is c/o left shoulder pain
pt complain of chest pain 5 out of 10
Patient c/o episode of CP that resolved on it's own

## 2017-03-28 NOTE — DISCHARGE NOTE ADULT - SECONDARY DIAGNOSIS.
CVA (cerebral vascular accident) Atrial fibrillation, unspecified type Essential hypertension Venous stasis CHF (congestive heart failure), NYHA class I, chronic, systolic

## 2017-03-28 NOTE — PROVIDER CONTACT NOTE (OTHER) - ACTION/TREATMENT ORDERED:
NP made aware; no new orders at this time; Continue to monitor patient
NP made aware; NP to assess patient at bedside. NP requested supplemental oxygen be removed and to obtain O2 sat without supplemental oxygen.  0212 O2 sat = 98%
PA aware, assessed pt recommended to place pt O2 2LNC for comfort, RN placed pt on 2LNC as per orders. Will continue to monitor pt.
Seen by NP advised x-ray of the left shoulder and Tylenol for pain. Called x-ray dept. X-ray done waiting for the result.
duo neb  ordered

## 2017-03-28 NOTE — DISCHARGE NOTE ADULT - HOME CARE AGENCY
[facility]. A nurse will visit your home within 24-48 hours after being discharged.

## 2017-03-28 NOTE — DISCHARGE NOTE ADULT - MEDICATION SUMMARY - MEDICATIONS TO TAKE
I will START or STAY ON the medications listed below when I get home from the hospital:    spironolactone 25 mg oral tablet  -- 1 tab(s) by mouth once a day  -- Indication: For Systolic congestive heart     aspirin 81 mg oral tablet, chewable  -- 1 tab(s) by mouth once a day  -- Indication: For NSTEMI    lisinopril 5 mg oral tablet  -- 1 tab(s) by mouth once a day  -- Indication: For Systolic congestive  heart     isosorbide mononitrate 30 mg oral tablet, extended release  -- 1 tab(s) by mouth once a day  -- Indication: For CAD     apixaban 5 mg oral tablet  -- 1 tab(s) by mouth 2 times a day  -- Indication: For NSTEMI    atorvastatin 80 mg oral tablet  -- 1 tab(s) by mouth once a day (at bedtime)  -- Indication: For high cholestrol    carvedilol 25 mg oral tablet  -- 1 tab(s) by mouth every 12 hours  -- Indication: For NSTEMI    furosemide 40 mg oral tablet  -- 1 tab(s) by mouth every 12 hours  -- Indication: For Systolic congestive heart     guaiFENesin 100 mg/5 mL oral liquid  -- 5 milliliter(s) by mouth every 6 hours, As needed, Cough  -- Indication: For Cough     pantoprazole 40 mg oral delayed release tablet  -- 1 tab(s) by mouth 2 times a day (before meals)  -- Indication: For Cough

## 2017-03-31 NOTE — OCCUPATIONAL THERAPY INITIAL EVALUATION ADULT - PATIENT PROFILE REVIEW, REHAB EVAL
[facility] - Podiatry  [address] [place] [address]  Phone: [phone]  Fax: [phone]                  Raymond Stuart   3/31/2017 3:00 PM   Office Visit    Description:  Male : 1942   Provider:  Nic Castillo DPM   Department:  [facility] Podiatry           Reason for Visit     Foot Ulcer                To Do List           Future Appointments        Provider Department Dept Phone    2017 3:40 PM Nic Castillo DPM [facility] Podiatry [phone]    2017 2:20 PM Nic Castillo DPM [facility] Podiatry [phone]    2017 3:15 PM Adele Gomez MD [facility] Dermatology [phone]    2017 7:35 AM LABORATORY, [facility] [phone]    2017 7:40 AM SPECIMEN, [facility] [phone]      Goals (5 Years of Data)     None      [facility]     [facility] Nurse Care Line -  Assistance  Unless otherwise directed by your provider, please contact [facility], our nurse care line that is available for  assistance.     Registered nurses in the [facility] provide: appointment scheduling, clinical advisement, health education, and other advisory services.  Call: [phone] (toll free)               Medications           Message regarding Medications     Verify the changes and/or additions to your medication regime listed below are the same as discussed with your clinician today.  If any of these changes or additions are incorrect, please notify your healthcare provider.             Verify that the below list of medications is an accurate representation of the medications you are currently taking.  If none reported, the list may be blank. If incorrect, please contact your healthcare provider. Carry this list with you in case of emergency.           Current Medications     atenolol (TENORMIN) 100 MG tablet Take 1 tablet by mouth  daily    clopidogrel (PLAVIX) 75 mg tablet Take 1 tablet by mouth   yes "daily    diphenhydrAMINE (BENADRYL) 50 mg/mL injection     doxycycline (VIBRAMYCIN) 100 MG Cap     fenofibrate micronized (LOFIBRA) 134 MG Cap Take 1 capsule by mouth  once daily    gabapentin (NEURONTIN) 600 MG tablet Take 2 tablets (1,200 mg total) by mouth 2 (two) times daily.    glipizide-metformin (METAGLIP) 2.5-500 mg per tablet Take 2 tablets by mouth  twice a day before meals in the morning and evening    hydrocodone-acetaminophen 5-325mg (NORCO) 5-325 mg per tablet     hydrocodone-acetaminophen 7.5-325mg (NORCO) 7.5-325 mg per tablet Take 1 tablet by mouth every 6 (six) hours as needed for Pain.    INVANZ 1 gram injection     lisinopril-hydrochlorothiazide (PRINZIDE,ZESTORETIC) 20-12.5 mg per tablet Take 2 tablets by mouth  once daily    minocycline (MINOCIN,DYNACIN) 100 MG capsule Take 100 mg by mouth.     niacin 1,000 mg TbSR Take 1 tablet by mouth Daily.    rosuvastatin (CRESTOR) 40 MG Tab Take 1 tablet (40 mg total) by mouth once daily.    SANTYL ointment     VANCOMYCIN HCL IN DEXTROSE 5 % (VANCOMYCIN IN DEXTROSE 5 %) 1 gram/200 mL PgBk     warfarin (COUMADIN) 5 MG tablet TAKE 1 AND 1/2 TABLETS BY  MOUTH ON WEDNESDAY AND  SUNDAY AND 1 TABLET ALL  REMAINING DAY AS DIRECTED  BY THE [facility]MADIN CLINIC.           Clinical Reference Information           Your Vitals Were     BP Pulse Height Weight BMI    136/57 (BP Location: Right arm, Patient Position: Sitting, BP Method: Automatic) 81 5' 6" (1.676 m) 110.2 kg (242 lb 15.2 oz) 39.21 kg/m2      Blood Pressure          Most Recent Value    BP  (!)  136/57      Allergies as of 3/31/2017     Sulfa (Sulfonamide Antibiotics)    Sulfamethoxazole    Trimethoprim      Immunizations Administered on Date of Encounter - 3/31/2017     None      Language Assistance Services     ATTENTION: Language assistance services are available, free of charge. Please call [phone].      ATENCIÓN: Si habla español, tiene a villagran disposición servicios gratuitos de asistencia " lingüística. Nereida al [phone].     JANIE Ý: N?u b?n nói Ti?ng Vi?t, có các d?ch v? h? tr? ngôn ng? mi?n phí dành cho b?n. G?i s? [phone].         [facility] - [facility] complies with applicable Federal civil rights laws and does not discriminate on the basis of race, color, national origin, age, disability, or sex.

## 2017-05-02 ENCOUNTER — APPOINTMENT (OUTPATIENT)
Dept: WOUND CARE | Facility: HOSPITAL | Age: 82
End: 2017-05-02

## 2017-05-04 ENCOUNTER — INPATIENT (INPATIENT)
Facility: HOSPITAL | Age: 82
LOS: 5 days | Discharge: HOME | DRG: 292 | End: 2017-05-10
Attending: INTERNAL MEDICINE | Admitting: INTERNAL MEDICINE
Payer: MEDICARE

## 2017-05-04 VITALS
OXYGEN SATURATION: 99 % | TEMPERATURE: 98 F | SYSTOLIC BLOOD PRESSURE: 139 MMHG | DIASTOLIC BLOOD PRESSURE: 72 MMHG | HEART RATE: 92 BPM | RESPIRATION RATE: 22 BRPM

## 2017-05-04 DIAGNOSIS — I87.8 OTHER SPECIFIED DISORDERS OF VEINS: ICD-10-CM

## 2017-05-04 DIAGNOSIS — I50.9 HEART FAILURE, UNSPECIFIED: ICD-10-CM

## 2017-05-04 DIAGNOSIS — I48.91 UNSPECIFIED ATRIAL FIBRILLATION: ICD-10-CM

## 2017-05-04 DIAGNOSIS — I50.21 ACUTE SYSTOLIC (CONGESTIVE) HEART FAILURE: ICD-10-CM

## 2017-05-04 DIAGNOSIS — Z71.89 OTHER SPECIFIED COUNSELING: ICD-10-CM

## 2017-05-04 DIAGNOSIS — I10 ESSENTIAL (PRIMARY) HYPERTENSION: ICD-10-CM

## 2017-05-04 LAB
ALBUMIN SERPL ELPH-MCNC: 3.6 G/DL — SIGNIFICANT CHANGE UP (ref 3.3–5)
ALP SERPL-CCNC: 133 U/L — HIGH (ref 40–120)
ALT FLD-CCNC: 53 U/L RC — HIGH (ref 10–45)
ANION GAP SERPL CALC-SCNC: 14 MMOL/L — SIGNIFICANT CHANGE UP (ref 5–17)
APTT BLD: 33.5 SEC — SIGNIFICANT CHANGE UP (ref 27.5–37.4)
AST SERPL-CCNC: 39 U/L — SIGNIFICANT CHANGE UP (ref 10–40)
BASOPHILS # BLD AUTO: 0 K/UL — SIGNIFICANT CHANGE UP (ref 0–0.2)
BASOPHILS NFR BLD AUTO: 1 % — SIGNIFICANT CHANGE UP (ref 0–2)
BILIRUB SERPL-MCNC: 0.6 MG/DL — SIGNIFICANT CHANGE UP (ref 0.2–1.2)
BUN SERPL-MCNC: 18 MG/DL — SIGNIFICANT CHANGE UP (ref 7–23)
CALCIUM SERPL-MCNC: 9.4 MG/DL — SIGNIFICANT CHANGE UP (ref 8.4–10.5)
CHLORIDE SERPL-SCNC: 105 MMOL/L — SIGNIFICANT CHANGE UP (ref 96–108)
CK MB BLD-MCNC: 2.8 % — SIGNIFICANT CHANGE UP (ref 0–3.5)
CK MB CFR SERPL CALC: 3.1 NG/ML — SIGNIFICANT CHANGE UP (ref 0–3.8)
CK SERPL-CCNC: 110 U/L — SIGNIFICANT CHANGE UP (ref 25–170)
CO2 SERPL-SCNC: 25 MMOL/L — SIGNIFICANT CHANGE UP (ref 22–31)
CREAT SERPL-MCNC: 0.97 MG/DL — SIGNIFICANT CHANGE UP (ref 0.5–1.3)
EOSINOPHIL # BLD AUTO: 0 K/UL — SIGNIFICANT CHANGE UP (ref 0–0.5)
EOSINOPHIL NFR BLD AUTO: 0.2 % — SIGNIFICANT CHANGE UP (ref 0–6)
GAS PNL BLDV: SIGNIFICANT CHANGE UP
GLUCOSE SERPL-MCNC: 93 MG/DL — SIGNIFICANT CHANGE UP (ref 70–99)
HCT VFR BLD CALC: 42.4 % — SIGNIFICANT CHANGE UP (ref 34.5–45)
HGB BLD-MCNC: 14 G/DL — SIGNIFICANT CHANGE UP (ref 11.5–15.5)
INR BLD: 1.76 RATIO — HIGH (ref 0.88–1.16)
LYMPHOCYTES # BLD AUTO: 1.1 K/UL — SIGNIFICANT CHANGE UP (ref 1–3.3)
LYMPHOCYTES # BLD AUTO: 25.8 % — SIGNIFICANT CHANGE UP (ref 13–44)
MCHC RBC-ENTMCNC: 29.4 PG — SIGNIFICANT CHANGE UP (ref 27–34)
MCHC RBC-ENTMCNC: 33.1 GM/DL — SIGNIFICANT CHANGE UP (ref 32–36)
MCV RBC AUTO: 88.9 FL — SIGNIFICANT CHANGE UP (ref 80–100)
MONOCYTES # BLD AUTO: 0.4 K/UL — SIGNIFICANT CHANGE UP (ref 0–0.9)
MONOCYTES NFR BLD AUTO: 8.8 % — SIGNIFICANT CHANGE UP (ref 2–14)
NEUTROPHILS # BLD AUTO: 2.8 K/UL — SIGNIFICANT CHANGE UP (ref 1.8–7.4)
NEUTROPHILS NFR BLD AUTO: 64.1 % — SIGNIFICANT CHANGE UP (ref 43–77)
NT-PROBNP SERPL-SCNC: 2692 PG/ML — HIGH (ref 0–300)
PLATELET # BLD AUTO: 66 K/UL — LOW (ref 150–400)
POTASSIUM SERPL-MCNC: 3.9 MMOL/L — SIGNIFICANT CHANGE UP (ref 3.5–5.3)
POTASSIUM SERPL-SCNC: 3.9 MMOL/L — SIGNIFICANT CHANGE UP (ref 3.5–5.3)
PROT SERPL-MCNC: 6.6 G/DL — SIGNIFICANT CHANGE UP (ref 6–8.3)
PROTHROM AB SERPL-ACNC: 19.4 SEC — HIGH (ref 9.8–12.7)
RBC # BLD: 4.77 M/UL — SIGNIFICANT CHANGE UP (ref 3.8–5.2)
RBC # FLD: 15.9 % — HIGH (ref 10.3–14.5)
SODIUM SERPL-SCNC: 144 MMOL/L — SIGNIFICANT CHANGE UP (ref 135–145)
TROPONIN T SERPL-MCNC: <0.01 NG/ML — SIGNIFICANT CHANGE UP (ref 0–0.06)
WBC # BLD: 4.4 K/UL — SIGNIFICANT CHANGE UP (ref 3.8–10.5)
WBC # FLD AUTO: 4.4 K/UL — SIGNIFICANT CHANGE UP (ref 3.8–10.5)

## 2017-05-04 PROCEDURE — 93010 ELECTROCARDIOGRAM REPORT: CPT

## 2017-05-04 PROCEDURE — 99291 CRITICAL CARE FIRST HOUR: CPT | Mod: 25

## 2017-05-04 PROCEDURE — 71010: CPT | Mod: 26

## 2017-05-04 PROCEDURE — 99497 ADVNCD CARE PLAN 30 MIN: CPT | Mod: 25

## 2017-05-04 PROCEDURE — 99222 1ST HOSP IP/OBS MODERATE 55: CPT

## 2017-05-04 PROCEDURE — 99223 1ST HOSP IP/OBS HIGH 75: CPT

## 2017-05-04 RX ORDER — FUROSEMIDE 40 MG
20 TABLET ORAL ONCE
Qty: 0 | Refills: 0 | Status: COMPLETED | OUTPATIENT
Start: 2017-05-04 | End: 2017-05-04

## 2017-05-04 RX ORDER — ATORVASTATIN CALCIUM 80 MG/1
80 TABLET, FILM COATED ORAL AT BEDTIME
Qty: 0 | Refills: 0 | Status: DISCONTINUED | OUTPATIENT
Start: 2017-05-04 | End: 2017-05-10

## 2017-05-04 RX ORDER — APIXABAN 2.5 MG/1
5 TABLET, FILM COATED ORAL
Qty: 0 | Refills: 0 | Status: DISCONTINUED | OUTPATIENT
Start: 2017-05-04 | End: 2017-05-10

## 2017-05-04 RX ORDER — PANTOPRAZOLE SODIUM 20 MG/1
40 TABLET, DELAYED RELEASE ORAL
Qty: 0 | Refills: 0 | Status: DISCONTINUED | OUTPATIENT
Start: 2017-05-04 | End: 2017-05-10

## 2017-05-04 RX ORDER — NITROGLYCERIN 6.5 MG
0.4 CAPSULE, EXTENDED RELEASE ORAL ONCE
Qty: 0 | Refills: 0 | Status: COMPLETED | OUTPATIENT
Start: 2017-05-04 | End: 2017-05-04

## 2017-05-04 RX ORDER — ASPIRIN/CALCIUM CARB/MAGNESIUM 324 MG
324 TABLET ORAL ONCE
Qty: 0 | Refills: 0 | Status: COMPLETED | OUTPATIENT
Start: 2017-05-04 | End: 2017-05-04

## 2017-05-04 RX ORDER — SPIRONOLACTONE 25 MG/1
25 TABLET, FILM COATED ORAL DAILY
Qty: 0 | Refills: 0 | Status: DISCONTINUED | OUTPATIENT
Start: 2017-05-04 | End: 2017-05-10

## 2017-05-04 RX ORDER — CARVEDILOL PHOSPHATE 80 MG/1
25 CAPSULE, EXTENDED RELEASE ORAL EVERY 12 HOURS
Qty: 0 | Refills: 0 | Status: DISCONTINUED | OUTPATIENT
Start: 2017-05-04 | End: 2017-05-10

## 2017-05-04 RX ORDER — LISINOPRIL 2.5 MG/1
5 TABLET ORAL DAILY
Qty: 0 | Refills: 0 | Status: DISCONTINUED | OUTPATIENT
Start: 2017-05-04 | End: 2017-05-10

## 2017-05-04 RX ADMIN — Medication 20 MILLIGRAM(S): at 19:43

## 2017-05-04 RX ADMIN — Medication 0.4 MILLIGRAM(S): at 19:46

## 2017-05-04 RX ADMIN — Medication 324 MILLIGRAM(S): at 19:42

## 2017-05-04 NOTE — H&P ADULT. - LAB RESULTS AND INTERPRETATION
Personally reviewed: no luekocytosis, stable H/H, Coags notable for INR 1.76 CMP notable for alk phos 122  Trop <0.01 Elevated BNP 2692 Lactate 2.2

## 2017-05-04 NOTE — H&P ADULT. - ATTENDING COMMENTS
Dr. Jose Emerson accepted stephaniemaged's case and requested hospitalist team to complete admission. Patient previously unknown to me and I was assigned case Dr. Emerson to continue care. Sign out provided to night NP

## 2017-05-04 NOTE — ED PROVIDER NOTE - PROGRESS NOTE DETAILS
Called PMD Dr. Sorto spoke with dr. Sorto said to admit to unattached. feels better, heart rate averaging around 90's Martinez Shore MD (attending)

## 2017-05-04 NOTE — ED PROVIDER NOTE - OBJECTIVE STATEMENT
85F hx afib on eloquis htn chf cva presents to the ED for SOB. Pt was at cardiologist office for 1 week of sob. SOB worse at night, worse laying down and worse with exertion. Associated with some chest pressor when SOB present. No f/c/ha/dizziness/abd pain/nausea/vomiting/dysuria. Mild pain now. + weight gain and leg swelling. Cardiologist wanted pt to come to ED for diuresis and admission

## 2017-05-04 NOTE — H&P ADULT. - PROBLEM SELECTOR PLAN 1
Per daughter, patient has been on Sprionolactone but not actively taking Lasix  Patient is compliant with diet  No infectious processes to suggest cause of decompensation.  Monitor on tele  Trend CE x3  Monitor i/o  Monitor daily weights  Lasix IV for diuresis  Primary day team to consider repeat TTE and clarify with patient's PMD/Cardiologist if she should be on Isosorbide mononitrate.

## 2017-05-04 NOTE — ED PROVIDER NOTE - PHYSICAL EXAMINATION
Constitutional: mild distress  Eyes: PERRLA EOMI  Head: Normocephalic atraumatic  Cardiac: tachycardic   Resp: rales midway up b/l lung fields  GI: Abd s/nt/nd  Neuro: CN2-12 intact  Skin: non-healing wound on left lower leg

## 2017-05-04 NOTE — H&P ADULT. - ASSESSMENT
84 yo woman with PMH of HTN, Afib (on Eliquis), systolic CHF, CVA (residual left sided deficit uses cane and walker), presenting from  cardiologist office in setting of shortness of breath with intermittent chest discomfort of 1 week.

## 2017-05-04 NOTE — H&P ADULT. - RS GEN PE MLT RESP DETAILS PC
respirations non-labored/good air movement/airway patent/rales/breath sounds equal no intercostal retractions/respirations non-labored/good air movement/breath sounds equal/no wheezes/airway patent/rales

## 2017-05-04 NOTE — ED ADULT NURSE NOTE - PMH
Atrial fibrillation    CVA (cerebral vascular accident)  March 18th 2017, left side weakness ,s/p TPA  HTN (hypertension)    Venous stasis  b/l leg, left calf ulcer

## 2017-05-04 NOTE — H&P ADULT. - HISTORY OF PRESENT ILLNESS
84 yo woman with PMH of HTN, Afib (on Eliquis), systolic CHF, CVA (residual left sided deficit uses cane and walker), presenting from  cardiologist office in setting of shortness of breath with intermittent chest discomfort of 1 week. With regards to chest discomfort mostly right sided of the chest under the breast without radiation. Denies current CP. Denies associated palpitations. She has noticed that she has had symptoms worsening dyspnea on exertion and orthopnea. Patient states she is compliant with medications. Denies abdominal pain, nausea, emesis, diarrhea, constipation, dysuria. Denies fevers, chills, sweats, URI symptoms. There is intermittent dry cough, no production of sputum. Patient last admitted in March 23-28 for shortness of breath. 86 yo woman with PMH of HTN, Afib (on Eliquis), systolic CHF, CVA (residual left sided deficit uses cane and walker), presenting from  cardiologist office in setting of shortness of breath with intermittent chest discomfort of 1 week. With regards to chest discomfort mostly right sided of the chest under the breast without radiation. Denies current CP. Denies associated palpitations. She has noticed that she has had symptoms worsening dyspnea on exertion and orthopnea. Patient states she is compliant with medications. However, it is unclear if patient has been on Lasix. Patient has been on sprinolactone. Denies abdominal pain, nausea, emesis, diarrhea, constipation, dysuria. Denies fevers, chills, sweats, URI symptoms. There is intermittent dry cough, no production of sputum. Patient last admitted in March 23-28 for shortness of breath. 84 yo woman with PMH of HTN, Afib (on Eliquis), systolic CHF, CVA (residual left sided deficit uses cane and walker), presenting from  cardiologist office in setting of shortness of breath with intermittent chest discomfort of 1 week. With regards to chest discomfort mostly right sided of the chest under the breast without radiation. Denies current CP.  Denies associated palpitations. She has noticed that she has had symptoms worsening dyspnea on exertion and orthopnea. There is intermittent dry cough, no production of sputum. Patient states she is compliant with medications. However, it is unclear if patient has been on Lasix as daughter did not see Lasix with her pill bottles. Patient has been on spironolactone. Denies abdominal pain, nausea, emesis, diarrhea, constipation, dysuria. Denies fevers, chills, sweats, URI symptoms. Patient last admitted in March 23-28 for shortness of breath.

## 2017-05-04 NOTE — ED PROVIDER NOTE - ATTENDING CONTRIBUTION TO CARE
increasing shortness of breath, leg swelling, referred to emergency department for admission, on my exam:  crackles at bases, rapid heart rate, tachypneic. Martinez Shore MD (attending)

## 2017-05-04 NOTE — ED PROVIDER NOTE - MEDICAL DECISION MAKING DETAILS
85F hx afib on eloquis htn cva chf presents with sob orthopnea pnd exertional dyspnea. Pt sent in from cardiologist for diuresis and admission. Pt with CHF. less likely PE. Will r/o acs.

## 2017-05-04 NOTE — ED ADULT NURSE NOTE - OBJECTIVE STATEMENT
85yr female presented to ED c/o sob and chest pain 1week.  Pt report sob with exertion with chest pain for approximately 1 week. Pt has hx of Afib, CHF, CVA, on eloquis. Pt presents a&ox4, no accessory muscle use noted, reports sob worse when laying down and with exertion, associated with some chest pain, no dizziness, no ha, no n/v/d, no blurry vision or change in vision, bilaterally leg edema present, Pt's Cardiologist wanted pt to come to ED for diuresis and admission, skin warm dry & intact.

## 2017-05-04 NOTE — ED ADULT NURSE REASSESSMENT NOTE - NS ED NURSE REASSESS COMMENT FT1
Pt resting in room, in no visible distress, no work of breathing, no accessory muscle use, VSS, IV site clear no redness or swelling, a&ox4, waiting for room disposition.

## 2017-05-04 NOTE — H&P ADULT. - PROBLEM SELECTOR PLAN 5
Discussed with patient and daughter Loly Sotelo. Patient's daughter's Loly Sotleo and Risa Lindo are her HCP  Advanced Directives: Full Code Discussed with patient and daughter Loly Sotelo. Patient's daughter's Loly Sotelo and Risa Lindo are her HCP  Advanced Directives: Full Code  Time Spent ~20 min

## 2017-05-05 LAB
ANION GAP SERPL CALC-SCNC: 11 MMOL/L — SIGNIFICANT CHANGE UP (ref 5–17)
BUN SERPL-MCNC: 18 MG/DL — SIGNIFICANT CHANGE UP (ref 7–23)
CALCIUM SERPL-MCNC: 9.2 MG/DL — SIGNIFICANT CHANGE UP (ref 8.4–10.5)
CHLORIDE SERPL-SCNC: 106 MMOL/L — SIGNIFICANT CHANGE UP (ref 96–108)
CK MB CFR SERPL CALC: 2.5 NG/ML — SIGNIFICANT CHANGE UP (ref 0–3.8)
CK SERPL-CCNC: 77 U/L — SIGNIFICANT CHANGE UP (ref 25–170)
CO2 SERPL-SCNC: 26 MMOL/L — SIGNIFICANT CHANGE UP (ref 22–31)
CREAT SERPL-MCNC: 0.96 MG/DL — SIGNIFICANT CHANGE UP (ref 0.5–1.3)
GLUCOSE SERPL-MCNC: 91 MG/DL — SIGNIFICANT CHANGE UP (ref 70–99)
HCT VFR BLD CALC: 40.9 % — SIGNIFICANT CHANGE UP (ref 34.5–45)
HGB BLD-MCNC: 13.4 G/DL — SIGNIFICANT CHANGE UP (ref 11.5–15.5)
MAGNESIUM SERPL-MCNC: 2 MG/DL — SIGNIFICANT CHANGE UP (ref 1.6–2.6)
MCHC RBC-ENTMCNC: 29.3 PG — SIGNIFICANT CHANGE UP (ref 27–34)
MCHC RBC-ENTMCNC: 32.9 GM/DL — SIGNIFICANT CHANGE UP (ref 32–36)
MCV RBC AUTO: 89 FL — SIGNIFICANT CHANGE UP (ref 80–100)
PHOSPHATE SERPL-MCNC: 4 MG/DL — SIGNIFICANT CHANGE UP (ref 2.5–4.5)
PLATELET # BLD AUTO: 60 K/UL — LOW (ref 150–400)
POTASSIUM SERPL-MCNC: 4.2 MMOL/L — SIGNIFICANT CHANGE UP (ref 3.5–5.3)
POTASSIUM SERPL-SCNC: 4.2 MMOL/L — SIGNIFICANT CHANGE UP (ref 3.5–5.3)
RBC # BLD: 4.59 M/UL — SIGNIFICANT CHANGE UP (ref 3.8–5.2)
RBC # FLD: 15.8 % — HIGH (ref 10.3–14.5)
SODIUM SERPL-SCNC: 143 MMOL/L — SIGNIFICANT CHANGE UP (ref 135–145)
TROPONIN T SERPL-MCNC: <0.01 NG/ML — SIGNIFICANT CHANGE UP (ref 0–0.06)
WBC # BLD: 3.6 K/UL — LOW (ref 3.8–10.5)
WBC # FLD AUTO: 3.6 K/UL — LOW (ref 3.8–10.5)

## 2017-05-05 RX ORDER — FUROSEMIDE 40 MG
20 TABLET ORAL EVERY 12 HOURS
Qty: 0 | Refills: 0 | Status: DISCONTINUED | OUTPATIENT
Start: 2017-05-05 | End: 2017-05-10

## 2017-05-05 RX ORDER — DIGOXIN 250 MCG
0.25 TABLET ORAL EVERY 6 HOURS
Qty: 0 | Refills: 0 | Status: COMPLETED | OUTPATIENT
Start: 2017-05-06 | End: 2017-05-06

## 2017-05-05 RX ORDER — DIGOXIN 250 MCG
0.12 TABLET ORAL DAILY
Qty: 0 | Refills: 0 | Status: DISCONTINUED | OUTPATIENT
Start: 2017-05-07 | End: 2017-05-10

## 2017-05-05 RX ORDER — DIGOXIN 250 MCG
0.5 TABLET ORAL ONCE
Qty: 0 | Refills: 0 | Status: COMPLETED | OUTPATIENT
Start: 2017-05-05 | End: 2017-05-05

## 2017-05-05 RX ADMIN — APIXABAN 5 MILLIGRAM(S): 2.5 TABLET, FILM COATED ORAL at 05:38

## 2017-05-05 RX ADMIN — Medication 20 MILLIGRAM(S): at 18:42

## 2017-05-05 RX ADMIN — CARVEDILOL PHOSPHATE 25 MILLIGRAM(S): 80 CAPSULE, EXTENDED RELEASE ORAL at 05:38

## 2017-05-05 RX ADMIN — LISINOPRIL 5 MILLIGRAM(S): 2.5 TABLET ORAL at 05:38

## 2017-05-05 RX ADMIN — CARVEDILOL PHOSPHATE 25 MILLIGRAM(S): 80 CAPSULE, EXTENDED RELEASE ORAL at 18:42

## 2017-05-05 RX ADMIN — Medication 0.5 MILLIGRAM(S): at 18:42

## 2017-05-05 RX ADMIN — SPIRONOLACTONE 25 MILLIGRAM(S): 25 TABLET, FILM COATED ORAL at 05:38

## 2017-05-05 RX ADMIN — PANTOPRAZOLE SODIUM 40 MILLIGRAM(S): 20 TABLET, DELAYED RELEASE ORAL at 05:38

## 2017-05-05 RX ADMIN — ATORVASTATIN CALCIUM 80 MILLIGRAM(S): 80 TABLET, FILM COATED ORAL at 21:36

## 2017-05-05 RX ADMIN — APIXABAN 5 MILLIGRAM(S): 2.5 TABLET, FILM COATED ORAL at 18:42

## 2017-05-05 RX ADMIN — Medication 20 MILLIGRAM(S): at 05:38

## 2017-05-05 NOTE — PROVIDER CONTACT NOTE (OTHER) - ASSESSMENT
A&Ox4, denies CP, palpitations, + dyspneic on exertion (baseline). Resting comfortably after being taken off bedpan, no CP/palpitations/SOB at rest.

## 2017-05-05 NOTE — PROVIDER CONTACT NOTE (OTHER) - BACKGROUND
Admit dx: CHF. Afib on tele. Pt was using being placed on bedpan at time of event. Admit dx: CHF. Afib on tele, on eliquis, dig, lasix, BB. Pt was using being placed on bedpan at time of event.

## 2017-05-06 LAB
ANION GAP SERPL CALC-SCNC: 9 MMOL/L — SIGNIFICANT CHANGE UP (ref 5–17)
BASOPHILS # BLD AUTO: 0 K/UL — SIGNIFICANT CHANGE UP (ref 0–0.2)
BASOPHILS NFR BLD AUTO: 0.4 % — SIGNIFICANT CHANGE UP (ref 0–2)
BUN SERPL-MCNC: 16 MG/DL — SIGNIFICANT CHANGE UP (ref 7–23)
CALCIUM SERPL-MCNC: 9.2 MG/DL — SIGNIFICANT CHANGE UP (ref 8.4–10.5)
CHLORIDE SERPL-SCNC: 105 MMOL/L — SIGNIFICANT CHANGE UP (ref 96–108)
CK MB CFR SERPL CALC: 2.1 NG/ML — SIGNIFICANT CHANGE UP (ref 0–3.8)
CK SERPL-CCNC: 47 U/L — SIGNIFICANT CHANGE UP (ref 25–170)
CO2 SERPL-SCNC: 28 MMOL/L — SIGNIFICANT CHANGE UP (ref 22–31)
CREAT SERPL-MCNC: 0.82 MG/DL — SIGNIFICANT CHANGE UP (ref 0.5–1.3)
EOSINOPHIL # BLD AUTO: 0 K/UL — SIGNIFICANT CHANGE UP (ref 0–0.5)
EOSINOPHIL NFR BLD AUTO: 0 % — SIGNIFICANT CHANGE UP (ref 0–6)
GLUCOSE SERPL-MCNC: 90 MG/DL — SIGNIFICANT CHANGE UP (ref 70–99)
HCT VFR BLD CALC: 39.8 % — SIGNIFICANT CHANGE UP (ref 34.5–45)
HGB BLD-MCNC: 13.1 G/DL — SIGNIFICANT CHANGE UP (ref 11.5–15.5)
LYMPHOCYTES # BLD AUTO: 1.1 K/UL — SIGNIFICANT CHANGE UP (ref 1–3.3)
LYMPHOCYTES # BLD AUTO: 25.6 % — SIGNIFICANT CHANGE UP (ref 13–44)
MCHC RBC-ENTMCNC: 29.2 PG — SIGNIFICANT CHANGE UP (ref 27–34)
MCHC RBC-ENTMCNC: 32.9 GM/DL — SIGNIFICANT CHANGE UP (ref 32–36)
MCV RBC AUTO: 88.8 FL — SIGNIFICANT CHANGE UP (ref 80–100)
MONOCYTES # BLD AUTO: 0.3 K/UL — SIGNIFICANT CHANGE UP (ref 0–0.9)
MONOCYTES NFR BLD AUTO: 7.8 % — SIGNIFICANT CHANGE UP (ref 2–14)
NEUTROPHILS # BLD AUTO: 3 K/UL — SIGNIFICANT CHANGE UP (ref 1.8–7.4)
NEUTROPHILS NFR BLD AUTO: 66.1 % — SIGNIFICANT CHANGE UP (ref 43–77)
PLATELET # BLD AUTO: 63 K/UL — LOW (ref 150–400)
POTASSIUM SERPL-MCNC: 4.1 MMOL/L — SIGNIFICANT CHANGE UP (ref 3.5–5.3)
POTASSIUM SERPL-SCNC: 4.1 MMOL/L — SIGNIFICANT CHANGE UP (ref 3.5–5.3)
RBC # BLD: 4.49 M/UL — SIGNIFICANT CHANGE UP (ref 3.8–5.2)
RBC # FLD: 15.6 % — HIGH (ref 10.3–14.5)
SODIUM SERPL-SCNC: 142 MMOL/L — SIGNIFICANT CHANGE UP (ref 135–145)
TROPONIN T SERPL-MCNC: <0.01 NG/ML — SIGNIFICANT CHANGE UP (ref 0–0.06)
WBC # BLD: 4.5 K/UL — SIGNIFICANT CHANGE UP (ref 3.8–10.5)
WBC # FLD AUTO: 4.5 K/UL — SIGNIFICANT CHANGE UP (ref 3.8–10.5)

## 2017-05-06 PROCEDURE — 93010 ELECTROCARDIOGRAM REPORT: CPT

## 2017-05-06 PROCEDURE — 99222 1ST HOSP IP/OBS MODERATE 55: CPT | Mod: GC

## 2017-05-06 RX ADMIN — Medication 0.25 MILLIGRAM(S): at 06:10

## 2017-05-06 RX ADMIN — Medication 20 MILLIGRAM(S): at 06:10

## 2017-05-06 RX ADMIN — CARVEDILOL PHOSPHATE 25 MILLIGRAM(S): 80 CAPSULE, EXTENDED RELEASE ORAL at 18:03

## 2017-05-06 RX ADMIN — Medication 20 MILLIGRAM(S): at 18:03

## 2017-05-06 RX ADMIN — Medication 0.25 MILLIGRAM(S): at 01:34

## 2017-05-06 RX ADMIN — PANTOPRAZOLE SODIUM 40 MILLIGRAM(S): 20 TABLET, DELAYED RELEASE ORAL at 06:10

## 2017-05-06 RX ADMIN — LISINOPRIL 5 MILLIGRAM(S): 2.5 TABLET ORAL at 06:10

## 2017-05-06 RX ADMIN — CARVEDILOL PHOSPHATE 25 MILLIGRAM(S): 80 CAPSULE, EXTENDED RELEASE ORAL at 06:10

## 2017-05-06 RX ADMIN — SPIRONOLACTONE 25 MILLIGRAM(S): 25 TABLET, FILM COATED ORAL at 06:10

## 2017-05-06 RX ADMIN — APIXABAN 5 MILLIGRAM(S): 2.5 TABLET, FILM COATED ORAL at 18:04

## 2017-05-06 RX ADMIN — APIXABAN 5 MILLIGRAM(S): 2.5 TABLET, FILM COATED ORAL at 06:10

## 2017-05-06 RX ADMIN — ATORVASTATIN CALCIUM 80 MILLIGRAM(S): 80 TABLET, FILM COATED ORAL at 21:05

## 2017-05-06 NOTE — PROVIDER CONTACT NOTE (OTHER) - ACTION/TREATMENT ORDERED:
NP Giancarlo smalls. Came to see and assess patient. While assessing patient, patient stated that "her pain level has decreased to 4/10." EKG done and cardiac enzymes ordered. Nursing care is ongoing.

## 2017-05-06 NOTE — PROVIDER CONTACT NOTE (OTHER) - ASSESSMENT
Pt is alert and oriented. Vitals have remained stable. Pain is not radiating. Pt states "I have had this type of chest pain before".

## 2017-05-07 LAB
ANION GAP SERPL CALC-SCNC: 11 MMOL/L — SIGNIFICANT CHANGE UP (ref 5–17)
BASOPHILS # BLD AUTO: 0.01 K/UL — SIGNIFICANT CHANGE UP (ref 0–0.2)
BASOPHILS NFR BLD AUTO: 0.3 % — SIGNIFICANT CHANGE UP (ref 0–2)
BUN SERPL-MCNC: 16 MG/DL — SIGNIFICANT CHANGE UP (ref 7–23)
CALCIUM SERPL-MCNC: 9.1 MG/DL — SIGNIFICANT CHANGE UP (ref 8.4–10.5)
CHLORIDE SERPL-SCNC: 102 MMOL/L — SIGNIFICANT CHANGE UP (ref 96–108)
CO2 SERPL-SCNC: 27 MMOL/L — SIGNIFICANT CHANGE UP (ref 22–31)
CREAT SERPL-MCNC: 0.75 MG/DL — SIGNIFICANT CHANGE UP (ref 0.5–1.3)
EOSINOPHIL # BLD AUTO: 0 K/UL — SIGNIFICANT CHANGE UP (ref 0–0.5)
EOSINOPHIL NFR BLD AUTO: 0 % — SIGNIFICANT CHANGE UP (ref 0–6)
GLUCOSE SERPL-MCNC: 94 MG/DL — SIGNIFICANT CHANGE UP (ref 70–99)
HCT VFR BLD CALC: 39.6 % — SIGNIFICANT CHANGE UP (ref 34.5–45)
HGB BLD-MCNC: 12.6 G/DL — SIGNIFICANT CHANGE UP (ref 11.5–15.5)
IMM GRANULOCYTES NFR BLD AUTO: 0.3 % — SIGNIFICANT CHANGE UP (ref 0–1.5)
LYMPHOCYTES # BLD AUTO: 1.4 K/UL — SIGNIFICANT CHANGE UP (ref 1–3.3)
LYMPHOCYTES # BLD AUTO: 35.2 % — SIGNIFICANT CHANGE UP (ref 13–44)
MCHC RBC-ENTMCNC: 27.8 PG — SIGNIFICANT CHANGE UP (ref 27–34)
MCHC RBC-ENTMCNC: 31.8 GM/DL — LOW (ref 32–36)
MCV RBC AUTO: 87.4 FL — SIGNIFICANT CHANGE UP (ref 80–100)
MONOCYTES # BLD AUTO: 0.43 K/UL — SIGNIFICANT CHANGE UP (ref 0–0.9)
MONOCYTES NFR BLD AUTO: 10.8 % — SIGNIFICANT CHANGE UP (ref 2–14)
NEUTROPHILS # BLD AUTO: 2.13 K/UL — SIGNIFICANT CHANGE UP (ref 1.8–7.4)
NEUTROPHILS NFR BLD AUTO: 53.4 % — SIGNIFICANT CHANGE UP (ref 43–77)
PF4 HEPARIN CMPLX AB SER-ACNC: NEGATIVE — SIGNIFICANT CHANGE UP
PF4 HEPARIN CMPLX AB SERPL QL IA: 0.14 ABS — SIGNIFICANT CHANGE UP
PLATELET # BLD AUTO: 82 K/UL — LOW (ref 150–400)
POTASSIUM SERPL-MCNC: 4.4 MMOL/L — SIGNIFICANT CHANGE UP (ref 3.5–5.3)
POTASSIUM SERPL-SCNC: 4.4 MMOL/L — SIGNIFICANT CHANGE UP (ref 3.5–5.3)
RBC # BLD: 4.53 M/UL — SIGNIFICANT CHANGE UP (ref 3.8–5.2)
RBC # FLD: 17.5 % — HIGH (ref 10.3–14.5)
SODIUM SERPL-SCNC: 140 MMOL/L — SIGNIFICANT CHANGE UP (ref 135–145)
WBC # BLD: 3.98 K/UL — SIGNIFICANT CHANGE UP (ref 3.8–10.5)
WBC # FLD AUTO: 3.98 K/UL — SIGNIFICANT CHANGE UP (ref 3.8–10.5)

## 2017-05-07 PROCEDURE — 93970 EXTREMITY STUDY: CPT | Mod: 26

## 2017-05-07 RX ADMIN — SPIRONOLACTONE 25 MILLIGRAM(S): 25 TABLET, FILM COATED ORAL at 06:30

## 2017-05-07 RX ADMIN — CARVEDILOL PHOSPHATE 25 MILLIGRAM(S): 80 CAPSULE, EXTENDED RELEASE ORAL at 18:29

## 2017-05-07 RX ADMIN — Medication 0.12 MILLIGRAM(S): at 05:31

## 2017-05-07 RX ADMIN — PANTOPRAZOLE SODIUM 40 MILLIGRAM(S): 20 TABLET, DELAYED RELEASE ORAL at 05:30

## 2017-05-07 RX ADMIN — LISINOPRIL 5 MILLIGRAM(S): 2.5 TABLET ORAL at 06:30

## 2017-05-07 RX ADMIN — APIXABAN 5 MILLIGRAM(S): 2.5 TABLET, FILM COATED ORAL at 05:31

## 2017-05-07 RX ADMIN — ATORVASTATIN CALCIUM 80 MILLIGRAM(S): 80 TABLET, FILM COATED ORAL at 21:14

## 2017-05-07 RX ADMIN — Medication 20 MILLIGRAM(S): at 18:29

## 2017-05-07 RX ADMIN — APIXABAN 5 MILLIGRAM(S): 2.5 TABLET, FILM COATED ORAL at 18:29

## 2017-05-07 RX ADMIN — Medication 20 MILLIGRAM(S): at 05:31

## 2017-05-07 RX ADMIN — CARVEDILOL PHOSPHATE 25 MILLIGRAM(S): 80 CAPSULE, EXTENDED RELEASE ORAL at 06:30

## 2017-05-07 NOTE — PHYSICAL THERAPY INITIAL EVALUATION ADULT - PERTINENT HX OF CURRENT PROBLEM, REHAB EVAL
pt has l calf ulcer chronic for wound care consult ; on tele had 4 beats WCT 5/6/17 pt has l calf ulcer chronic for wound care consult ; on tele had 4 beats WCT 5/6/17; platelets 82 EKG AFib w/ RVR with PVC's , nonspecific T Wave abnormality

## 2017-05-07 NOTE — PHYSICAL THERAPY INITIAL EVALUATION ADULT - DISCHARGE DISPOSITION, PT EVAL
assist as need pt report has son, grandson and 2 dtrs can assist as need  someone always around per pt ; pt can benefit from Home PT to increase strength, balance, endurance, functional mobility , fall prevention education/home w/ home PT/home w/ assist

## 2017-05-07 NOTE — PHYSICAL THERAPY INITIAL EVALUATION ADULT - IMPAIRMENTS CONTRIBUTING TO GAIT DEVIATIONS, PT EVAL
impaired balance/impaired postural control/decreased strength/decreased endurance , need rest periods x 1 on toilet x 10 min  + BM /urinate; then 1-2 min x 2 while amb with assist

## 2017-05-07 NOTE — PHYSICAL THERAPY INITIAL EVALUATION ADULT - PRECAUTIONS/LIMITATIONS, REHAB EVAL
84 yo woman with PMH of HTN, Afib (on Eliquis), systolic CHF, CVA (residual left sided deficit uses cane and walker), presenting from  cardiologist office in setting of shortness of breath with intermittent chest discomfort of 1 week. With regards to chest discomfort mostly right sided of the chest under the breast without radiation. Denies current CP.  Denies associated palpitations. She has noticed that she has had symptoms worsening dyspnea on exertion and orthopnea. There is intermittent dry cough, no production of sputum. Patient states she is compliant with medications. However, it is unclear if patient has been on Lasix as daughter did not see Lasix with her pill bottles. Patient has been on spironolactone/fall precautions

## 2017-05-07 NOTE — PHYSICAL THERAPY INITIAL EVALUATION ADULT - IMPAIRMENTS FOUND, PT EVAL
min -mod edema lower legs /feet; pulse ox drop to 88-89 % on room air during amb mild SOB then resolve with seated rest back to 94 % rn Radha and NP Romina informed/muscle strength/aerobic capacity/endurance/gait, locomotion, and balance

## 2017-05-07 NOTE — PHYSICAL THERAPY INITIAL EVALUATION ADULT - TRANSFER SAFETY CONCERNS NOTED: SIT/STAND, REHAB EVAL
losing balance/decreased weight-shifting ability/decreased balance during turns/decreased step length/sit-stand from recliner , sit-stand from toilet same assist with grab bar (pt toilet much higher at home per pt , asked rachelle Garcia to get raised toilet seat for pt )

## 2017-05-07 NOTE — PHYSICAL THERAPY INITIAL EVALUATION ADULT - ASR EQUIP NEEDS DISCH PT EVAL
pt has rolling walker , std cane, shower chair with grab bars in shower , rasied toilet seat with handles , commode

## 2017-05-07 NOTE — PHYSICAL THERAPY INITIAL EVALUATION ADULT - GAIT DEVIATIONS NOTED, PT EVAL
decreased mauri/decreased weight-shifting ability/decreased stride length/increased time in double stance/decreased step length

## 2017-05-07 NOTE — PHYSICAL THERAPY INITIAL EVALUATION ADULT - GENERAL OBSERVATIONS, REHAB EVAL
pt received in recliner resting but arousable ; pt on room air pulse ox 94-98% ; pt bilateral le's ace wrap c,d I min edema -mod ; pt has nc O2 2 L next to her if need ; NOTE when amb with PT below with rolling walker pulse ox drop to 88-89 % mild SOB resolve with seated rest then back up to 94 % on room air

## 2017-05-07 NOTE — PHYSICAL THERAPY INITIAL EVALUATION ADULT - ADDITIONAL COMMENTS
pt lives with son and 2 daughters ; Loly Sotelo is one of daughters named caregiver [phone]; pt Independent with ADL's and uses std cane and rolling walker to amb pt lives with son and grandson ; has 2 daughters ; Loly Sotelo is one of daughters named caregiver [phone] and Risa is other dtr also caregiver  [phone] ; pt Independent with ADL's has 3 grab bars in shower and shower seat in walk in shower , has raised toilet seat with armrests over toilet and has commode in bedroom and uses std cane and rolling walker to amb ; pt uses std cane in house and rolling walker outdoors

## 2017-05-08 LAB
ANION GAP SERPL CALC-SCNC: 9 MMOL/L — SIGNIFICANT CHANGE UP (ref 5–17)
BUN SERPL-MCNC: 16 MG/DL — SIGNIFICANT CHANGE UP (ref 7–23)
CALCIUM SERPL-MCNC: 9.1 MG/DL — SIGNIFICANT CHANGE UP (ref 8.4–10.5)
CHLORIDE SERPL-SCNC: 105 MMOL/L — SIGNIFICANT CHANGE UP (ref 96–108)
CO2 BLDA-SCNC: 34 MMOL/L — HIGH (ref 22–30)
CO2 SERPL-SCNC: 29 MMOL/L — SIGNIFICANT CHANGE UP (ref 22–31)
CREAT SERPL-MCNC: 0.77 MG/DL — SIGNIFICANT CHANGE UP (ref 0.5–1.3)
D DIMER BLD IA.RAPID-MCNC: 177 NG/ML DDU — SIGNIFICANT CHANGE UP
DRVVT SCREEN TO CONFIRM RATIO: SIGNIFICANT CHANGE UP
GAS PNL BLDA: SIGNIFICANT CHANGE UP
GLUCOSE SERPL-MCNC: 85 MG/DL — SIGNIFICANT CHANGE UP (ref 70–99)
HAV IGM SER-ACNC: SIGNIFICANT CHANGE UP
HBV CORE IGM SER-ACNC: SIGNIFICANT CHANGE UP
HBV SURFACE AG SER-ACNC: SIGNIFICANT CHANGE UP
HCO3 BLDA-SCNC: 32 MMOL/L — HIGH (ref 21–29)
HCT VFR BLD CALC: 39.4 % — SIGNIFICANT CHANGE UP (ref 34.5–45)
HCV AB S/CO SERPL IA: 0.08 S/CO — SIGNIFICANT CHANGE UP
HCV AB SERPL-IMP: SIGNIFICANT CHANGE UP
HGB BLD-MCNC: 12.6 G/DL — SIGNIFICANT CHANGE UP (ref 11.5–15.5)
LA NT DPL PPP QL: 42 SEC — SIGNIFICANT CHANGE UP
MCHC RBC-ENTMCNC: 27.6 PG — SIGNIFICANT CHANGE UP (ref 27–34)
MCHC RBC-ENTMCNC: 32 GM/DL — SIGNIFICANT CHANGE UP (ref 32–36)
MCV RBC AUTO: 86.2 FL — SIGNIFICANT CHANGE UP (ref 80–100)
NORMALIZED SCT PPP-RTO: 0.97 RATIO — SIGNIFICANT CHANGE UP (ref 0–1.16)
NORMALIZED SCT PPP-RTO: SIGNIFICANT CHANGE UP
PCO2 BLDA: 35 MMHG — SIGNIFICANT CHANGE UP (ref 32–46)
PH BLDA: 7.57 — HIGH (ref 7.35–7.45)
PLATELET # BLD AUTO: 84 K/UL — LOW (ref 150–400)
PO2 BLDA: 153 MMHG — HIGH (ref 74–108)
POTASSIUM SERPL-MCNC: 4.5 MMOL/L — SIGNIFICANT CHANGE UP (ref 3.5–5.3)
POTASSIUM SERPL-SCNC: 4.5 MMOL/L — SIGNIFICANT CHANGE UP (ref 3.5–5.3)
RBC # BLD: 4.57 M/UL — SIGNIFICANT CHANGE UP (ref 3.8–5.2)
RBC # FLD: 17.5 % — HIGH (ref 10.3–14.5)
SAO2 % BLDA: 100 % — HIGH (ref 92–96)
SODIUM SERPL-SCNC: 143 MMOL/L — SIGNIFICANT CHANGE UP (ref 135–145)
WBC # BLD: 4.58 K/UL — SIGNIFICANT CHANGE UP (ref 3.8–10.5)
WBC # FLD AUTO: 4.58 K/UL — SIGNIFICANT CHANGE UP (ref 3.8–10.5)

## 2017-05-08 PROCEDURE — 71010: CPT | Mod: 26

## 2017-05-08 RX ADMIN — Medication 20 MILLIGRAM(S): at 18:50

## 2017-05-08 RX ADMIN — CARVEDILOL PHOSPHATE 25 MILLIGRAM(S): 80 CAPSULE, EXTENDED RELEASE ORAL at 18:50

## 2017-05-08 RX ADMIN — CARVEDILOL PHOSPHATE 25 MILLIGRAM(S): 80 CAPSULE, EXTENDED RELEASE ORAL at 06:22

## 2017-05-08 RX ADMIN — ATORVASTATIN CALCIUM 80 MILLIGRAM(S): 80 TABLET, FILM COATED ORAL at 22:22

## 2017-05-08 RX ADMIN — APIXABAN 5 MILLIGRAM(S): 2.5 TABLET, FILM COATED ORAL at 18:50

## 2017-05-08 RX ADMIN — LISINOPRIL 5 MILLIGRAM(S): 2.5 TABLET ORAL at 06:22

## 2017-05-08 RX ADMIN — SPIRONOLACTONE 25 MILLIGRAM(S): 25 TABLET, FILM COATED ORAL at 06:23

## 2017-05-08 RX ADMIN — PANTOPRAZOLE SODIUM 40 MILLIGRAM(S): 20 TABLET, DELAYED RELEASE ORAL at 06:22

## 2017-05-08 RX ADMIN — APIXABAN 5 MILLIGRAM(S): 2.5 TABLET, FILM COATED ORAL at 06:22

## 2017-05-08 RX ADMIN — Medication 20 MILLIGRAM(S): at 06:22

## 2017-05-08 RX ADMIN — Medication 0.12 MILLIGRAM(S): at 06:22

## 2017-05-09 LAB
ANION GAP SERPL CALC-SCNC: 11 MMOL/L — SIGNIFICANT CHANGE UP (ref 5–17)
B2 GLYCOPROT1 AB SER QL: POSITIVE
B2 GLYCOPROT1 IGA SER QL: <5 SAU — SIGNIFICANT CHANGE UP
B2 GLYCOPROT1 IGG SER-ACNC: <5 SGU — SIGNIFICANT CHANGE UP
B2 GLYCOPROT1 IGM SER-ACNC: 118.6 SMU — HIGH
BUN SERPL-MCNC: 17 MG/DL — SIGNIFICANT CHANGE UP (ref 7–23)
CALCIUM SERPL-MCNC: 9.3 MG/DL — SIGNIFICANT CHANGE UP (ref 8.4–10.5)
CARDIOLIPIN AB SER-ACNC: NEGATIVE — SIGNIFICANT CHANGE UP
CHLORIDE SERPL-SCNC: 100 MMOL/L — SIGNIFICANT CHANGE UP (ref 96–108)
CO2 SERPL-SCNC: 31 MMOL/L — SIGNIFICANT CHANGE UP (ref 22–31)
CREAT SERPL-MCNC: 0.73 MG/DL — SIGNIFICANT CHANGE UP (ref 0.5–1.3)
GLUCOSE SERPL-MCNC: 76 MG/DL — SIGNIFICANT CHANGE UP (ref 70–99)
HCT VFR BLD CALC: 42.3 % — SIGNIFICANT CHANGE UP (ref 34.5–45)
HGB BLD-MCNC: 13.4 G/DL — SIGNIFICANT CHANGE UP (ref 11.5–15.5)
MCHC RBC-ENTMCNC: 27.4 PG — SIGNIFICANT CHANGE UP (ref 27–34)
MCHC RBC-ENTMCNC: 31.7 GM/DL — LOW (ref 32–36)
MCV RBC AUTO: 86.5 FL — SIGNIFICANT CHANGE UP (ref 80–100)
PLATELET # BLD AUTO: 91 K/UL — LOW (ref 150–400)
POTASSIUM SERPL-MCNC: 4.1 MMOL/L — SIGNIFICANT CHANGE UP (ref 3.5–5.3)
POTASSIUM SERPL-SCNC: 4.1 MMOL/L — SIGNIFICANT CHANGE UP (ref 3.5–5.3)
RBC # BLD: 4.89 M/UL — SIGNIFICANT CHANGE UP (ref 3.8–5.2)
RBC # FLD: 17.7 % — HIGH (ref 10.3–14.5)
SODIUM SERPL-SCNC: 142 MMOL/L — SIGNIFICANT CHANGE UP (ref 135–145)
SRA INTERP SER-IMP: SIGNIFICANT CHANGE UP
WBC # BLD: 4.49 K/UL — SIGNIFICANT CHANGE UP (ref 3.8–10.5)
WBC # FLD AUTO: 4.49 K/UL — SIGNIFICANT CHANGE UP (ref 3.8–10.5)

## 2017-05-09 RX ADMIN — ATORVASTATIN CALCIUM 80 MILLIGRAM(S): 80 TABLET, FILM COATED ORAL at 21:40

## 2017-05-09 RX ADMIN — APIXABAN 5 MILLIGRAM(S): 2.5 TABLET, FILM COATED ORAL at 06:51

## 2017-05-09 RX ADMIN — PANTOPRAZOLE SODIUM 40 MILLIGRAM(S): 20 TABLET, DELAYED RELEASE ORAL at 06:51

## 2017-05-09 RX ADMIN — Medication 20 MILLIGRAM(S): at 18:52

## 2017-05-09 RX ADMIN — SPIRONOLACTONE 25 MILLIGRAM(S): 25 TABLET, FILM COATED ORAL at 06:52

## 2017-05-09 RX ADMIN — CARVEDILOL PHOSPHATE 25 MILLIGRAM(S): 80 CAPSULE, EXTENDED RELEASE ORAL at 06:52

## 2017-05-09 RX ADMIN — APIXABAN 5 MILLIGRAM(S): 2.5 TABLET, FILM COATED ORAL at 18:52

## 2017-05-09 RX ADMIN — Medication 0.12 MILLIGRAM(S): at 06:51

## 2017-05-09 RX ADMIN — LISINOPRIL 5 MILLIGRAM(S): 2.5 TABLET ORAL at 06:52

## 2017-05-09 RX ADMIN — CARVEDILOL PHOSPHATE 25 MILLIGRAM(S): 80 CAPSULE, EXTENDED RELEASE ORAL at 18:52

## 2017-05-09 RX ADMIN — Medication 20 MILLIGRAM(S): at 06:52

## 2017-05-10 ENCOUNTER — TRANSCRIPTION ENCOUNTER (OUTPATIENT)
Age: 82
End: 2017-05-10

## 2017-05-10 VITALS — SYSTOLIC BLOOD PRESSURE: 107 MMHG | DIASTOLIC BLOOD PRESSURE: 77 MMHG | HEART RATE: 89 BPM

## 2017-05-10 LAB
ANION GAP SERPL CALC-SCNC: 12 MMOL/L — SIGNIFICANT CHANGE UP (ref 5–17)
BUN SERPL-MCNC: 15 MG/DL — SIGNIFICANT CHANGE UP (ref 7–23)
CALCIUM SERPL-MCNC: 9.5 MG/DL — SIGNIFICANT CHANGE UP (ref 8.4–10.5)
CHLORIDE SERPL-SCNC: 102 MMOL/L — SIGNIFICANT CHANGE UP (ref 96–108)
CO2 SERPL-SCNC: 28 MMOL/L — SIGNIFICANT CHANGE UP (ref 22–31)
CREAT SERPL-MCNC: 0.75 MG/DL — SIGNIFICANT CHANGE UP (ref 0.5–1.3)
GLUCOSE SERPL-MCNC: 89 MG/DL — SIGNIFICANT CHANGE UP (ref 70–99)
HCT VFR BLD CALC: 44.1 % — SIGNIFICANT CHANGE UP (ref 34.5–45)
HGB BLD-MCNC: 13.7 G/DL — SIGNIFICANT CHANGE UP (ref 11.5–15.5)
MCHC RBC-ENTMCNC: 26.9 PG — LOW (ref 27–34)
MCHC RBC-ENTMCNC: 31.1 GM/DL — LOW (ref 32–36)
MCV RBC AUTO: 86.6 FL — SIGNIFICANT CHANGE UP (ref 80–100)
PLATELET # BLD AUTO: 115 K/UL — LOW (ref 150–400)
POTASSIUM SERPL-MCNC: 4.5 MMOL/L — SIGNIFICANT CHANGE UP (ref 3.5–5.3)
POTASSIUM SERPL-SCNC: 4.5 MMOL/L — SIGNIFICANT CHANGE UP (ref 3.5–5.3)
RBC # BLD: 5.09 M/UL — SIGNIFICANT CHANGE UP (ref 3.8–5.2)
RBC # FLD: 17.6 % — HIGH (ref 10.3–14.5)
SODIUM SERPL-SCNC: 142 MMOL/L — SIGNIFICANT CHANGE UP (ref 135–145)
WBC # BLD: 4.61 K/UL — SIGNIFICANT CHANGE UP (ref 3.8–10.5)
WBC # FLD AUTO: 4.61 K/UL — SIGNIFICANT CHANGE UP (ref 3.8–10.5)

## 2017-05-10 PROCEDURE — 82553 CREATINE MB FRACTION: CPT

## 2017-05-10 PROCEDURE — 85379 FIBRIN DEGRADATION QUANT: CPT

## 2017-05-10 PROCEDURE — 93970 EXTREMITY STUDY: CPT

## 2017-05-10 PROCEDURE — 85014 HEMATOCRIT: CPT

## 2017-05-10 PROCEDURE — 86146 BETA-2 GLYCOPROTEIN ANTIBODY: CPT

## 2017-05-10 PROCEDURE — 80074 ACUTE HEPATITIS PANEL: CPT

## 2017-05-10 PROCEDURE — 86022 PLATELET ANTIBODIES: CPT

## 2017-05-10 PROCEDURE — 71045 X-RAY EXAM CHEST 1 VIEW: CPT

## 2017-05-10 PROCEDURE — 80053 COMPREHEN METABOLIC PANEL: CPT

## 2017-05-10 PROCEDURE — 96374 THER/PROPH/DIAG INJ IV PUSH: CPT

## 2017-05-10 PROCEDURE — 93005 ELECTROCARDIOGRAM TRACING: CPT

## 2017-05-10 PROCEDURE — 82947 ASSAY GLUCOSE BLOOD QUANT: CPT

## 2017-05-10 PROCEDURE — 83735 ASSAY OF MAGNESIUM: CPT

## 2017-05-10 PROCEDURE — 86147 CARDIOLIPIN ANTIBODY EA IG: CPT

## 2017-05-10 PROCEDURE — 84132 ASSAY OF SERUM POTASSIUM: CPT

## 2017-05-10 PROCEDURE — 85610 PROTHROMBIN TIME: CPT

## 2017-05-10 PROCEDURE — 76700 US EXAM ABDOM COMPLETE: CPT | Mod: 26

## 2017-05-10 PROCEDURE — 82330 ASSAY OF CALCIUM: CPT

## 2017-05-10 PROCEDURE — 36600 WITHDRAWAL OF ARTERIAL BLOOD: CPT

## 2017-05-10 PROCEDURE — 85730 THROMBOPLASTIN TIME PARTIAL: CPT

## 2017-05-10 PROCEDURE — 84484 ASSAY OF TROPONIN QUANT: CPT

## 2017-05-10 PROCEDURE — 97162 PT EVAL MOD COMPLEX 30 MIN: CPT

## 2017-05-10 PROCEDURE — 82435 ASSAY OF BLOOD CHLORIDE: CPT

## 2017-05-10 PROCEDURE — 85385 FIBRINOGEN ANTIGEN: CPT

## 2017-05-10 PROCEDURE — 82550 ASSAY OF CK (CPK): CPT

## 2017-05-10 PROCEDURE — 84295 ASSAY OF SERUM SODIUM: CPT

## 2017-05-10 PROCEDURE — 99285 EMERGENCY DEPT VISIT HI MDM: CPT | Mod: 25

## 2017-05-10 PROCEDURE — 80048 BASIC METABOLIC PNL TOTAL CA: CPT

## 2017-05-10 PROCEDURE — 76700 US EXAM ABDOM COMPLETE: CPT

## 2017-05-10 PROCEDURE — 84100 ASSAY OF PHOSPHORUS: CPT

## 2017-05-10 PROCEDURE — 82803 BLOOD GASES ANY COMBINATION: CPT

## 2017-05-10 PROCEDURE — 83605 ASSAY OF LACTIC ACID: CPT

## 2017-05-10 PROCEDURE — 85027 COMPLETE CBC AUTOMATED: CPT

## 2017-05-10 PROCEDURE — 83880 ASSAY OF NATRIURETIC PEPTIDE: CPT

## 2017-05-10 RX ORDER — FUROSEMIDE 40 MG
1 TABLET ORAL
Qty: 30 | Refills: 0 | OUTPATIENT
Start: 2017-05-10 | End: 2017-06-09

## 2017-05-10 RX ORDER — DIGOXIN 250 MCG
1 TABLET ORAL
Qty: 30 | Refills: 0 | OUTPATIENT
Start: 2017-05-10 | End: 2017-06-09

## 2017-05-10 RX ORDER — DIGOXIN 250 MCG
1 TABLET ORAL
Qty: 0 | Refills: 0 | COMMUNITY
Start: 2017-05-10

## 2017-05-10 RX ADMIN — Medication 20 MILLIGRAM(S): at 06:33

## 2017-05-10 RX ADMIN — CARVEDILOL PHOSPHATE 25 MILLIGRAM(S): 80 CAPSULE, EXTENDED RELEASE ORAL at 17:00

## 2017-05-10 RX ADMIN — APIXABAN 5 MILLIGRAM(S): 2.5 TABLET, FILM COATED ORAL at 17:00

## 2017-05-10 RX ADMIN — PANTOPRAZOLE SODIUM 40 MILLIGRAM(S): 20 TABLET, DELAYED RELEASE ORAL at 06:33

## 2017-05-10 RX ADMIN — CARVEDILOL PHOSPHATE 25 MILLIGRAM(S): 80 CAPSULE, EXTENDED RELEASE ORAL at 06:33

## 2017-05-10 RX ADMIN — Medication 20 MILLIGRAM(S): at 17:00

## 2017-05-10 RX ADMIN — APIXABAN 5 MILLIGRAM(S): 2.5 TABLET, FILM COATED ORAL at 06:33

## 2017-05-10 RX ADMIN — SPIRONOLACTONE 25 MILLIGRAM(S): 25 TABLET, FILM COATED ORAL at 06:33

## 2017-05-10 RX ADMIN — Medication 0.12 MILLIGRAM(S): at 06:33

## 2017-05-10 RX ADMIN — LISINOPRIL 5 MILLIGRAM(S): 2.5 TABLET ORAL at 06:33

## 2017-05-10 NOTE — DISCHARGE NOTE ADULT - HOSPITAL COURSE
to be completed by physician 85F hx afib on eloquis htn chf cva presents to the ED for SOB. Pt was at cardiologist office for 1 week of sob. SOB worse at night, worse laying down and worse with exertion and increasing BLLE edema. PT has not been taking home med spiralactone and imdur (unclear as to why)                   DX: Acute on chronic systolic CHF on iv lasix                      Hx of Afib on eliquis                      thrombocytopenia   CHF- continue lasix 20 IV BID and spiralactone     5/8: * sob/chf exa (ef 30-36%) c/w iv lasix ( repeat cxr: prelim clear lungs)-- per pulmonology            sob not pulm in nature ( duplex neg for DVT/ d-dimer 177).          * cardiology rec stress test, however per Dr. Cabello pt had cath a few months ago that            showed nonobstructive cad, no need to repeat test---c/w lasix.          * Thrombocytopenia-- heme consult, f/u lab test/abd US--monitor cbc         * dispo: d/c home with PT

## 2017-05-10 NOTE — DISCHARGE NOTE ADULT - PATIENT PORTAL LINK FT
“You can access the [facility] Patient Portal, offered by [facility], by registering with the following website: [email or web address]”

## 2017-05-10 NOTE — DISCHARGE NOTE ADULT - CARE PLAN
Principal Discharge DX:	CHF (congestive heart failure)  Goal:	stable  Instructions for follow-up, activity and diet:	Weigh yourself daily.  If you gain 3lbs in 3 days, or 5lbs in a week call your Health Care Provider.  Do not eat or drink foods containing more than 2000mg of salt (sodium) in your diet every day.  Call your Health Care Provider if you have any swelling or increased swelling in your feet, ankles, and/or stomach.  Take all of your medication as directed.  If you become dizzy call your Health Care Provider.  Secondary Diagnosis:	Atrial fibrillation, unspecified type  Goal:	stable  Instructions for follow-up, activity and diet:	Atrial fibrillation is the most common heart rhythm problem.  The condition puts you at risk for has stroke and heart attack  It helps if you control your blood pressure, not drink more than 1-2 alcohol drinks per day, cut down on caffeine, getting treatment for over active thyroid gland, and get regular exercise  Call your doctor if you feel your heart racing or beating unusually, chest tightness or pain, lightheaded, faint, shortness of breath especially with exercise  It is important to take your heart medication as prescribed  You may be on anticoagulation which is very important to take as directed - you may need blood work to monitor drug levels  Secondary Diagnosis:	HTN (hypertension)  Goal:	stable  Instructions for follow-up, activity and diet:	Follow up with your medical doctor to establish long term blood pressure treatment goals.  Secondary Diagnosis:	Venous stasis  Goal:	stable  Instructions for follow-up, activity and diet:	c/w current meds  Secondary Diagnosis:	Thrombocytopenia  Goal:	stable  Instructions for follow-up, activity and diet:	Please f/u with hematology doctor. Principal Discharge DX:	CHF (congestive heart failure)  Goal:	stable  Instructions for follow-up, activity and diet:	Weigh yourself daily.  If you gain 3lbs in 3 days, or 5lbs in a week call your Health Care Provider.  Do not eat or drink foods containing more than 2000mg of salt (sodium) in your diet every day.  Call your Health Care Provider if you have any swelling or increased swelling in your feet, ankles, and/or stomach.  Take all of your medication as directed.  If you become dizzy call your Health Care Provider.  Secondary Diagnosis:	Atrial fibrillation, unspecified type  Goal:	stable  Instructions for follow-up, activity and diet:	Atrial fibrillation is the most common heart rhythm problem.  The condition puts you at risk for has stroke and heart attack  It helps if you control your blood pressure, not drink more than 1-2 alcohol drinks per day, cut down on caffeine, getting treatment for over active thyroid gland, and get regular exercise  Call your doctor if you feel your heart racing or beating unusually, chest tightness or pain, lightheaded, faint, shortness of breath especially with exercise  It is important to take your heart medication as prescribed  You may be on anticoagulation which is very important to take as directed - you may need blood work to monitor drug levels  Secondary Diagnosis:	HTN (hypertension)  Goal:	stable  Instructions for follow-up, activity and diet:	Follow up with your medical doctor to establish long term blood pressure treatment goals.  Secondary Diagnosis:	Venous stasis  Goal:	stable  Instructions for follow-up, activity and diet:	Cleanse w/ NS  Pat dry  LAc Hydrin moisturizing cream to dry intact skin  LLE--Medihoney paste to LLE wound + Cover w/ gauze and Secure w/ kerlix cling  BLE ACE WRAP from base of toes to knees in figure 8 pattern  Secondary Diagnosis:	Thrombocytopenia  Goal:	stable  Instructions for follow-up, activity and diet:	Please f/u with hematology doctor.

## 2017-05-10 NOTE — DISCHARGE NOTE ADULT - MEDICATION SUMMARY - MEDICATIONS TO STOP TAKING
I will STOP taking the medications listed below when I get home from the hospital:    isosorbide mononitrate 30 mg oral tablet, extended release  -- 1 tab(s) by mouth once a day

## 2017-05-10 NOTE — DISCHARGE NOTE ADULT - PLAN OF CARE
stable Weigh yourself daily.  If you gain 3lbs in 3 days, or 5lbs in a week call your Health Care Provider.  Do not eat or drink foods containing more than 2000mg of salt (sodium) in your diet every day.  Call your Health Care Provider if you have any swelling or increased swelling in your feet, ankles, and/or stomach.  Take all of your medication as directed.  If you become dizzy call your Health Care Provider. Atrial fibrillation is the most common heart rhythm problem.  The condition puts you at risk for has stroke and heart attack  It helps if you control your blood pressure, not drink more than 1-2 alcohol drinks per day, cut down on caffeine, getting treatment for over active thyroid gland, and get regular exercise  Call your doctor if you feel your heart racing or beating unusually, chest tightness or pain, lightheaded, faint, shortness of breath especially with exercise  It is important to take your heart medication as prescribed  You may be on anticoagulation which is very important to take as directed - you may need blood work to monitor drug levels Follow up with your medical doctor to establish long term blood pressure treatment goals. c/w current meds Please f/u with hematology doctor. Cleanse w/ NS  Pat dry  LAc Hydrin moisturizing cream to dry intact skin  LLE--Medihoney paste to LLE wound + Cover w/ gauze and Secure w/ kerlix cling  BLE ACE WRAP from base of toes to knees in figure 8 pattern

## 2017-05-10 NOTE — DISCHARGE NOTE ADULT - HOME CARE AGENCY
[facility] [phone] RN to provide skilled services, reinforce wound care

## 2017-05-10 NOTE — DISCHARGE NOTE ADULT - MEDICATION SUMMARY - MEDICATIONS TO TAKE
I will START or STAY ON the medications listed below when I get home from the hospital:    spironolactone 25 mg oral tablet  -- 1 tab(s) by mouth once a day  -- Indication: For Heart failure    lisinopril 5 mg oral tablet  -- 1 tab(s) by mouth once a day  -- Indication: For Heart failure    digoxin 125 mcg (0.125 mg) oral tablet  -- 1 tab(s) by mouth once a day  -- Indication: For Afib    apixaban 5 mg oral tablet  -- 1 tab(s) by mouth 2 times a day  -- Indication: For Ac     atorvastatin 80 mg oral tablet  -- 1 tab(s) by mouth once a day (at bedtime)  -- Indication: For Hld     carvedilol 25 mg oral tablet  -- 1 tab(s) by mouth every 12 hours  -- Indication: For Heart     furosemide 40 mg oral tablet  -- 1 tab(s) by mouth once a day  -- Indication: For Heart failure    pantoprazole 40 mg oral delayed release tablet  -- 1 tab(s) by mouth once a day  -- Indication: For gerd

## 2017-05-13 LAB — FIBRINOGEN AG PPP IA-MCNC: 296 MG/DL — SIGNIFICANT CHANGE UP (ref 180–350)

## 2017-05-21 ENCOUNTER — INPATIENT (INPATIENT)
Facility: HOSPITAL | Age: 82
LOS: 11 days | Discharge: HOME | DRG: 202 | End: 2017-06-02
Attending: INTERNAL MEDICINE | Admitting: INTERNAL MEDICINE
Payer: MEDICARE

## 2017-05-21 VITALS
OXYGEN SATURATION: 95 % | RESPIRATION RATE: 28 BRPM | TEMPERATURE: 98 F | HEART RATE: 71 BPM | DIASTOLIC BLOOD PRESSURE: 85 MMHG | SYSTOLIC BLOOD PRESSURE: 159 MMHG

## 2017-05-21 DIAGNOSIS — B34.1 ENTEROVIRUS INFECTION, UNSPECIFIED: ICD-10-CM

## 2017-05-21 LAB
ALBUMIN SERPL ELPH-MCNC: 3.9 G/DL — SIGNIFICANT CHANGE UP (ref 3.3–5)
ALP SERPL-CCNC: 106 U/L — SIGNIFICANT CHANGE UP (ref 40–120)
ALT FLD-CCNC: 35 U/L RC — SIGNIFICANT CHANGE UP (ref 10–45)
ANION GAP SERPL CALC-SCNC: 15 MMOL/L — SIGNIFICANT CHANGE UP (ref 5–17)
APTT BLD: 35 SEC — SIGNIFICANT CHANGE UP (ref 27.5–37.4)
AST SERPL-CCNC: 30 U/L — SIGNIFICANT CHANGE UP (ref 10–40)
BASE EXCESS BLDV CALC-SCNC: 7.9 MMOL/L — HIGH (ref -2–2)
BASOPHILS # BLD AUTO: 0 K/UL — SIGNIFICANT CHANGE UP (ref 0–0.2)
BASOPHILS NFR BLD AUTO: 0.2 % — SIGNIFICANT CHANGE UP (ref 0–2)
BILIRUB SERPL-MCNC: 0.9 MG/DL — SIGNIFICANT CHANGE UP (ref 0.2–1.2)
BUN SERPL-MCNC: 14 MG/DL — SIGNIFICANT CHANGE UP (ref 7–23)
CA-I SERPL-SCNC: 1.2 MMOL/L — SIGNIFICANT CHANGE UP (ref 1.12–1.3)
CALCIUM SERPL-MCNC: 9.7 MG/DL — SIGNIFICANT CHANGE UP (ref 8.4–10.5)
CHLORIDE BLDV-SCNC: 102 MMOL/L — SIGNIFICANT CHANGE UP (ref 96–108)
CHLORIDE SERPL-SCNC: 99 MMOL/L — SIGNIFICANT CHANGE UP (ref 96–108)
CK MB CFR SERPL CALC: 2.3 NG/ML — SIGNIFICANT CHANGE UP (ref 0–3.8)
CK SERPL-CCNC: 68 U/L — SIGNIFICANT CHANGE UP (ref 25–170)
CO2 BLDV-SCNC: 36 MMOL/L — HIGH (ref 22–30)
CO2 SERPL-SCNC: 27 MMOL/L — SIGNIFICANT CHANGE UP (ref 22–31)
CREAT SERPL-MCNC: 0.89 MG/DL — SIGNIFICANT CHANGE UP (ref 0.5–1.3)
DIGOXIN SERPL-MCNC: 1 NG/ML — SIGNIFICANT CHANGE UP (ref 0.8–2)
EOSINOPHIL # BLD AUTO: 0 K/UL — SIGNIFICANT CHANGE UP (ref 0–0.5)
EOSINOPHIL NFR BLD AUTO: 0.1 % — SIGNIFICANT CHANGE UP (ref 0–6)
GAS PNL BLDV: 140 MMOL/L — SIGNIFICANT CHANGE UP (ref 136–145)
GAS PNL BLDV: SIGNIFICANT CHANGE UP
GAS PNL BLDV: SIGNIFICANT CHANGE UP
GLUCOSE BLDV-MCNC: 98 MG/DL — SIGNIFICANT CHANGE UP (ref 70–99)
GLUCOSE SERPL-MCNC: 91 MG/DL — SIGNIFICANT CHANGE UP (ref 70–99)
HCO3 BLDV-SCNC: 34 MMOL/L — HIGH (ref 21–29)
HCT VFR BLD CALC: 50 % — HIGH (ref 34.5–45)
HCT VFR BLDA CALC: 50 % — SIGNIFICANT CHANGE UP (ref 39–50)
HGB BLD CALC-MCNC: 16.2 G/DL — HIGH (ref 11.5–15.5)
HGB BLD-MCNC: 15.5 G/DL — SIGNIFICANT CHANGE UP (ref 11.5–15.5)
INR BLD: 1.55 RATIO — HIGH (ref 0.88–1.16)
LACTATE BLDV-MCNC: 1.3 MMOL/L — SIGNIFICANT CHANGE UP (ref 0.7–2)
LIDOCAIN IGE QN: 22 U/L — SIGNIFICANT CHANGE UP (ref 7–60)
LYMPHOCYTES # BLD AUTO: 2 K/UL — SIGNIFICANT CHANGE UP (ref 1–3.3)
LYMPHOCYTES # BLD AUTO: 24.6 % — SIGNIFICANT CHANGE UP (ref 13–44)
MCHC RBC-ENTMCNC: 28.4 PG — SIGNIFICANT CHANGE UP (ref 27–34)
MCHC RBC-ENTMCNC: 31.1 GM/DL — LOW (ref 32–36)
MCV RBC AUTO: 91.4 FL — SIGNIFICANT CHANGE UP (ref 80–100)
MONOCYTES # BLD AUTO: 0.6 K/UL — SIGNIFICANT CHANGE UP (ref 0–0.9)
MONOCYTES NFR BLD AUTO: 7.6 % — SIGNIFICANT CHANGE UP (ref 2–14)
NEUTROPHILS # BLD AUTO: 5.5 K/UL — SIGNIFICANT CHANGE UP (ref 1.8–7.4)
NEUTROPHILS NFR BLD AUTO: 67.6 % — SIGNIFICANT CHANGE UP (ref 43–77)
NT-PROBNP SERPL-SCNC: 2001 PG/ML — HIGH (ref 0–300)
PCO2 BLDV: 55 MMHG — HIGH (ref 35–50)
PH BLDV: 7.41 — SIGNIFICANT CHANGE UP (ref 7.35–7.45)
PLATELET # BLD AUTO: 132 K/UL — LOW (ref 150–400)
PO2 BLDV: 16 MMHG — LOW (ref 25–45)
POTASSIUM BLDV-SCNC: 3.3 MMOL/L — LOW (ref 3.5–5)
POTASSIUM SERPL-MCNC: 3.9 MMOL/L — SIGNIFICANT CHANGE UP (ref 3.5–5.3)
POTASSIUM SERPL-SCNC: 3.9 MMOL/L — SIGNIFICANT CHANGE UP (ref 3.5–5.3)
PROT SERPL-MCNC: 7.6 G/DL — SIGNIFICANT CHANGE UP (ref 6–8.3)
PROTHROM AB SERPL-ACNC: 16.9 SEC — HIGH (ref 9.8–12.7)
RAPID RVP RESULT: DETECTED
RBC # BLD: 5.47 M/UL — HIGH (ref 3.8–5.2)
RBC # FLD: 15.3 % — HIGH (ref 10.3–14.5)
RV+EV RNA SPEC QL NAA+PROBE: DETECTED
SAO2 % BLDV: 16 % — LOW (ref 67–88)
SODIUM SERPL-SCNC: 141 MMOL/L — SIGNIFICANT CHANGE UP (ref 135–145)
TROPONIN T SERPL-MCNC: <0.01 NG/ML — SIGNIFICANT CHANGE UP (ref 0–0.06)
WBC # BLD: 8.1 K/UL — SIGNIFICANT CHANGE UP (ref 3.8–10.5)
WBC # FLD AUTO: 8.1 K/UL — SIGNIFICANT CHANGE UP (ref 3.8–10.5)

## 2017-05-21 PROCEDURE — 99291 CRITICAL CARE FIRST HOUR: CPT | Mod: 25

## 2017-05-21 PROCEDURE — 93010 ELECTROCARDIOGRAM REPORT: CPT

## 2017-05-21 PROCEDURE — 71010: CPT | Mod: 26

## 2017-05-21 PROCEDURE — 99223 1ST HOSP IP/OBS HIGH 75: CPT

## 2017-05-21 PROCEDURE — 71250 CT THORAX DX C-: CPT | Mod: 26

## 2017-05-21 PROCEDURE — 93308 TTE F-UP OR LMTD: CPT | Mod: 26

## 2017-05-21 RX ORDER — SODIUM CHLORIDE 9 MG/ML
3 INJECTION INTRAMUSCULAR; INTRAVENOUS; SUBCUTANEOUS ONCE
Qty: 0 | Refills: 0 | Status: COMPLETED | OUTPATIENT
Start: 2017-05-21 | End: 2017-05-21

## 2017-05-21 RX ORDER — IPRATROPIUM/ALBUTEROL SULFATE 18-103MCG
3 AEROSOL WITH ADAPTER (GRAM) INHALATION ONCE
Qty: 0 | Refills: 0 | Status: COMPLETED | OUTPATIENT
Start: 2017-05-21 | End: 2017-05-21

## 2017-05-21 RX ORDER — ASPIRIN/CALCIUM CARB/MAGNESIUM 324 MG
324 TABLET ORAL ONCE
Qty: 0 | Refills: 0 | Status: COMPLETED | OUTPATIENT
Start: 2017-05-21 | End: 2017-05-21

## 2017-05-21 RX ORDER — FUROSEMIDE 40 MG
40 TABLET ORAL ONCE
Qty: 0 | Refills: 0 | Status: DISCONTINUED | OUTPATIENT
Start: 2017-05-21 | End: 2017-05-21

## 2017-05-21 RX ADMIN — Medication 3 MILLILITER(S): at 17:55

## 2017-05-21 RX ADMIN — Medication 324 MILLIGRAM(S): at 18:57

## 2017-05-21 RX ADMIN — SODIUM CHLORIDE 3 MILLILITER(S): 9 INJECTION INTRAMUSCULAR; INTRAVENOUS; SUBCUTANEOUS at 17:59

## 2017-05-21 RX ADMIN — Medication 3 MILLILITER(S): at 23:20

## 2017-05-21 NOTE — H&P ADULT. - PROBLEM SELECTOR PLAN 2
--continue lasix 40 PO daily  --continue digoxin 125  --continue aldactone 25  --continue lisinopril 5

## 2017-05-21 NOTE — H&P ADULT. - LAB RESULTS AND INTERPRETATION
Labs personally reviewed.   Labs notable for WBC 8.1, Hgb 15.5, Plt 132 (chronic thrombocytopenia), INR 1.55, Cr 0.89, troponin negative x 1, BNP 2001, lactate 1.3, RVP positive for rhino virus.

## 2017-05-21 NOTE — H&P ADULT. - HISTORY OF PRESENT ILLNESS
This is an 85 year old woman with PMH of HTN, Afib (on eliquis), systolic CHF, CVA (residual left sided deficit uses cane and walker), presenting from  cardiologist office in setting of shortness of breath with 1 week of chest pressure and 1 day of increased shortness of breath associated with productive cough worse with exertion and when lying flat. Was recently discharged from hospital for similar symptoms and had workup c/w CHF. Pulm consult determined that it was not a primary lung process, negative BLE DVT studies, clean coronary angiogram this year. States that she is compliant with medications although chart review shows that she has a history of poor compliance.    In ED, HR , /90, Tmax 97.7, satting well on RA.  Labs notable for WBC 8.1, Hgb 15.5, Plt 132 (chronic thrombocytopenia), INR 1.55, Cr 0.89, troponin negative x 1, BNP 2001, lactate 1.3, RVP positive for rhino virus.  CXR clear, CT chest without significant edema, no focal consolidation. This is an 85 year old woman with PMH of HTN, Afib (on eliquis), HFrEF 35%, CVA (mild residual left sided deficit uses cane and walker, memory deficits), recent admission 5/4-5/10 for SOB presenting with worsening cough, SOB x 2 days.  She notes that on her prior discharge, breathing was much improved back to baseline. Patient with chronic non-productive cough, but she notes that 2 days PTA cough began to become more frequent interrupting her sleep, and became productive of thick yellowish sputum. In the setting of this, she also noted worsening SOB with any minimal exertion.  She notes chills, did not take her temperature. She has stable 2 pillow orthopnea, notes chronic LE edema which has not worsened, denies PND.  She notes a chest pain which has been present intermittently for months which is 4/10 predominantly R chest below breast, and has become more fequent over the past 3 days. No associated N/V, diaphoresis, non-radiating, non-pleuritic, non-reproducible with palpations. Denies N/V, diarrhea, constipation.      Patient was recently admitted 5/4-/10 for SOB.   Cardiology and pulm were consulted for SOB, ultimately thought to be CHF exacerbation, improved with diuresis. Given recent cath with non-obstructive CAD, stress test was not repeated.  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 She received duonebs, lasix 40 and  with significant improvement in symptoms.      In ED, HR , /90, Tmax 97.7, satting well on RA.  Labs notable for WBC 8.1, Hgb 15.5, Plt 132 (chronic thrombocytopenia), INR 1.55, Cr 0.89, troponin negative x 1, BNP 2001, lactate 1.3, RVP positive for rhino virus.  CXR clear, CT chest without significant edema, no focal consolidation.

## 2017-05-21 NOTE — ED PROVIDER NOTE - MEDICAL DECISION MAKING DETAILS
85F history of chf presents with increased shortness of breath, productive cough and chest pressure. 85F history of chf presents with increased shortness of breath, productive cough and chest pressure.  Attending Mcrae: 86 y/o female h/o CHF in the past with last ef of approximately 35% presenting with increased chest pain and sob. reports a cproductive cough. afebrile upon arrival with increased work of breathing. no exam pt with diffuse wheezing. no h/o asthma. POCUS shows no sig B lines making acute pulmonary edema less likely. concern for possible infectious etiology with productive cough. pt is on eloquist and with age adjusted d dimer wnl acute PE less likely. will do CT chest to evaluate for pna and check rvp. pt will need admission

## 2017-05-21 NOTE — H&P ADULT. - EKG AND INTERPRETATION
Tracing personally reviewed. Tracing personally reviewed. afib @ 110, frequent PVCs, no ST changes, no TWI

## 2017-05-21 NOTE — ED PROVIDER NOTE - PROGRESS NOTE DETAILS
Attending Thor: pt feeling improved will trial off bipap Attending Mcrae: labs show positive RVP. awaiting chest ct

## 2017-05-21 NOTE — H&P ADULT. - PROBLEM SELECTOR PLAN 1
--suspect that this is predominant cause of new symptoms.  BNP is realtively low for her, CT without significant pulmonary edema, exam with wheezes likely reactive airways in setting of infection  --continue supportive care with duonebs q6h, robitussin, tylenol PRN  --NC as needed

## 2017-05-21 NOTE — ED PROVIDER NOTE - OBJECTIVE STATEMENT
85F Afib (eliquis, digoxin), CHF (EF 35%), HTN, CVA presents with 1 wk of chest pressure and 1 day of increased shortness of breath associated with productive cough worse with exertion and when lying flat. Was recently discharged from hospital for similar symptoms and had workup c/w CHF. Pulm consult determined that it was not a primary lung process, negative BLE DVT studies, clean coronary angiogram this year. States that she is compliant with medications although chart review shows that she has a history of poor compliance.    PMD: Macario

## 2017-05-21 NOTE — H&P ADULT. - RADIOLOGY RESULTS AND INTERPRETATION
Imaging personally reviewed.  CXR clear, CT chest without significant edema, no focal consolidation.

## 2017-05-21 NOTE — ED ADULT NURSE NOTE - OBJECTIVE STATEMENT
85F comes to ED c/o worsening SOB, cough and chest pain. SInce yesterday, patient has felt worsening SOB associated with coughing up thick yellow sputum. Patient has PMH stroke, MI, afib and CHF. Patient does not appear to be in any distress. Patient is a&ox4 with patent airway. Patient denies N/V/D/dizziness/abdominal pain/fever/chills/trauma. Patient is also c/o rhinorrhea, decreased PO intake and less frequent urination. 85F comes to ED c/o worsening SOB, cough and chest pain. SInce yesterday, patient has felt worsening SOB associated with coughing up thick yellow sputum. Patients chest pain started today, is located underneath her right breast and does not radiate. EKG performed immediately. Patient has PMH stroke, MI, afib and CHF. Patient does not appear to be in any distress. Patient is a&ox4 with patent airway. Patient denies N/V/D/dizziness/abdominal pain/fever/chills/trauma. Patient is also c/o rhinorrhea, decreased PO intake and less frequent urination. Patient has wound to left shin for which she dresses herself using medi-honey. Wound appears clean and healing. Patient on eliquis. Patient on cardiac monitor. Will continue to monitor.

## 2017-05-21 NOTE — H&P ADULT. - ASSESSMENT
This is an 85 year old woman with PMH of HTN, Afib (on eliquis), HFrEF 35%, CVA (mild residual left sided deficit uses cane and walker, memory deficits), recent admission 5/4-5/10 for SOB presenting with worsening cough, SOB x 2 days.

## 2017-05-22 DIAGNOSIS — I48.2 CHRONIC ATRIAL FIBRILLATION: ICD-10-CM

## 2017-05-22 DIAGNOSIS — B34.1 ENTEROVIRUS INFECTION, UNSPECIFIED: ICD-10-CM

## 2017-05-22 DIAGNOSIS — I50.9 HEART FAILURE, UNSPECIFIED: ICD-10-CM

## 2017-05-22 DIAGNOSIS — I10 ESSENTIAL (PRIMARY) HYPERTENSION: ICD-10-CM

## 2017-05-22 DIAGNOSIS — Z29.9 ENCOUNTER FOR PROPHYLACTIC MEASURES, UNSPECIFIED: ICD-10-CM

## 2017-05-22 DIAGNOSIS — R63.8 OTHER SYMPTOMS AND SIGNS CONCERNING FOOD AND FLUID INTAKE: ICD-10-CM

## 2017-05-22 DIAGNOSIS — I87.8 OTHER SPECIFIED DISORDERS OF VEINS: ICD-10-CM

## 2017-05-22 LAB
ANION GAP SERPL CALC-SCNC: 14 MMOL/L — SIGNIFICANT CHANGE UP (ref 5–17)
APPEARANCE UR: ABNORMAL
BASOPHILS # BLD AUTO: 0.01 K/UL — SIGNIFICANT CHANGE UP (ref 0–0.2)
BASOPHILS NFR BLD AUTO: 0.2 % — SIGNIFICANT CHANGE UP (ref 0–2)
BILIRUB UR-MCNC: NEGATIVE — SIGNIFICANT CHANGE UP
BUN SERPL-MCNC: 13 MG/DL — SIGNIFICANT CHANGE UP (ref 7–23)
CALCIUM SERPL-MCNC: 9.7 MG/DL — SIGNIFICANT CHANGE UP (ref 8.4–10.5)
CHLORIDE SERPL-SCNC: 100 MMOL/L — SIGNIFICANT CHANGE UP (ref 96–108)
CO2 SERPL-SCNC: 31 MMOL/L — SIGNIFICANT CHANGE UP (ref 22–31)
COLOR SPEC: YELLOW — SIGNIFICANT CHANGE UP
CREAT SERPL-MCNC: 0.95 MG/DL — SIGNIFICANT CHANGE UP (ref 0.5–1.3)
DIFF PNL FLD: ABNORMAL
EOSINOPHIL # BLD AUTO: 0 K/UL — SIGNIFICANT CHANGE UP (ref 0–0.5)
EOSINOPHIL NFR BLD AUTO: 0 % — SIGNIFICANT CHANGE UP (ref 0–6)
EPI CELLS # UR: SIGNIFICANT CHANGE UP /HPF
GLUCOSE SERPL-MCNC: 88 MG/DL — SIGNIFICANT CHANGE UP (ref 70–99)
GLUCOSE UR QL: NEGATIVE — SIGNIFICANT CHANGE UP
HCT VFR BLD CALC: 32.9 % — LOW (ref 34.5–45)
HGB BLD-MCNC: 10.6 G/DL — LOW (ref 11.5–15.5)
IMM GRANULOCYTES NFR BLD AUTO: 0 % — SIGNIFICANT CHANGE UP (ref 0–1.5)
KETONES UR-MCNC: NEGATIVE — SIGNIFICANT CHANGE UP
LEUKOCYTE ESTERASE UR-ACNC: ABNORMAL
LYMPHOCYTES # BLD AUTO: 1.25 K/UL — SIGNIFICANT CHANGE UP (ref 1–3.3)
LYMPHOCYTES # BLD AUTO: 24.6 % — SIGNIFICANT CHANGE UP (ref 13–44)
MCHC RBC-ENTMCNC: 28 PG — SIGNIFICANT CHANGE UP (ref 27–34)
MCHC RBC-ENTMCNC: 32.2 GM/DL — SIGNIFICANT CHANGE UP (ref 32–36)
MCV RBC AUTO: 87 FL — SIGNIFICANT CHANGE UP (ref 80–100)
MONOCYTES # BLD AUTO: 0.52 K/UL — SIGNIFICANT CHANGE UP (ref 0–0.9)
MONOCYTES NFR BLD AUTO: 10.2 % — SIGNIFICANT CHANGE UP (ref 2–14)
NEUTROPHILS # BLD AUTO: 3.3 K/UL — SIGNIFICANT CHANGE UP (ref 1.8–7.4)
NEUTROPHILS NFR BLD AUTO: 65 % — SIGNIFICANT CHANGE UP (ref 43–77)
NITRITE UR-MCNC: NEGATIVE — SIGNIFICANT CHANGE UP
PH UR: 7 — SIGNIFICANT CHANGE UP (ref 5–8)
PLATELET # BLD AUTO: 94 K/UL — LOW (ref 150–400)
POTASSIUM SERPL-MCNC: 3.9 MMOL/L — SIGNIFICANT CHANGE UP (ref 3.5–5.3)
POTASSIUM SERPL-SCNC: 3.9 MMOL/L — SIGNIFICANT CHANGE UP (ref 3.5–5.3)
PROT UR-MCNC: SIGNIFICANT CHANGE UP
RBC # BLD: 3.78 M/UL — LOW (ref 3.8–5.2)
RBC # FLD: 17.1 % — HIGH (ref 10.3–14.5)
RBC CASTS # UR COMP ASSIST: ABNORMAL /HPF (ref 0–2)
SODIUM SERPL-SCNC: 145 MMOL/L — SIGNIFICANT CHANGE UP (ref 135–145)
SP GR SPEC: 1.02 — SIGNIFICANT CHANGE UP (ref 1.01–1.02)
UROBILINOGEN FLD QL: 2
WBC # BLD: 5.08 K/UL — SIGNIFICANT CHANGE UP (ref 3.8–10.5)
WBC # FLD AUTO: 5.08 K/UL — SIGNIFICANT CHANGE UP (ref 3.8–10.5)
WBC UR QL: SIGNIFICANT CHANGE UP /HPF (ref 0–5)

## 2017-05-22 RX ORDER — ACETAMINOPHEN 500 MG
650 TABLET ORAL EVERY 6 HOURS
Qty: 0 | Refills: 0 | Status: DISCONTINUED | OUTPATIENT
Start: 2017-05-22 | End: 2017-06-02

## 2017-05-22 RX ORDER — DIGOXIN 250 MCG
0.12 TABLET ORAL DAILY
Qty: 0 | Refills: 0 | Status: DISCONTINUED | OUTPATIENT
Start: 2017-05-22 | End: 2017-05-26

## 2017-05-22 RX ORDER — CARVEDILOL PHOSPHATE 80 MG/1
25 CAPSULE, EXTENDED RELEASE ORAL EVERY 12 HOURS
Qty: 0 | Refills: 0 | Status: DISCONTINUED | OUTPATIENT
Start: 2017-05-22 | End: 2017-05-26

## 2017-05-22 RX ORDER — FUROSEMIDE 40 MG
40 TABLET ORAL DAILY
Qty: 0 | Refills: 0 | Status: DISCONTINUED | OUTPATIENT
Start: 2017-05-22 | End: 2017-06-02

## 2017-05-22 RX ORDER — SPIRONOLACTONE 25 MG/1
25 TABLET, FILM COATED ORAL DAILY
Qty: 0 | Refills: 0 | Status: DISCONTINUED | OUTPATIENT
Start: 2017-05-22 | End: 2017-06-02

## 2017-05-22 RX ORDER — ATORVASTATIN CALCIUM 80 MG/1
80 TABLET, FILM COATED ORAL AT BEDTIME
Qty: 0 | Refills: 0 | Status: DISCONTINUED | OUTPATIENT
Start: 2017-05-22 | End: 2017-06-02

## 2017-05-22 RX ORDER — APIXABAN 2.5 MG/1
5 TABLET, FILM COATED ORAL
Qty: 0 | Refills: 0 | Status: DISCONTINUED | OUTPATIENT
Start: 2017-05-22 | End: 2017-05-25

## 2017-05-22 RX ORDER — IPRATROPIUM/ALBUTEROL SULFATE 18-103MCG
3 AEROSOL WITH ADAPTER (GRAM) INHALATION EVERY 6 HOURS
Qty: 0 | Refills: 0 | Status: DISCONTINUED | OUTPATIENT
Start: 2017-05-22 | End: 2017-05-26

## 2017-05-22 RX ORDER — PANTOPRAZOLE SODIUM 20 MG/1
40 TABLET, DELAYED RELEASE ORAL
Qty: 0 | Refills: 0 | Status: DISCONTINUED | OUTPATIENT
Start: 2017-05-22 | End: 2017-06-02

## 2017-05-22 RX ORDER — LISINOPRIL 2.5 MG/1
5 TABLET ORAL DAILY
Qty: 0 | Refills: 0 | Status: DISCONTINUED | OUTPATIENT
Start: 2017-05-22 | End: 2017-06-02

## 2017-05-22 RX ADMIN — CARVEDILOL PHOSPHATE 25 MILLIGRAM(S): 80 CAPSULE, EXTENDED RELEASE ORAL at 06:03

## 2017-05-22 RX ADMIN — APIXABAN 5 MILLIGRAM(S): 2.5 TABLET, FILM COATED ORAL at 21:41

## 2017-05-22 RX ADMIN — Medication 40 MILLIGRAM(S): at 06:03

## 2017-05-22 RX ADMIN — SPIRONOLACTONE 25 MILLIGRAM(S): 25 TABLET, FILM COATED ORAL at 06:05

## 2017-05-22 RX ADMIN — Medication 3 MILLILITER(S): at 12:36

## 2017-05-22 RX ADMIN — PANTOPRAZOLE SODIUM 40 MILLIGRAM(S): 20 TABLET, DELAYED RELEASE ORAL at 10:14

## 2017-05-22 RX ADMIN — Medication 3 MILLILITER(S): at 06:08

## 2017-05-22 RX ADMIN — LISINOPRIL 5 MILLIGRAM(S): 2.5 TABLET ORAL at 06:03

## 2017-05-22 RX ADMIN — Medication 200 MILLIGRAM(S): at 21:41

## 2017-05-22 RX ADMIN — ATORVASTATIN CALCIUM 80 MILLIGRAM(S): 80 TABLET, FILM COATED ORAL at 21:41

## 2017-05-22 RX ADMIN — Medication 0.12 MILLIGRAM(S): at 06:05

## 2017-05-22 RX ADMIN — Medication 3 MILLILITER(S): at 18:25

## 2017-05-22 RX ADMIN — CARVEDILOL PHOSPHATE 25 MILLIGRAM(S): 80 CAPSULE, EXTENDED RELEASE ORAL at 21:41

## 2017-05-22 RX ADMIN — APIXABAN 5 MILLIGRAM(S): 2.5 TABLET, FILM COATED ORAL at 06:04

## 2017-05-22 NOTE — ED ADULT NURSE REASSESSMENT NOTE - NS ED NURSE REASSESS COMMENT FT1
Patient currently in no distress. Voiding in bedpan with assistance, clear yellow urine noted. Report given to holding RN Kevin. Patient still awaiting bed assignment.
Pt resting in room, no complaints at this time, VSS, IV site clear no redness or swelling, a&ox4.
Received patient in CC C, currently verbalizes that her shortness of breath has improved. No shortness of breath or dyspnea noted while patient at rest. Nasal canula in use at 3L/ min
pt resting with family member at bedside, Pt is in no visible distress, no work of breathing noted, no accessory muscle use, Respiratory Therapist present placing Pt on bipap.    Pt presents with chest pain and worsening sob for 1 week, pt has hx of CHF, Afib, CVA, HTN.  SOB associated with productive cough and worse with exertion and when lying down. Pt on eliquis and digoxin. Pt was recently discharged from hospital for similar symptoms and had workup c/w CHF. Pulm consult determined that it was not a primary lung process, negative BLE DVT studies, clean coronary angiogram this year. States that she is compliant with medications although chart review shows that she has a history of poor compliance.
Pt sleeping in room, waiting for room disposition, VSS, IV site clear no redness or swelling, no sob.

## 2017-05-23 LAB
ANION GAP SERPL CALC-SCNC: 17 MMOL/L — SIGNIFICANT CHANGE UP (ref 5–17)
BASOPHILS # BLD AUTO: 0.01 K/UL — SIGNIFICANT CHANGE UP (ref 0–0.2)
BASOPHILS NFR BLD AUTO: 0.2 % — SIGNIFICANT CHANGE UP (ref 0–2)
BUN SERPL-MCNC: 12 MG/DL — SIGNIFICANT CHANGE UP (ref 7–23)
CALCIUM SERPL-MCNC: 9.2 MG/DL — SIGNIFICANT CHANGE UP (ref 8.4–10.5)
CHLORIDE SERPL-SCNC: 98 MMOL/L — SIGNIFICANT CHANGE UP (ref 96–108)
CO2 SERPL-SCNC: 27 MMOL/L — SIGNIFICANT CHANGE UP (ref 22–31)
CREAT SERPL-MCNC: 0.82 MG/DL — SIGNIFICANT CHANGE UP (ref 0.5–1.3)
EOSINOPHIL # BLD AUTO: 0 K/UL — SIGNIFICANT CHANGE UP (ref 0–0.5)
EOSINOPHIL NFR BLD AUTO: 0 % — SIGNIFICANT CHANGE UP (ref 0–6)
GLUCOSE SERPL-MCNC: 80 MG/DL — SIGNIFICANT CHANGE UP (ref 70–99)
HCT VFR BLD CALC: 41.9 % — SIGNIFICANT CHANGE UP (ref 34.5–45)
HGB BLD-MCNC: 13.3 G/DL — SIGNIFICANT CHANGE UP (ref 11.5–15.5)
IMM GRANULOCYTES NFR BLD AUTO: 0.2 % — SIGNIFICANT CHANGE UP (ref 0–1.5)
LYMPHOCYTES # BLD AUTO: 1.63 K/UL — SIGNIFICANT CHANGE UP (ref 1–3.3)
LYMPHOCYTES # BLD AUTO: 31.7 % — SIGNIFICANT CHANGE UP (ref 13–44)
MCHC RBC-ENTMCNC: 27.7 PG — SIGNIFICANT CHANGE UP (ref 27–34)
MCHC RBC-ENTMCNC: 31.7 GM/DL — LOW (ref 32–36)
MCV RBC AUTO: 87.3 FL — SIGNIFICANT CHANGE UP (ref 80–100)
MONOCYTES # BLD AUTO: 0.47 K/UL — SIGNIFICANT CHANGE UP (ref 0–0.9)
MONOCYTES NFR BLD AUTO: 9.1 % — SIGNIFICANT CHANGE UP (ref 2–14)
NEUTROPHILS # BLD AUTO: 3.02 K/UL — SIGNIFICANT CHANGE UP (ref 1.8–7.4)
NEUTROPHILS NFR BLD AUTO: 58.8 % — SIGNIFICANT CHANGE UP (ref 43–77)
PLATELET # BLD AUTO: 116 K/UL — LOW (ref 150–400)
POTASSIUM SERPL-MCNC: 3.7 MMOL/L — SIGNIFICANT CHANGE UP (ref 3.5–5.3)
POTASSIUM SERPL-SCNC: 3.7 MMOL/L — SIGNIFICANT CHANGE UP (ref 3.5–5.3)
PROCALCITONIN SERPL-MCNC: 0.05 NG/ML — SIGNIFICANT CHANGE UP (ref 0–0.05)
RBC # BLD: 4.8 M/UL — SIGNIFICANT CHANGE UP (ref 3.8–5.2)
RBC # FLD: 17.2 % — HIGH (ref 10.3–14.5)
SODIUM SERPL-SCNC: 142 MMOL/L — SIGNIFICANT CHANGE UP (ref 135–145)
T4 FREE SERPL-MCNC: 1.6 NG/DL — SIGNIFICANT CHANGE UP (ref 0.9–1.8)
TSH SERPL-MCNC: 0.53 UIU/ML — SIGNIFICANT CHANGE UP (ref 0.27–4.2)
WBC # BLD: 5.14 K/UL — SIGNIFICANT CHANGE UP (ref 3.8–10.5)
WBC # FLD AUTO: 5.14 K/UL — SIGNIFICANT CHANGE UP (ref 3.8–10.5)

## 2017-05-23 RX ADMIN — Medication 200 MILLIGRAM(S): at 13:05

## 2017-05-23 RX ADMIN — Medication 200 MILLIGRAM(S): at 17:33

## 2017-05-23 RX ADMIN — APIXABAN 5 MILLIGRAM(S): 2.5 TABLET, FILM COATED ORAL at 17:34

## 2017-05-23 RX ADMIN — Medication 40 MILLIGRAM(S): at 05:43

## 2017-05-23 RX ADMIN — Medication 3 MILLILITER(S): at 17:33

## 2017-05-23 RX ADMIN — ATORVASTATIN CALCIUM 80 MILLIGRAM(S): 80 TABLET, FILM COATED ORAL at 21:11

## 2017-05-23 RX ADMIN — PANTOPRAZOLE SODIUM 40 MILLIGRAM(S): 20 TABLET, DELAYED RELEASE ORAL at 05:43

## 2017-05-23 RX ADMIN — Medication 3 MILLILITER(S): at 00:50

## 2017-05-23 RX ADMIN — SPIRONOLACTONE 25 MILLIGRAM(S): 25 TABLET, FILM COATED ORAL at 05:43

## 2017-05-23 RX ADMIN — CARVEDILOL PHOSPHATE 25 MILLIGRAM(S): 80 CAPSULE, EXTENDED RELEASE ORAL at 17:33

## 2017-05-23 RX ADMIN — LISINOPRIL 5 MILLIGRAM(S): 2.5 TABLET ORAL at 05:43

## 2017-05-23 RX ADMIN — Medication 3 MILLILITER(S): at 05:43

## 2017-05-23 RX ADMIN — Medication 3 MILLILITER(S): at 13:05

## 2017-05-23 RX ADMIN — APIXABAN 5 MILLIGRAM(S): 2.5 TABLET, FILM COATED ORAL at 05:43

## 2017-05-23 RX ADMIN — Medication 200 MILLIGRAM(S): at 05:43

## 2017-05-23 RX ADMIN — CARVEDILOL PHOSPHATE 25 MILLIGRAM(S): 80 CAPSULE, EXTENDED RELEASE ORAL at 05:43

## 2017-05-23 RX ADMIN — Medication 200 MILLIGRAM(S): at 00:49

## 2017-05-23 RX ADMIN — Medication 0.12 MILLIGRAM(S): at 05:43

## 2017-05-23 RX ADMIN — Medication 40 MILLIGRAM(S): at 17:29

## 2017-05-23 NOTE — PHYSICAL THERAPY INITIAL EVALUATION ADULT - MODALITIES TREATMENT COMMENTS
Pt rec'ed seated in chair besides bed, NAD, call bell  in reach, DSD to L shin laceration, pt familiar to rehab department & WC PT from previous admission. Pt presents w/ skin laceration from previous admission (pt hit L LE on car door 2 months ago), pt notes that wound care RN has been coming to the house to perform dressing changes multiple times/wk. Wound rec'ed C/D/I, no purulence, no erythema, no odor. Wound w/ 70% graular tissue, 30% slough. Wound measuring 1.5cm x 2.5cm x 0.1cm. Wound cleansed w/ NS, cavilon to periwound, medihoney to slough in wound bed, gauze to cover, sandy to secure. Pt left reclined in indira besides bed, NAD, call zamora in reach, RN/Gabriela aware.

## 2017-05-23 NOTE — PHYSICAL THERAPY INITIAL EVALUATION ADULT - PERTINENT HX OF CURRENT PROBLEM, REHAB EVAL
85yoF PMH of HTN, Afib (on eliquis), HFrEF 35%, CVA (mild residual L sided deficit uses cane and walker, memory deficits), recent admission 5/4-5/10 for SOB p/w worsening cough, SOB x2 days.  She notes that on her prior DC, breathing was much improved back to baseline. Pt w/ chronic non-productive cough, but she notes that 2 days PTA cough began to become more frequent interrupting her sleep, & became productive of thick yellowish sputum, w/ worsening SOB.

## 2017-05-23 NOTE — PHYSICAL THERAPY INITIAL EVALUATION ADULT - LEVEL OF INDEPENDENCE: SIT/STAND, REHAB EVAL
pt declined functional assessment at this time as she notes she just needs to rest in the chair contact guard

## 2017-05-23 NOTE — PHYSICAL THERAPY INITIAL EVALUATION ADULT - GENERAL OBSERVATIONS, REHAB EVAL
Pt rec'ed supine in Pt rec'ed seated in chair, NAD, call bell in reach, +DSD to L anterior shin, personal cane at bedside, agreeable to GREGORY iverson

## 2017-05-24 LAB
HCT VFR BLD CALC: 44.4 % — SIGNIFICANT CHANGE UP (ref 34.5–45)
HGB BLD-MCNC: 13.8 G/DL — SIGNIFICANT CHANGE UP (ref 11.5–15.5)
MCHC RBC-ENTMCNC: 26.9 PG — LOW (ref 27–34)
MCHC RBC-ENTMCNC: 31.1 GM/DL — LOW (ref 32–36)
MCV RBC AUTO: 86.5 FL — SIGNIFICANT CHANGE UP (ref 80–100)
PLATELET # BLD AUTO: 136 K/UL — LOW (ref 150–400)
RBC # BLD: 5.13 M/UL — SIGNIFICANT CHANGE UP (ref 3.8–5.2)
RBC # FLD: 17.1 % — HIGH (ref 10.3–14.5)
WBC # BLD: 5.22 K/UL — SIGNIFICANT CHANGE UP (ref 3.8–10.5)
WBC # FLD AUTO: 5.22 K/UL — SIGNIFICANT CHANGE UP (ref 3.8–10.5)

## 2017-05-24 PROCEDURE — 76536 US EXAM OF HEAD AND NECK: CPT | Mod: 26

## 2017-05-24 PROCEDURE — 99222 1ST HOSP IP/OBS MODERATE 55: CPT | Mod: 25

## 2017-05-24 PROCEDURE — 31575 DIAGNOSTIC LARYNGOSCOPY: CPT

## 2017-05-24 PROCEDURE — 93010 ELECTROCARDIOGRAM REPORT: CPT

## 2017-05-24 RX ADMIN — PANTOPRAZOLE SODIUM 40 MILLIGRAM(S): 20 TABLET, DELAYED RELEASE ORAL at 05:47

## 2017-05-24 RX ADMIN — Medication 40 MILLIGRAM(S): at 05:47

## 2017-05-24 RX ADMIN — Medication 3 MILLILITER(S): at 00:32

## 2017-05-24 RX ADMIN — CARVEDILOL PHOSPHATE 25 MILLIGRAM(S): 80 CAPSULE, EXTENDED RELEASE ORAL at 05:47

## 2017-05-24 RX ADMIN — ATORVASTATIN CALCIUM 80 MILLIGRAM(S): 80 TABLET, FILM COATED ORAL at 20:56

## 2017-05-24 RX ADMIN — Medication 3 MILLILITER(S): at 13:08

## 2017-05-24 RX ADMIN — Medication 200 MILLIGRAM(S): at 05:48

## 2017-05-24 RX ADMIN — LISINOPRIL 5 MILLIGRAM(S): 2.5 TABLET ORAL at 05:48

## 2017-05-24 RX ADMIN — Medication 200 MILLIGRAM(S): at 19:02

## 2017-05-24 RX ADMIN — SPIRONOLACTONE 25 MILLIGRAM(S): 25 TABLET, FILM COATED ORAL at 05:47

## 2017-05-24 RX ADMIN — APIXABAN 5 MILLIGRAM(S): 2.5 TABLET, FILM COATED ORAL at 05:47

## 2017-05-24 RX ADMIN — Medication 200 MILLIGRAM(S): at 00:32

## 2017-05-24 RX ADMIN — Medication 3 MILLILITER(S): at 19:02

## 2017-05-24 RX ADMIN — Medication 200 MILLIGRAM(S): at 13:08

## 2017-05-24 RX ADMIN — Medication 3 MILLILITER(S): at 05:48

## 2017-05-24 RX ADMIN — Medication 0.12 MILLIGRAM(S): at 05:47

## 2017-05-24 RX ADMIN — APIXABAN 5 MILLIGRAM(S): 2.5 TABLET, FILM COATED ORAL at 19:02

## 2017-05-24 RX ADMIN — CARVEDILOL PHOSPHATE 25 MILLIGRAM(S): 80 CAPSULE, EXTENDED RELEASE ORAL at 19:02

## 2017-05-25 LAB
ANION GAP SERPL CALC-SCNC: 14 MMOL/L — SIGNIFICANT CHANGE UP (ref 5–17)
APTT BLD: 31.1 SEC — SIGNIFICANT CHANGE UP (ref 27.5–37.4)
BUN SERPL-MCNC: 15 MG/DL — SIGNIFICANT CHANGE UP (ref 7–23)
CALCIUM SERPL-MCNC: 9.4 MG/DL — SIGNIFICANT CHANGE UP (ref 8.4–10.5)
CHLORIDE SERPL-SCNC: 100 MMOL/L — SIGNIFICANT CHANGE UP (ref 96–108)
CO2 SERPL-SCNC: 26 MMOL/L — SIGNIFICANT CHANGE UP (ref 22–31)
CREAT SERPL-MCNC: 0.92 MG/DL — SIGNIFICANT CHANGE UP (ref 0.5–1.3)
GLUCOSE SERPL-MCNC: 93 MG/DL — SIGNIFICANT CHANGE UP (ref 70–99)
HCT VFR BLD CALC: 44.8 % — SIGNIFICANT CHANGE UP (ref 34.5–45)
HGB BLD-MCNC: 14.4 G/DL — SIGNIFICANT CHANGE UP (ref 11.5–15.5)
INR BLD: 1.65 RATIO — HIGH (ref 0.88–1.16)
MAGNESIUM SERPL-MCNC: 2.2 MG/DL — SIGNIFICANT CHANGE UP (ref 1.6–2.6)
MCHC RBC-ENTMCNC: 27.9 PG — SIGNIFICANT CHANGE UP (ref 27–34)
MCHC RBC-ENTMCNC: 32.1 GM/DL — SIGNIFICANT CHANGE UP (ref 32–36)
MCV RBC AUTO: 86.8 FL — SIGNIFICANT CHANGE UP (ref 80–100)
PHOSPHATE SERPL-MCNC: 2.5 MG/DL — SIGNIFICANT CHANGE UP (ref 2.5–4.5)
PLATELET # BLD AUTO: 135 K/UL — LOW (ref 150–400)
POTASSIUM SERPL-MCNC: 3.6 MMOL/L — SIGNIFICANT CHANGE UP (ref 3.5–5.3)
POTASSIUM SERPL-SCNC: 3.6 MMOL/L — SIGNIFICANT CHANGE UP (ref 3.5–5.3)
PROTHROM AB SERPL-ACNC: 18 SEC — HIGH (ref 9.8–12.7)
RBC # BLD: 5.16 M/UL — SIGNIFICANT CHANGE UP (ref 3.8–5.2)
RBC # FLD: 16.8 % — HIGH (ref 10.3–14.5)
SODIUM SERPL-SCNC: 140 MMOL/L — SIGNIFICANT CHANGE UP (ref 135–145)
WBC # BLD: 6.67 K/UL — SIGNIFICANT CHANGE UP (ref 3.8–10.5)
WBC # FLD AUTO: 6.67 K/UL — SIGNIFICANT CHANGE UP (ref 3.8–10.5)

## 2017-05-25 RX ORDER — ENOXAPARIN SODIUM 100 MG/ML
110 INJECTION SUBCUTANEOUS
Qty: 0 | Refills: 0 | Status: DISCONTINUED | OUTPATIENT
Start: 2017-05-25 | End: 2017-05-31

## 2017-05-25 RX ADMIN — Medication 3 MILLILITER(S): at 11:15

## 2017-05-25 RX ADMIN — PANTOPRAZOLE SODIUM 40 MILLIGRAM(S): 20 TABLET, DELAYED RELEASE ORAL at 05:36

## 2017-05-25 RX ADMIN — Medication 0.12 MILLIGRAM(S): at 05:36

## 2017-05-25 RX ADMIN — LISINOPRIL 5 MILLIGRAM(S): 2.5 TABLET ORAL at 05:36

## 2017-05-25 RX ADMIN — Medication 3 MILLILITER(S): at 05:36

## 2017-05-25 RX ADMIN — CARVEDILOL PHOSPHATE 25 MILLIGRAM(S): 80 CAPSULE, EXTENDED RELEASE ORAL at 05:36

## 2017-05-25 RX ADMIN — APIXABAN 5 MILLIGRAM(S): 2.5 TABLET, FILM COATED ORAL at 18:04

## 2017-05-25 RX ADMIN — Medication 40 MILLIGRAM(S): at 05:38

## 2017-05-25 RX ADMIN — Medication 200 MILLIGRAM(S): at 11:15

## 2017-05-25 RX ADMIN — CARVEDILOL PHOSPHATE 25 MILLIGRAM(S): 80 CAPSULE, EXTENDED RELEASE ORAL at 18:04

## 2017-05-25 RX ADMIN — Medication 3 MILLILITER(S): at 18:04

## 2017-05-25 RX ADMIN — ATORVASTATIN CALCIUM 80 MILLIGRAM(S): 80 TABLET, FILM COATED ORAL at 21:26

## 2017-05-25 RX ADMIN — APIXABAN 5 MILLIGRAM(S): 2.5 TABLET, FILM COATED ORAL at 05:36

## 2017-05-25 RX ADMIN — SPIRONOLACTONE 25 MILLIGRAM(S): 25 TABLET, FILM COATED ORAL at 05:36

## 2017-05-25 RX ADMIN — Medication 40 MILLIGRAM(S): at 05:36

## 2017-05-25 RX ADMIN — Medication 200 MILLIGRAM(S): at 18:04

## 2017-05-25 RX ADMIN — Medication 200 MILLIGRAM(S): at 05:37

## 2017-05-25 NOTE — DIETITIAN INITIAL EVALUATION ADULT. - NS AS NUTRI INTERV ED CONTENT
Reinforced need to maintain a good oral intake, limit sodium and to focus on nutrient and protein dense foods./Recommended modifications

## 2017-05-25 NOTE — DIETITIAN INITIAL EVALUATION ADULT. - ENERGY NEEDS
HT 64 inches,  pounds, WT last March 266 pounds, Highest wt 280 pounds, BMI 42.3.  Patient concerned that she has lost weight.  Advised to maintain her muscle and strength and increase protein intake.  DXD with Bronchitis, Enlarged thyroid with nodules.  Skin intact

## 2017-05-25 NOTE — DIETITIAN INITIAL EVALUATION ADULT. - OTHER INFO
Patient seen for BMI >40.  Patient found sitting up in chair using inhaler treatment.  Patient reports a large weight loss and decreased appetite since March when she was in the hospital last.  Highest weight was 280.  Notes that back in March, weight was 266 pounds and now down to 246 pounds.  Endorses a large fluid loss but also admits to decreased appetite and intake and not always hungry at meals.  Based on NFPE  patient has some muscle loss.  Takes care of herself at home, cooks own meals and children assisted with shopping.  Complains that arthritis limits her.  Breakfast is usually a hot cereal, eggs, toast and jelly, tea or coffee.  Lunch may be a sandwich. If she prepares dinner, likes rice, vegetables, chicken, turkey or fish.  Snack sometimes on pudding.  Watches salt intake.

## 2017-05-26 LAB
APPEARANCE UR: CLEAR — SIGNIFICANT CHANGE UP
BILIRUB UR-MCNC: NEGATIVE — SIGNIFICANT CHANGE UP
COLOR SPEC: YELLOW — SIGNIFICANT CHANGE UP
DIFF PNL FLD: NEGATIVE — SIGNIFICANT CHANGE UP
GLUCOSE UR QL: NEGATIVE MG/DL — SIGNIFICANT CHANGE UP
KETONES UR-MCNC: NEGATIVE — SIGNIFICANT CHANGE UP
LEUKOCYTE ESTERASE UR-ACNC: NEGATIVE — SIGNIFICANT CHANGE UP
NITRITE UR-MCNC: NEGATIVE — SIGNIFICANT CHANGE UP
PH UR: 7 — SIGNIFICANT CHANGE UP (ref 5–8)
PROT UR-MCNC: NEGATIVE MG/DL — SIGNIFICANT CHANGE UP
SP GR SPEC: 1.01 — LOW (ref 1.01–1.02)
UROBILINOGEN FLD QL: 1 MG/DL — SIGNIFICANT CHANGE UP

## 2017-05-26 PROCEDURE — 70450 CT HEAD/BRAIN W/O DYE: CPT | Mod: 26

## 2017-05-26 PROCEDURE — 99223 1ST HOSP IP/OBS HIGH 75: CPT

## 2017-05-26 PROCEDURE — 93010 ELECTROCARDIOGRAM REPORT: CPT

## 2017-05-26 RX ORDER — SODIUM CHLORIDE 9 MG/ML
1000 INJECTION INTRAMUSCULAR; INTRAVENOUS; SUBCUTANEOUS
Qty: 0 | Refills: 0 | Status: DISCONTINUED | OUTPATIENT
Start: 2017-05-26 | End: 2017-06-02

## 2017-05-26 RX ORDER — IPRATROPIUM BROMIDE 0.2 MG/ML
500 SOLUTION, NON-ORAL INHALATION EVERY 6 HOURS
Qty: 0 | Refills: 0 | Status: DISCONTINUED | OUTPATIENT
Start: 2017-05-26 | End: 2017-06-02

## 2017-05-26 RX ORDER — CARVEDILOL PHOSPHATE 80 MG/1
12.5 CAPSULE, EXTENDED RELEASE ORAL EVERY 12 HOURS
Qty: 0 | Refills: 0 | Status: DISCONTINUED | OUTPATIENT
Start: 2017-05-26 | End: 2017-05-26

## 2017-05-26 RX ORDER — HALOPERIDOL DECANOATE 100 MG/ML
2 INJECTION INTRAMUSCULAR ONCE
Qty: 0 | Refills: 0 | Status: COMPLETED | OUTPATIENT
Start: 2017-05-26 | End: 2017-05-26

## 2017-05-26 RX ORDER — SODIUM CHLORIDE 9 MG/ML
1000 INJECTION INTRAMUSCULAR; INTRAVENOUS; SUBCUTANEOUS
Qty: 0 | Refills: 0 | Status: DISCONTINUED | OUTPATIENT
Start: 2017-05-26 | End: 2017-05-26

## 2017-05-26 RX ADMIN — HALOPERIDOL DECANOATE 2 MILLIGRAM(S): 100 INJECTION INTRAMUSCULAR at 06:19

## 2017-05-26 RX ADMIN — Medication 40 MILLIGRAM(S): at 09:40

## 2017-05-26 RX ADMIN — ENOXAPARIN SODIUM 110 MILLIGRAM(S): 100 INJECTION SUBCUTANEOUS at 09:40

## 2017-05-26 RX ADMIN — Medication 200 MILLIGRAM(S): at 21:40

## 2017-05-26 RX ADMIN — Medication 500 MICROGRAM(S): at 19:27

## 2017-05-26 RX ADMIN — SODIUM CHLORIDE 50 MILLILITER(S): 9 INJECTION INTRAMUSCULAR; INTRAVENOUS; SUBCUTANEOUS at 19:28

## 2017-05-26 RX ADMIN — Medication 200 MILLIGRAM(S): at 14:00

## 2017-05-26 RX ADMIN — ATORVASTATIN CALCIUM 80 MILLIGRAM(S): 80 TABLET, FILM COATED ORAL at 21:39

## 2017-05-26 RX ADMIN — Medication 3 MILLILITER(S): at 09:40

## 2017-05-26 RX ADMIN — PANTOPRAZOLE SODIUM 40 MILLIGRAM(S): 20 TABLET, DELAYED RELEASE ORAL at 09:40

## 2017-05-26 RX ADMIN — Medication 0.12 MILLIGRAM(S): at 09:40

## 2017-05-26 RX ADMIN — SPIRONOLACTONE 25 MILLIGRAM(S): 25 TABLET, FILM COATED ORAL at 09:40

## 2017-05-26 RX ADMIN — ENOXAPARIN SODIUM 110 MILLIGRAM(S): 100 INJECTION SUBCUTANEOUS at 21:39

## 2017-05-26 RX ADMIN — LISINOPRIL 5 MILLIGRAM(S): 2.5 TABLET ORAL at 09:40

## 2017-05-26 RX ADMIN — CARVEDILOL PHOSPHATE 25 MILLIGRAM(S): 80 CAPSULE, EXTENDED RELEASE ORAL at 09:40

## 2017-05-27 LAB
ANION GAP SERPL CALC-SCNC: 16 MMOL/L — SIGNIFICANT CHANGE UP (ref 5–17)
APTT BLD: 41.2 SEC — HIGH (ref 27.5–37.4)
BUN SERPL-MCNC: 16 MG/DL — SIGNIFICANT CHANGE UP (ref 7–23)
CALCIUM SERPL-MCNC: 9.9 MG/DL — SIGNIFICANT CHANGE UP (ref 8.4–10.5)
CHLORIDE SERPL-SCNC: 100 MMOL/L — SIGNIFICANT CHANGE UP (ref 96–108)
CO2 SERPL-SCNC: 23 MMOL/L — SIGNIFICANT CHANGE UP (ref 22–31)
CREAT SERPL-MCNC: 0.88 MG/DL — SIGNIFICANT CHANGE UP (ref 0.5–1.3)
DIGOXIN SERPL-MCNC: 0.9 NG/ML — SIGNIFICANT CHANGE UP (ref 0.8–2)
GLUCOSE SERPL-MCNC: 109 MG/DL — HIGH (ref 70–99)
HCT VFR BLD CALC: 46.6 % — HIGH (ref 34.5–45)
HGB BLD-MCNC: 14.9 G/DL — SIGNIFICANT CHANGE UP (ref 11.5–15.5)
INR BLD: 1.33 RATIO — HIGH (ref 0.88–1.16)
MCHC RBC-ENTMCNC: 27.6 PG — SIGNIFICANT CHANGE UP (ref 27–34)
MCHC RBC-ENTMCNC: 32 GM/DL — SIGNIFICANT CHANGE UP (ref 32–36)
MCV RBC AUTO: 86.3 FL — SIGNIFICANT CHANGE UP (ref 80–100)
PLATELET # BLD AUTO: 145 K/UL — LOW (ref 150–400)
POTASSIUM SERPL-MCNC: 4 MMOL/L — SIGNIFICANT CHANGE UP (ref 3.5–5.3)
POTASSIUM SERPL-SCNC: 4 MMOL/L — SIGNIFICANT CHANGE UP (ref 3.5–5.3)
PROTHROM AB SERPL-ACNC: 15.1 SEC — HIGH (ref 10–13.1)
RBC # BLD: 5.4 M/UL — HIGH (ref 3.8–5.2)
RBC # FLD: 16.8 % — HIGH (ref 10.3–14.5)
SODIUM SERPL-SCNC: 139 MMOL/L — SIGNIFICANT CHANGE UP (ref 135–145)
WBC # BLD: 7.08 K/UL — SIGNIFICANT CHANGE UP (ref 3.8–10.5)
WBC # FLD AUTO: 7.08 K/UL — SIGNIFICANT CHANGE UP (ref 3.8–10.5)

## 2017-05-27 RX ADMIN — ENOXAPARIN SODIUM 110 MILLIGRAM(S): 100 INJECTION SUBCUTANEOUS at 17:33

## 2017-05-27 RX ADMIN — Medication 500 MICROGRAM(S): at 12:32

## 2017-05-27 RX ADMIN — Medication 500 MICROGRAM(S): at 23:15

## 2017-05-27 RX ADMIN — Medication 200 MILLIGRAM(S): at 22:07

## 2017-05-27 RX ADMIN — Medication 500 MICROGRAM(S): at 06:37

## 2017-05-27 RX ADMIN — ENOXAPARIN SODIUM 110 MILLIGRAM(S): 100 INJECTION SUBCUTANEOUS at 06:33

## 2017-05-27 RX ADMIN — PANTOPRAZOLE SODIUM 40 MILLIGRAM(S): 20 TABLET, DELAYED RELEASE ORAL at 06:33

## 2017-05-27 RX ADMIN — LISINOPRIL 5 MILLIGRAM(S): 2.5 TABLET ORAL at 06:33

## 2017-05-27 RX ADMIN — ATORVASTATIN CALCIUM 80 MILLIGRAM(S): 80 TABLET, FILM COATED ORAL at 22:07

## 2017-05-27 RX ADMIN — Medication 500 MICROGRAM(S): at 17:33

## 2017-05-27 RX ADMIN — Medication 500 MICROGRAM(S): at 00:11

## 2017-05-27 RX ADMIN — Medication 40 MILLIGRAM(S): at 06:33

## 2017-05-27 RX ADMIN — SPIRONOLACTONE 25 MILLIGRAM(S): 25 TABLET, FILM COATED ORAL at 06:33

## 2017-05-28 LAB
ANION GAP SERPL CALC-SCNC: 16 MMOL/L — SIGNIFICANT CHANGE UP (ref 5–17)
BUN SERPL-MCNC: 12 MG/DL — SIGNIFICANT CHANGE UP (ref 7–23)
CALCIUM SERPL-MCNC: 9.6 MG/DL — SIGNIFICANT CHANGE UP (ref 8.4–10.5)
CHLORIDE SERPL-SCNC: 101 MMOL/L — SIGNIFICANT CHANGE UP (ref 96–108)
CO2 SERPL-SCNC: 23 MMOL/L — SIGNIFICANT CHANGE UP (ref 22–31)
CREAT SERPL-MCNC: 0.84 MG/DL — SIGNIFICANT CHANGE UP (ref 0.5–1.3)
DIGOXIN SERPL-MCNC: 0.6 NG/ML — LOW (ref 0.8–2)
GLUCOSE SERPL-MCNC: 87 MG/DL — SIGNIFICANT CHANGE UP (ref 70–99)
HCT VFR BLD CALC: 46.1 % — HIGH (ref 34.5–45)
HGB BLD-MCNC: 14.9 G/DL — SIGNIFICANT CHANGE UP (ref 11.5–15.5)
MCHC RBC-ENTMCNC: 28.1 PG — SIGNIFICANT CHANGE UP (ref 27–34)
MCHC RBC-ENTMCNC: 32.3 GM/DL — SIGNIFICANT CHANGE UP (ref 32–36)
MCV RBC AUTO: 86.8 FL — SIGNIFICANT CHANGE UP (ref 80–100)
PLATELET # BLD AUTO: 151 K/UL — SIGNIFICANT CHANGE UP (ref 150–400)
POTASSIUM SERPL-MCNC: 3.4 MMOL/L — LOW (ref 3.5–5.3)
POTASSIUM SERPL-SCNC: 3.4 MMOL/L — LOW (ref 3.5–5.3)
RBC # BLD: 5.31 M/UL — HIGH (ref 3.8–5.2)
RBC # FLD: 16.9 % — HIGH (ref 10.3–14.5)
SODIUM SERPL-SCNC: 140 MMOL/L — SIGNIFICANT CHANGE UP (ref 135–145)
WBC # BLD: 6.74 K/UL — SIGNIFICANT CHANGE UP (ref 3.8–10.5)
WBC # FLD AUTO: 6.74 K/UL — SIGNIFICANT CHANGE UP (ref 3.8–10.5)

## 2017-05-28 PROCEDURE — 99233 SBSQ HOSP IP/OBS HIGH 50: CPT

## 2017-05-28 RX ORDER — POTASSIUM CHLORIDE 20 MEQ
40 PACKET (EA) ORAL ONCE
Qty: 0 | Refills: 0 | Status: COMPLETED | OUTPATIENT
Start: 2017-05-28 | End: 2017-05-28

## 2017-05-28 RX ADMIN — Medication 40 MILLIGRAM(S): at 05:24

## 2017-05-28 RX ADMIN — Medication 500 MICROGRAM(S): at 12:48

## 2017-05-28 RX ADMIN — Medication 200 MILLIGRAM(S): at 22:16

## 2017-05-28 RX ADMIN — Medication 40 MILLIEQUIVALENT(S): at 18:35

## 2017-05-28 RX ADMIN — LISINOPRIL 5 MILLIGRAM(S): 2.5 TABLET ORAL at 05:24

## 2017-05-28 RX ADMIN — ENOXAPARIN SODIUM 110 MILLIGRAM(S): 100 INJECTION SUBCUTANEOUS at 05:23

## 2017-05-28 RX ADMIN — PANTOPRAZOLE SODIUM 40 MILLIGRAM(S): 20 TABLET, DELAYED RELEASE ORAL at 05:24

## 2017-05-28 RX ADMIN — ATORVASTATIN CALCIUM 80 MILLIGRAM(S): 80 TABLET, FILM COATED ORAL at 22:16

## 2017-05-28 RX ADMIN — SPIRONOLACTONE 25 MILLIGRAM(S): 25 TABLET, FILM COATED ORAL at 05:24

## 2017-05-28 RX ADMIN — Medication 500 MICROGRAM(S): at 05:23

## 2017-05-28 RX ADMIN — ENOXAPARIN SODIUM 110 MILLIGRAM(S): 100 INJECTION SUBCUTANEOUS at 22:16

## 2017-05-29 LAB
ANION GAP SERPL CALC-SCNC: 14 MMOL/L — SIGNIFICANT CHANGE UP (ref 5–17)
BUN SERPL-MCNC: 14 MG/DL — SIGNIFICANT CHANGE UP (ref 7–23)
CALCIUM SERPL-MCNC: 9.5 MG/DL — SIGNIFICANT CHANGE UP (ref 8.4–10.5)
CHLORIDE SERPL-SCNC: 100 MMOL/L — SIGNIFICANT CHANGE UP (ref 96–108)
CO2 SERPL-SCNC: 25 MMOL/L — SIGNIFICANT CHANGE UP (ref 22–31)
CREAT SERPL-MCNC: 0.93 MG/DL — SIGNIFICANT CHANGE UP (ref 0.5–1.3)
GLUCOSE SERPL-MCNC: 155 MG/DL — HIGH (ref 70–99)
POTASSIUM SERPL-MCNC: 3.6 MMOL/L — SIGNIFICANT CHANGE UP (ref 3.5–5.3)
POTASSIUM SERPL-SCNC: 3.6 MMOL/L — SIGNIFICANT CHANGE UP (ref 3.5–5.3)
SODIUM SERPL-SCNC: 139 MMOL/L — SIGNIFICANT CHANGE UP (ref 135–145)

## 2017-05-29 PROCEDURE — 70551 MRI BRAIN STEM W/O DYE: CPT | Mod: 26

## 2017-05-29 RX ORDER — METOPROLOL TARTRATE 50 MG
50 TABLET ORAL DAILY
Qty: 0 | Refills: 0 | Status: DISCONTINUED | OUTPATIENT
Start: 2017-05-29 | End: 2017-06-01

## 2017-05-29 RX ORDER — METOPROLOL TARTRATE 50 MG
25 TABLET ORAL DAILY
Qty: 0 | Refills: 0 | Status: DISCONTINUED | OUTPATIENT
Start: 2017-05-29 | End: 2017-05-29

## 2017-05-29 RX ADMIN — Medication 500 MICROGRAM(S): at 11:03

## 2017-05-29 RX ADMIN — SPIRONOLACTONE 25 MILLIGRAM(S): 25 TABLET, FILM COATED ORAL at 05:48

## 2017-05-29 RX ADMIN — LISINOPRIL 5 MILLIGRAM(S): 2.5 TABLET ORAL at 05:49

## 2017-05-29 RX ADMIN — ENOXAPARIN SODIUM 110 MILLIGRAM(S): 100 INJECTION SUBCUTANEOUS at 11:03

## 2017-05-29 RX ADMIN — Medication 200 MILLIGRAM(S): at 20:45

## 2017-05-29 RX ADMIN — PANTOPRAZOLE SODIUM 40 MILLIGRAM(S): 20 TABLET, DELAYED RELEASE ORAL at 05:48

## 2017-05-29 RX ADMIN — ATORVASTATIN CALCIUM 80 MILLIGRAM(S): 80 TABLET, FILM COATED ORAL at 22:16

## 2017-05-29 RX ADMIN — Medication 40 MILLIGRAM(S): at 05:49

## 2017-05-30 LAB
ANION GAP SERPL CALC-SCNC: 17 MMOL/L — SIGNIFICANT CHANGE UP (ref 5–17)
BUN SERPL-MCNC: 13 MG/DL — SIGNIFICANT CHANGE UP (ref 7–23)
CALCIUM SERPL-MCNC: 9.4 MG/DL — SIGNIFICANT CHANGE UP (ref 8.4–10.5)
CHLORIDE SERPL-SCNC: 104 MMOL/L — SIGNIFICANT CHANGE UP (ref 96–108)
CO2 SERPL-SCNC: 23 MMOL/L — SIGNIFICANT CHANGE UP (ref 22–31)
CREAT SERPL-MCNC: 0.84 MG/DL — SIGNIFICANT CHANGE UP (ref 0.5–1.3)
GLUCOSE SERPL-MCNC: 81 MG/DL — SIGNIFICANT CHANGE UP (ref 70–99)
POTASSIUM SERPL-MCNC: 4 MMOL/L — SIGNIFICANT CHANGE UP (ref 3.5–5.3)
POTASSIUM SERPL-SCNC: 4 MMOL/L — SIGNIFICANT CHANGE UP (ref 3.5–5.3)
SODIUM SERPL-SCNC: 144 MMOL/L — SIGNIFICANT CHANGE UP (ref 135–145)

## 2017-05-30 RX ORDER — METOPROLOL TARTRATE 50 MG
5 TABLET ORAL ONCE
Qty: 0 | Refills: 0 | Status: COMPLETED | OUTPATIENT
Start: 2017-05-30 | End: 2017-05-30

## 2017-05-30 RX ADMIN — Medication 5 MILLIGRAM(S): at 11:28

## 2017-05-30 RX ADMIN — ATORVASTATIN CALCIUM 80 MILLIGRAM(S): 80 TABLET, FILM COATED ORAL at 22:27

## 2017-05-30 RX ADMIN — SPIRONOLACTONE 25 MILLIGRAM(S): 25 TABLET, FILM COATED ORAL at 06:59

## 2017-05-30 RX ADMIN — LISINOPRIL 5 MILLIGRAM(S): 2.5 TABLET ORAL at 06:59

## 2017-05-30 RX ADMIN — Medication 40 MILLIGRAM(S): at 06:59

## 2017-05-30 RX ADMIN — Medication 50 MILLIGRAM(S): at 06:59

## 2017-05-30 RX ADMIN — PANTOPRAZOLE SODIUM 40 MILLIGRAM(S): 20 TABLET, DELAYED RELEASE ORAL at 06:59

## 2017-05-30 RX ADMIN — ENOXAPARIN SODIUM 110 MILLIGRAM(S): 100 INJECTION SUBCUTANEOUS at 18:35

## 2017-05-30 NOTE — DISCHARGE NOTE ADULT - MEDICATION SUMMARY - MEDICATIONS TO TAKE
I will START or STAY ON the medications listed below when I get home from the hospital:    spironolactone 25 mg oral tablet  -- 1 tab(s) by mouth once a day  -- Indication: For Water Pill    lisinopril 5 mg oral tablet  -- 1 tab(s) by mouth once a day  -- Indication: For High Blood Pressure    digoxin 125 mcg (0.125 mg) oral tablet  -- 1 tab(s) by mouth once a day  -- Indication: For Heart Rate    apixaban 5 mg oral tablet  -- 1 tab(s) by mouth 2 times a day  -- Indication: For Blood Thinner    atorvastatin 80 mg oral tablet  -- 1 tab(s) by mouth once a day (at bedtime)  -- Indication: For Cholesterol    haloperidol 0.5 mg oral tablet  -- 1 tab(s) by mouth once a day (at bedtime)  -- Indication: For Anxiety    metoprolol succinate 25 mg oral tablet, extended release  -- 3 tab(s) by mouth once a day  -- Indication: For High Blood Pressure    furosemide 40 mg oral tablet  -- 1 tab(s) by mouth once a day  -- Indication: For Water Pill    pantoprazole 40 mg oral delayed release tablet  -- 1 tab(s) by mouth once a day (before a meal)  -- Indication: For Prevent Stomach Ulcers

## 2017-05-30 NOTE — DISCHARGE NOTE ADULT - CARE PLAN
Principal Discharge DX:	Congestive heart failure  Goal:	Resolved  Instructions for follow-up, activity and diet:	Weigh yourself daily.  If you gain 3lbs in 3 days, or 5lbs in a week call your Health Care Provider.  Do not eat or drink foods containing more than 2000mg of salt (sodium) in your diet every day.  Call your Health Care Provider if you have any swelling or increased swelling in your feet, ankles, and/or stomach.  Take all of your medication as directed.  If you become dizzy call your Health Care Provider.  Secondary Diagnosis:	HTN (hypertension)  Instructions for follow-up, activity and diet:	Take medications for your blood pressure as recommended.  Eat a heart healthy diet that is low in saturated fats and salt, and includes whole grains, fruits, vegetables and lean protein   Exercise regularly (consult with your physician or cardiologist first); maintain a heart healthy weight.   If you smoke - quit (A resource to help you stop smoking is the [facility] – phone number [phone].). Continue to follow with your primary physician or cardiologist.   Seek medical help for dizziness, Lightheadedness, Blurry vision, Headache, Chest pain, Shortness of breath  Follow up with your medical doctor to establish long term blood pressure treatment goals. Principal Discharge DX:	Congestive heart failure  Goal:	Resolved  Instructions for follow-up, activity and diet:	Weigh yourself daily.  If you gain 3lbs in 3 days, or 5lbs in a week call your Health Care Provider.  Do not eat or drink foods containing more than 2000mg of salt (sodium) in your diet every day.  Call your Health Care Provider if you have any swelling or increased swelling in your feet, ankles, and/or stomach.  Take all of your medication as directed.  If you become dizzy call your Health Care Provider.  Secondary Diagnosis:	HTN (hypertension)  Instructions for follow-up, activity and diet:	Take medications for your blood pressure as recommended.  Eat a heart healthy diet that is low in saturated fats and salt, and includes whole grains, fruits, vegetables and lean protein   Exercise regularly (consult with your physician or cardiologist first); maintain a heart healthy weight.   If you smoke - quit (A resource to help you stop smoking is the [facility] – phone number [phone].). Continue to follow with your primary physician or cardiologist.   Seek medical help for dizziness, Lightheadedness, Blurry vision, Headache, Chest pain, Shortness of breath  Follow up with your medical doctor to establish long term blood pressure treatment goals. Principal Discharge DX:	Congestive heart failure  Goal:	Resolved  Instructions for follow-up, activity and diet:	Weigh yourself daily.  If you gain 3lbs in 3 days, or 5lbs in a week call your Health Care Provider.  Do not eat or drink foods containing more than 2000mg of salt (sodium) in your diet every day.  Call your Health Care Provider if you have any swelling or increased swelling in your feet, ankles, and/or stomach.  Take all of your medication as directed.  If you become dizzy call your Health Care Provider.  Secondary Diagnosis:	Chronic atrial fibrillation  Goal:	Continue Eliquis  Instructions for follow-up, activity and diet:	Atrial fibrillation is the most common heart rhythm problem.  The condition puts you at risk for has stroke and heart attack  It helps if you control your blood pressure, not drink more than 1-2 alcohol drinks per day, cut down on caffeine, getting treatment for over active thyroid gland, and get regular exercise  Call your doctor if you feel your heart racing or beating unusually, chest tightness or pain, lightheaded, faint, shortness of breath especially with exercise  It is important to take your heart medication as prescribed  You may be on anticoagulation which is very important to take as directed - you may need blood work to monitor drug levels  Secondary Diagnosis:	HTN (hypertension)  Instructions for follow-up, activity and diet:	Take medications for your blood pressure as recommended.  Eat a heart healthy diet that is low in saturated fats and salt, and includes whole grains, fruits, vegetables and lean protein   Exercise regularly (consult with your physician or cardiologist first); maintain a heart healthy weight.   If you smoke - quit (A resource to help you stop smoking is the [facility] – phone number [phone].). Continue to follow with your primary physician or cardiologist.   Seek medical help for dizziness, Lightheadedness, Blurry vision, Headache, Chest pain, Shortness of breath  Follow up with your medical doctor to establish long term blood pressure treatment goals. Principal Discharge DX:	Congestive heart failure  Goal:	Resolved  Instructions for follow-up, activity and diet:	Weigh yourself daily.  If you gain 3lbs in 3 days, or 5lbs in a week call your Health Care Provider.  Do not eat or drink foods containing more than 2000mg of salt (sodium) in your diet every day.  Call your Health Care Provider if you have any swelling or increased swelling in your feet, ankles, and/or stomach.  Take all of your medication as directed.  If you become dizzy call your Health Care Provider.  Secondary Diagnosis:	Chronic atrial fibrillation  Goal:	Continue Eliquis  Instructions for follow-up, activity and diet:	Atrial fibrillation is the most common heart rhythm problem.  The condition puts you at risk for has stroke and heart attack  It helps if you control your blood pressure, not drink more than 1-2 alcohol drinks per day, cut down on caffeine, getting treatment for over active thyroid gland, and get regular exercise  Call your doctor if you feel your heart racing or beating unusually, chest tightness or pain, lightheaded, faint, shortness of breath especially with exercise  It is important to take your heart medication as prescribed  You may be on anticoagulation which is very important to take as directed - you may need blood work to monitor drug levels  Secondary Diagnosis:	HTN (hypertension)  Instructions for follow-up, activity and diet:	Take medications for your blood pressure as recommended.  Eat a heart healthy diet that is low in saturated fats and salt, and includes whole grains, fruits, vegetables and lean protein   Exercise regularly (consult with your physician or cardiologist first); maintain a heart healthy weight.   If you smoke - quit (A resource to help you stop smoking is the [facility] – phone number [phone].). Continue to follow with your primary physician or cardiologist.   Seek medical help for dizziness, Lightheadedness, Blurry vision, Headache, Chest pain, Shortness of breath  Follow up with your medical doctor to establish long term blood pressure treatment goals. Principal Discharge DX:	Congestive heart failure  Goal:	Resolved  Instructions for follow-up, activity and diet:	Weigh yourself daily.  If you gain 3lbs in 3 days, or 5lbs in a week call your Health Care Provider.  Do not eat or drink foods containing more than 2000mg of salt (sodium) in your diet every day.  Call your Health Care Provider if you have any swelling or increased swelling in your feet, ankles, and/or stomach.  Take all of your medication as directed.  If you become dizzy call your Health Care Provider.  Secondary Diagnosis:	Chronic atrial fibrillation  Goal:	Continue Eliquis  Instructions for follow-up, activity and diet:	Atrial fibrillation is the most common heart rhythm problem.  The condition puts you at risk for has stroke and heart attack  It helps if you control your blood pressure, not drink more than 1-2 alcohol drinks per day, cut down on caffeine, getting treatment for over active thyroid gland, and get regular exercise  Call your doctor if you feel your heart racing or beating unusually, chest tightness or pain, lightheaded, faint, shortness of breath especially with exercise  It is important to take your heart medication as prescribed  You may be on anticoagulation which is very important to take as directed - you may need blood work to monitor drug levels  Secondary Diagnosis:	HTN (hypertension)  Instructions for follow-up, activity and diet:	Take medications for your blood pressure as recommended.  Eat a heart healthy diet that is low in saturated fats and salt, and includes whole grains, fruits, vegetables and lean protein   Exercise regularly (consult with your physician or cardiologist first); maintain a heart healthy weight.   If you smoke - quit (A resource to help you stop smoking is the [facility] – phone number [phone].). Continue to follow with your primary physician or cardiologist.   Seek medical help for dizziness, Lightheadedness, Blurry vision, Headache, Chest pain, Shortness of breath  Follow up with your medical doctor to establish long term blood pressure treatment goals. Principal Discharge DX:	Congestive heart failure  Goal:	Resolved  Instructions for follow-up, activity and diet:	Weigh yourself daily.  If you gain 3lbs in 3 days, or 5lbs in a week call your Health Care Provider.  Do not eat or drink foods containing more than 2000mg of salt (sodium) in your diet every day.  Call your Health Care Provider if you have any swelling or increased swelling in your feet, ankles, and/or stomach.  Take all of your medication as directed.  If you become dizzy call your Health Care Provider.  Secondary Diagnosis:	Chronic atrial fibrillation  Goal:	Continue Eliquis  Instructions for follow-up, activity and diet:	Atrial fibrillation is the most common heart rhythm problem.  The condition puts you at risk for has stroke and heart attack  It helps if you control your blood pressure, not drink more than 1-2 alcohol drinks per day, cut down on caffeine, getting treatment for over active thyroid gland, and get regular exercise  Call your doctor if you feel your heart racing or beating unusually, chest tightness or pain, lightheaded, faint, shortness of breath especially with exercise  It is important to take your heart medication as prescribed  You may be on anticoagulation which is very important to take as directed - you may need blood work to monitor drug levels  Secondary Diagnosis:	HTN (hypertension)  Instructions for follow-up, activity and diet:	Take medications for your blood pressure as recommended.  Eat a heart healthy diet that is low in saturated fats and salt, and includes whole grains, fruits, vegetables and lean protein   Exercise regularly (consult with your physician or cardiologist first); maintain a heart healthy weight.   If you smoke - quit (A resource to help you stop smoking is the [facility] – phone number [phone].). Continue to follow with your primary physician or cardiologist.   Seek medical help for dizziness, Lightheadedness, Blurry vision, Headache, Chest pain, Shortness of breath  Follow up with your medical doctor to establish long term blood pressure treatment goals.

## 2017-05-30 NOTE — DISCHARGE NOTE ADULT - PATIENT PORTAL LINK FT
“You can access the Follow[facility] Patient Portal, offered by [facility], by registering with the following website: [email or web address]”

## 2017-05-30 NOTE — DISCHARGE NOTE ADULT - ADDITIONAL INSTRUCTIONS
Please follow up with your primary medical doctor within Please follow up with your primary medical doctor within a week.   Call and make an appointment with Dr. Frias (electrophysiologist) in 2-4 weeks. Please follow up with your primary medical doctor within a week to continue to evaluate for thyroid nodule and fine needle aspiration.   Call and make an appointment with Dr. Frias (electrophysiologist) in 2-4 weeks.

## 2017-05-30 NOTE — DISCHARGE NOTE ADULT - CARE PROVIDERS DIRECT ADDRESSES
,[email or web address]

## 2017-05-30 NOTE — DISCHARGE NOTE ADULT - PLAN OF CARE
Resolved Weigh yourself daily.  If you gain 3lbs in 3 days, or 5lbs in a week call your Health Care Provider.  Do not eat or drink foods containing more than 2000mg of salt (sodium) in your diet every day.  Call your Health Care Provider if you have any swelling or increased swelling in your feet, ankles, and/or stomach.  Take all of your medication as directed.  If you become dizzy call your Health Care Provider. Take medications for your blood pressure as recommended.  Eat a heart healthy diet that is low in saturated fats and salt, and includes whole grains, fruits, vegetables and lean protein   Exercise regularly (consult with your physician or cardiologist first); maintain a heart healthy weight.   If you smoke - quit (A resource to help you stop smoking is the [facility] – phone number [phone].). Continue to follow with your primary physician or cardiologist.   Seek medical help for dizziness, Lightheadedness, Blurry vision, Headache, Chest pain, Shortness of breath  Follow up with your medical doctor to establish long term blood pressure treatment goals. Take medications for your blood pressure as recommended.  Eat a heart healthy diet that is low in saturated fats and salt, and includes whole grains, fruits, vegetables and lean protein   Exercise regularly (consult with your physician or cardiologist first); maintain a heart healthy weight.   If you smoke - quit (A resource to help you stop smoking is the [facility] – phone number [phone].). Continue to follow with your primary physician or cardiologist.   Seek medical help for dizziness, Lightheadedness, Blurry vision, Headache, Chest pain, Shortness of breath  Follow up with your medical doctor to establish long term blood pressure treatment goals. Continue Eliquis Atrial fibrillation is the most common heart rhythm problem.  The condition puts you at risk for has stroke and heart attack  It helps if you control your blood pressure, not drink more than 1-2 alcohol drinks per day, cut down on caffeine, getting treatment for over active thyroid gland, and get regular exercise  Call your doctor if you feel your heart racing or beating unusually, chest tightness or pain, lightheaded, faint, shortness of breath especially with exercise  It is important to take your heart medication as prescribed  You may be on anticoagulation which is very important to take as directed - you may need blood work to monitor drug levels

## 2017-05-30 NOTE — DISCHARGE NOTE ADULT - CARE PROVIDER_API CALL
Hernan Frias (MD), Cardiac Electrophysiology; Cardiology; Internal Medicine  [address] [place] [address]  Phone: [phone]  Fax: [phone] Hernan Frias (MD), Cardiac Electrophysiology; Cardiology; Internal Medicine  [address] [place] [address]  Phone: [phone]  Fax: [phone]    PRIMARY MEDICAL DOCTOR,   Phone: (   )    -  Fax: (   )    -

## 2017-05-30 NOTE — DISCHARGE NOTE ADULT - PROVIDER TOKENS
TOKMICHAEL:'[id number]' TOKEN:'[id number]',FREE:[LAST:[PRIMARY MEDICAL DOCTOR],PHONE:[(   )    -],FAX:[(   )    -]]

## 2017-05-31 LAB
ANION GAP SERPL CALC-SCNC: 16 MMOL/L — SIGNIFICANT CHANGE UP (ref 5–17)
BUN SERPL-MCNC: 14 MG/DL — SIGNIFICANT CHANGE UP (ref 7–23)
CALCIUM SERPL-MCNC: 9.4 MG/DL — SIGNIFICANT CHANGE UP (ref 8.4–10.5)
CHLORIDE SERPL-SCNC: 101 MMOL/L — SIGNIFICANT CHANGE UP (ref 96–108)
CO2 SERPL-SCNC: 23 MMOL/L — SIGNIFICANT CHANGE UP (ref 22–31)
CREAT SERPL-MCNC: 0.81 MG/DL — SIGNIFICANT CHANGE UP (ref 0.5–1.3)
GLUCOSE SERPL-MCNC: 97 MG/DL — SIGNIFICANT CHANGE UP (ref 70–99)
HCT VFR BLD CALC: 44 % — SIGNIFICANT CHANGE UP (ref 34.5–45)
HGB BLD-MCNC: 14.3 G/DL — SIGNIFICANT CHANGE UP (ref 11.5–15.5)
MCHC RBC-ENTMCNC: 27.9 PG — SIGNIFICANT CHANGE UP (ref 27–34)
MCHC RBC-ENTMCNC: 32.5 GM/DL — SIGNIFICANT CHANGE UP (ref 32–36)
MCV RBC AUTO: 85.9 FL — SIGNIFICANT CHANGE UP (ref 80–100)
PLATELET # BLD AUTO: 156 K/UL — SIGNIFICANT CHANGE UP (ref 150–400)
POTASSIUM SERPL-MCNC: 3.8 MMOL/L — SIGNIFICANT CHANGE UP (ref 3.5–5.3)
POTASSIUM SERPL-SCNC: 3.8 MMOL/L — SIGNIFICANT CHANGE UP (ref 3.5–5.3)
RBC # BLD: 5.12 M/UL — SIGNIFICANT CHANGE UP (ref 3.8–5.2)
RBC # FLD: 17 % — HIGH (ref 10.3–14.5)
SODIUM SERPL-SCNC: 140 MMOL/L — SIGNIFICANT CHANGE UP (ref 135–145)
WBC # BLD: 8.18 K/UL — SIGNIFICANT CHANGE UP (ref 3.8–10.5)
WBC # FLD AUTO: 8.18 K/UL — SIGNIFICANT CHANGE UP (ref 3.8–10.5)

## 2017-05-31 RX ORDER — APIXABAN 2.5 MG/1
5 TABLET, FILM COATED ORAL
Qty: 0 | Refills: 0 | Status: DISCONTINUED | OUTPATIENT
Start: 2017-05-31 | End: 2017-06-02

## 2017-05-31 RX ORDER — DIGOXIN 250 MCG
0.12 TABLET ORAL DAILY
Qty: 0 | Refills: 0 | Status: DISCONTINUED | OUTPATIENT
Start: 2017-06-01 | End: 2017-06-02

## 2017-05-31 RX ORDER — DIGOXIN 250 MCG
0.25 TABLET ORAL ONCE
Qty: 0 | Refills: 0 | Status: COMPLETED | OUTPATIENT
Start: 2017-05-31 | End: 2017-05-31

## 2017-05-31 RX ORDER — METOPROLOL TARTRATE 50 MG
1 TABLET ORAL
Qty: 0 | Refills: 0 | COMMUNITY
Start: 2017-05-31

## 2017-05-31 RX ADMIN — LISINOPRIL 5 MILLIGRAM(S): 2.5 TABLET ORAL at 06:35

## 2017-05-31 RX ADMIN — Medication 50 MILLIGRAM(S): at 06:35

## 2017-05-31 RX ADMIN — Medication 0.25 MILLIGRAM(S): at 11:10

## 2017-05-31 RX ADMIN — APIXABAN 5 MILLIGRAM(S): 2.5 TABLET, FILM COATED ORAL at 18:12

## 2017-05-31 RX ADMIN — Medication 40 MILLIGRAM(S): at 06:35

## 2017-05-31 RX ADMIN — ENOXAPARIN SODIUM 110 MILLIGRAM(S): 100 INJECTION SUBCUTANEOUS at 06:35

## 2017-05-31 RX ADMIN — PANTOPRAZOLE SODIUM 40 MILLIGRAM(S): 20 TABLET, DELAYED RELEASE ORAL at 06:35

## 2017-05-31 RX ADMIN — Medication 0.25 MILLIGRAM(S): at 18:13

## 2017-05-31 RX ADMIN — SPIRONOLACTONE 25 MILLIGRAM(S): 25 TABLET, FILM COATED ORAL at 06:35

## 2017-05-31 RX ADMIN — Medication 200 MILLIGRAM(S): at 07:14

## 2017-05-31 RX ADMIN — ATORVASTATIN CALCIUM 80 MILLIGRAM(S): 80 TABLET, FILM COATED ORAL at 21:22

## 2017-06-01 RX ORDER — METOPROLOL TARTRATE 50 MG
5 TABLET ORAL ONCE
Qty: 0 | Refills: 0 | Status: DISCONTINUED | OUTPATIENT
Start: 2017-06-01 | End: 2017-06-01

## 2017-06-01 RX ORDER — METOPROLOL TARTRATE 50 MG
25 TABLET ORAL ONCE
Qty: 0 | Refills: 0 | Status: COMPLETED | OUTPATIENT
Start: 2017-06-01 | End: 2017-06-01

## 2017-06-01 RX ORDER — METOPROLOL TARTRATE 50 MG
75 TABLET ORAL DAILY
Qty: 0 | Refills: 0 | Status: DISCONTINUED | OUTPATIENT
Start: 2017-06-02 | End: 2017-06-02

## 2017-06-01 RX ORDER — HALOPERIDOL DECANOATE 100 MG/ML
2 INJECTION INTRAMUSCULAR ONCE
Qty: 0 | Refills: 0 | Status: DISCONTINUED | OUTPATIENT
Start: 2017-06-01 | End: 2017-06-01

## 2017-06-01 RX ORDER — HALOPERIDOL DECANOATE 100 MG/ML
0.5 INJECTION INTRAMUSCULAR AT BEDTIME
Qty: 0 | Refills: 0 | Status: DISCONTINUED | OUTPATIENT
Start: 2017-06-01 | End: 2017-06-02

## 2017-06-01 RX ORDER — HALOPERIDOL DECANOATE 100 MG/ML
2 INJECTION INTRAMUSCULAR EVERY 8 HOURS
Qty: 0 | Refills: 0 | Status: DISCONTINUED | OUTPATIENT
Start: 2017-06-01 | End: 2017-06-02

## 2017-06-01 RX ADMIN — Medication 50 MILLIGRAM(S): at 05:52

## 2017-06-01 RX ADMIN — Medication 40 MILLIGRAM(S): at 05:59

## 2017-06-01 RX ADMIN — Medication 500 MICROGRAM(S): at 00:01

## 2017-06-01 RX ADMIN — Medication 25 MILLIGRAM(S): at 22:50

## 2017-06-01 RX ADMIN — APIXABAN 5 MILLIGRAM(S): 2.5 TABLET, FILM COATED ORAL at 20:25

## 2017-06-01 RX ADMIN — SPIRONOLACTONE 25 MILLIGRAM(S): 25 TABLET, FILM COATED ORAL at 07:02

## 2017-06-01 RX ADMIN — APIXABAN 5 MILLIGRAM(S): 2.5 TABLET, FILM COATED ORAL at 05:53

## 2017-06-01 RX ADMIN — HALOPERIDOL DECANOATE 2 MILLIGRAM(S): 100 INJECTION INTRAMUSCULAR at 22:50

## 2017-06-01 RX ADMIN — Medication 500 MICROGRAM(S): at 05:53

## 2017-06-01 RX ADMIN — Medication 0.12 MILLIGRAM(S): at 05:52

## 2017-06-01 RX ADMIN — PANTOPRAZOLE SODIUM 40 MILLIGRAM(S): 20 TABLET, DELAYED RELEASE ORAL at 05:52

## 2017-06-01 RX ADMIN — LISINOPRIL 5 MILLIGRAM(S): 2.5 TABLET ORAL at 07:01

## 2017-06-01 RX ADMIN — ATORVASTATIN CALCIUM 80 MILLIGRAM(S): 80 TABLET, FILM COATED ORAL at 20:25

## 2017-06-01 NOTE — PROVIDER CONTACT NOTE (OTHER) - SITUATION
-200, pt ambulating in tomas, refusing to sit down. Agitated. Refusing to take all medications, refusing to have IV access at this time for metoprolol or haldol.
On tele pt had 2.5 sec pause and HR 36.
On tele pt's HR up to 189 and then down to 30.
Patient had 7 beats of wide complex tachycardia on telemetry
Patient had a 3.0 sec pause
Pt a fib on tele (-190s), on ambulation. Pt refusing v/s at this time; refusing to rest, states "my heart rate is not high, I'm ok."
Pt refusing Cardizem 10mg IVP and Ativan 0.5mg IVP.
pt had 6 bts of WCT

## 2017-06-01 NOTE — PROVIDER CONTACT NOTE (OTHER) - REASON
7 beats Wide complex tachycardia
On tele pt had 2.5 sec pause and HR 36.
On tele pt's HR up to 189 and then down to 30.
Patient had a 3.0 sec pause
pt had 6 bts of WCT
-200, pt ambulating in tomas, refusing to sit down. Agitated.
Pt a fib on tele (-190s), on ambulation
Pt refusing Cardizem 10mg IVP and Ativan 0.5mg IVP

## 2017-06-01 NOTE — PROVIDER CONTACT NOTE (OTHER) - DATE AND TIME:
01-Jun-2017 17:00
26-May-2017 02:20
26-May-2017 04:30
01-Jun-2017 05:50
24-May-2017 22:22
26-May-2017 13:50
26-May-2017 19:20
26-May-2017 22:12

## 2017-06-01 NOTE — PROVIDER CONTACT NOTE (OTHER) - ASSESSMENT
-170s on tele (A fib). /104; RR 18; spo2 98% on room air. Pt denies SOB/cp/palpitations. Pt restless; noncompliant with calling for assist with ambulation. Pt pulled out IV lock prior to Cardizem and Ativan administration. Pt refusing new IV lock at this time.
No s/s distress, patient states she feels fine. Will not allow assessment by RN at this time. No s/s chest pain, pt denies chest pain.
Patient alert and oriented times 4 but forgetful, Vital signs T 98.4, HR 83, /73, resp 18, 98% 02 saturation
Patient is asymptomatic, states that she feels fine, see vitals flow sheet for details.
Pt symptomatic. VSS. pt aaox3. pt calm and comfortable.
VSS. pt asymptomatic. pt aaox3. meds given. no c/o pain or sob noted.
pt had 6 bts of WCT, no c/o chest pain or palpitations, bp 117/72 hr 73, A.fib
Pt refusing v/s at this time. -190s on tele (a fib). Pt is ambulating in hallway, refusing rest. Pt OOB X1 assist with cane (PCA Fibin assisting pt). Pt is denying cp/palpitations/headache/dizziness at this time.

## 2017-06-01 NOTE — PROVIDER CONTACT NOTE (OTHER) - RECOMMENDATIONS
NP made aware. Recommended to come see pt.
ALY Marquez made aware. New IV lock recommended
Continue to monitor
continue with morning cardiac meds as ordered
will continue to monitor the pt.
will monitor the pt. if has pause for 5 sec notify EP.

## 2017-06-01 NOTE — PROVIDER CONTACT NOTE (OTHER) - BACKGROUND
Enterovirus infection/Viral bronchitis.  Large 5.9 cm thyroid nodule, needs FNA biopsy Tuesday.
Enterovirus infection/Viral bronchitis. Large 5.9 cm thyroid nodule, needs FNA biopsy Tuesday.
Patient had 7 beats of wide complex tachycardia on telemetry
Patient has an admitting diagnosis of enterovirus infection / viral bronchitis
Pt admitted for CHF exacerbation. A fib on tele.
Pt admitted for enterovirus infection. History of a fib.
pt admitted with enterovirus infection
Dx: enterovirus  Hx: CVA, afib, HTN, venous stasis

## 2017-06-01 NOTE — PROVIDER CONTACT NOTE (OTHER) - ACTION/TREATMENT ORDERED:
Continue to see if patient will allow meds to be given PO or if we can place IV. Endorsed to night NP and night RN - continue to monitor patient. Awaiting auth for rehab.
Draw am labs, Obtain EKG
pt calm and comfortable. will continue to monitor the pt.
will continue to monitor the pt.
Do a 12 lead EKG, continue to monitor
Family notified. Pt instructed to rest; educated on importance of maintaining safety and assessing v/s. Pt refusing at this time. Tele tech made aware. Will continue to monitor pt.
Please insert new IV lock; Ativan 0.5mg IM to be ordered. Will continue to monitor pt.
continue with morning cardiac meds as ordered. reassess patient

## 2017-06-02 VITALS
OXYGEN SATURATION: 94 % | RESPIRATION RATE: 17 BRPM | SYSTOLIC BLOOD PRESSURE: 133 MMHG | DIASTOLIC BLOOD PRESSURE: 87 MMHG | HEART RATE: 97 BPM | TEMPERATURE: 99 F

## 2017-06-02 LAB
FOLATE SERPL-MCNC: 18.5 NG/ML — SIGNIFICANT CHANGE UP (ref 4.8–24.2)
VIT B12 SERPL-MCNC: 1085 PG/ML — HIGH (ref 243–894)

## 2017-06-02 PROCEDURE — 82803 BLOOD GASES ANY COMBINATION: CPT

## 2017-06-02 PROCEDURE — 82607 VITAMIN B-12: CPT

## 2017-06-02 PROCEDURE — 84100 ASSAY OF PHOSPHORUS: CPT

## 2017-06-02 PROCEDURE — 80162 ASSAY OF DIGOXIN TOTAL: CPT

## 2017-06-02 PROCEDURE — 85379 FIBRIN DEGRADATION QUANT: CPT

## 2017-06-02 PROCEDURE — 83880 ASSAY OF NATRIURETIC PEPTIDE: CPT

## 2017-06-02 PROCEDURE — 87633 RESP VIRUS 12-25 TARGETS: CPT

## 2017-06-02 PROCEDURE — 70551 MRI BRAIN STEM W/O DYE: CPT

## 2017-06-02 PROCEDURE — 94640 AIRWAY INHALATION TREATMENT: CPT

## 2017-06-02 PROCEDURE — 85027 COMPLETE CBC AUTOMATED: CPT

## 2017-06-02 PROCEDURE — 94660 CPAP INITIATION&MGMT: CPT

## 2017-06-02 PROCEDURE — 82553 CREATINE MB FRACTION: CPT

## 2017-06-02 PROCEDURE — 87486 CHLMYD PNEUM DNA AMP PROBE: CPT

## 2017-06-02 PROCEDURE — 83690 ASSAY OF LIPASE: CPT

## 2017-06-02 PROCEDURE — 97602 WOUND(S) CARE NON-SELECTIVE: CPT

## 2017-06-02 PROCEDURE — 83605 ASSAY OF LACTIC ACID: CPT

## 2017-06-02 PROCEDURE — 82947 ASSAY GLUCOSE BLOOD QUANT: CPT

## 2017-06-02 PROCEDURE — 71045 X-RAY EXAM CHEST 1 VIEW: CPT

## 2017-06-02 PROCEDURE — 82435 ASSAY OF BLOOD CHLORIDE: CPT

## 2017-06-02 PROCEDURE — 87070 CULTURE OTHR SPECIMN AEROBIC: CPT

## 2017-06-02 PROCEDURE — 84484 ASSAY OF TROPONIN QUANT: CPT

## 2017-06-02 PROCEDURE — 97110 THERAPEUTIC EXERCISES: CPT

## 2017-06-02 PROCEDURE — 82746 ASSAY OF FOLIC ACID SERUM: CPT

## 2017-06-02 PROCEDURE — 81003 URINALYSIS AUTO W/O SCOPE: CPT

## 2017-06-02 PROCEDURE — 83735 ASSAY OF MAGNESIUM: CPT

## 2017-06-02 PROCEDURE — 80053 COMPREHEN METABOLIC PANEL: CPT

## 2017-06-02 PROCEDURE — 84145 PROCALCITONIN (PCT): CPT

## 2017-06-02 PROCEDURE — 84295 ASSAY OF SERUM SODIUM: CPT

## 2017-06-02 PROCEDURE — 85730 THROMBOPLASTIN TIME PARTIAL: CPT

## 2017-06-02 PROCEDURE — 70450 CT HEAD/BRAIN W/O DYE: CPT

## 2017-06-02 PROCEDURE — 82330 ASSAY OF CALCIUM: CPT

## 2017-06-02 PROCEDURE — 84132 ASSAY OF SERUM POTASSIUM: CPT

## 2017-06-02 PROCEDURE — 99291 CRITICAL CARE FIRST HOUR: CPT | Mod: 25

## 2017-06-02 PROCEDURE — 81001 URINALYSIS AUTO W/SCOPE: CPT

## 2017-06-02 PROCEDURE — 76536 US EXAM OF HEAD AND NECK: CPT

## 2017-06-02 PROCEDURE — 93005 ELECTROCARDIOGRAM TRACING: CPT

## 2017-06-02 PROCEDURE — 87581 M.PNEUMON DNA AMP PROBE: CPT

## 2017-06-02 PROCEDURE — 82550 ASSAY OF CK (CPK): CPT

## 2017-06-02 PROCEDURE — 97161 PT EVAL LOW COMPLEX 20 MIN: CPT

## 2017-06-02 PROCEDURE — 97116 GAIT TRAINING THERAPY: CPT

## 2017-06-02 PROCEDURE — 87798 DETECT AGENT NOS DNA AMP: CPT

## 2017-06-02 PROCEDURE — 93308 TTE F-UP OR LMTD: CPT

## 2017-06-02 PROCEDURE — 80048 BASIC METABOLIC PNL TOTAL CA: CPT

## 2017-06-02 PROCEDURE — 84443 ASSAY THYROID STIM HORMONE: CPT

## 2017-06-02 PROCEDURE — 84439 ASSAY OF FREE THYROXINE: CPT

## 2017-06-02 PROCEDURE — 85014 HEMATOCRIT: CPT

## 2017-06-02 PROCEDURE — 85610 PROTHROMBIN TIME: CPT

## 2017-06-02 PROCEDURE — 71250 CT THORAX DX C-: CPT

## 2017-06-02 RX ORDER — PANTOPRAZOLE SODIUM 20 MG/1
1 TABLET, DELAYED RELEASE ORAL
Qty: 0 | Refills: 0 | COMMUNITY
Start: 2017-06-02

## 2017-06-02 RX ORDER — SPIRONOLACTONE 25 MG/1
1 TABLET, FILM COATED ORAL
Qty: 0 | Refills: 0 | COMMUNITY
Start: 2017-06-02

## 2017-06-02 RX ORDER — HALOPERIDOL DECANOATE 100 MG/ML
1 INJECTION INTRAMUSCULAR
Qty: 0 | Refills: 0 | COMMUNITY
Start: 2017-06-02

## 2017-06-02 RX ORDER — LISINOPRIL 2.5 MG/1
1 TABLET ORAL
Qty: 0 | Refills: 0 | COMMUNITY
Start: 2017-06-02

## 2017-06-02 RX ORDER — METOPROLOL TARTRATE 50 MG
3 TABLET ORAL
Qty: 0 | Refills: 0 | COMMUNITY
Start: 2017-06-02

## 2017-06-02 RX ADMIN — APIXABAN 5 MILLIGRAM(S): 2.5 TABLET, FILM COATED ORAL at 06:03

## 2017-06-02 RX ADMIN — Medication 0.12 MILLIGRAM(S): at 06:03

## 2017-06-02 RX ADMIN — SPIRONOLACTONE 25 MILLIGRAM(S): 25 TABLET, FILM COATED ORAL at 06:10

## 2017-06-02 RX ADMIN — PANTOPRAZOLE SODIUM 40 MILLIGRAM(S): 20 TABLET, DELAYED RELEASE ORAL at 06:04

## 2017-06-02 RX ADMIN — Medication 40 MILLIGRAM(S): at 06:03

## 2017-06-02 RX ADMIN — Medication 75 MILLIGRAM(S): at 06:03

## 2017-06-02 RX ADMIN — LISINOPRIL 5 MILLIGRAM(S): 2.5 TABLET ORAL at 06:08

## 2017-06-22 ENCOUNTER — OUTPATIENT (OUTPATIENT)
Dept: OUTPATIENT SERVICES | Facility: HOSPITAL | Age: 82
LOS: 1 days | Discharge: HOME | End: 2017-06-22

## 2017-06-22 DIAGNOSIS — D69.6 THROMBOCYTOPENIA, UNSPECIFIED: ICD-10-CM

## 2017-06-26 ENCOUNTER — APPOINTMENT (OUTPATIENT)
Dept: HEMATOLOGY ONCOLOGY | Facility: CLINIC | Age: 82
End: 2017-06-26

## 2017-06-30 ENCOUNTER — INPATIENT (INPATIENT)
Facility: HOSPITAL | Age: 82
LOS: 2 days | Discharge: HOME HEALTH | End: 2017-07-03
Attending: INTERNAL MEDICINE | Admitting: INTERNAL MEDICINE
Payer: MEDICARE

## 2017-06-30 VITALS
SYSTOLIC BLOOD PRESSURE: 84 MMHG | RESPIRATION RATE: 20 BRPM | TEMPERATURE: 98 F | DIASTOLIC BLOOD PRESSURE: 47 MMHG | WEIGHT: 238.98 LBS | HEIGHT: 64 IN | OXYGEN SATURATION: 97 % | HEART RATE: 58 BPM

## 2017-06-30 DIAGNOSIS — I87.8 OTHER SPECIFIED DISORDERS OF VEINS: ICD-10-CM

## 2017-06-30 DIAGNOSIS — I10 ESSENTIAL (PRIMARY) HYPERTENSION: ICD-10-CM

## 2017-06-30 DIAGNOSIS — I48.2 CHRONIC ATRIAL FIBRILLATION: ICD-10-CM

## 2017-06-30 DIAGNOSIS — I21.4 NON-ST ELEVATION (NSTEMI) MYOCARDIAL INFARCTION: ICD-10-CM

## 2017-06-30 LAB
ALBUMIN SERPL ELPH-MCNC: 2.5 G/DL — LOW (ref 3.3–5)
ALP SERPL-CCNC: 92 U/L — SIGNIFICANT CHANGE UP (ref 40–120)
ALT FLD-CCNC: 44 U/L — SIGNIFICANT CHANGE UP (ref 12–78)
AMYLASE P1 CFR SERPL: 41 U/L — SIGNIFICANT CHANGE UP (ref 25–115)
ANION GAP SERPL CALC-SCNC: 10 MMOL/L — SIGNIFICANT CHANGE UP (ref 5–17)
APPEARANCE UR: ABNORMAL
AST SERPL-CCNC: 38 U/L — HIGH (ref 15–37)
BASOPHILS # BLD AUTO: 0.1 K/UL — SIGNIFICANT CHANGE UP (ref 0–0.2)
BASOPHILS NFR BLD AUTO: 1.1 % — SIGNIFICANT CHANGE UP (ref 0–2)
BILIRUB DIRECT SERPL-MCNC: 0.28 MG/DL — HIGH (ref 0.05–0.2)
BILIRUB INDIRECT FLD-MCNC: 0.5 MG/DL — SIGNIFICANT CHANGE UP (ref 0.2–1)
BILIRUB SERPL-MCNC: 0.8 MG/DL — SIGNIFICANT CHANGE UP (ref 0.2–1.2)
BILIRUB UR-MCNC: NEGATIVE — SIGNIFICANT CHANGE UP
BUN SERPL-MCNC: 32 MG/DL — HIGH (ref 7–23)
CALCIUM SERPL-MCNC: 9.4 MG/DL — SIGNIFICANT CHANGE UP (ref 8.5–10.1)
CHLORIDE SERPL-SCNC: 94 MMOL/L — LOW (ref 96–108)
CK MB BLD-MCNC: 3.6 % — HIGH (ref 0–3.5)
CK MB CFR SERPL CALC: 3.3 NG/ML — SIGNIFICANT CHANGE UP (ref 0.5–3.6)
CK SERPL-CCNC: 91 U/L — SIGNIFICANT CHANGE UP (ref 26–192)
CO2 SERPL-SCNC: 32 MMOL/L — HIGH (ref 22–31)
COLOR SPEC: YELLOW — SIGNIFICANT CHANGE UP
CREAT SERPL-MCNC: 1.78 MG/DL — HIGH (ref 0.5–1.3)
DIFF PNL FLD: ABNORMAL
EOSINOPHIL # BLD AUTO: 0 K/UL — SIGNIFICANT CHANGE UP (ref 0–0.5)
EOSINOPHIL NFR BLD AUTO: 0.1 % — SIGNIFICANT CHANGE UP (ref 0–6)
GLUCOSE SERPL-MCNC: 94 MG/DL — SIGNIFICANT CHANGE UP (ref 70–99)
GLUCOSE UR QL: NEGATIVE MG/DL — SIGNIFICANT CHANGE UP
HCT VFR BLD CALC: 43.4 % — SIGNIFICANT CHANGE UP (ref 34.5–45)
HGB BLD-MCNC: 14.3 G/DL — SIGNIFICANT CHANGE UP (ref 11.5–15.5)
KETONES UR-MCNC: NEGATIVE — SIGNIFICANT CHANGE UP
LACTATE SERPL-SCNC: 2.7 MMOL/L — HIGH (ref 0.7–2)
LEUKOCYTE ESTERASE UR-ACNC: ABNORMAL
LIDOCAIN IGE QN: 151 U/L — SIGNIFICANT CHANGE UP (ref 73–393)
LYMPHOCYTES # BLD AUTO: 1.8 K/UL — SIGNIFICANT CHANGE UP (ref 1–3.3)
LYMPHOCYTES # BLD AUTO: 33.9 % — SIGNIFICANT CHANGE UP (ref 13–44)
MCHC RBC-ENTMCNC: 28.2 PG — SIGNIFICANT CHANGE UP (ref 27–34)
MCHC RBC-ENTMCNC: 33 GM/DL — SIGNIFICANT CHANGE UP (ref 32–36)
MCV RBC AUTO: 85.3 FL — SIGNIFICANT CHANGE UP (ref 80–100)
MONOCYTES # BLD AUTO: 0.5 K/UL — SIGNIFICANT CHANGE UP (ref 0–0.9)
MONOCYTES NFR BLD AUTO: 9.3 % — SIGNIFICANT CHANGE UP (ref 2–14)
NEUTROPHILS # BLD AUTO: 3 K/UL — SIGNIFICANT CHANGE UP (ref 1.8–7.4)
NEUTROPHILS NFR BLD AUTO: 55.6 % — SIGNIFICANT CHANGE UP (ref 43–77)
NITRITE UR-MCNC: NEGATIVE — SIGNIFICANT CHANGE UP
PH UR: 5 — SIGNIFICANT CHANGE UP (ref 5–8)
PLATELET # BLD AUTO: 160 K/UL — SIGNIFICANT CHANGE UP (ref 150–400)
POTASSIUM SERPL-MCNC: 4.3 MMOL/L — SIGNIFICANT CHANGE UP (ref 3.5–5.3)
POTASSIUM SERPL-SCNC: 4.3 MMOL/L — SIGNIFICANT CHANGE UP (ref 3.5–5.3)
PROT SERPL-MCNC: 6.1 GM/DL — SIGNIFICANT CHANGE UP (ref 6–8.3)
PROT UR-MCNC: NEGATIVE MG/DL — SIGNIFICANT CHANGE UP
RBC # BLD: 5.08 M/UL — SIGNIFICANT CHANGE UP (ref 3.8–5.2)
RBC # FLD: 15.8 % — HIGH (ref 11–15)
SODIUM SERPL-SCNC: 136 MMOL/L — SIGNIFICANT CHANGE UP (ref 135–145)
SP GR SPEC: 1.01 — SIGNIFICANT CHANGE UP (ref 1.01–1.02)
TROPONIN I SERPL-MCNC: 0.58 NG/ML — HIGH (ref 0.01–0.04)
UROBILINOGEN FLD QL: 1 MG/DL
WBC # BLD: 5.4 K/UL — SIGNIFICANT CHANGE UP (ref 3.8–10.5)
WBC # FLD AUTO: 5.4 K/UL — SIGNIFICANT CHANGE UP (ref 3.8–10.5)

## 2017-06-30 PROCEDURE — 70450 CT HEAD/BRAIN W/O DYE: CPT | Mod: 26

## 2017-06-30 PROCEDURE — 93880 EXTRACRANIAL BILAT STUDY: CPT | Mod: 26

## 2017-06-30 PROCEDURE — 99285 EMERGENCY DEPT VISIT HI MDM: CPT

## 2017-06-30 PROCEDURE — 71010: CPT | Mod: 26

## 2017-06-30 RX ORDER — SODIUM CHLORIDE 9 MG/ML
1000 INJECTION INTRAMUSCULAR; INTRAVENOUS; SUBCUTANEOUS ONCE
Qty: 0 | Refills: 0 | Status: COMPLETED | OUTPATIENT
Start: 2017-06-30 | End: 2017-06-30

## 2017-06-30 RX ORDER — APIXABAN 2.5 MG/1
2.5 TABLET, FILM COATED ORAL
Qty: 0 | Refills: 0 | Status: DISCONTINUED | OUTPATIENT
Start: 2017-06-30 | End: 2017-07-03

## 2017-06-30 RX ORDER — FUROSEMIDE 40 MG
40 TABLET ORAL DAILY
Qty: 0 | Refills: 0 | Status: DISCONTINUED | OUTPATIENT
Start: 2017-06-30 | End: 2017-07-03

## 2017-06-30 RX ORDER — SACUBITRIL AND VALSARTAN 24; 26 MG/1; MG/1
1 TABLET, FILM COATED ORAL
Qty: 0 | Refills: 0 | Status: DISCONTINUED | OUTPATIENT
Start: 2017-06-30 | End: 2017-07-01

## 2017-06-30 RX ORDER — PANTOPRAZOLE SODIUM 20 MG/1
40 TABLET, DELAYED RELEASE ORAL
Qty: 0 | Refills: 0 | Status: DISCONTINUED | OUTPATIENT
Start: 2017-06-30 | End: 2017-07-03

## 2017-06-30 RX ORDER — SODIUM CHLORIDE 9 MG/ML
1000 INJECTION INTRAMUSCULAR; INTRAVENOUS; SUBCUTANEOUS
Qty: 0 | Refills: 0 | Status: DISCONTINUED | OUTPATIENT
Start: 2017-06-30 | End: 2017-07-03

## 2017-06-30 RX ORDER — DIGOXIN 250 MCG
0.12 TABLET ORAL DAILY
Qty: 0 | Refills: 0 | Status: DISCONTINUED | OUTPATIENT
Start: 2017-06-30 | End: 2017-06-30

## 2017-06-30 RX ORDER — APIXABAN 2.5 MG/1
2.5 TABLET, FILM COATED ORAL ONCE
Qty: 0 | Refills: 0 | Status: COMPLETED | OUTPATIENT
Start: 2017-06-30 | End: 2017-06-30

## 2017-06-30 RX ORDER — SPIRONOLACTONE 25 MG/1
25 TABLET, FILM COATED ORAL DAILY
Qty: 0 | Refills: 0 | Status: DISCONTINUED | OUTPATIENT
Start: 2017-06-30 | End: 2017-07-03

## 2017-06-30 RX ORDER — ASPIRIN/CALCIUM CARB/MAGNESIUM 324 MG
325 TABLET ORAL ONCE
Qty: 0 | Refills: 0 | Status: COMPLETED | OUTPATIENT
Start: 2017-06-30 | End: 2017-06-30

## 2017-06-30 RX ORDER — SODIUM CHLORIDE 9 MG/ML
3 INJECTION INTRAMUSCULAR; INTRAVENOUS; SUBCUTANEOUS ONCE
Qty: 0 | Refills: 0 | Status: COMPLETED | OUTPATIENT
Start: 2017-06-30 | End: 2017-06-30

## 2017-06-30 RX ORDER — DIGOXIN 250 MCG
0.12 TABLET ORAL DAILY
Qty: 0 | Refills: 0 | Status: DISCONTINUED | OUTPATIENT
Start: 2017-06-30 | End: 2017-07-03

## 2017-06-30 RX ADMIN — APIXABAN 2.5 MILLIGRAM(S): 2.5 TABLET, FILM COATED ORAL at 17:32

## 2017-06-30 RX ADMIN — SODIUM CHLORIDE 1000 MILLILITER(S): 9 INJECTION INTRAMUSCULAR; INTRAVENOUS; SUBCUTANEOUS at 11:50

## 2017-06-30 RX ADMIN — Medication 325 MILLIGRAM(S): at 19:01

## 2017-06-30 RX ADMIN — SODIUM CHLORIDE 1000 MILLILITER(S): 9 INJECTION INTRAMUSCULAR; INTRAVENOUS; SUBCUTANEOUS at 17:23

## 2017-06-30 RX ADMIN — SODIUM CHLORIDE 3 MILLILITER(S): 9 INJECTION INTRAMUSCULAR; INTRAVENOUS; SUBCUTANEOUS at 11:50

## 2017-06-30 NOTE — ED PROVIDER NOTE - OBJECTIVE STATEMENT
85 year old female with h/o HTN, atrial fibrillation, CVA (s/p TPA), and venous stasis presents today BIBA from home accompanied with her daughter c/o feeling sudden onset of generalized weakness and feeling of numbness of her legs and upper body 85 year old female with h/o HTN, atrial fibrillation, CVA (s/p TPA), and venous stasis presents today BIBA from home accompanied with her daughter c/o feeling sudden onset of generalized weakness and feeling of numbness of her legs and upper body with dizziness, pt had a period of confusion as well which lasted for 45min (-) headache (-) chest pain (-) visual or speech changes (-) facial droop

## 2017-06-30 NOTE — H&P ADULT - HISTORY OF PRESENT ILLNESS
85 year old female with h/o HTN, atrial fibrillation, CVA (s/p TPA), and venous stasis presents today BIBA from home accompanied with her daughter c/o feeling sudden onset of generalized weakness and feeling of numbness of her legs and upper body with dizziness, pt had a period of confusion as well which lasted for 45min (-) headache (-) chest pain

## 2017-06-30 NOTE — ED ADULT TRIAGE NOTE - CHIEF COMPLAINT QUOTE
patient c/o of lost of appetite for 2 months and general numbness started last night , denied chest pain or difficulty breathing

## 2017-06-30 NOTE — ED ADULT NURSE REASSESSMENT NOTE - NS ED NURSE REASSESS COMMENT FT1
patient's BP 94/41 made aware tia Novak. no new order received. Patient's troponin 0.58 informed to Dr Mcwilliams. No new order received.

## 2017-06-30 NOTE — H&P ADULT - NSHPLABSRESULTS_GEN_ALL_CORE
14.3   5.4   )-----------( 160      ( 30 Jun 2017 11:53 )             43.4     06-30    136  |  94<L>  |  32<H>  ----------------------------<  94  4.3   |  32<H>  |  1.78<H>    Ca    9.4      30 Jun 2017 11:53    TPro  6.1  /  Alb  2.5<L>  /  TBili  0.8  /  DBili  .28<H>  /  AST  38<H>  /  ALT  44  /  AlkPhos  92  06-30

## 2017-07-01 DIAGNOSIS — E87.6 HYPOKALEMIA: ICD-10-CM

## 2017-07-01 LAB
ANION GAP SERPL CALC-SCNC: 9 MMOL/L — SIGNIFICANT CHANGE UP (ref 5–17)
BACTERIA # UR AUTO: ABNORMAL
BUN SERPL-MCNC: 24 MG/DL — HIGH (ref 7–23)
CALCIUM SERPL-MCNC: 8.5 MG/DL — SIGNIFICANT CHANGE UP (ref 8.5–10.1)
CHLORIDE SERPL-SCNC: 102 MMOL/L — SIGNIFICANT CHANGE UP (ref 96–108)
CO2 SERPL-SCNC: 30 MMOL/L — SIGNIFICANT CHANGE UP (ref 22–31)
COMMENT - URINE: SIGNIFICANT CHANGE UP
CREAT SERPL-MCNC: 1.08 MG/DL — SIGNIFICANT CHANGE UP (ref 0.5–1.3)
EPI CELLS # UR: SIGNIFICANT CHANGE UP
GLUCOSE SERPL-MCNC: 77 MG/DL — SIGNIFICANT CHANGE UP (ref 70–99)
HCT VFR BLD CALC: 39.2 % — SIGNIFICANT CHANGE UP (ref 34.5–45)
HGB BLD-MCNC: 13.5 G/DL — SIGNIFICANT CHANGE UP (ref 11.5–15.5)
HYALINE CASTS # UR AUTO: ABNORMAL /LPF
MCHC RBC-ENTMCNC: 29.3 PG — SIGNIFICANT CHANGE UP (ref 27–34)
MCHC RBC-ENTMCNC: 34.4 GM/DL — SIGNIFICANT CHANGE UP (ref 32–36)
MCV RBC AUTO: 85.2 FL — SIGNIFICANT CHANGE UP (ref 80–100)
PLATELET # BLD AUTO: 141 K/UL — LOW (ref 150–400)
POTASSIUM SERPL-MCNC: 3.2 MMOL/L — LOW (ref 3.5–5.3)
POTASSIUM SERPL-SCNC: 3.2 MMOL/L — LOW (ref 3.5–5.3)
RBC # BLD: 4.6 M/UL — SIGNIFICANT CHANGE UP (ref 3.8–5.2)
RBC # FLD: 15.7 % — HIGH (ref 11–15)
RBC CASTS # UR COMP ASSIST: SIGNIFICANT CHANGE UP /HPF (ref 0–4)
SODIUM SERPL-SCNC: 141 MMOL/L — SIGNIFICANT CHANGE UP (ref 135–145)
TROPONIN I SERPL-MCNC: 0.48 NG/ML — HIGH (ref 0.01–0.04)
TROPONIN I SERPL-MCNC: 0.6 NG/ML — HIGH (ref 0.01–0.04)
WBC # BLD: 4.9 K/UL — SIGNIFICANT CHANGE UP (ref 3.8–10.5)
WBC # FLD AUTO: 4.9 K/UL — SIGNIFICANT CHANGE UP (ref 3.8–10.5)
WBC UR QL: SIGNIFICANT CHANGE UP

## 2017-07-01 PROCEDURE — 93010 ELECTROCARDIOGRAM REPORT: CPT

## 2017-07-01 RX ORDER — MIDODRINE HYDROCHLORIDE 2.5 MG/1
10 TABLET ORAL ONCE
Qty: 0 | Refills: 0 | Status: COMPLETED | OUTPATIENT
Start: 2017-07-01 | End: 2017-07-01

## 2017-07-01 RX ORDER — SACUBITRIL AND VALSARTAN 24; 26 MG/1; MG/1
1 TABLET, FILM COATED ORAL
Qty: 0 | Refills: 0 | Status: DISCONTINUED | OUTPATIENT
Start: 2017-07-01 | End: 2017-07-03

## 2017-07-01 RX ORDER — SODIUM CHLORIDE 9 MG/ML
500 INJECTION INTRAMUSCULAR; INTRAVENOUS; SUBCUTANEOUS ONCE
Qty: 0 | Refills: 0 | Status: COMPLETED | OUTPATIENT
Start: 2017-07-01 | End: 2017-07-01

## 2017-07-01 RX ORDER — POTASSIUM CHLORIDE 20 MEQ
40 PACKET (EA) ORAL ONCE
Qty: 0 | Refills: 0 | Status: COMPLETED | OUTPATIENT
Start: 2017-07-01 | End: 2017-07-01

## 2017-07-01 RX ADMIN — SODIUM CHLORIDE 500 MILLILITER(S): 9 INJECTION INTRAMUSCULAR; INTRAVENOUS; SUBCUTANEOUS at 12:06

## 2017-07-01 RX ADMIN — SODIUM CHLORIDE 75 MILLILITER(S): 9 INJECTION INTRAMUSCULAR; INTRAVENOUS; SUBCUTANEOUS at 06:42

## 2017-07-01 RX ADMIN — PANTOPRAZOLE SODIUM 40 MILLIGRAM(S): 20 TABLET, DELAYED RELEASE ORAL at 06:41

## 2017-07-01 RX ADMIN — Medication 40 MILLIEQUIVALENT(S): at 16:14

## 2017-07-01 RX ADMIN — MIDODRINE HYDROCHLORIDE 10 MILLIGRAM(S): 2.5 TABLET ORAL at 15:04

## 2017-07-01 RX ADMIN — APIXABAN 2.5 MILLIGRAM(S): 2.5 TABLET, FILM COATED ORAL at 18:23

## 2017-07-01 RX ADMIN — SACUBITRIL AND VALSARTAN 1 TABLET(S): 24; 26 TABLET, FILM COATED ORAL at 06:37

## 2017-07-01 RX ADMIN — Medication 0.12 MILLIGRAM(S): at 06:41

## 2017-07-01 RX ADMIN — APIXABAN 2.5 MILLIGRAM(S): 2.5 TABLET, FILM COATED ORAL at 06:37

## 2017-07-01 NOTE — PROGRESS NOTE ADULT - SUBJECTIVE AND OBJECTIVE BOX
Patient is a 85y old  Female who presents with a chief complaint of Pt stated that she felt dizziness and he numbness of the upper body (2017 22:43)      INTERVAL HPI/OVERNIGHT EVENTS:  no complaint   MEDICATIONS  (STANDING):  sodium chloride 0.9%. 1000 milliLiter(s) (75 mL/Hr) IV Continuous <Continuous>  digoxin     Tablet 0.125 milliGRAM(s) Oral daily  apixaban 2.5 milliGRAM(s) Oral two times a day  pantoprazole    Tablet 40 milliGRAM(s) Oral before breakfast  spironolactone 25 milliGRAM(s) Oral daily  furosemide    Tablet 40 milliGRAM(s) Oral daily  sacubitril 24 mG/valsartan 26 mG 1 Tablet(s) Oral two times a day  potassium chloride    Tablet ER 40 milliEquivalent(s) Oral daily    MEDICATIONS  (PRN):      Allergies    penicillin (Unknown)    Intolerances        REVIEW OF SYSTEMS:  CONSTITUTIONAL: No fever, weight loss, or fatigue  EYES: No eye pain, visual disturbances, or discharge  ENMT:  No difficulty hearing, tinnitus, vertigo; No sinus or throat pain  NECK: No pain or stiffness  BREASTS: No pain, masses, or nipple discharge  RESPIRATORY: No cough, wheezing, chills or hemoptysis; No shortness of breath  CARDIOVASCULAR: No chest pain, palpitations, dizziness, or leg swelling  GASTROINTESTINAL: No abdominal or epigastric pain. No nausea, vomiting, or hematemesis; No diarrhea or constipation. No melena or hematochezia.  GENITOURINARY: No dysuria, frequency, hematuria, or incontinence  NEUROLOGICAL: No headaches, memory loss, loss of strength, numbness, or tremors  SKIN: No itching, burning, rashes, or lesions   LYMPH NODES: No enlarged glands  ENDOCRINE: No heat or cold intolerance; No hair loss  MUSCULOSKELETAL: No joint pain or swelling; No muscle, back, or extremity pain  PSYCHIATRIC: No depression, anxiety, mood swings, or difficulty sleeping  HEME/LYMPH: No easy bruising, or bleeding gums  ALLERGY AND IMMUNOLOGIC: No hives or eczema    Vital Signs Last 24 Hrs  T(C): 36.4 (2017 05:51), Max: 36.9 (2017 21:00)  T(F): 97.6 (2017 05:51), Max: 98.4 (2017 21:00)  HR: 68 (2017 12:44) (60 - 78)  BP: 91/50 (2017 12:44) (70/30 - 100/53)  BP(mean): --  RR: 18 (2017 10:00) (18 - 19)  SpO2: 98% (2017 10:00) (97% - 99%)    PHYSICAL EXAM:  GENERAL: NAD, well-groomed, well-developed  HEAD:  Atraumatic, Normocephalic  EYES: EOMI, PERRLA, conjunctiva and sclera clear  ENMT: No tonsillar erythema, exudates, or enlargement; Moist mucous membranes, Good dentition, No lesions  NECK: Supple, No JVD, Normal thyroid  NERVOUS SYSTEM:  Alert & Oriented X3, Good concentration; Motor Strength 5/5 B/L upper and lower extremities; DTRs 2+ intact and symmetric  CHEST/LUNG: Clear to percussion bilaterally; No rales, rhonchi, wheezing, or rubs  HEART: Regular rate and rhythm; No murmurs, rubs, or gallops  ABDOMEN: Soft, Nontender, Nondistended; Bowel sounds present  EXTREMITIES:  2+ Peripheral Pulses, No clubbing, cyanosis, or edema  LYMPH: No lymphadenopathy noted  SKIN: No rashes or lesions    LABS:                        13.5   4.9   )-----------( 141      ( 2017 07:29 )             39.2     07-    141  |  102  |  24<H>  ----------------------------<  77  3.2<L>   |  30  |  1.08    Ca    8.5      2017 07:29    TPro  6.1  /  Alb  2.5<L>  /  TBili  0.8  /  DBili  .28<H>  /  AST  38<H>  /  ALT  44  /  AlkPhos  92  06-30      Urinalysis Basic - ( 2017 23:37 )    Color: Yellow / Appearance: x / S.010 / pH: x  Gluc: x / Ketone: Negative  / Bili: Negative / Urobili: 1 mg/dL   Blood: x / Protein: Negative mg/dL / Nitrite: Negative   Leuk Esterase: Small / RBC: 0-2 /HPF / WBC 3-5   Sq Epi: x / Non Sq Epi: Few / Bacteria: Many      CAPILLARY BLOOD GLUCOSE        CULTURES:    HEMOGLOBIN A1C:    CHOLESTEROL:        RADIOLOGY & ADDITIONAL TESTS:

## 2017-07-01 NOTE — CONSULT NOTE ADULT - SUBJECTIVE AND OBJECTIVE BOX
Patient is a 85y old  Female who presents with a chief complaint of dizziness (30 Jun 2017 20:53)        PAST MEDICAL & SURGICAL HISTORY:  Venous stasis: b/l leg, left calf ulcer  CVA (cerebral vascular accident): March 18th 2017, left side weakness ,s/p TPA  Atrial fibrillation  HTN (hypertension)  No significant past surgical history      Summary of admission HPI: angina                PREVIOUS DIAGNOSTIC TESTING:      ECHO  FINDINGS:    STRESS  FINDINGS:    CATHETERIZATION  FINDINGS:    ELECTROPHYSIOLOGY STUDY  FINDINGS:    CAROTID ULTRASOUND:  FINDINGS    VENOUS DUPLEX SCAN:  FINDINGS:    CHEST CT PULMONARY ANGIO with IV Contrast:  FINDINGS:  MEDICATIONS  (STANDING):  sodium chloride 0.9%. 1000 milliLiter(s) (75 mL/Hr) IV Continuous <Continuous>  digoxin     Tablet 0.125 milliGRAM(s) Oral daily  apixaban 2.5 milliGRAM(s) Oral two times a day  pantoprazole    Tablet 40 milliGRAM(s) Oral before breakfast  spironolactone 25 milliGRAM(s) Oral daily  sacubitril 24 mG/valsartan 26 mG 1 Tablet(s) Oral two times a day  furosemide    Tablet 40 milliGRAM(s) Oral daily    MEDICATIONS  (PRN):      FAMILY HISTORY:  No pertinent family history in first degree relatives      SOCIAL HISTORY:    CIGARETTES:    ALCOHOL:    REVIEW OF SYSTEMS:    CONSTITUTIONAL: No fever, weight loss, chills, shakes, or fatigue  EYES: No eye pain, visual disturbances, or discharge  ENMT:  No difficulty hearing, tinnitus, vertigo; No sinus or throat pain  NECK: No pain or stiffness  BREASTS: No pain, masses, or nipple discharge  RESPIRATORY: No cough, wheezing, hemoptysis, or shortness of breath  CARDIOVASCULAR: No chest pain, dyspnea, palpitations, dizziness, syncope, paroxysmal nocturnal dyspnea, orthopnea, or arm or leg swelling  GASTROINTESTINAL: No abdominal  or epigastric pain, nausea, vomiting, hematemesis, diarrhea, constipation, melena or bright red blood.  GENITOURINARY: No dysuria, nocturia, hematuria, or urinary incontinence  NEUROLOGICAL: No headaches, memory loss, slurred speech, limb weakness, loss of strength, numbness, or tremors  SKIN: No itching, burning, rashes, or lesions   LYMPH NODES: No enlarged glands  ENDOCRINE: No heat or cold intolerance, or hair loss  MUSCULOSKELETAL: No joint pain or swelling, muscle, back, or extremity pain  PSYCHIATRIC: No depression, anxiety, or difficulty sleeping  HEME/LYMPH: No easy bruising or bleeding gums  ALLERY AND IMMUNOLOGIC: No hives or rash.      Vital Signs Last 24 Hrs  T(C): 36.9 (30 Jun 2017 21:00), Max: 36.9 (30 Jun 2017 21:00)  T(F): 98.4 (30 Jun 2017 21:00), Max: 98.4 (30 Jun 2017 21:00)  HR: 60 (30 Jun 2017 21:00) (58 - 71)  BP: 94/41 (30 Jun 2017 21:00) (70/30 - 94/41)  BP(mean): --  RR: 18 (30 Jun 2017 21:00) (18 - 20)  SpO2: 99% (30 Jun 2017 21:00) (97% - 99%)    PHYSICAL EXAM:    GENERAL: In no apparent distress, well nourished, and hydrated.  HEAD:  Atraumatic, Normocephalic  EYES: EOMI, PERRLA, conjunctiva and sclera clear  ENMT: No tonsillar erythema, exudates, or enlargement; Moist mucous membranes, Good dentition, No lesions  NECK: Supple and normal thyroid.  No JVD or carotid bruit.  Carotid pulse is 2+ bilaterally.  HEART: Regular rate and rhythm; No murmurs, rubs, or gallops.  PULMONARY: Clear to auscultation and perfusion.  No rales, wheezing, or rhonchi bilaterally.  ABDOMEN: Soft, Nontender, Nondistended; Bowel sounds present  EXTREMITIES:  2+ Peripheral Pulses, No clubbing, cyanosis, or edema  LYMPH: No lymphadenopathy noted  NEUROLOGICAL: Grossly nonfocal          INTERPRETATION OF TELEMETRY:    ECG:    I&O's Detail      LABS:                        14.3   5.4   )-----------( 160      ( 30 Jun 2017 11:53 )             43.4     06-30    136  |  94<L>  |  32<H>  ----------------------------<  94  4.3   |  32<H>  |  1.78<H>    Ca    9.4      30 Jun 2017 11:53    TPro  6.1  /  Alb  2.5<L>  /  TBili  0.8  /  DBili  .28<H>  /  AST  38<H>  /  ALT  44  /  AlkPhos  92  06-30    CARDIAC MARKERS ( 30 Jun 2017 18:19 )  .585 ng/mL / x     / 91 U/L / x     / 3.3 ng/mL          BNP  I&O's Detail    Daily Height in cm: 162.56 (30 Jun 2017 10:24)    Daily     RADIOLOGY & ADDITIONAL STUDIES:
Patient is a 85y old  Female who presents with a chief complaint of Pt stated that she felt dizziness and he numbness of the upper body (2017 22:43)        PAST MEDICAL & SURGICAL HISTORY:  Venous stasis: b/l leg, left calf ulcer  CVA (cerebral vascular accident): 2017, left side weakness ,s/p TPA  Atrial fibrillation  HTN (hypertension)  No significant past surgical history      Summary of admission HPI: chest pain                PREVIOUS DIAGNOSTIC TESTING:      ECHO  FINDINGS:    STRESS  FINDINGS:    CATHETERIZATION  FINDINGS:    ELECTROPHYSIOLOGY STUDY  FINDINGS:    CAROTID ULTRASOUND:  FINDINGS    VENOUS DUPLEX SCAN:  FINDINGS:    CHEST CT PULMONARY ANGIO with IV Contrast:  FINDINGS:  MEDICATIONS  (STANDING):  sodium chloride 0.9%. 1000 milliLiter(s) (75 mL/Hr) IV Continuous <Continuous>  digoxin     Tablet 0.125 milliGRAM(s) Oral daily  apixaban 2.5 milliGRAM(s) Oral two times a day  pantoprazole    Tablet 40 milliGRAM(s) Oral before breakfast  spironolactone 25 milliGRAM(s) Oral daily  sacubitril 24 mG/valsartan 26 mG 1 Tablet(s) Oral two times a day  furosemide    Tablet 40 milliGRAM(s) Oral daily    MEDICATIONS  (PRN):      FAMILY HISTORY:  No pertinent family history in first degree relatives      SOCIAL HISTORY:    CIGARETTES:    ALCOHOL:    REVIEW OF SYSTEMS:    CONSTITUTIONAL: No fever, weight loss, chills, shakes, or fatigue  EYES: No eye pain, visual disturbances, or discharge  ENMT:  No difficulty hearing, tinnitus, vertigo; No sinus or throat pain  NECK: No pain or stiffness  BREASTS: No pain, masses, or nipple discharge  RESPIRATORY: No cough, wheezing, hemoptysis, or shortness of breath  CARDIOVASCULAR: No chest pain, dyspnea, palpitations, dizziness, syncope, paroxysmal nocturnal dyspnea, orthopnea, or arm or leg swelling  GASTROINTESTINAL: No abdominal  or epigastric pain, nausea, vomiting, hematemesis, diarrhea, constipation, melena or bright red blood.  GENITOURINARY: No dysuria, nocturia, hematuria, or urinary incontinence  NEUROLOGICAL: No headaches, memory loss, slurred speech, limb weakness, loss of strength, numbness, or tremors  SKIN: No itching, burning, rashes, or lesions   LYMPH NODES: No enlarged glands  ENDOCRINE: No heat or cold intolerance, or hair loss  MUSCULOSKELETAL: No joint pain or swelling, muscle, back, or extremity pain  PSYCHIATRIC: No depression, anxiety, or difficulty sleeping  HEME/LYMPH: No easy bruising or bleeding gums  ALLERY AND IMMUNOLOGIC: No hives or rash.      Vital Signs Last 24 Hrs  T(C): 36.4 (2017 05:51), Max: 36.9 (2017 21:00)  T(F): 97.6 (2017 05:51), Max: 98.4 (2017 21:00)  HR: 70 (2017 10:00) (60 - 78)  BP: 100/53 (2017 10:00) (70/30 - 100/53)  BP(mean): --  RR: 18 (2017 10:) (18 - 20)  SpO2: 98% (2017 10:) (97% - 99%)    PHYSICAL EXAM:    GENERAL: In no apparent distress, well nourished, and hydrated.  HEAD:  Atraumatic, Normocephalic  EYES: EOMI, PERRLA, conjunctiva and sclera clear  ENMT: No tonsillar erythema, exudates, or enlargement; Moist mucous membranes, Good dentition, No lesions  NECK: Supple and normal thyroid.  No JVD or carotid bruit.  Carotid pulse is 2+ bilaterally.  HEART: Regular rate and rhythm; No murmurs, rubs, or gallops.  PULMONARY: Clear to auscultation and perfusion.  No rales, wheezing, or rhonchi bilaterally.  ABDOMEN: Soft, Nontender, Nondistended; Bowel sounds present  EXTREMITIES:  2+ Peripheral Pulses, No clubbing, cyanosis, or edema  LYMPH: No lymphadenopathy noted  NEUROLOGICAL: Grossly nonfocal          INTERPRETATION OF TELEMETRY:    ECG:    I&O's Detail    2017 07:01  -  2017 07:00  --------------------------------------------------------  IN:    sodium chloride 0.9%.: 900 mL  Total IN: 900 mL    OUT:  Total OUT: 0 mL    Total NET: 900 mL          LABS:                        13.5   4.9   )-----------( 141      ( 2017 07:29 )             39.2         141  |  102  |  24<H>  ----------------------------<  77  3.2<L>   |  30  |  1.08    Ca    8.5      2017 07:29    TPro  6.1  /  Alb  2.5<L>  /  TBili  0.8  /  DBili  .28<H>  /  AST  38<H>  /  ALT  44  /  AlkPhos  92  06-30    CARDIAC MARKERS ( 2017 03:47 )  .601 ng/mL / x     / x     / x     / x      CARDIAC MARKERS ( 2017 18:19 )  .585 ng/mL / x     / 91 U/L / x     / 3.3 ng/mL        Urinalysis Basic - ( 2017 23:37 )    Color: Yellow / Appearance: x / S.010 / pH: x  Gluc: x / Ketone: Negative  / Bili: Negative / Urobili: 1 mg/dL   Blood: x / Protein: Negative mg/dL / Nitrite: Negative   Leuk Esterase: Small / RBC: 0-2 /HPF / WBC 3-5   Sq Epi: x / Non Sq Epi: Few / Bacteria: Many      BNP  I&O's Detail    2017 07:01  -  2017 07:00  --------------------------------------------------------  IN:    sodium chloride 0.9%.: 900 mL  Total IN: 900 mL    OUT:  Total OUT: 0 mL    Total NET: 900 mL        Daily Height in cm: 162.56 (2017 22:29)    Daily Weight in k.7 (2017 22:29)    RADIOLOGY & ADDITIONAL STUDIES:

## 2017-07-01 NOTE — CHART NOTE - NSCHARTNOTEFT_GEN_A_CORE
Called to evaluate patient for BP 81/37, HR 67. Patient sitting in chair alert and oriented x 3. No c/o offered or distress noted.  Upon review of chart patient received Entresto this AM.   ml bolus ordered and Midodrine 10mg PO ordered.  BP Parameter added to Entresto administration.  Discussed findings and treatment with Dr Mcwilliams and Attending in agreement with treatment plan.

## 2017-07-01 NOTE — PHYSICAL THERAPY INITIAL EVALUATION ADULT - GENERAL OBSERVATIONS, REHAB EVAL
Pt encountered supine in bed, alert and oriented x4, cardiac monitor and IV in place, NAD. CT head with no acute findings. Pt was also seen by PT to assess venous stasis ulcers.

## 2017-07-01 NOTE — PHYSICAL THERAPY INITIAL EVALUATION ADULT - MODIFIED CLINICAL TEST OF SENSORY INTEGRATION IN BALANCE TEST
Barthel Index: Feeding Score _10__, Bathing Score _5__, Grooming Score _5__, Dressing Score _5__, Bowels Score _10__, Bladder Score _10__, Toilet Score _5__, Transfers Score _10__, Mobility Score _0__, Stairs Score _0__,     Total Score _60__

## 2017-07-02 LAB
DIGOXIN SERPL-MCNC: 1.26 NG/ML — SIGNIFICANT CHANGE UP (ref 0.8–2)
MAGNESIUM SERPL-MCNC: 1.8 MG/DL — SIGNIFICANT CHANGE UP (ref 1.6–2.6)
PHOSPHATE SERPL-MCNC: 1.7 MG/DL — LOW (ref 2.5–4.5)

## 2017-07-02 RX ORDER — POTASSIUM PHOSPHATE, MONOBASIC POTASSIUM PHOSPHATE, DIBASIC 236; 224 MG/ML; MG/ML
15 INJECTION, SOLUTION INTRAVENOUS ONCE
Qty: 0 | Refills: 0 | Status: COMPLETED | OUTPATIENT
Start: 2017-07-02 | End: 2017-07-02

## 2017-07-02 RX ORDER — DOCUSATE SODIUM 100 MG
100 CAPSULE ORAL
Qty: 0 | Refills: 0 | Status: DISCONTINUED | OUTPATIENT
Start: 2017-07-02 | End: 2017-07-03

## 2017-07-02 RX ORDER — FUROSEMIDE 40 MG
20 TABLET ORAL ONCE
Qty: 0 | Refills: 0 | Status: COMPLETED | OUTPATIENT
Start: 2017-07-02 | End: 2017-07-02

## 2017-07-02 RX ORDER — MIDODRINE HYDROCHLORIDE 2.5 MG/1
10 TABLET ORAL DAILY
Qty: 0 | Refills: 0 | Status: DISCONTINUED | OUTPATIENT
Start: 2017-07-02 | End: 2017-07-03

## 2017-07-02 RX ORDER — SENNA PLUS 8.6 MG/1
2 TABLET ORAL AT BEDTIME
Qty: 0 | Refills: 0 | Status: DISCONTINUED | OUTPATIENT
Start: 2017-07-02 | End: 2017-07-03

## 2017-07-02 RX ADMIN — SENNA PLUS 2 TABLET(S): 8.6 TABLET ORAL at 21:59

## 2017-07-02 RX ADMIN — APIXABAN 2.5 MILLIGRAM(S): 2.5 TABLET, FILM COATED ORAL at 06:05

## 2017-07-02 RX ADMIN — APIXABAN 2.5 MILLIGRAM(S): 2.5 TABLET, FILM COATED ORAL at 17:34

## 2017-07-02 RX ADMIN — Medication 0.12 MILLIGRAM(S): at 06:05

## 2017-07-02 RX ADMIN — MIDODRINE HYDROCHLORIDE 10 MILLIGRAM(S): 2.5 TABLET ORAL at 11:07

## 2017-07-02 RX ADMIN — Medication 100 MILLIGRAM(S): at 06:05

## 2017-07-02 RX ADMIN — PANTOPRAZOLE SODIUM 40 MILLIGRAM(S): 20 TABLET, DELAYED RELEASE ORAL at 06:05

## 2017-07-02 RX ADMIN — Medication 100 MILLIGRAM(S): at 17:34

## 2017-07-02 RX ADMIN — POTASSIUM PHOSPHATE, MONOBASIC POTASSIUM PHOSPHATE, DIBASIC 63.75 MILLIMOLE(S): 236; 224 INJECTION, SOLUTION INTRAVENOUS at 18:45

## 2017-07-02 RX ADMIN — Medication 20 MILLIGRAM(S): at 06:04

## 2017-07-02 NOTE — DIETITIAN INITIAL EVALUATION ADULT. - OTHER INFO
pt seen due to consult for BMI > 40. pt reports poor appetite for the past 3-4 mos due to meds, mouth was dry and had a bitter taste. she said her appetite has gotten better since she was admitted to the hosp. 3 days ago. no N/V, + bm 7/02. upper dentures; no difficulty chewing/swallowing. pt lives c son in law and grandson; they cook but no special diet for her.

## 2017-07-02 NOTE — DIETITIAN INITIAL EVALUATION ADULT. - ENERGY NEEDS
Height (cm): 162.56 (06-30)  Weight (kg): 107.7 (06-30)  BMI (kg/m2): 40.8 (06-30)  Ideal Body Weight: 54.5kg+/-10%  % Ideal Body Weight: 197%

## 2017-07-02 NOTE — PROGRESS NOTE ADULT - SUBJECTIVE AND OBJECTIVE BOX
Patient is a 85y old  Female who presents with a chief complaint of Pt stated that she felt dizziness and he numbness of the upper body (2017 22:43)      INTERVAL HPI/OVERNIGHT EVENTS:  no complaint  MEDICATIONS  (STANDING):  sodium chloride 0.9%. 1000 milliLiter(s) (75 mL/Hr) IV Continuous <Continuous>  digoxin     Tablet 0.125 milliGRAM(s) Oral daily  apixaban 2.5 milliGRAM(s) Oral two times a day  pantoprazole    Tablet 40 milliGRAM(s) Oral before breakfast  spironolactone 25 milliGRAM(s) Oral daily  furosemide    Tablet 40 milliGRAM(s) Oral daily  sacubitril 24 mG/valsartan 26 mG 1 Tablet(s) Oral two times a day  docusate sodium 100 milliGRAM(s) Oral two times a day  senna 2 Tablet(s) Oral at bedtime  midodrine 10 milliGRAM(s) Oral daily    MEDICATIONS  (PRN):      Allergies    penicillin (Unknown)    Intolerances        REVIEW OF SYSTEMS:  CONSTITUTIONAL: No fever, weight loss, or fatigue  EYES: No eye pain, visual disturbances, or discharge  ENMT:  No difficulty hearing, tinnitus, vertigo; No sinus or throat pain  NECK: No pain or stiffness  BREASTS: No pain, masses, or nipple discharge  RESPIRATORY: No cough, wheezing, chills or hemoptysis; No shortness of breath  CARDIOVASCULAR: No chest pain, palpitations, dizziness, or leg swelling  GASTROINTESTINAL: No abdominal or epigastric pain. No nausea, vomiting, or hematemesis; No diarrhea or constipation. No melena or hematochezia.  GENITOURINARY: No dysuria, frequency, hematuria, or incontinence  NEUROLOGICAL: No headaches, memory loss, loss of strength, numbness, or tremors  SKIN: No itching, burning, rashes, or lesions   LYMPH NODES: No enlarged glands  ENDOCRINE: No heat or cold intolerance; No hair loss  MUSCULOSKELETAL: No joint pain or swelling; No muscle, back, or extremity pain  PSYCHIATRIC: No depression, anxiety, mood swings, or difficulty sleeping  HEME/LYMPH: No easy bruising, or bleeding gums  ALLERGY AND IMMUNOLOGIC: No hives or eczema    Vital Signs Last 24 Hrs  T(C): 37 (2017 05:18), Max: 37.1 (2017 23:30)  T(F): 98.6 (2017 05:18), Max: 98.8 (2017 23:30)  HR: 65 (2017 05:18) (65 - 75)  BP: 94/46 (2017 05:18) (81/37 - 121/65)  BP(mean): --  RR: 18 (2017 05:18) (16 - 18)  SpO2: 96% (2017 05:18) (96% - 99%)    PHYSICAL EXAM:  GENERAL: NAD, well-groomed, well-developed  HEAD:  Atraumatic, Normocephalic  EYES: EOMI, PERRLA, conjunctiva and sclera clear  ENMT: No tonsillar erythema, exudates, or enlargement; Moist mucous membranes, Good dentition, No lesions  NECK: Supple, No JVD, Normal thyroid  NERVOUS SYSTEM:  Alert & Oriented X3, Good concentration; Motor Strength 5/5 B/L upper and lower extremities; DTRs 2+ intact and symmetric  CHEST/LUNG: Clear to percussion bilaterally; No rales, rhonchi, wheezing, or rubs  HEART: Regular rate and rhythm; No murmurs, rubs, or gallops  ABDOMEN: Soft, Nontender, Nondistended; Bowel sounds present  EXTREMITIES:  2+ Peripheral Pulses, No clubbing, cyanosis, or edema  LYMPH: No lymphadenopathy noted  SKIN: No rashes or lesions    LABS:                        13.5   4.9   )-----------( 141      ( 2017 07:29 )             39.2     07-01    141  |  102  |  24<H>  ----------------------------<  77  3.2<L>   |  30  |  1.08    Ca    8.5      2017 07:29  Phos  1.7     07-02  Mg     1.8     07-02    TPro  6.1  /  Alb  2.5<L>  /  TBili  0.8  /  DBili  .28<H>  /  AST  38<H>  /  ALT  44  /  AlkPhos  92  06-30      Urinalysis Basic - ( 2017 23:37 )    Color: Yellow / Appearance: x / S.010 / pH: x  Gluc: x / Ketone: Negative  / Bili: Negative / Urobili: 1 mg/dL   Blood: x / Protein: Negative mg/dL / Nitrite: Negative   Leuk Esterase: Small / RBC: 0-2 /HPF / WBC 3-5   Sq Epi: x / Non Sq Epi: Few / Bacteria: Many      CAPILLARY BLOOD GLUCOSE  109 (2017 21:57)        CULTURES:    HEMOGLOBIN A1C:    CHOLESTEROL:        RADIOLOGY & ADDITIONAL TESTS:

## 2017-07-03 ENCOUNTER — TRANSCRIPTION ENCOUNTER (OUTPATIENT)
Age: 82
End: 2017-07-03

## 2017-07-03 VITALS
RESPIRATION RATE: 17 BRPM | DIASTOLIC BLOOD PRESSURE: 67 MMHG | OXYGEN SATURATION: 95 % | SYSTOLIC BLOOD PRESSURE: 122 MMHG | TEMPERATURE: 98 F | HEART RATE: 80 BPM

## 2017-07-03 LAB
-  AMIKACIN: SIGNIFICANT CHANGE UP
-  AMPICILLIN/SULBACTAM: SIGNIFICANT CHANGE UP
-  AMPICILLIN: SIGNIFICANT CHANGE UP
-  AZTREONAM: SIGNIFICANT CHANGE UP
-  CEFAZOLIN: SIGNIFICANT CHANGE UP
-  CEFEPIME: SIGNIFICANT CHANGE UP
-  CEFOXITIN: SIGNIFICANT CHANGE UP
-  CEFTAZIDIME: SIGNIFICANT CHANGE UP
-  CEFTRIAXONE: SIGNIFICANT CHANGE UP
-  CIPROFLOXACIN: SIGNIFICANT CHANGE UP
-  ERTAPENEM: SIGNIFICANT CHANGE UP
-  GENTAMICIN: SIGNIFICANT CHANGE UP
-  IMIPENEM: SIGNIFICANT CHANGE UP
-  LEVOFLOXACIN: SIGNIFICANT CHANGE UP
-  MEROPENEM: SIGNIFICANT CHANGE UP
-  NITROFURANTOIN: SIGNIFICANT CHANGE UP
-  PIPERACILLIN/TAZOBACTAM: SIGNIFICANT CHANGE UP
-  TOBRAMYCIN: SIGNIFICANT CHANGE UP
-  TRIMETHOPRIM/SULFAMETHOXAZOLE: SIGNIFICANT CHANGE UP
ANION GAP SERPL CALC-SCNC: 9 MMOL/L — SIGNIFICANT CHANGE UP (ref 5–17)
BUN SERPL-MCNC: 10 MG/DL — SIGNIFICANT CHANGE UP (ref 7–23)
CALCIUM SERPL-MCNC: 8.5 MG/DL — SIGNIFICANT CHANGE UP (ref 8.5–10.1)
CHLORIDE SERPL-SCNC: 109 MMOL/L — HIGH (ref 96–108)
CO2 SERPL-SCNC: 28 MMOL/L — SIGNIFICANT CHANGE UP (ref 22–31)
CREAT SERPL-MCNC: 0.72 MG/DL — SIGNIFICANT CHANGE UP (ref 0.5–1.3)
CULTURE RESULTS: SIGNIFICANT CHANGE UP
GLUCOSE SERPL-MCNC: 82 MG/DL — SIGNIFICANT CHANGE UP (ref 70–99)
HCT VFR BLD CALC: 40.7 % — SIGNIFICANT CHANGE UP (ref 34.5–45)
HGB BLD-MCNC: 13.7 G/DL — SIGNIFICANT CHANGE UP (ref 11.5–15.5)
MCHC RBC-ENTMCNC: 28.7 PG — SIGNIFICANT CHANGE UP (ref 27–34)
MCHC RBC-ENTMCNC: 33.5 GM/DL — SIGNIFICANT CHANGE UP (ref 32–36)
MCV RBC AUTO: 85.6 FL — SIGNIFICANT CHANGE UP (ref 80–100)
METHOD TYPE: SIGNIFICANT CHANGE UP
ORGANISM # SPEC MICROSCOPIC CNT: SIGNIFICANT CHANGE UP
ORGANISM # SPEC MICROSCOPIC CNT: SIGNIFICANT CHANGE UP
PLATELET # BLD AUTO: 135 K/UL — LOW (ref 150–400)
POTASSIUM SERPL-MCNC: 3.2 MMOL/L — LOW (ref 3.5–5.3)
POTASSIUM SERPL-SCNC: 3.2 MMOL/L — LOW (ref 3.5–5.3)
RBC # BLD: 4.76 M/UL — SIGNIFICANT CHANGE UP (ref 3.8–5.2)
RBC # FLD: 15.9 % — HIGH (ref 11–15)
SODIUM SERPL-SCNC: 146 MMOL/L — HIGH (ref 135–145)
SPECIMEN SOURCE: SIGNIFICANT CHANGE UP
WBC # BLD: 5.3 K/UL — SIGNIFICANT CHANGE UP (ref 3.8–10.5)
WBC # FLD AUTO: 5.3 K/UL — SIGNIFICANT CHANGE UP (ref 3.8–10.5)

## 2017-07-03 RX ORDER — SACUBITRIL AND VALSARTAN 24; 26 MG/1; MG/1
1 TABLET, FILM COATED ORAL
Qty: 0 | Refills: 0 | COMMUNITY

## 2017-07-03 RX ORDER — ONDANSETRON 8 MG/1
4 TABLET, FILM COATED ORAL EVERY 8 HOURS
Qty: 0 | Refills: 0 | Status: DISCONTINUED | OUTPATIENT
Start: 2017-07-03 | End: 2017-07-03

## 2017-07-03 RX ORDER — CIPROFLOXACIN LACTATE 400MG/40ML
1 VIAL (ML) INTRAVENOUS
Qty: 14 | Refills: 0 | OUTPATIENT
Start: 2017-07-03 | End: 2017-07-10

## 2017-07-03 RX ORDER — APIXABAN 2.5 MG/1
1 TABLET, FILM COATED ORAL
Qty: 0 | Refills: 0 | COMMUNITY
Start: 2017-07-03

## 2017-07-03 RX ORDER — POTASSIUM CHLORIDE 20 MEQ
1 PACKET (EA) ORAL
Qty: 30 | Refills: 0 | OUTPATIENT
Start: 2017-07-03 | End: 2017-08-02

## 2017-07-03 RX ORDER — POTASSIUM CHLORIDE 20 MEQ
40 PACKET (EA) ORAL DAILY
Qty: 0 | Refills: 0 | Status: DISCONTINUED | OUTPATIENT
Start: 2017-07-03 | End: 2017-07-03

## 2017-07-03 RX ORDER — CIPROFLOXACIN LACTATE 400MG/40ML
500 VIAL (ML) INTRAVENOUS EVERY 12 HOURS
Qty: 0 | Refills: 0 | Status: DISCONTINUED | OUTPATIENT
Start: 2017-07-03 | End: 2017-07-03

## 2017-07-03 RX ORDER — PANTOPRAZOLE SODIUM 20 MG/1
1 TABLET, DELAYED RELEASE ORAL
Qty: 0 | Refills: 0 | COMMUNITY
Start: 2017-07-03

## 2017-07-03 RX ORDER — PANTOPRAZOLE SODIUM 20 MG/1
1 TABLET, DELAYED RELEASE ORAL
Qty: 30 | Refills: 0 | OUTPATIENT
Start: 2017-07-03 | End: 2017-08-02

## 2017-07-03 RX ADMIN — SPIRONOLACTONE 25 MILLIGRAM(S): 25 TABLET, FILM COATED ORAL at 06:10

## 2017-07-03 RX ADMIN — Medication 0.12 MILLIGRAM(S): at 06:09

## 2017-07-03 RX ADMIN — Medication 100 MILLIGRAM(S): at 06:09

## 2017-07-03 RX ADMIN — Medication 500 MILLIGRAM(S): at 15:36

## 2017-07-03 RX ADMIN — APIXABAN 2.5 MILLIGRAM(S): 2.5 TABLET, FILM COATED ORAL at 06:09

## 2017-07-03 RX ADMIN — PANTOPRAZOLE SODIUM 40 MILLIGRAM(S): 20 TABLET, DELAYED RELEASE ORAL at 06:09

## 2017-07-03 RX ADMIN — Medication 40 MILLIGRAM(S): at 06:09

## 2017-07-03 RX ADMIN — APIXABAN 2.5 MILLIGRAM(S): 2.5 TABLET, FILM COATED ORAL at 17:19

## 2017-07-03 RX ADMIN — Medication 100 MILLIGRAM(S): at 17:19

## 2017-07-03 RX ADMIN — MIDODRINE HYDROCHLORIDE 10 MILLIGRAM(S): 2.5 TABLET ORAL at 12:29

## 2017-07-03 RX ADMIN — Medication 40 MILLIEQUIVALENT(S): at 12:32

## 2017-07-03 NOTE — PROGRESS NOTE ADULT - SUBJECTIVE AND OBJECTIVE BOX
Patient is a 85y old  Female who presents with a chief complaint of Pt stated that she felt dizziness and he numbness of the upper body (03 Jul 2017 11:48)        PAST MEDICAL & SURGICAL HISTORY:  Venous stasis: b/l leg, left calf ulcer  CVA (cerebral vascular accident): March 18th 2017, left side weakness ,s/p TPA  Atrial fibrillation  HTN (hypertension)  No significant past surgical history      Summary of admission HPI: syncope                PREVIOUS DIAGNOSTIC TESTING:      ECHO  FINDINGS:    STRESS  FINDINGS:    CATHETERIZATION  FINDINGS:    ELECTROPHYSIOLOGY STUDY  FINDINGS:    CAROTID ULTRASOUND:  FINDINGS    VENOUS DUPLEX SCAN:  FINDINGS:    CHEST CT PULMONARY ANGIO with IV Contrast:  FINDINGS:  MEDICATIONS  (STANDING):  sodium chloride 0.9%. 1000 milliLiter(s) (75 mL/Hr) IV Continuous <Continuous>  digoxin     Tablet 0.125 milliGRAM(s) Oral daily  apixaban 2.5 milliGRAM(s) Oral two times a day  pantoprazole    Tablet 40 milliGRAM(s) Oral before breakfast  spironolactone 25 milliGRAM(s) Oral daily  furosemide    Tablet 40 milliGRAM(s) Oral daily  sacubitril 24 mG/valsartan 26 mG 1 Tablet(s) Oral two times a day  docusate sodium 100 milliGRAM(s) Oral two times a day  senna 2 Tablet(s) Oral at bedtime  midodrine 10 milliGRAM(s) Oral daily  potassium chloride    Tablet ER 40 milliEquivalent(s) Oral daily  ciprofloxacin     Tablet 500 milliGRAM(s) Oral every 12 hours    MEDICATIONS  (PRN):  ondansetron Injectable 4 milliGRAM(s) IV Push every 8 hours PRN Nausea and/or Vomiting      FAMILY HISTORY:  No pertinent family history in first degree relatives      SOCIAL HISTORY:    CIGARETTES:    ALCOHOL:    REVIEW OF SYSTEMS:    CONSTITUTIONAL: No fever, weight loss, chills, shakes, or fatigue  EYES: No eye pain, visual disturbances, or discharge  ENMT:  No difficulty hearing, tinnitus, vertigo; No sinus or throat pain  NECK: No pain or stiffness  BREASTS: No pain, masses, or nipple discharge  RESPIRATORY: No cough, wheezing, hemoptysis, or shortness of breath  CARDIOVASCULAR: No chest pain, dyspnea, palpitations, dizziness, syncope, paroxysmal nocturnal dyspnea, orthopnea, or arm or leg swelling  GASTROINTESTINAL: No abdominal  or epigastric pain, nausea, vomiting, hematemesis, diarrhea, constipation, melena or bright red blood.  GENITOURINARY: No dysuria, nocturia, hematuria, or urinary incontinence  NEUROLOGICAL: No headaches, memory loss, slurred speech, limb weakness, loss of strength, numbness, or tremors  SKIN: No itching, burning, rashes, or lesions   LYMPH NODES: No enlarged glands  ENDOCRINE: No heat or cold intolerance, or hair loss  MUSCULOSKELETAL: No joint pain or swelling, muscle, back, or extremity pain  PSYCHIATRIC: No depression, anxiety, or difficulty sleeping  HEME/LYMPH: No easy bruising or bleeding gums  ALLERY AND IMMUNOLOGIC: No hives or rash.      Vital Signs Last 24 Hrs  T(C): 36.6 (03 Jul 2017 12:29), Max: 36.9 (03 Jul 2017 05:14)  T(F): 97.8 (03 Jul 2017 12:29), Max: 98.4 (03 Jul 2017 05:14)  HR: 69 (03 Jul 2017 12:29) (60 - 76)  BP: 115/68 (03 Jul 2017 12:29) (115/55 - 126/54)  BP(mean): --  RR: 18 (03 Jul 2017 12:29) (17 - 18)  SpO2: 96% (03 Jul 2017 12:29) (95% - 100%)    PHYSICAL EXAM:    GENERAL: In no apparent distress, well nourished, and hydrated.  HEAD:  Atraumatic, Normocephalic  EYES: EOMI, PERRLA, conjunctiva and sclera clear  ENMT: No tonsillar erythema, exudates, or enlargement; Moist mucous membranes, Good dentition, No lesions  NECK: Supple and normal thyroid.  No JVD or carotid bruit.  Carotid pulse is 2+ bilaterally.  HEART: Regular rate and rhythm; No murmurs, rubs, or gallops.  PULMONARY: Clear to auscultation and perfusion.  No rales, wheezing, or rhonchi bilaterally.  ABDOMEN: Soft, Nontender, Nondistended; Bowel sounds present  EXTREMITIES:  2+ Peripheral Pulses, No clubbing, cyanosis, or edema  LYMPH: No lymphadenopathy noted  NEUROLOGICAL: Grossly nonfocal          INTERPRETATION OF TELEMETRY:    ECG:    I&O's Detail    02 Jul 2017 07:01  -  03 Jul 2017 07:00  --------------------------------------------------------  IN:    Oral Fluid: 720 mL  Total IN: 720 mL    OUT:    Voided: 300 mL  Total OUT: 300 mL    Total NET: 420 mL      03 Jul 2017 07:01  -  03 Jul 2017 13:46  --------------------------------------------------------  IN:    Oral Fluid: 240 mL  Total IN: 240 mL    OUT:  Total OUT: 0 mL    Total NET: 240 mL          LABS:                        13.7   5.3   )-----------( 135      ( 03 Jul 2017 07:20 )             40.7     07-03    146<H>  |  109<H>  |  10  ----------------------------<  82  3.2<L>   |  28  |  0.72    Ca    8.5      03 Jul 2017 07:20  Phos  1.7     07-02  Mg     1.8     07-02              BNP  I&O's Detail    02 Jul 2017 07:01  -  03 Jul 2017 07:00  --------------------------------------------------------  IN:    Oral Fluid: 720 mL  Total IN: 720 mL    OUT:    Voided: 300 mL  Total OUT: 300 mL    Total NET: 420 mL      03 Jul 2017 07:01  -  03 Jul 2017 13:46  --------------------------------------------------------  IN:    Oral Fluid: 240 mL  Total IN: 240 mL    OUT:  Total OUT: 0 mL    Total NET: 240 mL        Daily     Daily     RADIOLOGY & ADDITIONAL STUDIES:

## 2017-07-03 NOTE — DISCHARGE NOTE ADULT - CARE PLAN
Principal Discharge DX:	NSTEMI (non-ST elevated myocardial infarction)  Goal:	follow up with pmd in 1 week  Instructions for follow-up, activity and diet:	take med as directed  Secondary Diagnosis:	Transient cerebral ischemia, unspecified type  Secondary Diagnosis:	Essential hypertension  Secondary Diagnosis:	Venous stasis  Secondary Diagnosis:	Chronic atrial fibrillation  Secondary Diagnosis:	Acute on chronic systolic congestive heart failure

## 2017-07-03 NOTE — DISCHARGE NOTE ADULT - HOSPITAL COURSE
85 year old female with h/o HTN, atrial fibrillation, CVA (s/p TPA), and venous stasis presents today BIBA from home accompanied with her daughter c/o feeling sudden onset of generalized weakness and feeling of numbness of her legs and upper body with dizziness, pt had a period of confusion as well which lasted for 45min

## 2017-07-03 NOTE — DISCHARGE NOTE ADULT - PATIENT PORTAL LINK FT
“You can access the [facility] Patient Portal, offered by [facility], by registering with the following website: [email or web address]”

## 2017-07-03 NOTE — DISCHARGE NOTE ADULT - SECONDARY DIAGNOSIS.
Transient cerebral ischemia, unspecified type Essential hypertension Venous stasis Chronic atrial fibrillation Acute on chronic systolic congestive heart failure

## 2017-07-07 DIAGNOSIS — I21.4 NON-ST ELEVATION (NSTEMI) MYOCARDIAL INFARCTION: ICD-10-CM

## 2017-07-07 DIAGNOSIS — I50.9 HEART FAILURE, UNSPECIFIED: ICD-10-CM

## 2017-07-07 DIAGNOSIS — E87.6 HYPOKALEMIA: ICD-10-CM

## 2017-07-07 DIAGNOSIS — I87.8 OTHER SPECIFIED DISORDERS OF VEINS: ICD-10-CM

## 2017-07-07 DIAGNOSIS — Z86.73 PERSONAL HISTORY OF TRANSIENT ISCHEMIC ATTACK (TIA), AND CEREBRAL INFARCTION WITHOUT RESIDUAL DEFICITS: ICD-10-CM

## 2017-07-07 DIAGNOSIS — I48.2 CHRONIC ATRIAL FIBRILLATION: ICD-10-CM

## 2017-07-07 DIAGNOSIS — I10 ESSENTIAL (PRIMARY) HYPERTENSION: ICD-10-CM

## 2017-07-07 DIAGNOSIS — Z88.0 ALLERGY STATUS TO PENICILLIN: ICD-10-CM

## 2018-02-10 ENCOUNTER — INPATIENT (INPATIENT)
Facility: HOSPITAL | Age: 83
LOS: 9 days | Discharge: NURSING, REHAB OR LONG-TERM CARE | DRG: 166 | End: 2018-02-20
Attending: INTERNAL MEDICINE | Admitting: INTERNAL MEDICINE
Payer: MEDICARE

## 2018-02-10 VITALS
WEIGHT: 199.96 LBS | DIASTOLIC BLOOD PRESSURE: 99 MMHG | OXYGEN SATURATION: 100 % | SYSTOLIC BLOOD PRESSURE: 129 MMHG | TEMPERATURE: 97 F | HEART RATE: 136 BPM | HEIGHT: 66 IN | RESPIRATION RATE: 42 BRPM

## 2018-02-10 LAB
ALBUMIN SERPL ELPH-MCNC: 2.5 G/DL — LOW (ref 3.5–5)
ALP SERPL-CCNC: 184 U/L — HIGH (ref 40–120)
ALT FLD-CCNC: 26 U/L DA — SIGNIFICANT CHANGE UP (ref 10–60)
ANION GAP SERPL CALC-SCNC: 12 MMOL/L — SIGNIFICANT CHANGE UP (ref 5–17)
AST SERPL-CCNC: 41 U/L — HIGH (ref 10–40)
BASOPHILS # BLD AUTO: 0 K/UL — SIGNIFICANT CHANGE UP (ref 0–0.2)
BASOPHILS NFR BLD AUTO: 1 % — SIGNIFICANT CHANGE UP (ref 0–2)
BILIRUB SERPL-MCNC: 0.9 MG/DL — SIGNIFICANT CHANGE UP (ref 0.2–1.2)
BUN SERPL-MCNC: 24 MG/DL — HIGH (ref 7–18)
CALCIUM SERPL-MCNC: 9.6 MG/DL — SIGNIFICANT CHANGE UP (ref 8.4–10.5)
CHLORIDE SERPL-SCNC: 104 MMOL/L — SIGNIFICANT CHANGE UP (ref 96–108)
CO2 SERPL-SCNC: 22 MMOL/L — SIGNIFICANT CHANGE UP (ref 22–31)
CREAT SERPL-MCNC: 1.15 MG/DL — SIGNIFICANT CHANGE UP (ref 0.5–1.3)
EOSINOPHIL # BLD AUTO: 0 K/UL — SIGNIFICANT CHANGE UP (ref 0–0.5)
EOSINOPHIL NFR BLD AUTO: 0 % — SIGNIFICANT CHANGE UP (ref 0–6)
GLUCOSE SERPL-MCNC: 110 MG/DL — HIGH (ref 70–99)
HCT VFR BLD CALC: 43.6 % — SIGNIFICANT CHANGE UP (ref 34.5–45)
HGB BLD-MCNC: 13.3 G/DL — SIGNIFICANT CHANGE UP (ref 11.5–15.5)
LYMPHOCYTES # BLD AUTO: 0.7 K/UL — LOW (ref 1–3.3)
LYMPHOCYTES # BLD AUTO: 16.9 % — SIGNIFICANT CHANGE UP (ref 13–44)
MCHC RBC-ENTMCNC: 29.7 PG — SIGNIFICANT CHANGE UP (ref 27–34)
MCHC RBC-ENTMCNC: 30.5 GM/DL — LOW (ref 32–36)
MCV RBC AUTO: 97.2 FL — SIGNIFICANT CHANGE UP (ref 80–100)
MONOCYTES # BLD AUTO: 0.4 K/UL — SIGNIFICANT CHANGE UP (ref 0–0.9)
MONOCYTES NFR BLD AUTO: 8.9 % — SIGNIFICANT CHANGE UP (ref 2–14)
NEUTROPHILS # BLD AUTO: 3.2 K/UL — SIGNIFICANT CHANGE UP (ref 1.8–7.4)
NEUTROPHILS NFR BLD AUTO: 73.2 % — SIGNIFICANT CHANGE UP (ref 43–77)
NT-PROBNP SERPL-SCNC: HIGH PG/ML (ref 0–450)
PLATELET # BLD AUTO: 88 K/UL — LOW (ref 150–400)
POTASSIUM SERPL-MCNC: 4.1 MMOL/L — SIGNIFICANT CHANGE UP (ref 3.5–5.3)
POTASSIUM SERPL-SCNC: 4.1 MMOL/L — SIGNIFICANT CHANGE UP (ref 3.5–5.3)
PROT SERPL-MCNC: 7 G/DL — SIGNIFICANT CHANGE UP (ref 6–8.3)
RBC # BLD: 4.49 M/UL — SIGNIFICANT CHANGE UP (ref 3.8–5.2)
RBC # FLD: 17.8 % — HIGH (ref 10.3–14.5)
SODIUM SERPL-SCNC: 138 MMOL/L — SIGNIFICANT CHANGE UP (ref 135–145)
TROPONIN I SERPL-MCNC: 0.03 NG/ML — SIGNIFICANT CHANGE UP (ref 0–0.04)
WBC # BLD: 4.4 K/UL — SIGNIFICANT CHANGE UP (ref 3.8–10.5)
WBC # FLD AUTO: 4.4 K/UL — SIGNIFICANT CHANGE UP (ref 3.8–10.5)

## 2018-02-10 PROCEDURE — 71045 X-RAY EXAM CHEST 1 VIEW: CPT | Mod: 26

## 2018-02-10 RX ORDER — HEPARIN SODIUM 5000 [USP'U]/ML
INJECTION INTRAVENOUS; SUBCUTANEOUS
Qty: 25000 | Refills: 0 | Status: DISCONTINUED | OUTPATIENT
Start: 2018-02-10 | End: 2018-02-11

## 2018-02-10 RX ORDER — VANCOMYCIN HCL 1 G
1000 VIAL (EA) INTRAVENOUS ONCE
Qty: 0 | Refills: 0 | Status: COMPLETED | OUTPATIENT
Start: 2018-02-10 | End: 2018-02-10

## 2018-02-10 RX ORDER — FUROSEMIDE 40 MG
40 TABLET ORAL ONCE
Qty: 0 | Refills: 0 | Status: COMPLETED | OUTPATIENT
Start: 2018-02-10 | End: 2018-02-10

## 2018-02-10 RX ORDER — AMIODARONE HYDROCHLORIDE 400 MG/1
0.5 TABLET ORAL
Qty: 900 | Refills: 0 | Status: DISCONTINUED | OUTPATIENT
Start: 2018-02-11 | End: 2018-02-12

## 2018-02-10 RX ORDER — NITROGLYCERIN 6.5 MG
0.4 CAPSULE, EXTENDED RELEASE ORAL ONCE
Qty: 0 | Refills: 0 | Status: COMPLETED | OUTPATIENT
Start: 2018-02-10 | End: 2018-02-10

## 2018-02-10 RX ORDER — HEPARIN SODIUM 5000 [USP'U]/ML
3500 INJECTION INTRAVENOUS; SUBCUTANEOUS EVERY 6 HOURS
Qty: 0 | Refills: 0 | Status: DISCONTINUED | OUTPATIENT
Start: 2018-02-10 | End: 2018-02-11

## 2018-02-10 RX ORDER — AMIODARONE HYDROCHLORIDE 400 MG/1
150 TABLET ORAL ONCE
Qty: 0 | Refills: 0 | Status: COMPLETED | OUTPATIENT
Start: 2018-02-10 | End: 2018-02-10

## 2018-02-10 RX ORDER — HEPARIN SODIUM 5000 [USP'U]/ML
7500 INJECTION INTRAVENOUS; SUBCUTANEOUS EVERY 6 HOURS
Qty: 0 | Refills: 0 | Status: DISCONTINUED | OUTPATIENT
Start: 2018-02-10 | End: 2018-02-11

## 2018-02-10 RX ORDER — HEPARIN SODIUM 5000 [USP'U]/ML
5000 INJECTION INTRAVENOUS; SUBCUTANEOUS ONCE
Qty: 0 | Refills: 0 | Status: DISCONTINUED | OUTPATIENT
Start: 2018-02-10 | End: 2018-02-10

## 2018-02-10 RX ORDER — MEROPENEM 1 G/30ML
1000 INJECTION INTRAVENOUS ONCE
Qty: 0 | Refills: 0 | Status: DISCONTINUED | OUTPATIENT
Start: 2018-02-10 | End: 2018-02-11

## 2018-02-10 RX ORDER — HEPARIN SODIUM 5000 [USP'U]/ML
7500 INJECTION INTRAVENOUS; SUBCUTANEOUS ONCE
Qty: 0 | Refills: 0 | Status: COMPLETED | OUTPATIENT
Start: 2018-02-10 | End: 2018-02-10

## 2018-02-10 RX ORDER — HEPARIN SODIUM 5000 [USP'U]/ML
5600 INJECTION INTRAVENOUS; SUBCUTANEOUS EVERY 6 HOURS
Qty: 0 | Refills: 0 | Status: DISCONTINUED | OUTPATIENT
Start: 2018-02-10 | End: 2018-02-10

## 2018-02-10 RX ORDER — AMIODARONE HYDROCHLORIDE 400 MG/1
1 TABLET ORAL
Qty: 900 | Refills: 0 | Status: DISCONTINUED | OUTPATIENT
Start: 2018-02-10 | End: 2018-02-12

## 2018-02-10 RX ORDER — HEPARIN SODIUM 5000 [USP'U]/ML
INJECTION INTRAVENOUS; SUBCUTANEOUS
Qty: 25000 | Refills: 0 | Status: DISCONTINUED | OUTPATIENT
Start: 2018-02-10 | End: 2018-02-10

## 2018-02-10 RX ADMIN — Medication 250 MILLIGRAM(S): at 23:58

## 2018-02-10 RX ADMIN — Medication 0.4 MILLIGRAM(S): at 21:48

## 2018-02-10 NOTE — H&P ADULT - PROBLEM SELECTOR PLAN 4
Rate uncontrolled, patient is on metoprolol  Start Amiodarone loading for rate control  Change eliquis to heparin gtt for now

## 2018-02-10 NOTE — H&P ADULT - HISTORY OF PRESENT ILLNESS
84 y/o F from [facility] pt w/ PMHx of A-fib, CVA, CHF, at home DNR/DNI p/w SOB x the past few hours as per nursing home reports onset which started in the afternoon. Patient has been complaining of difficulty breathing, but today it was worse and she was very lethargic. History is limited as patient is lethargic, and history was obtained by daughters at bedside, and Ed provider note. Pt was dyspneic in ED and was speaking only one word sentences. Pt was placed on Bipap on arrival to ED.     Ed course:  EKG: Afib @ 84 y/o F from [facility] pt w/ PMHx of A-fib, CVA, CHF, at home DNR/DNI p/w SOB x the past few hours as per nursing home reports onset which started in the afternoon. Patient has been complaining of difficulty breathing, but today it was worse and she was very lethargic. History is limited as patient is lethargic, and history was obtained by daughters at bedside, and Ed provider note. Pt was dyspneic in ED and was speaking only one word sentences. Pt was placed on Bipap on arrival to ED.     Ed course:  EKG: Afib @ 148bpm  bnp: 29916  T1: .027

## 2018-02-10 NOTE — H&P ADULT - PROBLEM SELECTOR PLAN 3
Patient on CXR found to have RML infiltrate, c/w meropenem for now  f/up procalcitonin, if negative will d/c abx

## 2018-02-10 NOTE — H&P ADULT - NSHPLABSRESULTS_GEN_ALL_CORE
13.3   4.4   )-----------( 88       ( 10 Feb 2018 21:40 )             43.6     02-10    138  |  104  |  24<H>  ----------------------------<  110<H>  4.1   |  22  |  1.15    Ca    9.6      10 Feb 2018 21:40    TPro  7.0  /  Alb  2.5<L>  /  TBili  0.9  /  DBili  x   /  AST  41<H>  /  ALT  26  /  AlkPhos  184<H>  02-10    LIVER FUNCTIONS - ( 10 Feb 2018 21:40 )  Alb: 2.5 g/dL / Pro: 7.0 g/dL / ALK PHOS: 184 U/L / ALT: 26 U/L DA / AST: 41 U/L / GGT: x           CARDIAC MARKERS ( 10 Feb 2018 21:40 )  0.027 ng/mL / x     / x     / x     / x          CAPILLARY BLOOD GLUCOSE        I&O's Summary    PT/INR - ( 11 Feb 2018 00:32 )   PT: 32.2 sec;   INR: 2.89 ratio         PTT - ( 11 Feb 2018 00:32 )  PTT:35.0 sec

## 2018-02-10 NOTE — H&P ADULT - PROBLEM SELECTOR PLAN 5
Holding anti-HTN medications for now, as patients BP is borderline normal to low normal  restart as needed

## 2018-02-10 NOTE — ED PROVIDER NOTE - OBJECTIVE STATEMENT
86 y/o F pt DNI/DNR w/ PMHx of CHF, MI, BIB EMS from [facility], as a notification p/w respiratory distress. Per EMS, onset 2 pm.  Pt is on CPAP at home. Allergic to penicillin. 84 y/o F pt DNI/DNR w/ PMHx of CHF, MI, BIB EMS from [facility], p/w respiratory distress. Per EMS, onset 2 pm.  Pt is on CPAP at home. Allergic to penicillin. 84 y/o F pt w/ PMHx of A-fib, CVA, CHF, on CPAP at home DNI/DN p/w SOB x the past few hours as per nursing home reports onset 2pm. Pt is dyspneic in ED and speaking only one word sentences. Pt seen immediately upon arrival secondary to respiratory distress. No fever. Allergic to penicillin.

## 2018-02-10 NOTE — ED PROVIDER NOTE - MEDICAL DECISION MAKING DETAILS
84 y/o F pt p/w CHF exacerbation, labs done, CXR, EKG. Pt given vancomycin, meropenum, Lasix, NTG. ICU consult.

## 2018-02-10 NOTE — ED ADULT NURSE NOTE - OBJECTIVE STATEMENT
brought in by ambulance due to respiratory distress, seen and examined by Dr Arechiga, BIPAP started by RT  as ordered by Dr Arechiga, well tolerated, attached to cardiac monitor of afib, meds given as ordered. Noted pt with swelling both lower legs and discoloration to bilateral foot. brought in by ambulance due to respiratory distress, seen and examined by Dr Arechiga, BIPAP started by RT  as ordered by Dr Arechiga, well tolerated, attached to cardiac monitor of afib, meds given as ordered. Noted pt with swelling both lower legs and discoloration to bilateral foot. Left lower leg covered with dressing s/p debridement as per relative at bedside.

## 2018-02-10 NOTE — H&P ADULT - ASSESSMENT
86 y/o F from [facility] pt w/ PMHx of A-fib, CVA, CHF, at home DNR/DNI p/w SOB x the past few hours as per nursing home notes is being admitted to ICU for Respiratory failure 2/2 to CHF exacerbation/PNA

## 2018-02-10 NOTE — H&P ADULT - NSHPPHYSICALEXAM_GEN_ALL_CORE
Physical Examination:    General: No acute distress.      HEENT: Pupils equal, reactive to light.  Symmetric.    PULM: Clear to auscultation bilaterally, no significant sputum production    CVS: Regular rate and rhythm, no murmurs, rubs, or gallops    ABD: Soft, nondistended, nontender, normoactive bowel sounds, no masses    EXT: No edema, nontender    SKIN: Warm and well perfused, no rashes noted.    NEURO: Alert, oriented, interactive, nonfocal Physical Examination:    General: Moderate Respiratory distress, currently on Bipap    HEENT: Pupils equal, reactive to light.  Symmetric.    PULM: b/l crackles     CVS: Irregular rate and rhythm, no murmurs, rubs, or gallops    ABD: Soft, nondistended, nontender, normoactive bowel sounds, no masses    EXT: 2+ edema, nontender    SKIN: cold extremities    NEURO: lethargic, mildly responsive to verbal commands

## 2018-02-10 NOTE — ED PROVIDER NOTE - CRITICAL CARE PROVIDED
direct patient care (not related to procedure)/consultation with other physicians/additional history taking/interpretation of diagnostic studies/consult w/ pt's family directly relating to pts condition

## 2018-02-10 NOTE — H&P ADULT - PROBLEM SELECTOR PLAN 2
Patient has hx of Systolic heart failure last echo showing EF 30-35% p/w SOB   elevated bnp of 189[address]  T1: 0.027  Trend Cardiac enzymes  f/up Echo  c/w Lasix 40mg IV BID for now  Monitor Bun/Cr Patient has hx of Systolic heart failure last echo showing EF 30-35% p/w SOB   elevated bnp of 7[address]  T1: 0.027  Trend Cardiac enzymes  f/up Echo  c/w Lasix 60mg IV BID for now  Monitor Bun/Cr

## 2018-02-10 NOTE — H&P ADULT - PROBLEM SELECTOR PLAN 1
Respiratory failure 2/2 to CHF exacerbation currently on Bipap, c/w NIVV  Patient is DNR/DNI  c/w Lasix  Taper as tolerated Respiratory failure 2/2 to CHF exacerbation currently on Bipap, c/w NIVV  Patient is DNR/DNI  c/w Lasix  Taper as tolerated  Palliative consult in AM

## 2018-02-10 NOTE — H&P ADULT - ATTENDING COMMENTS
85 female from nursing home with hx of CHFrEF, afib on eliquis, CVA, presented to the ED with dyspnea and hypoxemia, was found to have hmpv infection, acute on chronic systolic CHF exacerbation, acute respiratory failure requiring bipap, Afib with RVR, as well as encephalopathy.  Bedside echo showed global hypokinesis with severe LV dysfunction, biatrial enlargement. I spoke with two daughters at bedside who said the patient did not want CPR or mechanical ventilation.   Plan:  - bipap for now with trial of nasal cannula soon  - amiodarone for rate control given severe LV dysfunction and decompensated heart failure   - heparin drip, hold eliquis for now  - diuresis with IV lasix  - abx - if procalcitonin negative can d/c  - palliative care consult  Total cc time 35 min

## 2018-02-11 DIAGNOSIS — Z29.9 ENCOUNTER FOR PROPHYLACTIC MEASURES, UNSPECIFIED: ICD-10-CM

## 2018-02-11 DIAGNOSIS — I48.91 UNSPECIFIED ATRIAL FIBRILLATION: ICD-10-CM

## 2018-02-11 DIAGNOSIS — J18.9 PNEUMONIA, UNSPECIFIED ORGANISM: ICD-10-CM

## 2018-02-11 DIAGNOSIS — I10 ESSENTIAL (PRIMARY) HYPERTENSION: ICD-10-CM

## 2018-02-11 DIAGNOSIS — J96.90 RESPIRATORY FAILURE, UNSPECIFIED, UNSPECIFIED WHETHER WITH HYPOXIA OR HYPERCAPNIA: ICD-10-CM

## 2018-02-11 DIAGNOSIS — I50.9 HEART FAILURE, UNSPECIFIED: ICD-10-CM

## 2018-02-11 LAB
ALBUMIN SERPL ELPH-MCNC: 2.2 G/DL — LOW (ref 3.5–5)
ALP SERPL-CCNC: 165 U/L — HIGH (ref 40–120)
ALT FLD-CCNC: 27 U/L DA — SIGNIFICANT CHANGE UP (ref 10–60)
ANION GAP SERPL CALC-SCNC: 12 MMOL/L — SIGNIFICANT CHANGE UP (ref 5–17)
APTT BLD: 35 SEC — SIGNIFICANT CHANGE UP (ref 27.5–37.4)
APTT BLD: >200 SEC — SIGNIFICANT CHANGE UP (ref 27.5–37.4)
AST SERPL-CCNC: 39 U/L — SIGNIFICANT CHANGE UP (ref 10–40)
BILIRUB SERPL-MCNC: 0.9 MG/DL — SIGNIFICANT CHANGE UP (ref 0.2–1.2)
BUN SERPL-MCNC: 24 MG/DL — HIGH (ref 7–18)
CALCIUM SERPL-MCNC: 8.9 MG/DL — SIGNIFICANT CHANGE UP (ref 8.4–10.5)
CHLORIDE SERPL-SCNC: 102 MMOL/L — SIGNIFICANT CHANGE UP (ref 96–108)
CK MB BLD-MCNC: <2.6 % — SIGNIFICANT CHANGE UP (ref 0–3.5)
CK MB CFR SERPL CALC: <1 NG/ML — SIGNIFICANT CHANGE UP (ref 0–3.6)
CK SERPL-CCNC: 39 U/L — SIGNIFICANT CHANGE UP (ref 21–215)
CO2 SERPL-SCNC: 23 MMOL/L — SIGNIFICANT CHANGE UP (ref 22–31)
CREAT SERPL-MCNC: 1.15 MG/DL — SIGNIFICANT CHANGE UP (ref 0.5–1.3)
GLUCOSE SERPL-MCNC: 104 MG/DL — HIGH (ref 70–99)
HCT VFR BLD CALC: 43.1 % — SIGNIFICANT CHANGE UP (ref 34.5–45)
HGB BLD-MCNC: 12.6 G/DL — SIGNIFICANT CHANGE UP (ref 11.5–15.5)
HMPV RNA SPEC QL NAA+PROBE: DETECTED
INR BLD: 2.89 RATIO — HIGH (ref 0.88–1.16)
INR BLD: 3.95 RATIO — HIGH (ref 0.88–1.16)
LACTATE SERPL-SCNC: 2.6 MMOL/L — HIGH (ref 0.7–2)
LACTATE SERPL-SCNC: 3.6 MMOL/L — HIGH (ref 0.7–2)
MAGNESIUM SERPL-MCNC: 1.8 MG/DL — SIGNIFICANT CHANGE UP (ref 1.6–2.6)
MCHC RBC-ENTMCNC: 28.3 PG — SIGNIFICANT CHANGE UP (ref 27–34)
MCHC RBC-ENTMCNC: 29.4 GM/DL — LOW (ref 32–36)
MCV RBC AUTO: 96.3 FL — SIGNIFICANT CHANGE UP (ref 80–100)
PHOSPHATE SERPL-MCNC: 4 MG/DL — SIGNIFICANT CHANGE UP (ref 2.5–4.5)
PLATELET # BLD AUTO: 87 K/UL — LOW (ref 150–400)
POTASSIUM SERPL-MCNC: 4 MMOL/L — SIGNIFICANT CHANGE UP (ref 3.5–5.3)
POTASSIUM SERPL-SCNC: 4 MMOL/L — SIGNIFICANT CHANGE UP (ref 3.5–5.3)
PROT SERPL-MCNC: 6.6 G/DL — SIGNIFICANT CHANGE UP (ref 6–8.3)
PROTHROM AB SERPL-ACNC: 32.2 SEC — HIGH (ref 9.8–12.7)
PROTHROM AB SERPL-ACNC: 44.3 SEC — HIGH (ref 9.8–12.7)
RAPID RVP RESULT: DETECTED
RBC # BLD: 4.47 M/UL — SIGNIFICANT CHANGE UP (ref 3.8–5.2)
RBC # FLD: 18.1 % — HIGH (ref 10.3–14.5)
SODIUM SERPL-SCNC: 137 MMOL/L — SIGNIFICANT CHANGE UP (ref 135–145)
TROPONIN I SERPL-MCNC: 0.03 NG/ML — SIGNIFICANT CHANGE UP (ref 0–0.04)
TSH SERPL-MCNC: 0.93 UU/ML — SIGNIFICANT CHANGE UP (ref 0.34–4.82)
WBC # BLD: 5.1 K/UL — SIGNIFICANT CHANGE UP (ref 3.8–10.5)
WBC # FLD AUTO: 5.1 K/UL — SIGNIFICANT CHANGE UP (ref 3.8–10.5)

## 2018-02-11 PROCEDURE — 71045 X-RAY EXAM CHEST 1 VIEW: CPT | Mod: 26

## 2018-02-11 PROCEDURE — 99291 CRITICAL CARE FIRST HOUR: CPT

## 2018-02-11 RX ORDER — FUROSEMIDE 40 MG
60 TABLET ORAL EVERY 12 HOURS
Qty: 0 | Refills: 0 | Status: DISCONTINUED | OUTPATIENT
Start: 2018-02-11 | End: 2018-02-11

## 2018-02-11 RX ORDER — METOPROLOL TARTRATE 50 MG
5 TABLET ORAL EVERY 6 HOURS
Qty: 0 | Refills: 0 | Status: DISCONTINUED | OUTPATIENT
Start: 2018-02-11 | End: 2018-02-12

## 2018-02-11 RX ORDER — PANTOPRAZOLE SODIUM 20 MG/1
40 TABLET, DELAYED RELEASE ORAL DAILY
Qty: 0 | Refills: 0 | Status: DISCONTINUED | OUTPATIENT
Start: 2018-02-11 | End: 2018-02-20

## 2018-02-11 RX ORDER — MEROPENEM 1 G/30ML
1000 INJECTION INTRAVENOUS EVERY 8 HOURS
Qty: 0 | Refills: 0 | Status: DISCONTINUED | OUTPATIENT
Start: 2018-02-11 | End: 2018-02-12

## 2018-02-11 RX ORDER — MEROPENEM 1 G/30ML
1000 INJECTION INTRAVENOUS ONCE
Qty: 0 | Refills: 0 | Status: COMPLETED | OUTPATIENT
Start: 2018-02-11 | End: 2018-02-11

## 2018-02-11 RX ORDER — FUROSEMIDE 40 MG
60 TABLET ORAL EVERY 12 HOURS
Qty: 0 | Refills: 0 | Status: DISCONTINUED | OUTPATIENT
Start: 2018-02-11 | End: 2018-02-13

## 2018-02-11 RX ORDER — AMIODARONE HYDROCHLORIDE 400 MG/1
150 TABLET ORAL ONCE
Qty: 0 | Refills: 0 | Status: COMPLETED | OUTPATIENT
Start: 2018-02-11 | End: 2018-02-11

## 2018-02-11 RX ORDER — MEROPENEM 1 G/30ML
INJECTION INTRAVENOUS
Qty: 0 | Refills: 0 | Status: DISCONTINUED | OUTPATIENT
Start: 2018-02-11 | End: 2018-02-12

## 2018-02-11 RX ADMIN — Medication 60 MILLIGRAM(S): at 02:47

## 2018-02-11 RX ADMIN — MEROPENEM 100 MILLIGRAM(S): 1 INJECTION INTRAVENOUS at 13:18

## 2018-02-11 RX ADMIN — AMIODARONE HYDROCHLORIDE 600 MILLIGRAM(S): 400 TABLET ORAL at 00:15

## 2018-02-11 RX ADMIN — AMIODARONE HYDROCHLORIDE 600 MILLIGRAM(S): 400 TABLET ORAL at 20:00

## 2018-02-11 RX ADMIN — AMIODARONE HYDROCHLORIDE 16.67 MG/MIN: 400 TABLET ORAL at 11:43

## 2018-02-11 RX ADMIN — PANTOPRAZOLE SODIUM 40 MILLIGRAM(S): 20 TABLET, DELAYED RELEASE ORAL at 11:43

## 2018-02-11 RX ADMIN — HEPARIN SODIUM 1700 UNIT(S)/HR: 5000 INJECTION INTRAVENOUS; SUBCUTANEOUS at 03:17

## 2018-02-11 RX ADMIN — Medication 60 MILLIGRAM(S): at 18:13

## 2018-02-11 RX ADMIN — AMIODARONE HYDROCHLORIDE 16.67 MG/MIN: 400 TABLET ORAL at 09:03

## 2018-02-11 RX ADMIN — MEROPENEM 100 MILLIGRAM(S): 1 INJECTION INTRAVENOUS at 05:48

## 2018-02-11 RX ADMIN — MEROPENEM 100 MILLIGRAM(S): 1 INJECTION INTRAVENOUS at 21:26

## 2018-02-11 RX ADMIN — MEROPENEM 100 MILLIGRAM(S): 1 INJECTION INTRAVENOUS at 01:55

## 2018-02-11 RX ADMIN — AMIODARONE HYDROCHLORIDE 33.33 MG/MIN: 400 TABLET ORAL at 02:47

## 2018-02-11 RX ADMIN — Medication 5 MILLIGRAM(S): at 23:24

## 2018-02-11 RX ADMIN — AMIODARONE HYDROCHLORIDE 600 MILLIGRAM(S): 400 TABLET ORAL at 21:37

## 2018-02-11 NOTE — PROGRESS NOTE ADULT - PROBLEM SELECTOR PLAN 1
Respiratory failure 2/2 to CHF exacerbation   was on Bipap overnight, diuresed with IV lasix   Patient is DNR/DNI  c/w Lasix 40 mg IV BID  May need Palliative consult

## 2018-02-11 NOTE — PROGRESS NOTE ADULT - PROBLEM SELECTOR PLAN 3
Patient's CXR found to have RML infiltrate, c/w IV meropenem for now  f/up procalcitonin, if negative will d/c abx

## 2018-02-11 NOTE — PROGRESS NOTE ADULT - PROBLEM SELECTOR PLAN 4
EKG- rapid Afib to 140s on admission.  patient is on metoprolol at home.  Started on  Amiodarone loading for rate control  Change eliquis to heparin gtt for now

## 2018-02-11 NOTE — ED ADULT NURSE NOTE - GENITOURINARY ASSESSMENT
Patient up to bedside chair. Patient appears to be steady. Offered patient the walker and she walked the full length of the outside hallway and back again. She primarily carried the walker. Would recommend a rolling walker at her home. - - -

## 2018-02-11 NOTE — PROGRESS NOTE ADULT - PROBLEM SELECTOR PLAN 2
Patient has hx of Systolic heart failure last echo showing EF 30-35% in march 2017  [place]ated bnp of [address]  T1: 0.027  Trend Cardiac enzymes  f/up Echo  c/w Lasix 60mg IV BID for now  Monitor Bun/Cr

## 2018-02-11 NOTE — PROGRESS NOTE ADULT - SUBJECTIVE AND OBJECTIVE BOX
· Subjective and Objective: 	  INTERVAL /OVERNIGHT EVENTS: Patient was admitted overnight for CHF exacerbation for new BIPAP, with RML infiltrates and rapid Afib needing amiodarone loading.    PRESSORS: [ ] YES [x ] NO  WHICH:    ANTIBIOTICS: meropenem                 DATE STARTED: 2/11/18  ANTIBIOTICS:                  DATE STARTED:  ANTIBIOTICS:                  DATE STARTED:    Antimicrobial:  meropenem  IVPB      meropenem  IVPB 1000 milliGRAM(s) IV Intermittent every 8 hours    Cardiovascular:  amiodarone Infusion 1 mG/Min IV Continuous <Continuous>  amiodarone Infusion 0.5 mG/Min IV Continuous <Continuous>  furosemide   Injectable 60 milliGRAM(s) IV Push every 12 hours    Pulmonary:    Hematalogic:  heparin  Infusion.  Unit(s)/Hr IV Continuous <Continuous>  heparin  Injectable 7500 Unit(s) IV Push once  heparin  Injectable 7500 Unit(s) IV Push every 6 hours PRN  heparin  Injectable 3500 Unit(s) IV Push every 6 hours PRN    Other:    amiodarone Infusion 1 mG/Min IV Continuous <Continuous>  amiodarone Infusion 0.5 mG/Min IV Continuous <Continuous>  furosemide   Injectable 60 milliGRAM(s) IV Push every 12 hours  heparin  Infusion.  Unit(s)/Hr IV Continuous <Continuous>  heparin  Injectable 7500 Unit(s) IV Push once  heparin  Injectable 7500 Unit(s) IV Push every 6 hours PRN  heparin  Injectable 3500 Unit(s) IV Push every 6 hours PRN  meropenem  IVPB      meropenem  IVPB 1000 milliGRAM(s) IV Intermittent every 8 hours    Drug Dosing Weight  Height (cm): 167.64 (10 Feb 2018 20:31)  Weight (kg): 90.7 (10 Feb 2018 20:31)  BMI (kg/m2): 32.3 (10 Feb 2018 20:31)  BSA (m2): 2 (10 Feb 2018 20:31)    CENTRAL LINE: [ ] YES [x ] NO  LOCATION:   DATE INSERTED:  REMOVE: [ ] YES [ ] NO  EXPLAIN:    MOBLEY: [x ] YES [ ] NO    DATE INSERTED: 2/11/18  REMOVE:  [ ] YES [ ] NO  EXPLAIN:    A-LINE:  [ ] YES [x ] NO  LOCATION:   DATE INSERTED:  REMOVE:  [ ] YES [ ] NO  EXPLAIN:    PMH -reviewed admission note, no change since admission      ICU Vital Signs Last 24 Hrs  T(C): 36.9 (10 Feb 2018 23:19), Max: 36.9 (10 Feb 2018 23:19)  T(F): 98.4 (10 Feb 2018 23:19), Max: 98.4 (10 Feb 2018 23:19)  HR: 118 (11 Feb 2018 01:30) (105 - 157)  BP: 110/88 (11 Feb 2018 01:30) (104/72 - 129/99)  BP(mean): --  ABP: --  ABP(mean): --  RR: 26 (11 Feb 2018 01:30) (26 - 42)  SpO2: 92% (11 Feb 2018 01:30) (92% - 100%)                  PHYSICAL EXAM:          LABS:  CBC Full  -  ( 10 Feb 2018 21:40 )  WBC Count : 4.4 K/uL  Hemoglobin : 13.3 g/dL  Hematocrit : 43.6 %  Platelet Count - Automated : 88 K/uL  Mean Cell Volume : 97.2 fl  Mean Cell Hemoglobin : 29.7 pg  Mean Cell Hemoglobin Concentration : 30.5 gm/dL  Auto Neutrophil # : 3.2 K/uL  Auto Lymphocyte # : 0.7 K/uL  Auto Monocyte # : 0.4 K/uL  Auto Eosinophil # : 0.0 K/uL  Auto Basophil # : 0.0 K/uL  Auto Neutrophil % : 73.2 %  Auto Lymphocyte % : 16.9 %  Auto Monocyte % : 8.9 %  Auto Eosinophil % : 0.0 %  Auto Basophil % : 1.0 %    02-10    138  |  104  |  24<H>  ----------------------------<  110<H>  4.1   |  22  |  1.15    Ca    9.6      10 Feb 2018 21:40    TPro  7.0  /  Alb  2.5<L>  /  TBili  0.9  /  DBili  x   /  AST  41<H>  /  ALT  26  /  AlkPhos  184<H>  02-10    PT/INR - ( 11 Feb 2018 00:32 )   PT: 32.2 sec;   INR: 2.89 ratio         PTT - ( 11 Feb 2018 00:32 )  PTT:35.0 sec        RADIOLOGY & ADDITIONAL STUDIES REVIEWED:     [ ]GOALS OF CARE DISCUSSION WITH PATIENT/FAMILY/PROXY:    CRITICAL CARE TIME SPENT: 35 minutes    Assessment and Plan:   · Assessment		  84 y/o F from [facility] pt w/ PMHx of A-fib, CVA, CHF, at home DNR/DNI p/w SOB  admitted to ICU for Respiratory failure 2/2 to CHF exacerbation/PNA     Problem/Plan - 1:  ·  Problem: Respiratory failure.  Plan: Respiratory failure 2/2 to CHF exacerbation   was on Bipap overnight, diuresed with IV lasix   Patient is DNR/DNI  c/w Lasix 40 mg IV BID  May need Palliative consult.      Problem/Plan - 2:  ·  Problem: CHF exacerbation.  Plan: Patient has hx of Systolic heart failure last echo showing EF 30-35% in march 2017  [place] b[place] of [address]  T1: 0.027  Trend Cardiac enzymes  f/up Echo  c/w Lasix 60mg IV BID for now  Monitor Bun/Cr.      Problem/Plan - 3:  ·  Problem: HCAP (healthcare-associated pneumonia).  Plan: Patient's CXR found to have RML infiltrate, c/w IV meropenem for now  f/up procalcitonin, if negative will d/c abx.      Problem/Plan - 4:  ·  Problem: Rapid atrial fibrillation.  Plan: EKG- rapid Afib to 140s on admission.  patient is on metoprolol at home.  Started on  Amiodarone loading for rate control  Change eliquis to heparin gtt for now.      Problem/Plan - 5:  ·  Problem: HTN (hypertension).  Plan: Holding anti-HTN medications for now, as patients BP is borderline normal to low normal  restart as needed.      Problem/Plan - 6:  Problem: Need for prophylactic measure. Plan: DVT prophylaxis with heparin gtt.  GI ppx with protonix.

## 2018-02-11 NOTE — PROGRESS NOTE ADULT - SUBJECTIVE AND OBJECTIVE BOX
INTERVAL /OVERNIGHT EVENTS: Patient was admitted overnight for CHF exacerbation for new BIPAP, with RML infiltrates and rapid Afib needing amiodarone loading.    PRESSORS: [ ] YES [x ] NO  WHICH:    ANTIBIOTICS: meropenem                 DATE STARTED: 2/11/18  ANTIBIOTICS:                  DATE STARTED:  ANTIBIOTICS:                  DATE STARTED:    Antimicrobial:  meropenem  IVPB      meropenem  IVPB 1000 milliGRAM(s) IV Intermittent every 8 hours    Cardiovascular:  amiodarone Infusion 1 mG/Min IV Continuous <Continuous>  amiodarone Infusion 0.5 mG/Min IV Continuous <Continuous>  furosemide   Injectable 60 milliGRAM(s) IV Push every 12 hours    Pulmonary:    Hematalogic:  heparin  Infusion.  Unit(s)/Hr IV Continuous <Continuous>  heparin  Injectable 7500 Unit(s) IV Push once  heparin  Injectable 7500 Unit(s) IV Push every 6 hours PRN  heparin  Injectable 3500 Unit(s) IV Push every 6 hours PRN    Other:    amiodarone Infusion 1 mG/Min IV Continuous <Continuous>  amiodarone Infusion 0.5 mG/Min IV Continuous <Continuous>  furosemide   Injectable 60 milliGRAM(s) IV Push every 12 hours  heparin  Infusion.  Unit(s)/Hr IV Continuous <Continuous>  heparin  Injectable 7500 Unit(s) IV Push once  heparin  Injectable 7500 Unit(s) IV Push every 6 hours PRN  heparin  Injectable 3500 Unit(s) IV Push every 6 hours PRN  meropenem  IVPB      meropenem  IVPB 1000 milliGRAM(s) IV Intermittent every 8 hours    Drug Dosing Weight  Height (cm): 167.64 (10 Feb 2018 20:31)  Weight (kg): 90.7 (10 Feb 2018 20:31)  BMI (kg/m2): 32.3 (10 Feb 2018 20:31)  BSA (m2): 2 (10 Feb 2018 20:31)    CENTRAL LINE: [ ] YES [x ] NO  LOCATION:   DATE INSERTED:  REMOVE: [ ] YES [ ] NO  EXPLAIN:    MOBLEY: [x ] YES [ ] NO    DATE INSERTED: 2/11/18  REMOVE:  [ ] YES [ ] NO  EXPLAIN:    A-LINE:  [ ] YES [x ] NO  LOCATION:   DATE INSERTED:  REMOVE:  [ ] YES [ ] NO  EXPLAIN:    PMH -reviewed admission note, no change since admission      ICU Vital Signs Last 24 Hrs  T(C): 36.9 (10 Feb 2018 23:19), Max: 36.9 (10 Feb 2018 23:19)  T(F): 98.4 (10 Feb 2018 23:19), Max: 98.4 (10 Feb 2018 23:19)  HR: 118 (11 Feb 2018 01:30) (105 - 157)  BP: 110/88 (11 Feb 2018 01:30) (104/72 - 129/99)  BP(mean): --  ABP: --  ABP(mean): --  RR: 26 (11 Feb 2018 01:30) (26 - 42)  SpO2: 92% (11 Feb 2018 01:30) (92% - 100%)                  PHYSICAL EXAM:          LABS:  CBC Full  -  ( 10 Feb 2018 21:40 )  WBC Count : 4.4 K/uL  Hemoglobin : 13.3 g/dL  Hematocrit : 43.6 %  Platelet Count - Automated : 88 K/uL  Mean Cell Volume : 97.2 fl  Mean Cell Hemoglobin : 29.7 pg  Mean Cell Hemoglobin Concentration : 30.5 gm/dL  Auto Neutrophil # : 3.2 K/uL  Auto Lymphocyte # : 0.7 K/uL  Auto Monocyte # : 0.4 K/uL  Auto Eosinophil # : 0.0 K/uL  Auto Basophil # : 0.0 K/uL  Auto Neutrophil % : 73.2 %  Auto Lymphocyte % : 16.9 %  Auto Monocyte % : 8.9 %  Auto Eosinophil % : 0.0 %  Auto Basophil % : 1.0 %    02-10    138  |  104  |  24<H>  ----------------------------<  110<H>  4.1   |  22  |  1.15    Ca    9.6      10 Feb 2018 21:40    TPro  7.0  /  Alb  2.5<L>  /  TBili  0.9  /  DBili  x   /  AST  41<H>  /  ALT  26  /  AlkPhos  184<H>  02-10    PT/INR - ( 11 Feb 2018 00:32 )   PT: 32.2 sec;   INR: 2.89 ratio         PTT - ( 11 Feb 2018 00:32 )  PTT:35.0 sec        RADIOLOGY & ADDITIONAL STUDIES REVIEWED:     [ ]GOALS OF CARE DISCUSSION WITH PATIENT/FAMILY/PROXY:    CRITICAL CARE TIME SPENT: 35 minutes INTERVAL /OVERNIGHT EVENTS: Patient was admitted overnight for CHF exacerbation for new BIPAP, with RML infiltrates and rapid Afib needing amiodarone loading.    PRESSORS: [ ] YES [x ] NO  WHICH:    ANTIBIOTICS: meropenem                 DATE STARTED: 2/11/18  ANTIBIOTICS:                  DATE STARTED:  ANTIBIOTICS:                  DATE STARTED:    Antimicrobial:  meropenem  IVPB      meropenem  IVPB 1000 milliGRAM(s) IV Intermittent every 8 hours    Cardiovascular:  amiodarone Infusion 1 mG/Min IV Continuous <Continuous>  amiodarone Infusion 0.5 mG/Min IV Continuous <Continuous>  furosemide   Injectable 60 milliGRAM(s) IV Push every 12 hours    Pulmonary:    Hematalogic:  heparin  Infusion.  Unit(s)/Hr IV Continuous <Continuous>  heparin  Injectable 7500 Unit(s) IV Push once  heparin  Injectable 7500 Unit(s) IV Push every 6 hours PRN  heparin  Injectable 3500 Unit(s) IV Push every 6 hours PRN    Other:    amiodarone Infusion 1 mG/Min IV Continuous <Continuous>  amiodarone Infusion 0.5 mG/Min IV Continuous <Continuous>  furosemide   Injectable 60 milliGRAM(s) IV Push every 12 hours  heparin  Infusion.  Unit(s)/Hr IV Continuous <Continuous>  heparin  Injectable 7500 Unit(s) IV Push once  heparin  Injectable 7500 Unit(s) IV Push every 6 hours PRN  heparin  Injectable 3500 Unit(s) IV Push every 6 hours PRN  meropenem  IVPB      meropenem  IVPB 1000 milliGRAM(s) IV Intermittent every 8 hours    Drug Dosing Weight  Height (cm): 167.64 (10 Feb 2018 20:31)  Weight (kg): 90.7 (10 Feb 2018 20:31)  BMI (kg/m2): 32.3 (10 Feb 2018 20:31)  BSA (m2): 2 (10 Feb 2018 20:31)    CENTRAL LINE: [ ] YES [x ] NO  LOCATION:   DATE INSERTED:  REMOVE: [ ] YES [ ] NO  EXPLAIN:    DE LA ROSA: [x ] YES [ ] NO    DATE INSERTED: 2/11/18  REMOVE:  [ ] YES [ ] NO  EXPLAIN:    A-LINE:  [ ] YES [x ] NO  LOCATION:   DATE INSERTED:  REMOVE:  [ ] YES [ ] NO  EXPLAIN:    PMH -reviewed admission note, no change since admission      ICU Vital Signs Last 24 Hrs  T(C): 36.9 (10 Feb 2018 23:19), Max: 36.9 (10 Feb 2018 23:19)  T(F): 98.4 (10 Feb 2018 23:19), Max: 98.4 (10 Feb 2018 23:19)  HR: 118 (11 Feb 2018 01:30) (105 - 157)  BP: 110/88 (11 Feb 2018 01:30) (104/72 - 129/99)  BP(mean): --  ABP: --  ABP(mean): --  RR: 26 (11 Feb 2018 01:30) (26 - 42)  SpO2: 92% (11 Feb 2018 01:30) (92% - 100%)                  PHYSICAL EXAM:    GENERAL: patient is on BIPAP  HEAD: atraumatic, normocephalic   EYES: PERRLA, white sclera.   ENMT: nasal mucosa and Oral cavity- normal  NECK: supple, no JVD  CHEST/LUNG: No Chest deformity , no chest tenderness. bilateral breath sounds  ABDOMEN: soft, nontender, nondistended; bowel sounds.  :de la rosa catheter.  EXTREMITIES: bilateral pedal edema, no cyanosis, no clubbing.  NEURO: AA0X2, no focal neuro deficits        LABS:  CBC Full  -  ( 10 Feb 2018 21:40 )  WBC Count : 4.4 K/uL  Hemoglobin : 13.3 g/dL  Hematocrit : 43.6 %  Platelet Count - Automated : 88 K/uL  Mean Cell Volume : 97.2 fl  Mean Cell Hemoglobin : 29.7 pg  Mean Cell Hemoglobin Concentration : 30.5 gm/dL  Auto Neutrophil # : 3.2 K/uL  Auto Lymphocyte # : 0.7 K/uL  Auto Monocyte # : 0.4 K/uL  Auto Eosinophil # : 0.0 K/uL  Auto Basophil # : 0.0 K/uL  Auto Neutrophil % : 73.2 %  Auto Lymphocyte % : 16.9 %  Auto Monocyte % : 8.9 %  Auto Eosinophil % : 0.0 %  Auto Basophil % : 1.0 %    02-10    138  |  104  |  24<H>  ----------------------------<  110<H>  4.1   |  22  |  1.15    Ca    9.6      10 Feb 2018 21:40    TPro  7.0  /  Alb  2.5<L>  /  TBili  0.9  /  DBili  x   /  AST  41<H>  /  ALT  26  /  AlkPhos  184<H>  02-10    PT/INR - ( 11 Feb 2018 00:32 )   PT: 32.2 sec;   INR: 2.89 ratio         PTT - ( 11 Feb 2018 00:32 )  PTT:35.0 sec        RADIOLOGY & ADDITIONAL STUDIES REVIEWED:     [ ]GOALS OF CARE DISCUSSION WITH PATIENT/FAMILY/PROXY:    CRITICAL CARE TIME SPENT: 35 minutes

## 2018-02-12 LAB
ALBUMIN SERPL ELPH-MCNC: 2.3 G/DL — LOW (ref 3.5–5)
ALP SERPL-CCNC: 250 U/L — HIGH (ref 40–120)
ALT FLD-CCNC: 27 U/L DA — SIGNIFICANT CHANGE UP (ref 10–60)
ANION GAP SERPL CALC-SCNC: 8 MMOL/L — SIGNIFICANT CHANGE UP (ref 5–17)
AST SERPL-CCNC: 46 U/L — HIGH (ref 10–40)
BASOPHILS # BLD AUTO: 0 K/UL — SIGNIFICANT CHANGE UP (ref 0–0.2)
BASOPHILS NFR BLD AUTO: 1.3 % — SIGNIFICANT CHANGE UP (ref 0–2)
BILIRUB SERPL-MCNC: 1.1 MG/DL — SIGNIFICANT CHANGE UP (ref 0.2–1.2)
BUN SERPL-MCNC: 22 MG/DL — HIGH (ref 7–18)
CALCIUM SERPL-MCNC: 9 MG/DL — SIGNIFICANT CHANGE UP (ref 8.4–10.5)
CHLORIDE SERPL-SCNC: 103 MMOL/L — SIGNIFICANT CHANGE UP (ref 96–108)
CO2 SERPL-SCNC: 29 MMOL/L — SIGNIFICANT CHANGE UP (ref 22–31)
CREAT SERPL-MCNC: 1.02 MG/DL — SIGNIFICANT CHANGE UP (ref 0.5–1.3)
EOSINOPHIL # BLD AUTO: 0 K/UL — SIGNIFICANT CHANGE UP (ref 0–0.5)
EOSINOPHIL NFR BLD AUTO: 0 % — SIGNIFICANT CHANGE UP (ref 0–6)
GLUCOSE SERPL-MCNC: 101 MG/DL — HIGH (ref 70–99)
HCT VFR BLD CALC: 42.6 % — SIGNIFICANT CHANGE UP (ref 34.5–45)
HGB BLD-MCNC: 12.9 G/DL — SIGNIFICANT CHANGE UP (ref 11.5–15.5)
INR BLD: 2.49 RATIO — HIGH (ref 0.88–1.16)
LACTATE SERPL-SCNC: 1.5 MMOL/L — SIGNIFICANT CHANGE UP (ref 0.7–2)
LYMPHOCYTES # BLD AUTO: 0.8 K/UL — LOW (ref 1–3.3)
LYMPHOCYTES # BLD AUTO: 20.7 % — SIGNIFICANT CHANGE UP (ref 13–44)
MAGNESIUM SERPL-MCNC: 1.7 MG/DL — SIGNIFICANT CHANGE UP (ref 1.6–2.6)
MCHC RBC-ENTMCNC: 29.4 PG — SIGNIFICANT CHANGE UP (ref 27–34)
MCHC RBC-ENTMCNC: 30.3 GM/DL — LOW (ref 32–36)
MCV RBC AUTO: 96.9 FL — SIGNIFICANT CHANGE UP (ref 80–100)
MONOCYTES # BLD AUTO: 0.4 K/UL — SIGNIFICANT CHANGE UP (ref 0–0.9)
MONOCYTES NFR BLD AUTO: 10.3 % — SIGNIFICANT CHANGE UP (ref 2–14)
NEUTROPHILS # BLD AUTO: 2.6 K/UL — SIGNIFICANT CHANGE UP (ref 1.8–7.4)
NEUTROPHILS NFR BLD AUTO: 67.8 % — SIGNIFICANT CHANGE UP (ref 43–77)
PHOSPHATE SERPL-MCNC: 3.4 MG/DL — SIGNIFICANT CHANGE UP (ref 2.5–4.5)
PLATELET # BLD AUTO: 88 K/UL — LOW (ref 150–400)
POTASSIUM SERPL-MCNC: 3.1 MMOL/L — LOW (ref 3.5–5.3)
POTASSIUM SERPL-SCNC: 3.1 MMOL/L — LOW (ref 3.5–5.3)
PROCALCITONIN SERPL-MCNC: 0.61 NG/ML — HIGH (ref 0–0.04)
PROT SERPL-MCNC: 6.6 G/DL — SIGNIFICANT CHANGE UP (ref 6–8.3)
PROTHROM AB SERPL-ACNC: 27.6 SEC — HIGH (ref 9.8–12.7)
RBC # BLD: 4.39 M/UL — SIGNIFICANT CHANGE UP (ref 3.8–5.2)
RBC # FLD: 18 % — HIGH (ref 10.3–14.5)
SODIUM SERPL-SCNC: 140 MMOL/L — SIGNIFICANT CHANGE UP (ref 135–145)
WBC # BLD: 3.9 K/UL — SIGNIFICANT CHANGE UP (ref 3.8–10.5)
WBC # FLD AUTO: 3.9 K/UL — SIGNIFICANT CHANGE UP (ref 3.8–10.5)

## 2018-02-12 PROCEDURE — 71045 X-RAY EXAM CHEST 1 VIEW: CPT | Mod: 26

## 2018-02-12 RX ORDER — DIGOXIN 250 MCG
0.5 TABLET ORAL ONCE
Qty: 0 | Refills: 0 | Status: COMPLETED | OUTPATIENT
Start: 2018-02-12 | End: 2018-02-12

## 2018-02-12 RX ORDER — METOPROLOL TARTRATE 50 MG
25 TABLET ORAL
Qty: 0 | Refills: 0 | Status: DISCONTINUED | OUTPATIENT
Start: 2018-02-12 | End: 2018-02-12

## 2018-02-12 RX ORDER — POTASSIUM CHLORIDE 20 MEQ
20 PACKET (EA) ORAL
Qty: 0 | Refills: 0 | Status: COMPLETED | OUTPATIENT
Start: 2018-02-12 | End: 2018-02-12

## 2018-02-12 RX ORDER — CHLORHEXIDINE GLUCONATE 213 G/1000ML
1 SOLUTION TOPICAL DAILY
Qty: 0 | Refills: 0 | Status: DISCONTINUED | OUTPATIENT
Start: 2018-02-12 | End: 2018-02-20

## 2018-02-12 RX ORDER — MEROPENEM 1 G/30ML
1000 INJECTION INTRAVENOUS EVERY 8 HOURS
Qty: 0 | Refills: 0 | Status: DISCONTINUED | OUTPATIENT
Start: 2018-02-12 | End: 2018-02-13

## 2018-02-12 RX ORDER — POTASSIUM CHLORIDE 20 MEQ
20 PACKET (EA) ORAL
Qty: 0 | Refills: 0 | Status: DISCONTINUED | OUTPATIENT
Start: 2018-02-12 | End: 2018-02-12

## 2018-02-12 RX ORDER — METOPROLOL TARTRATE 50 MG
50 TABLET ORAL
Qty: 0 | Refills: 0 | Status: DISCONTINUED | OUTPATIENT
Start: 2018-02-12 | End: 2018-02-13

## 2018-02-12 RX ORDER — AMIODARONE HYDROCHLORIDE 400 MG/1
200 TABLET ORAL DAILY
Qty: 0 | Refills: 0 | Status: DISCONTINUED | OUTPATIENT
Start: 2018-02-12 | End: 2018-02-20

## 2018-02-12 RX ORDER — IPRATROPIUM/ALBUTEROL SULFATE 18-103MCG
3 AEROSOL WITH ADAPTER (GRAM) INHALATION ONCE
Qty: 0 | Refills: 0 | Status: COMPLETED | OUTPATIENT
Start: 2018-02-12 | End: 2018-02-12

## 2018-02-12 RX ORDER — DIGOXIN 250 MCG
0.25 TABLET ORAL EVERY 6 HOURS
Qty: 0 | Refills: 0 | Status: COMPLETED | OUTPATIENT
Start: 2018-02-12 | End: 2018-02-12

## 2018-02-12 RX ORDER — DIGOXIN 250 MCG
0.12 TABLET ORAL DAILY
Qty: 0 | Refills: 0 | Status: DISCONTINUED | OUTPATIENT
Start: 2018-02-13 | End: 2018-02-20

## 2018-02-12 RX ADMIN — Medication 5 MILLIGRAM(S): at 05:14

## 2018-02-12 RX ADMIN — Medication 20 MILLIEQUIVALENT(S): at 16:51

## 2018-02-12 RX ADMIN — Medication 0.25 MILLIGRAM(S): at 22:59

## 2018-02-12 RX ADMIN — Medication 60 MILLIGRAM(S): at 17:29

## 2018-02-12 RX ADMIN — MEROPENEM 100 MILLIGRAM(S): 1 INJECTION INTRAVENOUS at 05:13

## 2018-02-12 RX ADMIN — Medication 0.5 MILLIGRAM(S): at 10:25

## 2018-02-12 RX ADMIN — AMIODARONE HYDROCHLORIDE 200 MILLIGRAM(S): 400 TABLET ORAL at 06:02

## 2018-02-12 RX ADMIN — Medication 20 MILLIEQUIVALENT(S): at 18:29

## 2018-02-12 RX ADMIN — Medication 50 MILLIGRAM(S): at 17:29

## 2018-02-12 RX ADMIN — Medication 3 MILLILITER(S): at 14:39

## 2018-02-12 RX ADMIN — PANTOPRAZOLE SODIUM 40 MILLIGRAM(S): 20 TABLET, DELAYED RELEASE ORAL at 13:14

## 2018-02-12 RX ADMIN — MEROPENEM 100 MILLIGRAM(S): 1 INJECTION INTRAVENOUS at 13:14

## 2018-02-12 RX ADMIN — Medication 20 MILLIEQUIVALENT(S): at 22:59

## 2018-02-12 RX ADMIN — Medication 0.25 MILLIGRAM(S): at 17:28

## 2018-02-12 RX ADMIN — Medication 60 MILLIGRAM(S): at 05:13

## 2018-02-12 RX ADMIN — Medication 25 MILLIGRAM(S): at 08:21

## 2018-02-12 RX ADMIN — MEROPENEM 100 MILLIGRAM(S): 1 INJECTION INTRAVENOUS at 22:58

## 2018-02-12 RX ADMIN — CHLORHEXIDINE GLUCONATE 1 APPLICATION(S): 213 SOLUTION TOPICAL at 22:34

## 2018-02-12 NOTE — PROGRESS NOTE ADULT - PROBLEM SELECTOR PLAN 4
EKG- rapid Afib to 140s on admission.  patient is on metoprolol at home.  Started on  Amiodarone loading for rate control  Change eliquis to heparin gtt for now EKG- rapid Afib to 140s on admission.  s/p  Amiodarone loading for rate control  Continue on amiodarone maintainance dose 200 OD  Start Metoprolol 50 bid   Start digoxin Loading dose 0.5 iv   Initially started on heparin drip , Off ac for now , due to supra therapeutic INR  takes elliquis at home   Patient refused am labs

## 2018-02-12 NOTE — PROGRESS NOTE ADULT - PROBLEM SELECTOR PLAN 2
Patient has hx of Systolic heart failure last echo showing EF 30-35% in march 2017  [place]ated b[place] of [address]  T1: 0.027  Trend Cardiac enzymes  f/up Echo  c/w Lasix 60mg IV BID for now  Monitor Bun/Cr Patient has hx of Systolic heart failure last echo showing EF 30-35% in march 2017  elevated bnp of 73281  Troponin negative   f/up Echo  c/w Lasix 60mg IV BID for now  Monitor Bun/Cr  Cardio : Dr. Chavis

## 2018-02-12 NOTE — PHYSICAL THERAPY INITIAL EVALUATION ADULT - GENERAL OBSERVATIONS, REHAB EVAL
pt received supine, HOB elevated, NAD, AOx2 (time). +NC 4L, +cardiac monitor, +de la rosa; B/L LE venous stasis ulcers (bandaged)/hemosiderin staining

## 2018-02-12 NOTE — PHYSICAL THERAPY INITIAL EVALUATION ADULT - CRITERIA FOR SKILLED THERAPEUTIC INTERVENTIONS
impairments found/rehab potential/therapy frequency/functional limitations in following categories/risk reduction/prevention/anticipated discharge recommendation/predicted duration of therapy intervention/anticipated equipment needs at discharge

## 2018-02-12 NOTE — PROGRESS NOTE ADULT - SUBJECTIVE AND OBJECTIVE BOX
· Subjective and Objective: 	  INTERVAL HPI/OVERNIGHT EVENTS: Patient in Rapid atrial fibrillation , completed amiodarone loading dose . Patient refused morning labs     PRESSORS: [ ] YES [ x ] NO  WHICH:    ANTIBIOTICS:   Meropenem                DATE STARTED: 2/11  ANTIBIOTICS:                  DATE STARTED:  ANTIBIOTICS:                  DATE STARTED:    Antimicrobial:  meropenem  IVPB 1000 milliGRAM(s) IV Intermittent every 8 hours    Cardiovascular:  amiodarone    Tablet 200 milliGRAM(s) Oral daily  furosemide   Injectable 60 milliGRAM(s) IV Push every 12 hours  metoprolol     tartrate 25 milliGRAM(s) Oral two times a day    Pulmonary:    Hematalogic:    Other:  pantoprazole  Injectable 40 milliGRAM(s) IV Push daily    amiodarone    Tablet 200 milliGRAM(s) Oral daily  furosemide   Injectable 60 milliGRAM(s) IV Push every 12 hours  meropenem  IVPB 1000 milliGRAM(s) IV Intermittent every 8 hours  metoprolol     tartrate 25 milliGRAM(s) Oral two times a day  pantoprazole  Injectable 40 milliGRAM(s) IV Push daily    Drug Dosing Weight  Height (cm): 167.64 (10 Feb 2018 20:31)  Weight (kg): 90.7 (10 Feb 2018 20:31)  BMI (kg/m2): 32.3 (10 Feb 2018 20:31)  BSA (m2): 2 (10 Feb 2018 20:31)    CENTRAL LINE: [ ] YES [x ] NO  LOCATION:   DATE INSERTED:  REMOVE: [ ] YES [ ] NO  EXPLAIN:    MOBLEY: [x ] YES [ ] NO    DATE INSERTED:   REMOVE:  [ ] YES [ ] NO  EXPLAIN:    A-LINE:  [ ] YES [x ] NO  LOCATION:   DATE INSERTED:  REMOVE:  [ ] YES [ ] NO  EXPLAIN:    PMH -reviewed admission note, no change since admission      ICU Vital Signs Last 24 Hrs  T(C): 35.9 (12 Feb 2018 04:00), Max: 36.3 (11 Feb 2018 20:54)  T(F): 96.6 (12 Feb 2018 04:00), Max: 97.4 (11 Feb 2018 20:54)  HR: 132 (12 Feb 2018 07:00) (104 - 135)  BP: 129/89 (12 Feb 2018 07:00) (96/68 - 129/89)  BP(mean): 98 (12 Feb 2018 07:00) (74 - 102)  ABP: --  ABP(mean): --  RR: 33 (12 Feb 2018 07:00) (17 - 34)  SpO2: 100% (12 Feb 2018 07:00) (68% - 100%)            02-11 @ 07:01  -  02-12 @ 07:00  --------------------------------------------------------  IN: 1181.1 mL / OUT: 2580 mL / NET: -1398.9 mL            PHYSICAL EXAM:    GENERAL: [x ]NAD, [x ]well-groomed, [ x]well-developed  HEAD:  [x ]Atraumatic, [x ]Normocephalic  EYES: [ x]EOMI, [x ]PERRLA, [ x]conjunctiva and sclera clear  ENMT: [x ]No tonsillar erythema, exudates, or enlargement; [x ]Moist mucous membranes, [ ]Good dentition, [ ]No lesions  NECK: [x ]Supple, normal appearance,   NERVOUS SYSTEM:  [x ]Alert & Oriented X2 ; [ x]Motor Strength 5/5 B/L upper and lower extremities; [ x]DTRs 2+ intact and symmetric  CHEST/LUNG: [x ]No chest deformity; [x ]Normal percussion bilaterally;  wheezing + in bilateral upper lobes   HEART: [ x]Regular rate and rhythm; [x ]No murmurs, rubs, or gallops  ABDOMEN: [x ]Soft, Nontender, Nondistended; [x ]Bowel sounds present  EXTREMITIES:  [x ]2+ Peripheral Pulses, 2+ pitting pedal edema ,venous ulcer Left leg ( chronic ) , bilateral venous stasis   LYMPH: [ x]No lymphadenopathy noted  SKIN: [x ]No rashes or lesions; [ x]Good capillary refill      LABS:  CBC Full  -  ( 11 Feb 2018 13:15 )  WBC Count : 5.1 K/uL  Hemoglobin : 12.6 g/dL  Hematocrit : 43.1 %  Platelet Count - Automated : 87 K/uL  Mean Cell Volume : 96.3 fl  Mean Cell Hemoglobin : 28.3 pg  Mean Cell Hemoglobin Concentration : 29.4 gm/dL  Auto Neutrophil # : x  Auto Lymphocyte # : x  Auto Monocyte # : x  Auto Eosinophil # : x  Auto Basophil # : x  Auto Neutrophil % : x  Auto Lymphocyte % : x  Auto Monocyte % : x  Auto Eosinophil % : x  Auto Basophil % : x    02-11    137  |  102  |  24<H>  ----------------------------<  104<H>  4.0   |  23  |  1.15    Ca    8.9      11 Feb 2018 13:15  Phos  4.0     02-11  Mg     1.8     02-11    TPro  6.6  /  Alb  2.2<L>  /  TBili  0.9  /  DBili  x   /  AST  39  /  ALT  27  /  AlkPhos  165<H>  02-11    PT/INR - ( 11 Feb 2018 13:15 )   PT: 44.3 sec;   INR: 3.95 ratio         PTT - ( 11 Feb 2018 13:15 )  PTT:>200 sec        RADIOLOGY & ADDITIONAL STUDIES REVIEWED:     < from: Xray Chest 1 View- PORTABLE-Routine (02.12.18 @ 07:52) >  FINDINGS: Since prior evaluation there is increasing airspace   consolidation within the right upper lobe with stable patchy airspace   opacity within the left midlung field. There is no evidence for pleural   effusion or pneumothorax. No mediastinal shift is noted. Heart size   appears within normal limits. Hilar and superior mediastinal contours are   unchanged. No acute osseous fractures are demonstrated.    IMPRESSION: Increasing right upper lobe airspace consolidation, with   stable airspace opacity left midlung field.      < end of copied text >      [x ]GOALS OF CARE DISCUSSION WITH PATIENT/FAMILY/PROXY: DNR / DNI     CRITICAL CARE TIME SPENT: 35 minutes    Assessment and Plan:   · Assessment		  84 y/o F from [facility] pt w/ PMHx of A-fib, CVA, CHF, at home DNR/DNI p/w SOB  admitted to ICU for Respiratory failure 2/2 to CHF exacerbation/PNA     Problem/Plan - 1:  ·  Problem: Respiratory failure.  Plan: Respiratory failure 2/2 to CHF exacerbation   was on Bipap initially , off bipap since 10pm on 2/11 , saturating well on 4 L NC  diuresed with IV lasix 60 bid   Patient is DNR/DNI.      Problem/Plan - 2:  ·  Problem: CHF exacerbation.  Plan: Patient has hx of Systolic heart failure last echo showing EF 30-35% in march 2017  [place] 8[address]  Troponin negative   f/up Echo  c/w Lasix 60mg IV BID for now  Monitor Bun/Cr  Cardio : Dr. Chavis.      Problem/Plan - 3:  ·  Problem: HCAP (healthcare-associated pneumonia).  Plan: Patient's CXR found to have RML infiltrate, c/w IV meropenem for now  f/up procalcitonin  Patient meets sepsis criteria for now : hypothermia , Tachycardia , tachypnea   elevated Lactate   f/u am Lactate , however patient refusing labs  Consult ID . Dr. Candelaria.      Problem/Plan - 4:  ·  Problem: Rapid atrial fibrillation.  Plan: EKG- rapid Afib to 140s on admission.  s/p  Amiodarone loading for rate control  Continue on amiodarone maintainance dose 200 OD  Start Metoprolol 50 bid   Start digoxin Loading dose 0.5 iv   Initially started on heparin drip , Off ac for now , due to supra therapeutic INR  takes elliquis at home   Patient refused am labs.      Problem/Plan - 5:  ·  Problem: HTN (hypertension).  Plan: Holding anti-HTN medications for now, as patients BP is borderline normal to low normal  restart as needed.      Problem/Plan - 6:  Problem: Need for prophylactic measure. Plan: GI ppx with protonix  No dvt ppx due to supra therapeutic INR , no scd boots due to chronic venous stasis.

## 2018-02-12 NOTE — PHYSICAL THERAPY INITIAL EVALUATION ADULT - RANGE OF MOTION EXAMINATION, REHAB EVAL
AAROM B/L UE WFL; B/L LE AAROM knee/hip flexion 30 deg; B/L ankles AROM 10 deg PF, 10 deg DF. LE ROM limited by weakness; swelling

## 2018-02-12 NOTE — PROGRESS NOTE ADULT - PROBLEM SELECTOR PLAN 1
Respiratory failure 2/2 to CHF exacerbation   was on Bipap overnight, diuresed with IV lasix   Patient is DNR/DNI  c/w Lasix 40 mg IV BID  May need Palliative consult Respiratory failure 2/2 to CHF exacerbation   was on Bipap initially , off bipap since 10pm on 2/11 , saturating well on 4 L NC  diuresed with IV lasix 60 bid   Patient is DNR/DNI

## 2018-02-12 NOTE — PROGRESS NOTE ADULT - SUBJECTIVE AND OBJECTIVE BOX
INTERVAL HPI/OVERNIGHT EVENTS: ***    PRESSORS: [ ] YES [ ] NO  WHICH:    ANTIBIOTICS:                  DATE STARTED:  ANTIBIOTICS:                  DATE STARTED:  ANTIBIOTICS:                  DATE STARTED:    Antimicrobial:  meropenem  IVPB 1000 milliGRAM(s) IV Intermittent every 8 hours    Cardiovascular:  amiodarone    Tablet 200 milliGRAM(s) Oral daily  furosemide   Injectable 60 milliGRAM(s) IV Push every 12 hours  metoprolol     tartrate 25 milliGRAM(s) Oral two times a day    Pulmonary:    Hematalogic:    Other:  pantoprazole  Injectable 40 milliGRAM(s) IV Push daily    amiodarone    Tablet 200 milliGRAM(s) Oral daily  furosemide   Injectable 60 milliGRAM(s) IV Push every 12 hours  meropenem  IVPB 1000 milliGRAM(s) IV Intermittent every 8 hours  metoprolol     tartrate 25 milliGRAM(s) Oral two times a day  pantoprazole  Injectable 40 milliGRAM(s) IV Push daily    Drug Dosing Weight  Height (cm): 167.64 (10 Feb 2018 20:31)  Weight (kg): 90.7 (10 Feb 2018 20:31)  BMI (kg/m2): 32.3 (10 Feb 2018 20:31)  BSA (m2): 2 (10 Feb 2018 20:31)    CENTRAL LINE: [ ] YES [ ] NO  LOCATION:   DATE INSERTED:  REMOVE: [ ] YES [ ] NO  EXPLAIN:    MOBLEY: [ ] YES [ ] NO    DATE INSERTED:  REMOVE:  [ ] YES [ ] NO  EXPLAIN:    A-LINE:  [ ] YES [ ] NO  LOCATION:   DATE INSERTED:  REMOVE:  [ ] YES [ ] NO  EXPLAIN:    PMH -reviewed admission note, no change since admission      ICU Vital Signs Last 24 Hrs  T(C): 35.9 (12 Feb 2018 04:00), Max: 36.3 (11 Feb 2018 20:54)  T(F): 96.6 (12 Feb 2018 04:00), Max: 97.4 (11 Feb 2018 20:54)  HR: 132 (12 Feb 2018 07:00) (104 - 135)  BP: 129/89 (12 Feb 2018 07:00) (96/68 - 129/89)  BP(mean): 98 (12 Feb 2018 07:00) (74 - 102)  ABP: --  ABP(mean): --  RR: 33 (12 Feb 2018 07:00) (17 - 34)  SpO2: 100% (12 Feb 2018 07:00) (68% - 100%)            02-11 @ 07:01  -  02-12 @ 07:00  --------------------------------------------------------  IN: 1181.1 mL / OUT: 2580 mL / NET: -1398.9 mL            PHYSICAL EXAM:    GENERAL: [x ]NAD, [x ]well-groomed, [ x]well-developed  HEAD:  [x ]Atraumatic, [x ]Normocephalic  EYES: [ x]EOMI, [x ]PERRLA, [ x]conjunctiva and sclera clear  ENMT: [x ]No tonsillar erythema, exudates, or enlargement; [x ]Moist mucous membranes, [ ]Good dentition, [ ]No lesions  NECK: [x ]Supple, normal appearance,   NERVOUS SYSTEM:  [x ]Alert & Oriented X3; [ x]Motor Strength 5/5 B/L upper and lower extremities; [ x]DTRs 2+ intact and symmetric  CHEST/LUNG: [x ]No chest deformity; [x ]Normal percussion bilaterally; [ x]No rales, rhonchi, wheezing   HEART: [ x]Regular rate and rhythm; [x ]No murmurs, rubs, or gallops  ABDOMEN: [x ]Soft, Nontender, Nondistended; [x ]Bowel sounds present  EXTREMITIES:  [x ]2+ Peripheral Pulses, [x ]No clubbing, cyanosis, or edema  LYMPH: [ x]No lymphadenopathy noted  SKIN: [x ]No rashes or lesions; [ x]Good capillary refill      LABS:  CBC Full  -  ( 11 Feb 2018 13:15 )  WBC Count : 5.1 K/uL  Hemoglobin : 12.6 g/dL  Hematocrit : 43.1 %  Platelet Count - Automated : 87 K/uL  Mean Cell Volume : 96.3 fl  Mean Cell Hemoglobin : 28.3 pg  Mean Cell Hemoglobin Concentration : 29.4 gm/dL  Auto Neutrophil # : x  Auto Lymphocyte # : x  Auto Monocyte # : x  Auto Eosinophil # : x  Auto Basophil # : x  Auto Neutrophil % : x  Auto Lymphocyte % : x  Auto Monocyte % : x  Auto Eosinophil % : x  Auto Basophil % : x    02-11    137  |  102  |  24<H>  ----------------------------<  104<H>  4.0   |  23  |  1.15    Ca    8.9      11 Feb 2018 13:15  Phos  4.0     02-11  Mg     1.8     02-11    TPro  6.6  /  Alb  2.2<L>  /  TBili  0.9  /  DBili  x   /  AST  39  /  ALT  27  /  AlkPhos  165<H>  02-11    PT/INR - ( 11 Feb 2018 13:15 )   PT: 44.3 sec;   INR: 3.95 ratio         PTT - ( 11 Feb 2018 13:15 )  PTT:>200 sec        RADIOLOGY & ADDITIONAL STUDIES REVIEWED:  ***    [ ]GOALS OF CARE DISCUSSION WITH PATIENT/FAMILY/PROXY:    CRITICAL CARE TIME SPENT: 35 minutes INTERVAL HPI/OVERNIGHT EVENTS: Patient in Rapid atrial fibrillation , completed amiodarone loading dose . Patient refused morning labs     PRESSORS: [ ] YES [ x ] NO  WHICH:    ANTIBIOTICS:   Meropenem                DATE STARTED: 2/11  ANTIBIOTICS:                  DATE STARTED:  ANTIBIOTICS:                  DATE STARTED:    Antimicrobial:  meropenem  IVPB 1000 milliGRAM(s) IV Intermittent every 8 hours    Cardiovascular:  amiodarone    Tablet 200 milliGRAM(s) Oral daily  furosemide   Injectable 60 milliGRAM(s) IV Push every 12 hours  metoprolol     tartrate 25 milliGRAM(s) Oral two times a day    Pulmonary:    Hematalogic:    Other:  pantoprazole  Injectable 40 milliGRAM(s) IV Push daily    amiodarone    Tablet 200 milliGRAM(s) Oral daily  furosemide   Injectable 60 milliGRAM(s) IV Push every 12 hours  meropenem  IVPB 1000 milliGRAM(s) IV Intermittent every 8 hours  metoprolol     tartrate 25 milliGRAM(s) Oral two times a day  pantoprazole  Injectable 40 milliGRAM(s) IV Push daily    Drug Dosing Weight  Height (cm): 167.64 (10 Feb 2018 20:31)  Weight (kg): 90.7 (10 Feb 2018 20:31)  BMI (kg/m2): 32.3 (10 Feb 2018 20:31)  BSA (m2): 2 (10 Feb 2018 20:31)    CENTRAL LINE: [ ] YES [x ] NO  LOCATION:   DATE INSERTED:  REMOVE: [ ] YES [ ] NO  EXPLAIN:    MOBLEY: [x ] YES [ ] NO    DATE INSERTED:   REMOVE:  [ ] YES [ ] NO  EXPLAIN:    A-LINE:  [ ] YES [x ] NO  LOCATION:   DATE INSERTED:  REMOVE:  [ ] YES [ ] NO  EXPLAIN:    PMH -reviewed admission note, no change since admission      ICU Vital Signs Last 24 Hrs  T(C): 35.9 (12 Feb 2018 04:00), Max: 36.3 (11 Feb 2018 20:54)  T(F): 96.6 (12 Feb 2018 04:00), Max: 97.4 (11 Feb 2018 20:54)  HR: 132 (12 Feb 2018 07:00) (104 - 135)  BP: 129/89 (12 Feb 2018 07:00) (96/68 - 129/89)  BP(mean): 98 (12 Feb 2018 07:00) (74 - 102)  ABP: --  ABP(mean): --  RR: 33 (12 Feb 2018 07:00) (17 - 34)  SpO2: 100% (12 Feb 2018 07:00) (68% - 100%)            02-11 @ 07:01  -  02-12 @ 07:00  --------------------------------------------------------  IN: 1181.1 mL / OUT: 2580 mL / NET: -1398.9 mL            PHYSICAL EXAM:    GENERAL: [x ]NAD, [x ]well-groomed, [ x]well-developed  HEAD:  [x ]Atraumatic, [x ]Normocephalic  EYES: [ x]EOMI, [x ]PERRLA, [ x]conjunctiva and sclera clear  ENMT: [x ]No tonsillar erythema, exudates, or enlargement; [x ]Moist mucous membranes, [ ]Good dentition, [ ]No lesions  NECK: [x ]Supple, normal appearance,   NERVOUS SYSTEM:  [x ]Alert & Oriented X2 ; [ x]Motor Strength 5/5 B/L upper and lower extremities; [ x]DTRs 2+ intact and symmetric  CHEST/LUNG: [x ]No chest deformity; [x ]Normal percussion bilaterally;  wheezing + in bilateral upper lobes   HEART: [ x]Regular rate and rhythm; [x ]No murmurs, rubs, or gallops  ABDOMEN: [x ]Soft, Nontender, Nondistended; [x ]Bowel sounds present  EXTREMITIES:  [x ]2+ Peripheral Pulses, 2+ pitting pedal edema ,venous ulcer Left leg ( chronic ) , bilateral venous stasis   LYMPH: [ x]No lymphadenopathy noted  SKIN: [x ]No rashes or lesions; [ x]Good capillary refill      LABS:  CBC Full  -  ( 11 Feb 2018 13:15 )  WBC Count : 5.1 K/uL  Hemoglobin : 12.6 g/dL  Hematocrit : 43.1 %  Platelet Count - Automated : 87 K/uL  Mean Cell Volume : 96.3 fl  Mean Cell Hemoglobin : 28.3 pg  Mean Cell Hemoglobin Concentration : 29.4 gm/dL  Auto Neutrophil # : x  Auto Lymphocyte # : x  Auto Monocyte # : x  Auto Eosinophil # : x  Auto Basophil # : x  Auto Neutrophil % : x  Auto Lymphocyte % : x  Auto Monocyte % : x  Auto Eosinophil % : x  Auto Basophil % : x    02-11    137  |  102  |  24<H>  ----------------------------<  104<H>  4.0   |  23  |  1.15    Ca    8.9      11 Feb 2018 13:15  Phos  4.0     02-11  Mg     1.8     02-11    TPro  6.6  /  Alb  2.2<L>  /  TBili  0.9  /  DBili  x   /  AST  39  /  ALT  27  /  AlkPhos  165<H>  02-11    PT/INR - ( 11 Feb 2018 13:15 )   PT: 44.3 sec;   INR: 3.95 ratio         PTT - ( 11 Feb 2018 13:15 )  PTT:>200 sec        RADIOLOGY & ADDITIONAL STUDIES REVIEWED:     < from: Xray Chest 1 View- PORTABLE-Routine (02.12.18 @ 07:52) >  FINDINGS: Since prior evaluation there is increasing airspace   consolidation within the right upper lobe with stable patchy airspace   opacity within the left midlung field. There is no evidence for pleural   effusion or pneumothorax. No mediastinal shift is noted. Heart size   appears within normal limits. Hilar and superior mediastinal contours are   unchanged. No acute osseous fractures are demonstrated.    IMPRESSION: Increasing right upper lobe airspace consolidation, with   stable airspace opacity left midlung field.      < end of copied text >      [x ]GOALS OF CARE DISCUSSION WITH PATIENT/FAMILY/PROXY: DNR / DNI     CRITICAL CARE TIME SPENT: 35 minutes

## 2018-02-12 NOTE — PROGRESS NOTE ADULT - PROBLEM SELECTOR PLAN 6
DVT prophylaxis with heparin gtt.  GI ppx with protonix GI ppx with protonix  No dvt ppx due to supra therapeutic INR , no scd boots due to chronic venous stasis

## 2018-02-12 NOTE — CONSULT NOTE ADULT - SUBJECTIVE AND OBJECTIVE BOX
CHIEF COMPLAINT: Patient is a 85y old  Female who presents with a chief complaint of Shortness of breath x 1week (10 Feb 2018 23:56)      HPI:  84 y/o F from [facility] pt w/ PMHx of A-fib, CVA, CHF, at home DNR/DNI p/w SOB x the past few hours as per nursing home reports onset which started in the afternoon. Patient has been complaining of difficulty breathing, but today it was worse and she was very lethargic. History is limited as patient is lethargic, and history was obtained by daughters at bedside, and Ed provider note. Pt was dyspneic in ED and was speaking only one word sentences. Pt was placed on Bipap on arrival to ED.     Ed course:  EKG: Afib @ 148bpm  bnp: 64693  T1: .027 (10 Feb 2018 23:56)   Patient seen and examined.     PAST MEDICAL & SURGICAL HISTORY:  Venous stasis: b/l leg, left calf ulcer  CVA (cerebral vascular accident): 2017, left side weakness ,s/p TPA  Atrial fibrillation  HTN (hypertension)  No significant past surgical history      Allergies    penicillin (Unknown)    Intolerances        MEDICATIONS  (STANDING):  amiodarone    Tablet 200 milliGRAM(s) Oral daily  chlorhexidine 4% Liquid 1 Application(s) Topical daily  digoxin  Injectable 0.25 milliGRAM(s) IV Push every 6 hours  furosemide   Injectable 60 milliGRAM(s) IV Push every 12 hours  meropenem  IVPB 1000 milliGRAM(s) IV Intermittent every 8 hours  metoprolol     tartrate 50 milliGRAM(s) Oral two times a day  pantoprazole  Injectable 40 milliGRAM(s) IV Push daily  potassium chloride   Powder 20 milliEquivalent(s) Oral every 2 hours      MEDICATIONS  (PRN):       Medications up to date at time of exam.    FAMILY HISTORY:  No pertinent family history in first degree relatives      SOCIAL HISTORY  Smoking History: [   ] smoking/smoke exposure, [   ] former smoker  Living Condition: [   ] apartment, [   ] private house  Work History:   Travel History: denies recent travel  Illicit Substance Use: denies  Alcohol Use: denies    REVIEW OF SYSTEMS:    CONSTITUTIONAL:  denies fevers, chills, sweats, weight loss    HEENT:  denies diplopia or blurred vision, sore throat or runny nose.    CARDIOVASCULAR:  denies pressure, squeezing, tightness, or heaviness about the chest; no palpitations.    RESPIRATORY:  denies SOB, cough, BROOKS, wheezing.    GASTROINTESTINAL:  denies abdominal pain, nausea, vomiting or diarrhea.    GENITOURINARY: denies dysuria, frequency or urgency.    NEUROLOGIC:  denies numbness, tingling, seizures or weakness.    PSYCHIATRIC:  denies disorder of thought or mood.    MSK: denies swelling, redness      PHYSICAL EXAMINATION:    GENERAL: The patient is a well-developed, well-nourished, in no apparent distress.     Vital Signs Last 24 Hrs  T(C): 36.7 (2018 16:00), Max: 36.7 (2018 16:00)  T(F): 98 (2018 16:00), Max: 98 (2018 16:00)  HR: 91 (2018 21:07) (72 - 135)  BP: 114/88 (2018 21:00) (95/74 - 132/81)  BP(mean): 94 (:00) (70 - 98)  RR: 16 (2018 21:00) (16 - 35)  SpO2: 54% (2018 21:00) (54% - 100%)    HEENT: head is normocephalic and atraumatic. mucous membranes are moist.     NECK: supple, no palpable adenopathy.    LUNGS: clear to auscultation, no wheezing, rales, or rhonchi.    HEART: irregularly irregular rhythm + II/VI SEMmurmur.    ABDOMEN: soft, nontender, and nondistended.     EXTREMITIES: without any cyanosis, clubbing, rash, lesions or + 3 B/L LE edema.    NEUROLOGIC: awake, alert, oriented.     SKIN: warm, dry, good turgor.      LABS:                        12.9   3.9   )-----------( 88       ( 2018 14:26 )             42.6     02-12    140  |  103  |  22<H>  ----------------------------<  101<H>  3.1<L>   |  29  |  1.02    Ca    9.0      2018 14:26  Phos  3.4     02-12  Mg     1.7     02-12    TPro  6.6  /  Alb  2.3<L>  /  TBili  1.1  /  DBili  x   /  AST  46<H>  /  ALT  27  /  AlkPhos  250<H>      PT/INR - ( 2018 14:26 )   PT: 27.6 sec;   INR: 2.49 ratio         PTT - ( 2018 13:15 )  PTT:>200 sec      CARDIAC MARKERS ( 2018 13:15 )  0.027 ng/mL / x     / 39 U/L / x     / <1.0 ng/mL        Serum Pro-Brain Natriuretic Peptide: 21613 pg/mL (02-10-18 @ 21:40)    Lactate, Blood: 1.5 mmol/L (18 @ 14:26)    Procalcitonin, Serum: 0.61 ng/mL (18 @ 14:29)      .    MICROBIOLOGY: (if applicable)    RADIOLOGY & ADDITIONAL STUDIES:  EKG:   CXR:  ECHO:  < from: TTE Echo Doppler w/o Cont (17 @ 14:43) >     EXAM:  TTE WO CON COMPLETE W DOPPL         PROCEDURE DATE:  2017        INTERPRETATION:  REPORT:    TRANSTHORACIC ECHOCARDIOGRAM REPORT           Patient Name:   CHESTER JONES Patient Location: [facility] Rec #:  [id number]    Accession #:   [id number]  Account #:                    Height:           63.8 in 162.0 cm  YOB: 1932      Weight:           264.6 lb 120.00 kg  Patient Age:    84 years      BSA:              2.20 m²  Patient Gender: F             BP: 158/87 mmHg        Date of Exam: 3/17/2017 1:52:55 PM  Sonographer:  JOSE     Procedure:     2D Echo/Doppler/Color Doppler Complete.  Indications:   Cerebral infarction, unspecified - I63.9; Unspecified   atrial                 fibrillation - I48.91  Diagnosis:     Cerebral infarction, unspecified - I63.9;  Study Details: Technically fair study.           2D AND M-MODE MEASUREMENTS (normal ranges within parentheses):  Left Ventricle:                  Normal   Aorta/Left Atrium:            Normal  IVSd (2D):              1.09 cm (0.7-1.1) Aortic Root (2D):  3.03 cm   (2.4-3.7)  LVPWd (2D):             1.07 cm (0.7-1.1) Left Atrium (2D):  3.42 cm   (1.9-4.0)  LVIDd (2D):             4.56 cm (3.4-5.7) Right Ventricle:  LVIDs (2D):             3.71 cm           TAPSE:           1.80 cm  LV FS (2D):             18.7 %   (>25%)  Relative Wall Thickness  0.47    (<0.42)     LV DIASTOLIC FUNCTION:  MV Peak E: 1.06 m/s Decel Time: 156 msec  MV Peak A: 0.43 m/s  E/A Ratio: 2.45     SPECTRAL DOPPLER ANALYSIS (where applicable):  Mitral Valve:  MV P1/2 Time: 45.27 msec  MV Area, PHT: 4.86 cm²     Aortic Valve: AoV Max Moose: 1.14 m/s AoV Peak P.2 mmHg AoV Mean P.8 mmHg     LVOT Vmax: 0.83 m/s LVOT VTI: 0.116 m LVOT Diameter:     Tricuspid Valve and PA/RV Systolic Pressure: TR Max Velocity: 2.86 m/s   RA Pressure: 5 mmHg RVSP/PASP: 37.8 mmHg        PHYSICIAN INTERPRETATION:  Left Ventricle: Normal left ventricular size and wall thicknesses, with   normal systolic and diastolic function.  Left ventricular ejection fraction, by visual estimation, is 30 to 35%.   The mitral in-flow pattern reveals no discernable A-wave, therefore no   comment on diastolic function can be made.  Difficult to assess true LV systolic function due to arrhythmia with   rapid ventricular rates. Suggest repeat study when rate has normalized   for better assessment of LVEF.  Right Ventricle: Normal right ventricular size and function.  Left Atrium: Mildly enlarged left atrium.  Right Atrium: The right atrium is mildly dilated.  Pericardium: There is no evidence of pericardial effusion.  Mitral Valve: Structurally normal mitral valve, with normal leaflet   excursion. Mild mitral valve regurgitation is seen.  Tricuspid Valve: Structurally normal tricuspid valve, with normal leaflet   excursion. The tricuspid valve is normal in structure. Mild tricuspid   regurgitation is visualized. Estimated pulmonary artery systolic pressure   is 37.8 mmHg assuming a right atrial pressure of 5 mmHg, which is   consistent with borderline pulmonary hypertension.  Aortic Valve: Normal trileaflet aortic valve with normal opening. Trivial   aortic valve regurgitation is seen.  Pulmonic Valve: Structurally normal pulmonic valve, with normal leaflet   excursion. Mild pulmonic valve regurgitation.  Aorta: The aortic root and ascending aorta are structurally normal, with   no evidence of dilitation.  Pulmonary Artery: The main pulmonary artery is normal in size.        Summary:   1. Left ventricular ejection fraction, by visual estimation, is 30 to   35%.   2. The mitral in-flow pattern reveals no discernable A-wave, therefore   no comment on diastolic function can be made.   3. Mildly dilated right atrium.   4. Mild mitral, tricuspid and pulmonic valve regurgitation.   5. Estimated pulmonary artery systolic pressure is 37.8 mmHg assuming a   right atrial pressure of 5 mmHg, which is consistent with borderline   pulmonary hypertension.   6. Mildly enlarged left atrium.     [id number] Terrell Trevizo MD, FACC  Electronically signed on 3/17/2017 at 9:46:55 PM                *** Final ***                    TERRELL TREVIZO M.D.; Attending Cardiologist  This document has been electronically signed. Mar 17 2017  1:52PM                < end of copied text >    TELE:    IMPRESSION: 85y Female PAST MEDICAL & SURGICAL HISTORY:  Venous stasis: b/l leg, left calf ulcer  CVA (cerebral vascular accident): 2017, left side weakness ,s/p TPA  Atrial fibrillation  HTN (hypertension)  No significant past surgical history   p/w     RECOMMENDATIONS:    Patient presents from NH with SOB due to HF exacerbation, reduced EF. Probnp 22,000.  Continue with IV lasix, bb, recommend low dose ACE I for HF.   Afib stable rate, continue with amiodarone, digoxin, anticoagulation.  Would recommend 2D echo.

## 2018-02-12 NOTE — ADVANCED PRACTICE NURSE CONSULT - ASSESSMENT
This is a 85yr old female patient admitted for A. Fib, presenting with (x4) Venous Stasis Ulcers to the L. Lower Leg with red tissue, slough, and fibrinous exudate

## 2018-02-12 NOTE — CHART NOTE - NSCHARTNOTEFT_GEN_A_CORE
84 y/o F from [facility] pt w/ PMHx of A-fib, CVA, CHF, at home DNR/DNI p/w SOB  admitted to ICU for Respiratory failure 2/2 to CHF exacerbation/PNA     Problem/Plan - 1:  ·  Problem: Respiratory failure.  Plan: Respiratory failure 2/2 to CHF exacerbation   was on Bipap initially , off bipap since 10pm on 2/11 , saturating well on 4 L NC  diuresed with IV lasix 60 bid   Patient is DNR/DNI.      Problem/Plan - 2:  ·  Problem: CHF exacerbation.  Plan: Patient has hx of Systolic heart failure last echo showing EF 30-35% in march 2017  [place]ated bnp of 73272  Troponin negative   f/up Echo  c/w Lasix 60mg IV BID for now  Monitor Bun/Cr  Cardio : Dr. Chavis.      Problem/Plan - 3:  ·  Problem: HCAP (healthcare-associated pneumonia).  Plan: Patient's CXR found to have RML infiltrate, c/w IV meropenem for now  f/up procalcitonin  Patient meets sepsis criteria for now : hypothermia , Tachycardia , tachypnea   elevated Lactate initially , now normalized  Consult ID . Dr. Candelaria.      Problem/Plan - 4:  ·  Problem: Rapid atrial fibrillation.  Plan: EKG- rapid Afib to 140s on admission.  s/p  Amiodarone loading for rate control  Continue on amiodarone maintainance dose 200 OD  Start Metoprolol 50 bid   Start digoxin Loading dose 0.5 iv --> 0.25 --> 0.25   Initially started on heparin drip , Off ac for now , due to supra therapeutic INR  takes elliquis at home      Problem/Plan - 5:  ·  Problem: HTN (hypertension).  Plan: Holding anti-HTN medications for now, as patients BP is borderline normal to low normal  restart as needed.      Problem/Plan - 6:  Problem: Need for prophylactic measure. Plan: GI ppx with protonix  No dvt ppx due to supra therapeutic INR , no scd boots due to chronic venous stasis.    Patient is medically stable for downgrade . 84 y/o F from [facility] pt w/ PMHx of A-fib, CVA, CHF, at home DNR/DNI p/w SOB  admitted to ICU for Respiratory failure 2/2 to CHF exacerbation/PNA     Problem/Plan - 1:  ·  Problem: Respiratory failure.  Plan: Respiratory failure 2/2 to CHF exacerbation   was on Bipap initially , off bipap since 10pm on 2/11 , saturating well on 4 L NC  diuresed with IV lasix 60 bid --> will now cut down to 40 bid   Patient has a net negative of - 5.6 L in last 12 hours   Patient is DNR/DNI.      Problem/Plan - 2:  ·  Problem: CHF exacerbation.  Plan: Patient has hx of Systolic heart failure last echo showing EF 30-35% in march 2017  elevated bnp of 72682  Troponin negative   f/up Echo : EF of 10 % with grade 4 dd , fall from EF of 35 % in March 2017  Will follow up with Dr. Day for the same : patient will need ischemic work up   Unable to get in tough with daughter about ischemic work up   c/w Lasix 40mg IV BID for now  Monitor Bun/Cr : wnl   Cardio : Dr. Chavis.      Problem/Plan - 3:  ·  Problem: HCAP (healthcare-associated pneumonia).  Plan: Patient's CXR found to have RML infiltrate  f/up procalcitonin : 0.61   Lactate wnl now , vitals stable   Blood cultures negative to date   Discontinued meropenem   Consult ID . Dr. Candelaria.      Problem/Plan - 4:  ·  Problem: Rapid atrial fibrillation.  Plan: now rate controlled   s/p  Amiodarone loading for rate control  Continue on amiodarone maintenance dose 200 OD  Start Metoprolol 50 bid   s/p digoxin loading dose , on maintain dose now   Initially started on heparin drip , Off ac due to supra therapeutic INR --> INR now wnl  takes elliquis at home : will restart now      Problem/Plan - 5:  ·  Problem: HTN (hypertension).  Plan: Holding anti-HTN medications for now, as patients BP is borderline normal to low normal  restart as needed.      Problem/Plan - 6:  Problem: Venous stasis. Plan: Patient has chronic venous stasis in bilateral Le  Podiatry was consulted : Started Santyl application daily .      Problem/Plan - 7:  ·  Problem: Need for prophylactic measure.  Plan: GI ppx with protonix  No dvt ppx due to supra therapeutic INR , no scd boots due to chronic venous stasis.    Things to follow :    1. Speak to the daughter for ischemic work up. 86 y/o F from [facility] pt w/ PMHx of A-fib, CVA, CHF, at home DNR/ DNI p/w SOB  admitted to ICU for Respiratory failure 2/2 to CHF exacerbation.     Problem/Plan - 1:  ·  Problem: Respiratory failure.  Plan: Respiratory failure 2/2 to CHF exacerbation   was on Bipap initially , off bipap since 2/11 , saturating well on 4 L NC  diuresed with IV lasix 60 bid --> will now cut down to 40 iv bid --> will cut down to 40 bid oral today   Patient has a net negative of - 2.2 L in last 12 hours   [place] in place for monitoring uop.  Please dc [place] in am and give TOV.  Patient is DNR/DNI.      Problem/Plan - 2:  ·  Problem: CHF exacerbation.  Plan: Patient has hx of Systolic heart failure last echo showing EF 30-35% in march 2017  [place] bnp of 44942  Troponin negative   f/up Echo : EF of 10 % with grade 4 dd , fall from EF of 35 % in March 2017  Will follow up with Dr. Day for the same   c/w Lasix 40mg oral BID for now  No ace inhibitor as BP not able to sustain it .  Monitor Bun/Cr : wnl   Spoke to daughter , hcp in detail about her condition . Family and patient do not want any further ischemic workup . No stress test.   Cardio : Dr. Chavis.      Problem/Plan - 3:  ·  Problem: HCAP (healthcare-associated pneumonia).  Plan: Ruled out  Patient's CXR found to have questionable RML infiltrate  f/up procalcitonin : 0.61   Lactate wnl now , vitals stable   Blood cultures negative to date   Discontinued meropenem   Consult ID . Dr. Candelaria.      Problem/Plan - 4:  ·  Problem: Rapid atrial fibrillation.  Plan: now rate controlled   s/p  Amiodarone loading for rate control  Continue on amiodarone maintenance dose 200 OD  Start Metoprolol 50 bid   s/p digoxin loading dose , on maintain dose now   Initially started on heparin drip , Off ac for now , due to supra therapeutic INR  takes elliquis at home   f/u inr : 1.17  Restarted Elliquis.      Problem/Plan - 5:  ·  Problem: HTN (hypertension).  Plan: Holding anti-HTN medications for now, as patients BP is borderline normal to low normal  restart as needed.      Problem/Plan - 6:  Problem: Venous stasis. Plan: Patient has chronic venous stasis in bilateral Le  Podiatry was consulted.     Problem/Plan - 7:  ·  Problem: Need for prophylactic measure.  Plan: GI ppx with protonix  Continue with full dose ac with elliquis  , no scd boots due to chronic venous stasis.    Things to follow :    1. Monitor electrolytes , bmp daily .  2. Please dc [place] and give TOV.  3. Please restart bp meds if patient 's BP tolerates it , has been on lower side .

## 2018-02-12 NOTE — ADVANCED PRACTICE NURSE CONSULT - RECOMMEDATIONS
-Clean the L. Lower Leg wounds with normal saline and apply skin prep to the surrounding skin  -Apply Calcium Alginate (Aquacel) to the wound bed and cov3er with a Foam dressing Q 72hrs PRN  -Wrap with ACE wrap  -Elevate/float the patients heels using heel protectors and reposition the patient Q 2hrs using wedges or pillows

## 2018-02-12 NOTE — PHYSICAL THERAPY INITIAL EVALUATION ADULT - IMPAIRMENTS FOUND, PT EVAL
ROM/gait, locomotion, and balance/aerobic capacity/endurance/muscle strength/posture/circulation/ventilation and respiration/gas exchange

## 2018-02-12 NOTE — PROGRESS NOTE ADULT - PROBLEM SELECTOR PLAN 3
Patient's CXR found to have RML infiltrate, c/w IV meropenem for now  f/up procalcitonin, if negative will d/c abx Patient's CXR found to have RML infiltrate, c/w IV meropenem for now  f/up procalcitonin  Patient meets sepsis criteria for now : hypothermia , Tachycardia , tachypnea   elevated Lactate   f/u am Lactate , however patient refusing labs  Consult ID . Dr. Candelaria

## 2018-02-12 NOTE — PHYSICAL THERAPY INITIAL EVALUATION ADULT - ADDITIONAL COMMENTS
as per NH chart: pt was on PT program  as per pt: prior to NH admit pt amb indoors only using RW; has not amb for  ~3 mo

## 2018-02-13 DIAGNOSIS — I87.8 OTHER SPECIFIED DISORDERS OF VEINS: ICD-10-CM

## 2018-02-13 LAB
ALBUMIN SERPL ELPH-MCNC: 1.9 G/DL — LOW (ref 3.5–5)
ALP SERPL-CCNC: 228 U/L — HIGH (ref 40–120)
ALT FLD-CCNC: 29 U/L DA — SIGNIFICANT CHANGE UP (ref 10–60)
ANION GAP SERPL CALC-SCNC: 4 MMOL/L — LOW (ref 5–17)
AST SERPL-CCNC: 78 U/L — HIGH (ref 10–40)
BASE EXCESS BLDA CALC-SCNC: 10.9 MMOL/L — HIGH (ref -2–2)
BASOPHILS # BLD AUTO: 0 K/UL — SIGNIFICANT CHANGE UP (ref 0–0.2)
BASOPHILS NFR BLD AUTO: 0.3 % — SIGNIFICANT CHANGE UP (ref 0–2)
BILIRUB SERPL-MCNC: 0.8 MG/DL — SIGNIFICANT CHANGE UP (ref 0.2–1.2)
BLOOD GAS COMMENTS ARTERIAL: SIGNIFICANT CHANGE UP
BLOOD GAS COMMENTS ARTERIAL: SIGNIFICANT CHANGE UP
BUN SERPL-MCNC: 16 MG/DL — SIGNIFICANT CHANGE UP (ref 7–18)
CALCIUM SERPL-MCNC: 8.2 MG/DL — LOW (ref 8.4–10.5)
CHLORIDE SERPL-SCNC: 98 MMOL/L — SIGNIFICANT CHANGE UP (ref 96–108)
CO2 SERPL-SCNC: 38 MMOL/L — HIGH (ref 22–31)
CREAT SERPL-MCNC: 0.8 MG/DL — SIGNIFICANT CHANGE UP (ref 0.5–1.3)
EOSINOPHIL # BLD AUTO: 0 K/UL — SIGNIFICANT CHANGE UP (ref 0–0.5)
EOSINOPHIL NFR BLD AUTO: 0 % — SIGNIFICANT CHANGE UP (ref 0–6)
GLUCOSE SERPL-MCNC: 147 MG/DL — HIGH (ref 70–99)
HCO3 BLDA-SCNC: 37 MMOL/L — HIGH (ref 23–27)
HCT VFR BLD CALC: 43.9 % — SIGNIFICANT CHANGE UP (ref 34.5–45)
HGB BLD-MCNC: 13 G/DL — SIGNIFICANT CHANGE UP (ref 11.5–15.5)
HOROWITZ INDEX BLDA+IHG-RTO: 28 — SIGNIFICANT CHANGE UP
INR BLD: 2.07 RATIO — HIGH (ref 0.88–1.16)
LYMPHOCYTES # BLD AUTO: 0.8 K/UL — LOW (ref 1–3.3)
LYMPHOCYTES # BLD AUTO: 9.9 % — LOW (ref 13–44)
MAGNESIUM SERPL-MCNC: 1.6 MG/DL — SIGNIFICANT CHANGE UP (ref 1.6–2.6)
MCHC RBC-ENTMCNC: 28.7 PG — SIGNIFICANT CHANGE UP (ref 27–34)
MCHC RBC-ENTMCNC: 29.8 GM/DL — LOW (ref 32–36)
MCV RBC AUTO: 96.5 FL — SIGNIFICANT CHANGE UP (ref 80–100)
MONOCYTES # BLD AUTO: 0.5 K/UL — SIGNIFICANT CHANGE UP (ref 0–0.9)
MONOCYTES NFR BLD AUTO: 5.9 % — SIGNIFICANT CHANGE UP (ref 2–14)
NEUTROPHILS # BLD AUTO: 6.5 K/UL — SIGNIFICANT CHANGE UP (ref 1.8–7.4)
NEUTROPHILS NFR BLD AUTO: 83.8 % — HIGH (ref 43–77)
PCO2 BLDA: 53 MMHG — HIGH (ref 32–46)
PH BLDA: 7.45 — SIGNIFICANT CHANGE UP (ref 7.35–7.45)
PHOSPHATE SERPL-MCNC: 2 MG/DL — LOW (ref 2.5–4.5)
PLATELET # BLD AUTO: 95 K/UL — LOW (ref 150–400)
PO2 BLDA: 107 MMHG — SIGNIFICANT CHANGE UP (ref 74–108)
POTASSIUM SERPL-MCNC: 4.6 MMOL/L — SIGNIFICANT CHANGE UP (ref 3.5–5.3)
POTASSIUM SERPL-SCNC: 4.6 MMOL/L — SIGNIFICANT CHANGE UP (ref 3.5–5.3)
PROT SERPL-MCNC: 6.1 G/DL — SIGNIFICANT CHANGE UP (ref 6–8.3)
PROTHROM AB SERPL-ACNC: 22.9 SEC — HIGH (ref 9.8–12.7)
RBC # BLD: 4.55 M/UL — SIGNIFICANT CHANGE UP (ref 3.8–5.2)
RBC # FLD: 17.4 % — HIGH (ref 10.3–14.5)
SAO2 % BLDA: 98 % — HIGH (ref 92–96)
SODIUM SERPL-SCNC: 140 MMOL/L — SIGNIFICANT CHANGE UP (ref 135–145)
WBC # BLD: 7.8 K/UL — SIGNIFICANT CHANGE UP (ref 3.8–10.5)
WBC # FLD AUTO: 7.8 K/UL — SIGNIFICANT CHANGE UP (ref 3.8–10.5)

## 2018-02-13 PROCEDURE — 71045 X-RAY EXAM CHEST 1 VIEW: CPT | Mod: 26

## 2018-02-13 RX ORDER — NYSTATIN CREAM 100000 [USP'U]/G
1 CREAM TOPICAL DAILY
Qty: 0 | Refills: 0 | Status: DISCONTINUED | OUTPATIENT
Start: 2018-02-13 | End: 2018-02-20

## 2018-02-13 RX ORDER — APIXABAN 2.5 MG/1
5 TABLET, FILM COATED ORAL EVERY 12 HOURS
Qty: 0 | Refills: 0 | Status: DISCONTINUED | OUTPATIENT
Start: 2018-02-13 | End: 2018-02-20

## 2018-02-13 RX ORDER — COLLAGENASE CLOSTRIDIUM HIST. 250 UNIT/G
1 OINTMENT (GRAM) TOPICAL DAILY
Qty: 0 | Refills: 0 | Status: DISCONTINUED | OUTPATIENT
Start: 2018-02-13 | End: 2018-02-20

## 2018-02-13 RX ORDER — METOPROLOL TARTRATE 50 MG
50 TABLET ORAL
Qty: 0 | Refills: 0 | Status: DISCONTINUED | OUTPATIENT
Start: 2018-02-13 | End: 2018-02-20

## 2018-02-13 RX ORDER — FUROSEMIDE 40 MG
40 TABLET ORAL EVERY 12 HOURS
Qty: 0 | Refills: 0 | Status: DISCONTINUED | OUTPATIENT
Start: 2018-02-13 | End: 2018-02-14

## 2018-02-13 RX ADMIN — Medication 40 MILLIGRAM(S): at 17:51

## 2018-02-13 RX ADMIN — Medication 50 MILLIGRAM(S): at 05:23

## 2018-02-13 RX ADMIN — PANTOPRAZOLE SODIUM 40 MILLIGRAM(S): 20 TABLET, DELAYED RELEASE ORAL at 14:16

## 2018-02-13 RX ADMIN — APIXABAN 5 MILLIGRAM(S): 2.5 TABLET, FILM COATED ORAL at 17:51

## 2018-02-13 RX ADMIN — AMIODARONE HYDROCHLORIDE 200 MILLIGRAM(S): 400 TABLET ORAL at 05:21

## 2018-02-13 RX ADMIN — Medication 1 APPLICATION(S): at 17:51

## 2018-02-13 RX ADMIN — Medication 62.5 MILLIMOLE(S): at 17:50

## 2018-02-13 RX ADMIN — MEROPENEM 100 MILLIGRAM(S): 1 INJECTION INTRAVENOUS at 05:22

## 2018-02-13 RX ADMIN — CHLORHEXIDINE GLUCONATE 1 APPLICATION(S): 213 SOLUTION TOPICAL at 14:17

## 2018-02-13 RX ADMIN — NYSTATIN CREAM 1 APPLICATION(S): 100000 CREAM TOPICAL at 14:17

## 2018-02-13 RX ADMIN — Medication 0.12 MILLIGRAM(S): at 05:22

## 2018-02-13 RX ADMIN — Medication 60 MILLIGRAM(S): at 05:22

## 2018-02-13 NOTE — CONSULT NOTE ADULT - ASSESSMENT
86 y/o F from [facility] pt w/ PMHx of A-fib, CVA, CHF, at home DNR/DNI p/w SOB x the past few hours as per nursing home reports onset which started in the afternoon. Patient has been complaining of difficulty breathing, but today it was worse and she was very lethargic. History is limited as patient is lethargic, and history was obtained by daughters at bedside, and Ed provider note. Pt was dyspneic in ED and was speaking only one word sentences. Pt was placed on Bipap on arrival to ED.Ed course:EKG: Afib @ 148bpmbnp: [id number]: .027 (10 Feb 2018 23:56). cxr with congestion , seen by cardio, on high doses of lasix, rvp swab pos for HMPV POS      # viral infection with HMPV prediaposing pt to CHF r/o post viral pna . markedly elevated probnp , no fevers     # elevated procalcitonin r/o uti     PLAN  blood cx times 2   u/a and urine cx   start merrem pending above cx  rx of chf as per cardio   ct chest to r/o post viral pna       thnx will f/u   d/w [facility] resident

## 2018-02-13 NOTE — PROGRESS NOTE ADULT - SUBJECTIVE AND OBJECTIVE BOX
INTERVAL HPI/ OVERNIGHT EVENTS: Patient has been a very difficult peripheral access to obtain , secured a line , however , still pending blood work from am .     PRESSORS: [ ] YES [x ] NO  WHICH:    ANTIBIOTICS:     Meropenem              DATE STARTED: 2/11  ANTIBIOTICS:                  DATE STARTED:  ANTIBIOTICS:                  DATE STARTED:    Antimicrobial:  meropenem  IVPB 1000 milliGRAM(s) IV Intermittent every 8 hours    Cardiovascular:  amiodarone    Tablet 200 milliGRAM(s) Oral daily  digoxin     Tablet 0.125 milliGRAM(s) Oral daily  furosemide   Injectable 60 milliGRAM(s) IV Push every 12 hours  metoprolol     tartrate 50 milliGRAM(s) Oral two times a day    Pulmonary:    Hematalogic:    Other:  chlorhexidine 4% Liquid 1 Application(s) Topical daily  nystatin Powder 1 Application(s) Topical daily  pantoprazole  Injectable 40 milliGRAM(s) IV Push daily    amiodarone    Tablet 200 milliGRAM(s) Oral daily  chlorhexidine 4% Liquid 1 Application(s) Topical daily  digoxin     Tablet 0.125 milliGRAM(s) Oral daily  furosemide   Injectable 60 milliGRAM(s) IV Push every 12 hours  meropenem  IVPB 1000 milliGRAM(s) IV Intermittent every 8 hours  metoprolol     tartrate 50 milliGRAM(s) Oral two times a day  nystatin Powder 1 Application(s) Topical daily  pantoprazole  Injectable 40 milliGRAM(s) IV Push daily    Drug Dosing Weight  Height (cm): 167.64 (10 Feb 2018 20:31)  Weight (kg): 90.7 (10 Feb 2018 20:31)  BMI (kg/m2): 32.3 (10 Feb 2018 20:31)  BSA (m2): 2 (10 Feb 2018 20:31)    CENTRAL LINE: [ ] YES [ ] NO  LOCATION:   DATE INSERTED:  REMOVE: [ ] YES [ ] NO  EXPLAIN:    MOBLEY: [ ] YES [ ] NO    DATE INSERTED:  REMOVE:  [ ] YES [ ] NO  EXPLAIN:    A-LINE:  [ ] YES [ ] NO  LOCATION:   DATE INSERTED:  REMOVE:  [ ] YES [ ] NO  EXPLAIN:    PMH -reviewed admission note, no change since admission      ICU Vital Signs Last 24 Hrs  T(C): 36.1 (13 Feb 2018 06:00), Max: 36.7 (12 Feb 2018 16:00)  T(F): 97 (13 Feb 2018 06:00), Max: 98 (12 Feb 2018 16:00)  HR: 97 (13 Feb 2018 06:00) (68 - 129)  BP: 126/75 (13 Feb 2018 06:00) (95/74 - 148/64)  BP(mean): 85 (13 Feb 2018 06:00) (70 - 96)  ABP: --  ABP(mean): --  RR: 18 (13 Feb 2018 06:00) (15 - 35)  SpO2: 100% (13 Feb 2018 05:00) (54% - 100%)            02-12 @ 07:01  -  02-13 @ 07:00  --------------------------------------------------------  IN: 600 mL / OUT: 6230 mL / NET: -5630 mL            HEAD:  [x ]Atraumatic, [x ]Normocephalic  EYES: [ x]EOMI, [x ]PERRLA, [ x]conjunctiva and sclera clear  ENMT: [x ]No tonsillar erythema, exudates, or enlargement; [x ]Moist mucous membranes, [ ]Good dentition, [ ]No lesions  NECK: [x ]Supple, normal appearance,   NERVOUS SYSTEM:  [x ]Alert & Oriented X2 ; [ x]Motor Strength 5/5 B/L upper and lower extremities; [ x]DTRs 2+ intact and symmetric  CHEST/LUNG: [x ]No chest deformity; [x ]Normal percussion bilaterally;  wheezing + in bilateral upper lobes   HEART: [ x]Regular rate and rhythm; [x ]No murmurs, rubs, or gallops  ABDOMEN: [x ]Soft, Nontender, Nondistended; [x ]Bowel sounds present  EXTREMITIES:  [x ]2+ Peripheral Pulses, 2+ pitting pedal edema ,venous ulcer Left leg ( chronic ) , bilateral venous stasis   LYMPH: [ x]No lymphadenopathy noted  SKIN: [x ]No rashes or lesions; [ x]Good capillary refill        LABS:  CBC Full  -  ( 12 Feb 2018 14:26 )  WBC Count : 3.9 K/uL  Hemoglobin : 12.9 g/dL  Hematocrit : 42.6 %  Platelet Count - Automated : 88 K/uL  Mean Cell Volume : 96.9 fl  Mean Cell Hemoglobin : 29.4 pg  Mean Cell Hemoglobin Concentration : 30.3 gm/dL  Auto Neutrophil # : 2.6 K/uL  Auto Lymphocyte # : 0.8 K/uL  Auto Monocyte # : 0.4 K/uL  Auto Eosinophil # : 0.0 K/uL  Auto Basophil # : 0.0 K/uL  Auto Neutrophil % : 67.8 %  Auto Lymphocyte % : 20.7 %  Auto Monocyte % : 10.3 %  Auto Eosinophil % : 0.0 %  Auto Basophil % : 1.3 %    02-12    140  |  103  |  22<H>  ----------------------------<  101<H>  3.1<L>   |  29  |  1.02    Ca    9.0      12 Feb 2018 14:26  Phos  3.4     02-12  Mg     1.7     02-12    TPro  6.6  /  Alb  2.3<L>  /  TBili  1.1  /  DBili  x   /  AST  46<H>  /  ALT  27  /  AlkPhos  250<H>  02-12    PT/INR - ( 12 Feb 2018 14:26 )   PT: 27.6 sec;   INR: 2.49 ratio         PTT - ( 11 Feb 2018 13:15 )  PTT:>200 sec    Culture Results:   No growth to date. (02-11 @ 21:36)    [x ]GOALS OF CARE DISCUSSION WITH PATIENT/FAMILY/PROXY: DNR / DNI     CRITICAL CARE TIME SPENT: 35 minutes INTERVAL HPI/ OVERNIGHT EVENTS: Patient has been a very difficult peripheral access to obtain , secured a line , however , still pending blood work from am .     PRESSORS: [ ] YES [x ] NO  WHICH:    ANTIBIOTICS:     Meropenem              DATE STARTED: 2/11  ANTIBIOTICS:                  DATE STARTED:  ANTIBIOTICS:                  DATE STARTED:    Antimicrobial:  meropenem  IVPB 1000 milliGRAM(s) IV Intermittent every 8 hours    Cardiovascular:  amiodarone    Tablet 200 milliGRAM(s) Oral daily  digoxin     Tablet 0.125 milliGRAM(s) Oral daily  furosemide   Injectable 60 milliGRAM(s) IV Push every 12 hours  metoprolol     tartrate 50 milliGRAM(s) Oral two times a day    Pulmonary:    Hematalogic:    Other:  chlorhexidine 4% Liquid 1 Application(s) Topical daily  nystatin Powder 1 Application(s) Topical daily  pantoprazole  Injectable 40 milliGRAM(s) IV Push daily    amiodarone    Tablet 200 milliGRAM(s) Oral daily  chlorhexidine 4% Liquid 1 Application(s) Topical daily  digoxin     Tablet 0.125 milliGRAM(s) Oral daily  furosemide   Injectable 60 milliGRAM(s) IV Push every 12 hours  meropenem  IVPB 1000 milliGRAM(s) IV Intermittent every 8 hours  metoprolol     tartrate 50 milliGRAM(s) Oral two times a day  nystatin Powder 1 Application(s) Topical daily  pantoprazole  Injectable 40 milliGRAM(s) IV Push daily    Drug Dosing Weight  Height (cm): 167.64 (10 Feb 2018 20:31)  Weight (kg): 90.7 (10 Feb 2018 20:31)  BMI (kg/m2): 32.3 (10 Feb 2018 20:31)  BSA (m2): 2 (10 Feb 2018 20:31)    CENTRAL LINE: [ ] YES [ x] NO  LOCATION:   DATE INSERTED:  REMOVE: [ ] YES [ ] NO  EXPLAIN:    MOBLEY: [ ] YES [ x] NO    DATE INSERTED:  REMOVE:  [ ] YES [ ] NO  EXPLAIN:    A-LINE:  [ ] YES [ x] NO  LOCATION:   DATE INSERTED:  REMOVE:  [ ] YES [ ] NO  EXPLAIN:    PMH -reviewed admission note, no change since admission      ICU Vital Signs Last 24 Hrs  T(C): 36.1 (13 Feb 2018 06:00), Max: 36.7 (12 Feb 2018 16:00)  T(F): 97 (13 Feb 2018 06:00), Max: 98 (12 Feb 2018 16:00)  HR: 97 (13 Feb 2018 06:00) (68 - 129)  BP: 126/75 (13 Feb 2018 06:00) (95/74 - 148/64)  BP(mean): 85 (13 Feb 2018 06:00) (70 - 96)  ABP: --  ABP(mean): --  RR: 18 (13 Feb 2018 06:00) (15 - 35)  SpO2: 100% (13 Feb 2018 05:00) (54% - 100%)            02-12 @ 07:01  -  02-13 @ 07:00  --------------------------------------------------------  IN: 600 mL / OUT: 6230 mL / NET: -5630 mL            HEAD:  [x ]Atraumatic, [x ]Normocephalic  EYES: [ x]EOMI, [x ]PERRLA, [ x]conjunctiva and sclera clear  ENMT: [x ]No tonsillar erythema, exudates, or enlargement; [x ]Moist mucous membranes, [ ]Good dentition, [ ]No lesions  NECK: [x ]Supple, normal appearance,   NERVOUS SYSTEM:  [x ]Alert & Oriented X2 ; [ x]Motor Strength 5/5 B/L upper and lower extremities; [ x]DTRs 2+ intact and symmetric  CHEST/LUNG: [x ]No chest deformity; [x ]Normal percussion bilaterally;  wheezing + in bilateral upper lobes   HEART: [ x]Regular rate and rhythm; [x ]No murmurs, rubs, or gallops  ABDOMEN: [x ]Soft, Nontender, Nondistended; [x ]Bowel sounds present  EXTREMITIES:  [x ]2+ Peripheral Pulses, 2+ pitting pedal edema ,venous ulcer Left leg ( chronic ) , bilateral venous stasis   LYMPH: [ x]No lymphadenopathy noted  SKIN: [x ]No rashes or lesions; [ x]Good capillary refill        LABS:  CBC Full  -  ( 12 Feb 2018 14:26 )  WBC Count : 3.9 K/uL  Hemoglobin : 12.9 g/dL  Hematocrit : 42.6 %  Platelet Count - Automated : 88 K/uL  Mean Cell Volume : 96.9 fl  Mean Cell Hemoglobin : 29.4 pg  Mean Cell Hemoglobin Concentration : 30.3 gm/dL  Auto Neutrophil # : 2.6 K/uL  Auto Lymphocyte # : 0.8 K/uL  Auto Monocyte # : 0.4 K/uL  Auto Eosinophil # : 0.0 K/uL  Auto Basophil # : 0.0 K/uL  Auto Neutrophil % : 67.8 %  Auto Lymphocyte % : 20.7 %  Auto Monocyte % : 10.3 %  Auto Eosinophil % : 0.0 %  Auto Basophil % : 1.3 %    02-12    140  |  103  |  22<H>  ----------------------------<  101<H>  3.1<L>   |  29  |  1.02    Ca    9.0      12 Feb 2018 14:26  Phos  3.4     02-12  Mg     1.7     02-12    TPro  6.6  /  Alb  2.3<L>  /  TBili  1.1  /  DBili  x   /  AST  46<H>  /  ALT  27  /  AlkPhos  250<H>  02-12    PT/INR - ( 12 Feb 2018 14:26 )   PT: 27.6 sec;   INR: 2.49 ratio         PTT - ( 11 Feb 2018 13:15 )  PTT:>200 sec    Culture Results:   No growth to date. (02-11 @ 21:36)    [x ]GOALS OF CARE DISCUSSION WITH PATIENT/FAMILY/PROXY: DNR / DNI     CRITICAL CARE TIME SPENT: 35 minutes

## 2018-02-13 NOTE — PROGRESS NOTE ADULT - PROBLEM SELECTOR PLAN 1
Respiratory failure 2/2 to CHF exacerbation   was on Bipap initially , off bipap since 10pm on 2/11 , saturating well on 4 L NC  diuresed with IV lasix 60 bid   Patient has a net negative of - 6 L in last 12 hours   Patient is DNR/DNI Respiratory failure 2/2 to CHF exacerbation   was on Bipap initially , off bipap since 10pm on 2/11 , saturating well on 4 L NC  diuresed with IV lasix 60 bid --> will now cut down to 40 bid   Patient has a net negative of - 5.6 L in last 12 hours   Patient is DNR/DNI

## 2018-02-13 NOTE — PROGRESS NOTE ADULT - SUBJECTIVE AND OBJECTIVE BOX
Patient seen and examined.     MEDICATIONS  (STANDING):  amiodarone    Tablet 200 milliGRAM(s) Oral daily  chlorhexidine 4% Liquid 1 Application(s) Topical daily  collagenase Ointment 1 Application(s) Topical daily  digoxin     Tablet 0.125 milliGRAM(s) Oral daily  furosemide   Injectable 40 milliGRAM(s) IV Push every 12 hours  metoprolol     tartrate 50 milliGRAM(s) Oral two times a day  nystatin Powder 1 Application(s) Topical daily  pantoprazole  Injectable 40 milliGRAM(s) IV Push daily  sodium phosphate IVPB 15 milliMole(s) IV Intermittent once      MEDICATIONS  (PRN):   Medications up to date at time of exam.      PHYSICAL EXAMINATION:  Patient has no new complaints.  GENERAL: The patient is a well-developed, well-nourished, in no apparent distress.     Vital Signs Last 24 Hrs  T(C): 36.1 (13 Feb 2018 06:00), Max: 36.7 (12 Feb 2018 16:00)  T(F): 97 (13 Feb 2018 06:00), Max: 98 (12 Feb 2018 16:00)  HR: 97 (13 Feb 2018 15:00) (68 - 107)  BP: 100/62 (13 Feb 2018 15:00) (88/41 - 148/64)  BP(mean): 71 (13 Feb 2018 15:00) (53 - 97)  RR: 0 (13 Feb 2018 15:00) (0 - 26)  SpO2: 99% (13 Feb 2018 15:00) (54% - 100%)   (if applicable)      HEENT: Head is normocephalic and atraumatic.     NECK: Supple, no palpable adenopathy.    LUNGS: Clear to auscultation, no wheezing, rales, or rhonchi.    HEART: irregularly irregular + II/VI HSM murmur.    ABDOMEN: Soft, nontender, and nondistended.  No hepatosplenomegaly is noted.    EXTREMITIES: Without any cyanosis, clubbing, rash, lesions or + B/L LE edema.    NEUROLOGIC: Awake, alert.    SKIN: Warm, dry, good turgor.      LABS:                        13.0   7.8   )-----------( 95       ( 13 Feb 2018 14:32 )             43.9     02-13    140  |  98  |  16  ----------------------------<  147<H>  4.6   |  38<H>  |  0.80    Ca    8.2<L>      13 Feb 2018 14:32  Phos  2.0     02-13  Mg     1.6     02-13    TPro  6.1  /  Alb  1.9<L>  /  TBili  0.8  /  DBili  x   /  AST  78<H>  /  ALT  29  /  AlkPhos  228<H>  02-13    PT/INR - ( 13 Feb 2018 14:32 )   PT: 22.9 sec;   INR: 2.07 ratio             ABG - ( 13 Feb 2018 14:28 )  pH: 7.45  /  pCO2: 53    /  pO2: 107   / HCO3: 37    / Base Excess: 10.9  /  SaO2: 98              Serum Pro-Brain Natriuretic Peptide: 03587 pg/mL (02-10-18 @ 21:40)      Procalcitonin, Serum: 0.61 ng/mL (02-12-18 @ 14:29)      MICROBIOLOGY: (if applicable)    RADIOLOGY & ADDITIONAL STUDIES:  EKG:   CXR:  ECHO:  < from: Transthoracic Echocardiogram (02.12.18 @ 11:21) >    Patient name: CHESTER JONES  YOB: 1932   Age: 85 (F)   MR#: [id number]  Study Date: 2/12/2018  [id number]: [facility]  Study quality: Technically good  Referring Physician:  PAOLA PAGAN MD  Blood Pressure: 123/76 mmHg  Height: 168 cm  Weight: 91 kg  BSA: 2 m2  ------------------------------------------------------------------------    PROCEDURE: Transthoracic echocardiogram with 2-D, M-Mode  and complete spectral and color flow Doppler.  INDICATION: Edema, unspecified (R60.9)  HISTORY:  ------------------------------------------------------------------------  DIMENSIONS:  Dimensions:     Normal Values:  LA:     4.2 cm    2.0 - 4.0 cm  Ao:     3.6 cm    2.0 - 3.8 cm  SEPTUM: 1.0 cm    0.6 - 1.2 cm  PWT:    1.0 cm    0.6 - 1.1 cm  LVIDd:  5.4 cm    3.0 - 5.6 cm  LVIDs:  5.1 cm    1.8 - 4.0 cm      Derived Variables:  LVMI: 103 g/m2  RWT: 0.37  Ejection Fraction Visual Estimate: 10 %    ------------------------------------------------------------------------  OBSERVATIONS:  Mitral Valve: Normal mitral valve. Severe mitral  regurgitation.  Aortic Root: Aortic Root: 3.6 cm.    Aortic Valve: Calcified trileaflet aortic valve with normal  opening. Mild aortic insufficiency.  Left Atrium: Mild left atrial enlargement.  Left Ventricle: Dilated LV with severe global left  ventricular systolic dysfunction EF=10%. Normal left  ventricular internal dimensions and wall thicknesses. Grade  IV diastolic dysfunction.  Right Heart: Moderate right atrial enlargement. Right  ventricular enlargement with decreased RV function. There  is severe tricuspid regurgitation. There is mild pulmonic  regurgitation.  Pericardium/PleuraNormal pericardium with no pericardial  effusion.  Hemodynamic: RV systolic pressure is moderately increased  at  46 mm Hg.  ------------------------------------------------------------------------  CONCLUSIONS:  1. Normal mitral valve. Severe mitral regurgitation.  2. Calcified trileaflet aortic valve with normal opening.  Mild aortic insufficiency.  3. Aortic Root: 3.6 cm.  4. Mild left atrial enlargement.  5. Normal left ventricular internal dimensions and wall  thicknesses.  6. Dilated LV with severe global left ventricular systolic  dysfunction EF=10%.  7. Grade IV diastolic dysfunction.  8. Moderate right atrial enlargement.  9. Right ventricular enlargement with decreased RV  function.  10. RV systolic pressure is moderately increased at  46 mm  Hg.  11. There is severe tricuspid regurgitation.  12. There is mild pulmonic regurgitation.  13. Normal pericardium with no pericardial effusion.    ------------------------------------------------------------------------  Confirmed on  2/13/2018 - 07:59:24 by Huyen Medina MD  ------------------------------------------------------------------------    < end of copied text >        IMPRESSION: 85y Female PAST MEDICAL & SURGICAL HISTORY:  Venous stasis: b/l leg, left calf ulcer  CVA (cerebral vascular accident): March 18th 2017, left side weakness ,s/p TPA  Atrial fibrillation  HTN (hypertension)  No significant past surgical history       Patient presents from NH with SOB due to HF exacerbation, reduced EF. Probnp 22,000.  Continue with IV lasix, bb, recommend low dose ACE I for HF.   Afib stable rate, continue with amiodarone, digoxin, anticoagulation.  Patient's EF now 10%, was 30% in 2017, attempted to call daughter Loly [phone], went to voicemail, to discuss patient's worsening HF.

## 2018-02-13 NOTE — PROGRESS NOTE ADULT - PROBLEM SELECTOR PLAN 4
now rate controlled   s/p  Amiodarone loading for rate control  Continue on amiodarone maintenance dose 200 OD  Start Metoprolol 50 bid   s/p digoxin loading dose , on maintain dose now   Initially started on heparin drip , Off ac for now , due to supra therapeutic INR  takes ellvickyis at home   f/u inr in am

## 2018-02-13 NOTE — PROGRESS NOTE ADULT - PROBLEM SELECTOR PLAN 2
Patient has hx of Systolic heart failure last echo showing EF 30-35% in march 2017  elevated bnp of 80049  Troponin negative   f/up Echo  c/w Lasix 60mg IV BID for now  Monitor Bun/Cr  Cardio : Dr. Chavis Patient has hx of Systolic heart failure last echo showing EF 30-35% in march 2017  [place] bnp of 825[address]  Troponin negative   f/up Echo : EF of 10 % with grade 4 dd , fall from EF of 35 % in March 2017  Will follow up with Dr. Day for the same   c/w Lasix 40mg IV BID for now  Monitor Bun/Cr : wnl   Cardio : Dr. Chavis

## 2018-02-13 NOTE — CONSULT NOTE ADULT - SUBJECTIVE AND OBJECTIVE BOX
86 y/o Female from [facility] pt with PMHx of A-fib, CVA, CHF, at home DNR/DNI p/w SOB, as per nursing home reports onset which started 2 days ago. Patient has been complaining of difficulty breathing, but has worsen and she was very lethargic. History is limited as patient is lethargic. Pt was dyspneic in ED and was speaking only one word sentences. Pt was placed on Bipap on arrival to ED. Admitted to ICU with Respiratory failure 2/2 to CHF exacerbation. Consulted for left lower leg chronic venous stasis ulcers     Review of Systems:  Review of Systems: unable to ellicit      Allergies and Intolerances:        Allergies:  	penicillin: Drug, Unknown    Home Medications:   * Patient Currently Takes Medications as of 03-Jul-2017 12:07 documented in Structured Notes  · 	apixaban 2.5 mg oral tablet: 1 tab(s) orally 2 times a day  · 	potassium chloride 20 mEq oral tablet, extended release: 1 tab(s) orally once a day  · 	ciprofloxacin 500 mg oral tablet: 1 tab(s) orally every 12 hours  · 	pantoprazole 40 mg oral delayed release tablet: 1 tab(s) orally once a day (before a meal)  · 	spironolactone 25 mg oral tablet: 1 tab(s) orally once a day  · 	furosemide 40 mg oral tablet: 1 tab(s) orally once a day  · 	digoxin 125 mcg (0.125 mg) oral tablet: 1 tab(s) orally once a day    Patient History:    Past Medical History:  Atrial fibrillation    CVA (cerebral vascular accident)  March 18th 2017, left side weakness ,s/p TPA  HTN (hypertension)    Venous stasis  b/l leg, left calf ulcer.     Past Surgical History:  No significant past surgical history.     Family History:  No pertinent family history in first degree relatives.     Tobacco Screening:  · Core Measure Site	Yes  · Has the patient used tobacco in the past 30 days?	No    Risk Assessment:    Present on Admission:  Deep Venous Thrombosis	no  Pulmonary Embolus	no    PHYSICAL EXAM:  86 y/o female seen at bedside in NAD. For left lower extremity venous stasis ulcers  Local wound care with santyl ointment  Admitted to ICU with Respiratory failure 2/2 to CHF exacerbation.    ICU Vital Signs Last 24 Hrs  T(C): 36.1 (13 Feb 2018 06:00), Max: 36.7 (12 Feb 2018 16:00)  T(F): 97 (13 Feb 2018 06:00), Max: 98 (12 Feb 2018 16:00)  HR: 97 (13 Feb 2018 06:00) (68 - 129)  BP: 126/75 (13 Feb 2018 06:00) (95/74 - 148/64)  BP(mean): 85 (13 Feb 2018 06:00) (70 - 96)  ABP: --  ABP(mean): --  RR: 18 (13 Feb 2018 06:00) (15 - 35)  SpO2: 100% (13 Feb 2018 05:00) (54% - 100%)    O:   Integument:  Skin cool, dry, scaly with trophic changes bilateral.    Left medial lower leg ulcers x 2  measuring  4.0 cm x 2.0 cm, and distal ulcer measuring 3.0 cm x 2.5 cm both with mostly granular base, mixed with few fibrotic tissues, no active drainage, no purulent, no erythema, 1+ pitting edema, -fluctuance, -tracking/tunneling, -probe to bone. Chronic venous stasis changes with hyper-pigmentation noted bilateral LE  Toenails are elongated, thickened and dystrophic x 10  Vascular: Dorsalis Pedis 0/4 and Posterior Tibial pulses 0/4 right.  Doppler-able pedal pulses bilateral, Capillary re-fill time > 5 seconds digits x 10    Neuro: Protective sensation diminished bilateral  foot to S.W monofilament   MSK: Laterally deviated hallux bilateral,  dorsally contracted digits 2-5 bilateral, weak bilateral LE      LABS/DIAGNOSTIC TESTS  CBC Full  -  ( 12 Feb 2018 14:26 )  WBC Count : 3.9 K/uL  Hemoglobin : 12.9 g/dL  Hematocrit : 42.6 %  Platelet Count - Automated : 88 K/uL  Mean Cell Volume : 96.9 fl  Mean Cell Hemoglobin : 29.4 pg  Mean Cell Hemoglobin Concentration : 30.3 gm/dL  Auto Neutrophil # : 2.6 K/uL  Auto Lymphocyte # : 0.8 K/uL  Auto Monocyte # : 0.4 K/uL  Auto Eosinophil # : 0.0 K/uL  Auto Basophil # : 0.0 K/uL  Auto Neutrophil % : 67.8 %  Auto Lymphocyte % : 20.7 %  Auto Monocyte % : 10.3 %  Auto Eosinophil % : 0.0 %  Auto Basophil % : 1.3 %    02-12    140  |  103  |  22<H>  ----------------------------<  101<H>  3.1<L>   |  29  |  1.02    Ca    9.0      12 Feb 2018 14:26  Phos  3.4     02-12  Mg     1.7     02-12    TPro  6.6  /  Alb  2.3<L>  /  TBili  1.1  /  DBili  x   /  AST  46<H>  /  ALT  27  /  AlkPhos  250<H>  02-12    PT/INR - ( 12 Feb 2018 14:26 )   PT: 27.6 sec;   INR: 2.49 ratio         PTT - ( 11 Feb 2018 13:15 )  PTT:>200 sec

## 2018-02-13 NOTE — CONSULT NOTE ADULT - SUBJECTIVE AND OBJECTIVE BOX
HPI:  86 y/o F from [facility] pt w/ PMHx of A-fib, CVA, CHF, at home DNR/DNI p/w SOB x the past few hours as per nursing home reports onset which started in the afternoon. Patient has been complaining of difficulty breathing, but today it was worse and she was very lethargic. History is limited as patient is lethargic, and history was obtained by daughters at bedside, and Ed provider note. Pt was dyspneic in ED and was speaking only one word sentences. Pt was placed on Bipap on arrival to ED.Ed course:EKG: Afib @ 148bpmbnp: [id number]: .027 (10 Feb 2018 23:56). cxr with congestion , seen by cardio, on high doses of lasix, rvp swab pos for HMPV POS . ID called for eval of appr ab for poss concomitant pna       PAST MEDICAL & SURGICAL HISTORY:  Venous stasis: b/l leg, left calf ulcer  CVA (cerebral vascular accident): March 18th 2017, left side weakness ,s/p TPA  Atrial fibrillation  HTN (hypertension)  No significant past surgical history    allergy   penicillin (Unknown)    meds   amiodarone    Tablet 200 milliGRAM(s) Oral daily  apixaban 5 milliGRAM(s) Oral every 12 hours  chlorhexidine 4% Liquid 1 Application(s) Topical daily  collagenase Ointment 1 Application(s) Topical daily  digoxin     Tablet 0.125 milliGRAM(s) Oral daily  furosemide   Injectable 40 milliGRAM(s) IV Push every 12 hours  metoprolol     tartrate 50 milliGRAM(s) Oral two times a day  nystatin Powder 1 Application(s) Topical daily  pantoprazole  Injectable 40 milliGRAM(s) IV Push daily      Social Hx:no smoking no etoh     FAMILY HISTORY:  No pertinent family history in first degree relatives        ROS  [x  ] UNABLE TO ELICIT pt pierre lethargic and not answering questions     General:  [  ] Fever   [  ] Malaise    Skin:    HEENT:  [  ] Sore Throat  [  ] Photophobia	    Chest: [  ] SOB  [  ] Cough     Cardiovascular: [  ] CP	    Gastrointestinal: [  ] Abd pain   [  ] N    [  ] V   [  ] Diarrhea	    Genitourinary: [  ] Polyuria   [  ] Urgency   [  ] Dysuria    [  ]  Hematuria	    Musculoskeletal:  [  ] Back Pain	    Neurological: [  ]Dizziness  [  ]Visual Disturbance  [  ]Headaches      PHYSICAL EXAM:    Vital Signs Last 24 Hrs  T(C): 36.9 (13 Feb 2018 15:39), Max: 36.9 (13 Feb 2018 15:39)  T(F): 98.5 (13 Feb 2018 15:39), Max: 98.5 (13 Feb 2018 15:39)  HR: 94 (13 Feb 2018 18:00) (68 - 107)  BP: 112/66 (13 Feb 2018 18:00) (88/41 - 148/64)  BP(mean): 78 (13 Feb 2018 18:00) (53 - 97)  RR: 19 (13 Feb 2018 18:00) (0 - 26)  SpO2: 85% (13 Feb 2018 17:00) (54% - 100%)    Constitutional: very lethargic , sob at rest , on VM   SWAPNA SCLERA anicteric EOMI   LUNGS biabsilar rales    CVS s1 s2 aud systolic murmur lsb    ABDOMEN soft non tender no HSM , obese   NEUROLOGY  no defecits   SKIN stasis dermatitis with stasis ulcers over left leg   EXTREMITIES 2+ edema no cyanosis   de la rosa with casa urine         LABS/DIAGNOSTIC TESTS                          13.0   7.8   )-----------( 95       ( 13 Feb 2018 14:32 )             43.9     WBC Count: 7.8 K/uL (02-13 @ 14:32)  WBC Count: 3.9 K/uL (02-12 @ 14:26)  WBC Count: 5.1 K/uL (02-11 @ 13:15)  WBC Count: 4.4 K/uL (02-10 @ 21:40)      02-13    140  |  98  |  16  ----------------------------<  147<H>  4.6   |  38<H>  |  0.80    Ca    8.2<L>      13 Feb 2018 14:32  Phos  2.0     02-13  Mg     1.6     02-13    TPro  6.1  /  Alb  1.9<L>  /  TBili  0.8  /  DBili  x   /  AST  78<H>  /  ALT  29  /  AlkPhos  228<H>  02-13          LIVER FUNCTIONS - ( 13 Feb 2018 14:32 )  Alb: 1.9 g/dL / Pro: 6.1 g/dL / ALK PHOS: 228 U/L / ALT: 29 U/L DA / AST: 78 U/L / GGT: x             PT/INR - ( 13 Feb 2018 14:32 )   PT: 22.9 sec;   INR: 2.07 ratio             LACTATE:Lactate, Blood (02.12.18 @ 14:26)    Lactate, Blood: 1.5 mmol/L          Culture Results: blood  No growth to date. (02-11 @ 21:36)    Rapid Respiratory Viral Panel (02.11.18 @ 01:59)    Rapid RVP Result: Detected: The [employer] RVP Rapid uses polymerase chain reaction (PCR) and melt  curve analysis to screen for adenovirus; coronavirus HKU1, NL63, 229E,  OC43; human metapneumovirus (hMPV); human enterovirus/rhinovirus  (Entero/RV); influenza A; influenza A/H1;influenza A/H3; influenza  A/H1-2009; influenza B; parainfluenza viruses 1, 2, 3, 4; respiratory  syncytial virus; Bordetella pertussis; Mycoplasma pneumoniae; and  Chlamydophila pneumoniae.    hMPV (RapRVP): Detected        RADIOLOGY    < from: Xray Chest 1 View- PORTABLE-Routine (02.13.18 @ 10:46) >  EXAM:  XR CHEST PORTABLE ROUTINE 1V                            PROCEDURE DATE:  02/13/2018          INTERPRETATION:  Follow-up.    AP chest. Prior dated 2/12/2018.    No change heart mediastinum. Slight interval improvement in the airspace   infiltrate in the right upper lobe. No change in the left midlung   airspace infiltrate. No new abnormality.    Impression: As above                RUDDY MUSE M.D., ATTENDING RADIOLOGIST  This document has been electronically signed. Feb 13 2018 11:13AM    < end of copied text >

## 2018-02-13 NOTE — PROGRESS NOTE ADULT - SUBJECTIVE AND OBJECTIVE BOX
· Subjective and Objective: 	  INTERVAL HPI/ OVERNIGHT EVENTS: Patient has been a very difficult peripheral access to obtain , secured a line , however , still pending blood work from am .     PRESSORS: [ ] YES [x ] NO  WHICH:    ANTIBIOTICS:     Meropenem              DATE STARTED: 2/11  ANTIBIOTICS:                  DATE STARTED:  ANTIBIOTICS:                  DATE STARTED:    Antimicrobial:  meropenem  IVPB 1000 milliGRAM(s) IV Intermittent every 8 hours    Cardiovascular:  amiodarone    Tablet 200 milliGRAM(s) Oral daily  digoxin     Tablet 0.125 milliGRAM(s) Oral daily  furosemide   Injectable 60 milliGRAM(s) IV Push every 12 hours  metoprolol     tartrate 50 milliGRAM(s) Oral two times a day    Pulmonary:    Hematalogic:    Other:  chlorhexidine 4% Liquid 1 Application(s) Topical daily  nystatin Powder 1 Application(s) Topical daily  pantoprazole  Injectable 40 milliGRAM(s) IV Push daily    amiodarone    Tablet 200 milliGRAM(s) Oral daily  chlorhexidine 4% Liquid 1 Application(s) Topical daily  digoxin     Tablet 0.125 milliGRAM(s) Oral daily  furosemide   Injectable 60 milliGRAM(s) IV Push every 12 hours  meropenem  IVPB 1000 milliGRAM(s) IV Intermittent every 8 hours  metoprolol     tartrate 50 milliGRAM(s) Oral two times a day  nystatin Powder 1 Application(s) Topical daily  pantoprazole  Injectable 40 milliGRAM(s) IV Push daily    Drug Dosing Weight  Height (cm): 167.64 (10 Feb 2018 20:31)  Weight (kg): 90.7 (10 Feb 2018 20:31)  BMI (kg/m2): 32.3 (10 Feb 2018 20:31)  BSA (m2): 2 (10 Feb 2018 20:31)    CENTRAL LINE: [ ] YES [ ] NO  LOCATION:   DATE INSERTED:  REMOVE: [ ] YES [ ] NO  EXPLAIN:    MOBLEY: [ ] YES [ ] NO    DATE INSERTED:  REMOVE:  [ ] YES [ ] NO  EXPLAIN:    A-LINE:  [ ] YES [ ] NO  LOCATION:   DATE INSERTED:  REMOVE:  [ ] YES [ ] NO  EXPLAIN:    PMH -reviewed admission note, no change since admission      ICU Vital Signs Last 24 Hrs  T(C): 36.1 (13 Feb 2018 06:00), Max: 36.7 (12 Feb 2018 16:00)  T(F): 97 (13 Feb 2018 06:00), Max: 98 (12 Feb 2018 16:00)  HR: 97 (13 Feb 2018 06:00) (68 - 129)  BP: 126/75 (13 Feb 2018 06:00) (95/74 - 148/64)  BP(mean): 85 (13 Feb 2018 06:00) (70 - 96)  ABP: --  ABP(mean): --  RR: 18 (13 Feb 2018 06:00) (15 - 35)  SpO2: 100% (13 Feb 2018 05:00) (54% - 100%)            02-12 @ 07:01  -  02-13 @ 07:00  --------------------------------------------------------  IN: 600 mL / OUT: 6230 mL / NET: -5630 mL            HEAD:  [x ]Atraumatic, [x ]Normocephalic  EYES: [ x]EOMI, [x ]PERRLA, [ x]conjunctiva and sclera clear  ENMT: [x ]No tonsillar erythema, exudates, or enlargement; [x ]Moist mucous membranes, [ ]Good dentition, [ ]No lesions  NECK: [x ]Supple, normal appearance,   NERVOUS SYSTEM:  [x ]Alert & Oriented X2 ; [ x]Motor Strength 5/5 B/L upper and lower extremities; [ x]DTRs 2+ intact and symmetric  CHEST/LUNG: [x ]No chest deformity; [x ]Normal percussion bilaterally;  wheezing + in bilateral upper lobes   HEART: [ x]Regular rate and rhythm; [x ]No murmurs, rubs, or gallops  ABDOMEN: [x ]Soft, Nontender, Nondistended; [x ]Bowel sounds present  EXTREMITIES:  [x ]2+ Peripheral Pulses, 2+ pitting pedal edema ,venous ulcer Left leg ( chronic ) , bilateral venous stasis   LYMPH: [ x]No lymphadenopathy noted  SKIN: [x ]No rashes or lesions; [ x]Good capillary refill        LABS:  CBC Full  -  ( 12 Feb 2018 14:26 )  WBC Count : 3.9 K/uL  Hemoglobin : 12.9 g/dL  Hematocrit : 42.6 %  Platelet Count - Automated : 88 K/uL  Mean Cell Volume : 96.9 fl  Mean Cell Hemoglobin : 29.4 pg  Mean Cell Hemoglobin Concentration : 30.3 gm/dL  Auto Neutrophil # : 2.6 K/uL  Auto Lymphocyte # : 0.8 K/uL  Auto Monocyte # : 0.4 K/uL  Auto Eosinophil # : 0.0 K/uL  Auto Basophil # : 0.0 K/uL  Auto Neutrophil % : 67.8 %  Auto Lymphocyte % : 20.7 %  Auto Monocyte % : 10.3 %  Auto Eosinophil % : 0.0 %  Auto Basophil % : 1.3 %    02-12    140  |  103  |  22<H>  ----------------------------<  101<H>  3.1<L>   |  29  |  1.02    Ca    9.0      12 Feb 2018 14:26  Phos  3.4     02-12  Mg     1.7     02-12    TPro  6.6  /  Alb  2.3<L>  /  TBili  1.1  /  DBili  x   /  AST  46<H>  /  ALT  27  /  AlkPhos  250<H>  02-12    PT/INR - ( 12 Feb 2018 14:26 )   PT: 27.6 sec;   INR: 2.49 ratio         PTT - ( 11 Feb 2018 13:15 )  PTT:>200 sec    Culture Results:   No growth to date. (02-11 @ 21:36)    [x ]GOALS OF CARE DISCUSSION WITH PATIENT/FAMILY/PROXY: DNR / DNI     CRITICAL CARE TIME SPENT: 35 minutes    Assessment and Plan:   · Assessment		  84 y/o F from [facility] pt w/ PMHx of A-fib, CVA, CHF, at home DNR/DNI p/w SOB  admitted to ICU for Respiratory failure 2/2 to CHF exacerbation/PNA     Problem/Plan - 1:  ·  Problem: Respiratory failure.  Plan: Respiratory failure 2/2 to CHF exacerbation   was on Bipap initially , off bipap since 10pm on 2/11 , saturating well on 4 L NC  diuresed with IV lasix 60 bid   Patient has a net negative of - 6 L in last 12 hours   Patient is DNR/DNI.      Problem/Plan - 2:  ·  Problem: CHF exacerbation.  Plan: Patient has hx of Systolic heart failure last echo showing EF 30-35% in march 2017  [place] b[place] of 63[address]  Troponin negative   f/up Echo  c/w Lasix 60mg IV BID for now  Monitor Bun/Cr  Cardio : Dr. Chavis.      Problem/Plan - 3:  ·  Problem: HCAP (healthcare-associated pneumonia).  Plan: Patient's CXR found to have RML infiltrate, c/w IV meropenem for now  f/up procalcitonin : 0.61   Lactate wnl now , vitals stable   Blood cultures negative to date   Consult ID . Dr. Candelaria.      Problem/Plan - 4:  ·  Problem: Rapid atrial fibrillation.  Plan: now rate controlled   s/p  Amiodarone loading for rate control  Continue on amiodarone maintenance dose 200 OD  Start Metoprolol 50 bid   s/p digoxin loading dose , on maintain dose now   Initially started on heparin drip , Off ac for now , due to supra therapeutic INR  takes elliquis at home   f/u inr in am.      Problem/Plan - 5:  ·  Problem: HTN (hypertension).  Plan: Holding anti-HTN medications for now, as patients BP is borderline normal to low normal  restart as needed.      Problem/Plan - 6:  Problem: Need for prophylactic measure. Plan: GI ppx with protonix  No dvt ppx due to supra therapeutic INR , no scd boots due to chronic venous stasis.

## 2018-02-13 NOTE — PROGRESS NOTE ADULT - PROBLEM SELECTOR PLAN 3
Patient's CXR found to have RML infiltrate, c/w IV meropenem for now  f/up procalcitonin : 0.61   Lactate wnl now , vitals stable   Blood cultures negative to date   Consult ID . Dr. Candelaria Patient's CXR found to have RML infiltrate  f/up procalcitonin : 0.61   Lactate wnl now , vitals stable   Blood cultures negative to date   Discontinued meropenem   Consult ID . Dr. Candelaria

## 2018-02-13 NOTE — CONSULT NOTE ADULT - ASSESSMENT
A: Left lower leg venous stasis ulcers   Onychomycosis X 10, PVD, Xerosis  H/O A-fib, CVA, CHF, HTN    P:   Chart reviewed and Patient evaluated  Performed excisional debridement of devitalized tissues with scalpel to and including subcutaneous level to left lower leg ulcers  Cleaned with normal saline and applied santyl ointment with adaptic and wet to dry dressing  Continue daily dressing changes with santyl ointment to left leg ulcers  Performed aseptic debridement of all toenails without complications  Offloading to bilateral Heels. And elevate bilateral LE

## 2018-02-13 NOTE — PROGRESS NOTE ADULT - PROBLEM SELECTOR PLAN 6
GI ppx with protonix  No dvt ppx due to supra therapeutic INR , no scd boots due to chronic venous stasis Patient has chronic venous stasis in bilateral Le  Podiatry was consulted

## 2018-02-14 LAB
ALBUMIN SERPL ELPH-MCNC: 1.6 G/DL — LOW (ref 3.5–5)
ALP SERPL-CCNC: 192 U/L — HIGH (ref 40–120)
ALT FLD-CCNC: 24 U/L DA — SIGNIFICANT CHANGE UP (ref 10–60)
ANION GAP SERPL CALC-SCNC: 2 MMOL/L — LOW (ref 5–17)
AST SERPL-CCNC: 74 U/L — HIGH (ref 10–40)
BILIRUB SERPL-MCNC: 0.8 MG/DL — SIGNIFICANT CHANGE UP (ref 0.2–1.2)
BUN SERPL-MCNC: 13 MG/DL — SIGNIFICANT CHANGE UP (ref 7–18)
CALCIUM SERPL-MCNC: 8 MG/DL — LOW (ref 8.4–10.5)
CHLORIDE SERPL-SCNC: 96 MMOL/L — SIGNIFICANT CHANGE UP (ref 96–108)
CO2 SERPL-SCNC: 44 MMOL/L — HIGH (ref 22–31)
CREAT SERPL-MCNC: 0.66 MG/DL — SIGNIFICANT CHANGE UP (ref 0.5–1.3)
GLUCOSE SERPL-MCNC: 102 MG/DL — HIGH (ref 70–99)
HCT VFR BLD CALC: 39.8 % — SIGNIFICANT CHANGE UP (ref 34.5–45)
HGB BLD-MCNC: 12.2 G/DL — SIGNIFICANT CHANGE UP (ref 11.5–15.5)
MAGNESIUM SERPL-MCNC: 1.4 MG/DL — LOW (ref 1.6–2.6)
MCHC RBC-ENTMCNC: 30 PG — SIGNIFICANT CHANGE UP (ref 27–34)
MCHC RBC-ENTMCNC: 30.7 GM/DL — LOW (ref 32–36)
MCV RBC AUTO: 97.7 FL — SIGNIFICANT CHANGE UP (ref 80–100)
PHOSPHATE SERPL-MCNC: 1.8 MG/DL — LOW (ref 2.5–4.5)
PLATELET # BLD AUTO: 102 K/UL — LOW (ref 150–400)
POTASSIUM SERPL-MCNC: 4.5 MMOL/L — SIGNIFICANT CHANGE UP (ref 3.5–5.3)
POTASSIUM SERPL-SCNC: 4.5 MMOL/L — SIGNIFICANT CHANGE UP (ref 3.5–5.3)
PROT SERPL-MCNC: 6 G/DL — SIGNIFICANT CHANGE UP (ref 6–8.3)
RBC # BLD: 4.07 M/UL — SIGNIFICANT CHANGE UP (ref 3.8–5.2)
RBC # FLD: 17.8 % — HIGH (ref 10.3–14.5)
SODIUM SERPL-SCNC: 142 MMOL/L — SIGNIFICANT CHANGE UP (ref 135–145)
WBC # BLD: 4.8 K/UL — SIGNIFICANT CHANGE UP (ref 3.8–10.5)
WBC # FLD AUTO: 4.8 K/UL — SIGNIFICANT CHANGE UP (ref 3.8–10.5)

## 2018-02-14 PROCEDURE — 71045 X-RAY EXAM CHEST 1 VIEW: CPT | Mod: 26

## 2018-02-14 RX ORDER — FUROSEMIDE 40 MG
40 TABLET ORAL
Qty: 0 | Refills: 0 | Status: DISCONTINUED | OUTPATIENT
Start: 2018-02-14 | End: 2018-02-14

## 2018-02-14 RX ORDER — MAGNESIUM SULFATE 500 MG/ML
2 VIAL (ML) INJECTION
Qty: 0 | Refills: 0 | Status: COMPLETED | OUTPATIENT
Start: 2018-02-14 | End: 2018-02-14

## 2018-02-14 RX ORDER — FUROSEMIDE 40 MG
40 TABLET ORAL
Qty: 0 | Refills: 0 | Status: DISCONTINUED | OUTPATIENT
Start: 2018-02-14 | End: 2018-02-20

## 2018-02-14 RX ADMIN — Medication 40 MILLIGRAM(S): at 16:16

## 2018-02-14 RX ADMIN — CHLORHEXIDINE GLUCONATE 1 APPLICATION(S): 213 SOLUTION TOPICAL at 13:37

## 2018-02-14 RX ADMIN — Medication 1 APPLICATION(S): at 13:35

## 2018-02-14 RX ADMIN — Medication 40 MILLIGRAM(S): at 05:08

## 2018-02-14 RX ADMIN — Medication 50 MILLIGRAM(S): at 05:08

## 2018-02-14 RX ADMIN — Medication 50 GRAM(S): at 16:16

## 2018-02-14 RX ADMIN — Medication 50 MILLIGRAM(S): at 17:34

## 2018-02-14 RX ADMIN — Medication 50 GRAM(S): at 17:32

## 2018-02-14 RX ADMIN — APIXABAN 5 MILLIGRAM(S): 2.5 TABLET, FILM COATED ORAL at 17:32

## 2018-02-14 RX ADMIN — NYSTATIN CREAM 1 APPLICATION(S): 100000 CREAM TOPICAL at 13:37

## 2018-02-14 RX ADMIN — AMIODARONE HYDROCHLORIDE 200 MILLIGRAM(S): 400 TABLET ORAL at 05:07

## 2018-02-14 RX ADMIN — Medication 0.12 MILLIGRAM(S): at 05:08

## 2018-02-14 RX ADMIN — PANTOPRAZOLE SODIUM 40 MILLIGRAM(S): 20 TABLET, DELAYED RELEASE ORAL at 13:35

## 2018-02-14 RX ADMIN — APIXABAN 5 MILLIGRAM(S): 2.5 TABLET, FILM COATED ORAL at 05:08

## 2018-02-14 RX ADMIN — Medication 62.5 MILLIMOLE(S): at 16:16

## 2018-02-14 NOTE — PROGRESS NOTE ADULT - PROBLEM SELECTOR PLAN 3
Patient's CXR found to have RML infiltrate  f/up procalcitonin : 0.61   Lactate wnl now , vitals stable   Blood cultures negative to date   Discontinued meropenem   Consult ID . Dr. Candelaria

## 2018-02-14 NOTE — PROGRESS NOTE ADULT - PROBLEM SELECTOR PLAN 1
Respiratory failure 2/2 to CHF exacerbation   was on Bipap initially , off bipap since 10pm on 2/11 , saturating well on 4 L NC  diuresed with IV lasix 60 bid --> will now cut down to 40 bid   Patient has a net negative of - 5.9 L in last 12 hours   Patient is DNR/DNI Respiratory failure 2/2 to CHF exacerbation   was on Bipap initially , off bipap since 10pm on 2/11 , saturating well on 4 L NC  diuresed with IV lasix 60 bid --> will now cut down to 40 iv bid --> will cut down to 40 bid oral today   Patient has a net negative of - 5.9 L in last 12 hours   Patient is DNR/DNI

## 2018-02-14 NOTE — PROGRESS NOTE ADULT - PROBLEM SELECTOR PLAN 4
now rate controlled   s/p  Amiodarone loading for rate control  Continue on amiodarone maintenance dose 200 OD  Start Metoprolol 50 bid   s/p digoxin loading dose , on maintain dose now   Initially started on heparin drip , Off ac for now , due to supra therapeutic INR  takes magdais at home   f/u inr : 2.07  Restarted Luisa

## 2018-02-14 NOTE — PROGRESS NOTE ADULT - PROBLEM SELECTOR PLAN 2
Patient has hx of Systolic heart failure last echo showing EF 30-35% in march 2017  elevated bnp of [address]  Troponin negative   f/up Echo : EF of 10 % with grade 4 dd , fall from EF of 35 % in March 2017  Will follow up with Dr. Day for the same   c/w Lasix 40mg IV BID for now  Monitor Bun/Cr : wnl   Could not get in touch with family for ischemic workup plan ?  Cardio : Dr. Chavis Patient has hx of Systolic heart failure last echo showing EF 30-35% in march 2017  [place]vated bnp of [address]  Troponin negative   f/up Echo : EF of 10 % with grade 4 dd , fall from EF of 35 % in March 2017  Will follow up with Dr. Day for the same   c/w Lasix 40mg oral BID for now  Monitor Bun/Cr : wnl   Could not get in touch with family for ischemic workup plan ?  Cardio : Dr. Chavis

## 2018-02-14 NOTE — PROGRESS NOTE ADULT - SUBJECTIVE AND OBJECTIVE BOX
Patient seen and examined.   Time of visit:    MEDICATIONS  (STANDING):  amiodarone    Tablet 200 milliGRAM(s) Oral daily  apixaban 5 milliGRAM(s) Oral every 12 hours  chlorhexidine 4% Liquid 1 Application(s) Topical daily  collagenase Ointment 1 Application(s) Topical daily  digoxin     Tablet 0.125 milliGRAM(s) Oral daily  furosemide    Tablet 40 milliGRAM(s) Oral two times a day  metoprolol     tartrate 50 milliGRAM(s) Oral two times a day  nystatin Powder 1 Application(s) Topical daily  pantoprazole  Injectable 40 milliGRAM(s) IV Push daily      MEDICATIONS  (PRN):   Medications up to date at time of exam.      PHYSICAL EXAMINATION:  Patient has no new complaints.  GENERAL: The patient is a well-developed, well-nourished, in no apparent distress.     Vital Signs Last 24 Hrs  T(C): 37.1 (14 Feb 2018 20:17), Max: 37.2 (14 Feb 2018 15:38)  T(F): 98.7 (14 Feb 2018 20:17), Max: 98.9 (14 Feb 2018 15:38)  HR: 67 (14 Feb 2018 21:00) (65 - 100)  BP: 113/53 (14 Feb 2018 21:00) (92/79 - 137/58)  BP(mean): 65 (14 Feb 2018 21:00) (56 - 115)  RR: 18 (14 Feb 2018 21:00) (14 - 26)  SpO2: 100% (14 Feb 2018 21:00) (58% - 100%)   (if applicable)      HEENT: Head is normocephalic and atraumatic.     NECK: Supple, no palpable adenopathy.    LUNGS: Clear to auscultation, no wheezing, rales, or rhonchi.    HEART: Regular rate and +irreg rhythm +II/VI murmur.    ABDOMEN: Soft, nontender, and nondistended.  No hepatosplenomegaly is noted.    EXTREMITIES: Without any cyanosis, clubbing, rash, lesions or + B/L LE edema.    NEUROLOGIC: Awake, alert.    SKIN: Warm, dry, good turgor.      LABS:                        12.2   4.8   )-----------( 102      ( 14 Feb 2018 13:07 )             39.8     02-14    142  |  96  |  13  ----------------------------<  102<H>  4.5   |  44<H>  |  0.66    Ca    8.0<L>      14 Feb 2018 13:07  Phos  1.8     02-14  Mg     1.4     02-14    TPro  6.0  /  Alb  1.6<L>  /  TBili  0.8  /  DBili  x   /  AST  74<H>  /  ALT  24  /  AlkPhos  192<H>  02-14    PT/INR - ( 13 Feb 2018 14:32 )   PT: 22.9 sec;   INR: 2.07 ratio             ABG - ( 13 Feb 2018 14:28 )  pH: 7.45  /  pCO2: 53    /  pO2: 107   / HCO3: 37    / Base Excess: 10.9  /  SaO2: 98                          Procalcitonin, Serum: 0.61 ng/mL (02-12-18 @ 14:29)      MICROBIOLOGY: (if applicable)    RADIOLOGY & ADDITIONAL STUDIES:  EKG:   CXR:  ECHO:    IMPRESSION: 85y Female PAST MEDICAL & SURGICAL HISTORY:  Venous stasis: b/l leg, left calf ulcer  CVA (cerebral vascular accident): March 18th 2017, left side weakness ,s/p TPA  Atrial fibrillation  HTN (hypertension)  No significant past surgical history       Patient presents from NH with SOB due to HF exacerbation, reduced EF. Probnp 22,000.  Continue with IV lasix, bb, recommend low dose ACE I for HF.   Afib stable rate, continue with amiodarone, digoxin, anticoagulation.  Patient's EF now 10%, was 30% in 2017, attempted to call daughter Loly [phone] again today 2/14/18, went to voicemail.

## 2018-02-14 NOTE — PROGRESS NOTE ADULT - SUBJECTIVE AND OBJECTIVE BOX
INTERVAL HPI/OVERNIGHT EVENTS: Patient has a persistent dry cough     PRESSORS: [ ] YES [ x] NO  WHICH:    ANTIBIOTICS:   none               DATE STARTED:  ANTIBIOTICS:                  DATE STARTED:  ANTIBIOTICS:                  DATE STARTED:    Antimicrobial:    Cardiovascular:  amiodarone    Tablet 200 milliGRAM(s) Oral daily  digoxin     Tablet 0.125 milliGRAM(s) Oral daily  furosemide   Injectable 40 milliGRAM(s) IV Push every 12 hours  metoprolol     tartrate 50 milliGRAM(s) Oral two times a day    Pulmonary:    Hematalogic:  apixaban 5 milliGRAM(s) Oral every 12 hours    Other:  chlorhexidine 4% Liquid 1 Application(s) Topical daily  collagenase Ointment 1 Application(s) Topical daily  nystatin Powder 1 Application(s) Topical daily  pantoprazole  Injectable 40 milliGRAM(s) IV Push daily    amiodarone    Tablet 200 milliGRAM(s) Oral daily  apixaban 5 milliGRAM(s) Oral every 12 hours  chlorhexidine 4% Liquid 1 Application(s) Topical daily  collagenase Ointment 1 Application(s) Topical daily  digoxin     Tablet 0.125 milliGRAM(s) Oral daily  furosemide   Injectable 40 milliGRAM(s) IV Push every 12 hours  metoprolol     tartrate 50 milliGRAM(s) Oral two times a day  nystatin Powder 1 Application(s) Topical daily  pantoprazole  Injectable 40 milliGRAM(s) IV Push daily    Drug Dosing Weight  Height (cm): 167.64 (10 Feb 2018 20:31)  Weight (kg): 90.7 (10 Feb 2018 20:31)  BMI (kg/m2): 32.3 (10 Feb 2018 20:31)  BSA (m2): 2 (10 Feb 2018 20:31)    CENTRAL LINE: [ ] YES [x ] NO  LOCATION:   DATE INSERTED:  REMOVE: [ ] YES [ ] NO  EXPLAIN:    MOBLEY: [x ] YES [ ] NO    DATE INSERTED:2/11  REMOVE:  [ ] YES [ ] NO  EXPLAIN:    A-LINE:  [ ] YES [x NO  LOCATION:   DATE INSERTED:  REMOVE:  [ ] YES [ ] NO  EXPLAIN:    PMH -reviewed admission note, no change since admission      ICU Vital Signs Last 24 Hrs  T(C): 36.6 (14 Feb 2018 05:29), Max: 36.9 (13 Feb 2018 15:39)  T(F): 97.8 (14 Feb 2018 05:29), Max: 98.5 (13 Feb 2018 15:39)  HR: 76 (14 Feb 2018 07:00) (65 - 107)  BP: 110/53 (14 Feb 2018 07:00) (88/41 - 128/91)  BP(mean): 69 (14 Feb 2018 07:00) (53 - 115)  ABP: --  ABP(mean): --  RR: 18 (14 Feb 2018 07:00) (0 - 26)  SpO2: 96% (14 Feb 2018 07:00) (58% - 100%)      ABG - ( 13 Feb 2018 14:28 )  pH: 7.45  /  pCO2: 53    /  pO2: 107   / HCO3: 37    / Base Excess: 10.9  /  SaO2: 98                    02-13 @ 07:01  -  02-14 @ 07:00  --------------------------------------------------------  IN: 225 mL / OUT: 6130 mL / NET: -5905 mL        PE :    HEAD:  [x ]Atraumatic, [x ]Normocephalic  EYES: [ x]EOMI, [x ]PERRLA, [ x]conjunctiva and sclera clear  ENMT: [x ]No tonsillar erythema, exudates, or enlargement; [x ]Moist mucous membranes, [ ]Good dentition, [ ]No lesions  NECK: [x ]Supple, normal appearance,   NERVOUS SYSTEM:  [x ]Alert & Oriented X2 ; [ x]Motor Strength 5/5 B/L upper and lower extremities; [ x]DTRs 2+ intact and symmetric  CHEST/LUNG: [x ]No chest deformity; [x ]Normal percussion bilaterally;  wheezing + in bilateral upper lobes   HEART: [ x]Regular rate and rhythm; [x ]No murmurs, rubs, or gallops  ABDOMEN: [x ]Soft, Nontender, Nondistended; [x ]Bowel sounds present  EXTREMITIES:  [x ]2+ Peripheral Pulses, 2+ pitting pedal edema ,venous ulcer Left leg ( chronic ) , bilateral venous stasis   LYMPH: [ x]No lymphadenopathy noted  SKIN: [x ]No rashes or lesions; [ x]Good capillary refill      LABS:  CBC Full  -  ( 13 Feb 2018 14:32 )  WBC Count : 7.8 K/uL  Hemoglobin : 13.0 g/dL  Hematocrit : 43.9 %  Platelet Count - Automated : 95 K/uL  Mean Cell Volume : 96.5 fl  Mean Cell Hemoglobin : 28.7 pg  Mean Cell Hemoglobin Concentration : 29.8 gm/dL  Auto Neutrophil # : 6.5 K/uL  Auto Lymphocyte # : 0.8 K/uL  Auto Monocyte # : 0.5 K/uL  Auto Eosinophil # : 0.0 K/uL  Auto Basophil # : 0.0 K/uL  Auto Neutrophil % : 83.8 %  Auto Lymphocyte % : 9.9 %  Auto Monocyte % : 5.9 %  Auto Eosinophil % : 0.0 %  Auto Basophil % : 0.3 %    02-13    140  |  98  |  16  ----------------------------<  147<H>  4.6   |  38<H>  |  0.80    Ca    8.2<L>      13 Feb 2018 14:32  Phos  2.0     02-13  Mg     1.6     02-13    TPro  6.1  /  Alb  1.9<L>  /  TBili  0.8  /  DBili  x   /  AST  78<H>  /  ALT  29  /  AlkPhos  228<H>  02-13    PT/INR - ( 13 Feb 2018 14:32 )   PT: 22.9 sec;   INR: 2.07 ratio             Culture Results:   No growth to date. (02-11 @ 21:36)        [x ]GOALS OF CARE DISCUSSION WITH PATIENT/FAMILY/PROXY: DNR/ DNI    CRITICAL CARE TIME SPENT: 35 minutes

## 2018-02-15 DIAGNOSIS — E87.6 HYPOKALEMIA: ICD-10-CM

## 2018-02-15 DIAGNOSIS — E83.39 OTHER DISORDERS OF PHOSPHORUS METABOLISM: ICD-10-CM

## 2018-02-15 LAB
ANION GAP SERPL CALC-SCNC: -3 MMOL/L — LOW (ref 5–17)
ANION GAP SERPL CALC-SCNC: 1 MMOL/L — LOW (ref 5–17)
BUN SERPL-MCNC: 13 MG/DL — SIGNIFICANT CHANGE UP (ref 7–18)
BUN SERPL-MCNC: 14 MG/DL — SIGNIFICANT CHANGE UP (ref 7–18)
CALCIUM SERPL-MCNC: 8.1 MG/DL — LOW (ref 8.4–10.5)
CALCIUM SERPL-MCNC: 8.6 MG/DL — SIGNIFICANT CHANGE UP (ref 8.4–10.5)
CHLORIDE SERPL-SCNC: 97 MMOL/L — SIGNIFICANT CHANGE UP (ref 96–108)
CHLORIDE SERPL-SCNC: 97 MMOL/L — SIGNIFICANT CHANGE UP (ref 96–108)
CO2 SERPL-SCNC: 44 MMOL/L — HIGH (ref 22–31)
CO2 SERPL-SCNC: 48 MMOL/L — CRITICAL HIGH (ref 22–31)
CREAT SERPL-MCNC: 0.5 MG/DL — SIGNIFICANT CHANGE UP (ref 0.5–1.3)
CREAT SERPL-MCNC: 0.52 MG/DL — SIGNIFICANT CHANGE UP (ref 0.5–1.3)
GLUCOSE SERPL-MCNC: 111 MG/DL — HIGH (ref 70–99)
GLUCOSE SERPL-MCNC: 82 MG/DL — SIGNIFICANT CHANGE UP (ref 70–99)
HCT VFR BLD CALC: 41.8 % — SIGNIFICANT CHANGE UP (ref 34.5–45)
HGB BLD-MCNC: 12.8 G/DL — SIGNIFICANT CHANGE UP (ref 11.5–15.5)
INR BLD: 1.71 RATIO — HIGH (ref 0.88–1.16)
MAGNESIUM SERPL-MCNC: 1.8 MG/DL — SIGNIFICANT CHANGE UP (ref 1.6–2.6)
MAGNESIUM SERPL-MCNC: 1.8 MG/DL — SIGNIFICANT CHANGE UP (ref 1.6–2.6)
MCHC RBC-ENTMCNC: 29.7 PG — SIGNIFICANT CHANGE UP (ref 27–34)
MCHC RBC-ENTMCNC: 30.5 GM/DL — LOW (ref 32–36)
MCV RBC AUTO: 97.2 FL — SIGNIFICANT CHANGE UP (ref 80–100)
PHOSPHATE SERPL-MCNC: 1.7 MG/DL — LOW (ref 2.5–4.5)
PHOSPHATE SERPL-MCNC: 1.9 MG/DL — LOW (ref 2.5–4.5)
PLATELET # BLD AUTO: 81 K/UL — LOW (ref 150–400)
POTASSIUM SERPL-MCNC: 2.7 MMOL/L — CRITICAL LOW (ref 3.5–5.3)
POTASSIUM SERPL-MCNC: 3.7 MMOL/L — SIGNIFICANT CHANGE UP (ref 3.5–5.3)
POTASSIUM SERPL-SCNC: 2.7 MMOL/L — CRITICAL LOW (ref 3.5–5.3)
POTASSIUM SERPL-SCNC: 3.7 MMOL/L — SIGNIFICANT CHANGE UP (ref 3.5–5.3)
PROTHROM AB SERPL-ACNC: 18.8 SEC — HIGH (ref 9.8–12.7)
RBC # BLD: 4.29 M/UL — SIGNIFICANT CHANGE UP (ref 3.8–5.2)
RBC # FLD: 18 % — HIGH (ref 10.3–14.5)
SODIUM SERPL-SCNC: 142 MMOL/L — SIGNIFICANT CHANGE UP (ref 135–145)
SODIUM SERPL-SCNC: 142 MMOL/L — SIGNIFICANT CHANGE UP (ref 135–145)
WBC # BLD: 4.1 K/UL — SIGNIFICANT CHANGE UP (ref 3.8–10.5)
WBC # FLD AUTO: 4.1 K/UL — SIGNIFICANT CHANGE UP (ref 3.8–10.5)

## 2018-02-15 PROCEDURE — 71045 X-RAY EXAM CHEST 1 VIEW: CPT | Mod: 26

## 2018-02-15 RX ORDER — MEROPENEM 1 G/30ML
INJECTION INTRAVENOUS
Qty: 0 | Refills: 0 | Status: DISCONTINUED | OUTPATIENT
Start: 2018-02-16 | End: 2018-02-17

## 2018-02-15 RX ORDER — POTASSIUM CHLORIDE 20 MEQ
40 PACKET (EA) ORAL EVERY 4 HOURS
Qty: 0 | Refills: 0 | Status: COMPLETED | OUTPATIENT
Start: 2018-02-15 | End: 2018-02-15

## 2018-02-15 RX ORDER — POTASSIUM CHLORIDE 20 MEQ
40 PACKET (EA) ORAL EVERY 4 HOURS
Qty: 0 | Refills: 0 | Status: DISCONTINUED | OUTPATIENT
Start: 2018-02-15 | End: 2018-02-15

## 2018-02-15 RX ORDER — MEROPENEM 1 G/30ML
1000 INJECTION INTRAVENOUS EVERY 8 HOURS
Qty: 0 | Refills: 0 | Status: DISCONTINUED | OUTPATIENT
Start: 2018-02-16 | End: 2018-02-17

## 2018-02-15 RX ORDER — POTASSIUM CHLORIDE 20 MEQ
10 PACKET (EA) ORAL
Qty: 0 | Refills: 0 | Status: DISCONTINUED | OUTPATIENT
Start: 2018-02-15 | End: 2018-02-15

## 2018-02-15 RX ORDER — SODIUM,POTASSIUM PHOSPHATES 278-250MG
1 POWDER IN PACKET (EA) ORAL
Qty: 0 | Refills: 0 | Status: COMPLETED | OUTPATIENT
Start: 2018-02-15 | End: 2018-02-15

## 2018-02-15 RX ORDER — MEROPENEM 1 G/30ML
1000 INJECTION INTRAVENOUS ONCE
Qty: 0 | Refills: 0 | Status: COMPLETED | OUTPATIENT
Start: 2018-02-15 | End: 2018-02-16

## 2018-02-15 RX ADMIN — Medication 40 MILLIEQUIVALENT(S): at 14:07

## 2018-02-15 RX ADMIN — Medication 40 MILLIGRAM(S): at 18:03

## 2018-02-15 RX ADMIN — AMIODARONE HYDROCHLORIDE 200 MILLIGRAM(S): 400 TABLET ORAL at 05:50

## 2018-02-15 RX ADMIN — PANTOPRAZOLE SODIUM 40 MILLIGRAM(S): 20 TABLET, DELAYED RELEASE ORAL at 12:46

## 2018-02-15 RX ADMIN — APIXABAN 5 MILLIGRAM(S): 2.5 TABLET, FILM COATED ORAL at 05:50

## 2018-02-15 RX ADMIN — APIXABAN 5 MILLIGRAM(S): 2.5 TABLET, FILM COATED ORAL at 18:03

## 2018-02-15 RX ADMIN — Medication 0.12 MILLIGRAM(S): at 05:50

## 2018-02-15 RX ADMIN — Medication 1 TABLET(S): at 12:46

## 2018-02-15 RX ADMIN — Medication 40 MILLIGRAM(S): at 05:50

## 2018-02-15 RX ADMIN — NYSTATIN CREAM 1 APPLICATION(S): 100000 CREAM TOPICAL at 12:46

## 2018-02-15 RX ADMIN — Medication 40 MILLIEQUIVALENT(S): at 10:28

## 2018-02-15 RX ADMIN — Medication 1 APPLICATION(S): at 12:00

## 2018-02-15 RX ADMIN — Medication 50 MILLIGRAM(S): at 05:51

## 2018-02-15 RX ADMIN — Medication 1 TABLET(S): at 09:14

## 2018-02-15 RX ADMIN — Medication 1 TABLET(S): at 17:03

## 2018-02-15 RX ADMIN — Medication 1 TABLET(S): at 22:53

## 2018-02-15 RX ADMIN — Medication 40 MILLIEQUIVALENT(S): at 05:51

## 2018-02-15 RX ADMIN — CHLORHEXIDINE GLUCONATE 1 APPLICATION(S): 213 SOLUTION TOPICAL at 12:00

## 2018-02-15 NOTE — SWALLOW BEDSIDE ASSESSMENT ADULT - ASR SWALLOW ASPIRATION MONITOR
oral hygiene/fever/gurgly voice/pneumonia/change of breathing pattern/throat clearing/position upright (90Y)/cough

## 2018-02-15 NOTE — SWALLOW BEDSIDE ASSESSMENT ADULT - ORAL PREPARATORY PHASE
Within functional limits Decreased mastication ability impaired bolus formation/Reduced oral grading weak pucker to cup presentation Decreased mastication ability/Reduced oral grading

## 2018-02-15 NOTE — PROGRESS NOTE ADULT - PROBLEM SELECTOR PLAN 4
now rate controlled   s/p  Amiodarone loading for rate control  Continue on amiodarone maintenance dose 200 OD  Start Metoprolol 50 bid   s/p digoxin loading dose , on maintain dose now   Initially started on heparin drip , Off ac for now , due to supra therapeutic INR  takes magdais at home   f/u inr : 1.17  Restarted Luisa

## 2018-02-15 NOTE — SWALLOW BEDSIDE ASSESSMENT ADULT - SWALLOW EVAL: RECOMMENDED FEEDING/EATING TECHNIQUES
allow for swallow between intakes/small sips/bites/alternate food with liquid/oral hygiene/position upright (90 degrees)

## 2018-02-15 NOTE — PROGRESS NOTE ADULT - PROBLEM SELECTOR PLAN 1
Respiratory failure 2/2 to CHF exacerbation   was on Bipap initially , off bipap since 10pm on 2/11 , saturating well on 4 L NC  diuresed with IV lasix 60 bid --> will now cut down to 40 iv bid --> will cut down to 40 bid oral today   Patient has a net negative of - 2.2 L in last 12 hours   Patient is DNR/DNI

## 2018-02-15 NOTE — CONSULT NOTE ADULT - ASSESSMENT
86 y/o F from [facility] pt w/ PMHx of A-fib, CVA, CHF, at home DNR/DNI p/w SOB  admitted to ICU for Respiratory failure 2/2 to Acute CHF exacerbation and RTI

## 2018-02-15 NOTE — SWALLOW BEDSIDE ASSESSMENT ADULT - SWALLOW EVAL: DIAGNOSIS
Pt p/w oral stage dysphagia c/b weak labial seal/pucker, slow bolus formation, prolonged mastication, and reduced A-P transport; However, oropharyngeal swallow was intact (laryngeal elevation palpated and timely) with no overt s/s of laryngeal penetration or aspiration at this exam.

## 2018-02-15 NOTE — DIETITIAN INITIAL EVALUATION ADULT. - DIET TYPE
DASH/TLC (sodium and cholesterol restricted diet)/2 Saad HN BID/dysphagia 1, pureed, nectar consistency fluid/supplement (specify)

## 2018-02-15 NOTE — PROGRESS NOTE ADULT - SUBJECTIVE AND OBJECTIVE BOX
Subjective: says feels better. coughing with inability to vring up phlegm . no fevers       PHYSICAL EXAM:    Vital Signs Last 24 Hrs  T(C): 36.4 (15 Feb 2018 18:42), Max: 37.1 (14 Feb 2018 20:17)  T(F): 97.5 (15 Feb 2018 18:42), Max: 98.7 (14 Feb 2018 20:17)  HR: 58 (15 Feb 2018 18:42) (58 - 98)  BP: 111/78 (15 Feb 2018 18:42) (86/42 - 123/63)  BP(mean): 57 (15 Feb 2018 18:00) (52 - 84)  RR: 18 (15 Feb 2018 18:42) (16 - 25)  SpO2: 98% (15 Feb 2018 18:42) (97% - 100%)    Constitutional: very lethargic , sob at rest , on VM   LUNGS biabsilar rales    CVS s1 s2 aud systolic murmur lsb    ABDOMEN soft non tender no HSM , obese   NEUROLOGY  no defecits   SKIN stasis dermatitis with stasis ulcers over left leg   EXTREMITIES 2+ edema no cyanosis   de la rosa with casa urine         LABS/DIAGNOSTIC TESTS                        12.8   4.1   )-----------( 81       ( 15 Feb 2018 03:51 )             41.8     02-15    142  |  97  |  14  ----------------------------<  111<H>  3.7   |  48<HH>  |  0.52    Ca    8.1<L>      15 Feb 2018 18:02  Phos  1.7     02-15  Mg     1.8     02-15    TPro  6.0  /  Alb  1.6<L>  /  TBili  0.8  /  DBili  x   /  AST  74<H>  /  ALT  24  /  AlkPhos  192<H>  02-14    PT/INR - ( 15 Feb 2018 03:51 )   PT: 18.8 sec;   INR: 1.71 ratio               meds   amiodarone    Tablet 200 milliGRAM(s) Oral daily  apixaban 5 milliGRAM(s) Oral every 12 hours  chlorhexidine 4% Liquid 1 Application(s) Topical daily  collagenase Ointment 1 Application(s) Topical daily  digoxin     Tablet 0.125 milliGRAM(s) Oral daily  furosemide    Tablet 40 milliGRAM(s) Oral two times a day  metoprolol     tartrate 50 milliGRAM(s) Oral two times a day  nystatin Powder 1 Application(s) Topical daily  pantoprazole  Injectable 40 milliGRAM(s) IV Push daily  potassium acid phosphate/sodium acid phosphate tablet (K-PHOS No. 2) 1 Tablet(s) Oral four times a day with meals        CULTURES  Culture Results: blood  No growth to date. (02-11 @ 21:36)        RADIOLOGY  < from: Xray Chest 1 View- PORTABLE-Routine (02.15.18 @ 07:35) >    EXAM:  XR CHEST PORTABLE ROUTINE 1V                            PROCEDURE DATE:  02/15/2018          INTERPRETATION:  CLINICAL STATEMENT: Follow-up chest pain.    TECHNIQUE: AP view of the chest.    COMPARISON: 2/14/2018    FINDINGS/  IMPRESSION:  Small right pleural effusion with opacity stable to mildly increased   given differences in technique. Mild increased interstitial lung markings   without significant change.    Heart size cannot be accurately assessed in this projection. Questionable   mild prominence of right hilum without significant change    < end of copied text >

## 2018-02-15 NOTE — CONSULT NOTE ADULT - PROBLEM SELECTOR RECOMMENDATION 2
improving  c/w cardiac meds including lasix, dig, amio, metoprolol  telemonitoring  strict i/o  f/u cardio recs

## 2018-02-15 NOTE — DIETITIAN INITIAL EVALUATION ADULT. - OTHER INFO
Pt reports good appetite, intake at lunch 2/14 &2/15 poor. RN reports taking come/ not much supplement. Pt may not be happy with the textures  (SLP pending)

## 2018-02-15 NOTE — DIETITIAN INITIAL EVALUATION ADULT. - PROBLEM SELECTOR PLAN 2
Patient has hx of Systolic heart failure last echo showing EF 30-35% p/w SOB   [place] of [address]  T1: 0.027  Trend Cardiac enzymes  f/up Echo  c/w Lasix 60mg IV BID for now  Monitor Bun/Cr

## 2018-02-15 NOTE — DIETITIAN INITIAL EVALUATION ADULT. - PROBLEM SELECTOR PLAN 1
Respiratory failure 2/2 to CHF exacerbation currently on Bipap, c/w NIVV  Patient is DNR/DNI  c/w Lasix  Taper as tolerated  Palliative consult in AM

## 2018-02-15 NOTE — SWALLOW BEDSIDE ASSESSMENT ADULT - SLP PERTINENT HISTORY OF CURRENT PROBLEM
86 y/o F from [facility] pt w/ PMHx of A-fib, CVA, CHF, at home DNR/DNI p/w SOB. Pt was placed on Bipap on arrival to ED., now on nasal cannula. CXR: (+) small R-side pleural effusion.

## 2018-02-15 NOTE — PROGRESS NOTE ADULT - PROBLEM SELECTOR PLAN 2
Patient has hx of Systolic heart failure last echo showing EF 30-35% in march 2017  [place]vated bnp of [address]  Troponin negative   f/up Echo : EF of 10 % with grade 4 dd , fall from EF of 35 % in March 2017  Will follow up with Dr. Day for the same   c/w Lasix 40mg oral BID for now  Monitor Bun/Cr : wnl   Could not get in touch with family for ischemic workup plan ?  Cardio : Dr. Chavis

## 2018-02-15 NOTE — SWALLOW BEDSIDE ASSESSMENT ADULT - COMMENTS
Pt A,A+Ox2, but intermittently confused. HOB elevated to 90 degrees. Pt followed 1-step directions and verbalized wants and needs.

## 2018-02-15 NOTE — PROGRESS NOTE ADULT - SUBJECTIVE AND OBJECTIVE BOX
Patient seen and examined.   Time of visit:    MEDICATIONS  (STANDING):  amiodarone    Tablet 200 milliGRAM(s) Oral daily  apixaban 5 milliGRAM(s) Oral every 12 hours  chlorhexidine 4% Liquid 1 Application(s) Topical daily  collagenase Ointment 1 Application(s) Topical daily  digoxin     Tablet 0.125 milliGRAM(s) Oral daily  furosemide    Tablet 40 milliGRAM(s) Oral two times a day  metoprolol     tartrate 50 milliGRAM(s) Oral two times a day  nystatin Powder 1 Application(s) Topical daily  pantoprazole  Injectable 40 milliGRAM(s) IV Push daily  potassium acid phosphate/sodium acid phosphate tablet (K-PHOS No. 2) 1 Tablet(s) Oral four times a day with meals      MEDICATIONS  (PRN):   Medications up to date at time of exam.      PHYSICAL EXAMINATION:  Patient has no new complaints.  GENERAL: The patient is a well-developed, well-nourished, in no apparent distress.     Vital Signs Last 24 Hrs  T(C): 36.8 (15 Feb 2018 15:31), Max: 37.1 (14 Feb 2018 20:17)  T(F): 98.3 (15 Feb 2018 15:31), Max: 98.7 (14 Feb 2018 20:17)  HR: 98 (15 Feb 2018 18:00) (63 - 98)  BP: 97/48 (15 Feb 2018 18:00) (86/42 - 123/63)  BP(mean): 57 (15 Feb 2018 18:00) (52 - 84)  RR: 22 (15 Feb 2018 18:00) (16 - 25)  SpO2: 97% (15 Feb 2018 18:00) (97% - 100%)   (if applicable)      HEENT: Head is normocephalic and atraumatic. Extraocular muscles are intact. Mucous membranes are moist.     NECK: Supple, no palpable adenopathy.    LUNGS: Clear to auscultation, no wheezing, rales, or rhonchi.    HEART: Regular rate and + irreg rhythm + II/VI HSM.    ABDOMEN: Soft, nontender, and nondistended.  No hepatosplenomegaly is noted.    EXTREMITIES: Without any cyanosis, clubbing, rash, lesions or + B/L LE edema.    NEUROLOGIC: Awake, alert, oriented.     SKIN: Warm, dry, good turgor.      LABS:                        12.8   4.1   )-----------( 81       ( 15 Feb 2018 03:51 )             41.8     02-15    142  |  97  |  13  ----------------------------<  82  2.7<LL>   |  44<H>  |  0.50    Ca    8.6      15 Feb 2018 03:51  Phos  1.9     02-15  Mg     1.8     02-15    TPro  6.0  /  Alb  1.6<L>  /  TBili  0.8  /  DBili  x   /  AST  74<H>  /  ALT  24  /  AlkPhos  192<H>  02-14    PT/INR - ( 15 Feb 2018 03:51 )   PT: 18.8 sec;   INR: 1.71 ratio           MICROBIOLOGY: (if applicable)    RADIOLOGY & ADDITIONAL STUDIES:  EKG:   CXR:  ECHO:    IMPRESSION: 85y Female PAST MEDICAL & SURGICAL HISTORY:  Venous stasis: b/l leg, left calf ulcer  CVA (cerebral vascular accident): March 18th 2017, left side weakness ,s/p TPA  Atrial fibrillation  HTN (hypertension)  No significant past surgical history   p/w     RECOMMENDATIONS:    Patient presents from NH with SOB due to HF exacerbation, reduced EF. Probnp 22,000.  Continue with IV lasix, bb, recommend low dose ACE I for HF.   Afib stable rate, continue with amiodarone, digoxin, anticoagulation.  Patient's EF now 10%, was 30% in 2017, family does not want ischemic evaluation at present time.

## 2018-02-15 NOTE — SWALLOW BEDSIDE ASSESSMENT ADULT - MODE OF PRESENTATION
cup/self fed self fed spoon/fed by clinician/x 5 cup/x 3/self fed spoon/self fed/fed by clinician/x 5

## 2018-02-15 NOTE — CONSULT NOTE ADULT - PROBLEM SELECTOR RECOMMENDATION 9
improving  c/w supportive care  oxygen supplementation  goal spo2 > 90%  bronchodilators  chest pt  incentive spirometry   PT  f/u cultures to final date  f/u pulm recs  f/u id recs replace  f/u am labs

## 2018-02-15 NOTE — CONSULT NOTE ADULT - SUBJECTIVE AND OBJECTIVE BOX
IM Attending Covering For  Through 2/18/18     Patient seen and examined at bedside in the morning    Patient is a 85y old  Female who presents with a chief complaint of Shortness of breath x 1week (10 Feb 2018 23:56)    HPI:  86 y/o F from [facility] pt w/ PMHx of A-fib, CVA, CHF, at home DNR/DNI p/w SOB x the past few hours as per nursing home reports onset which started in the afternoon. Patient has been complaining of difficulty breathing, but today it was worse and she was very lethargic. History is limited as patient is lethargic, and history was obtained by daughters at bedside, and Ed provider note. Pt was dyspneic in ED and was speaking only one word sentences. Pt was placed on Bipap on arrival to ED.       PAST MEDICAL & SURGICAL HISTORY:  Venous stasis: b/l leg, left calf ulcer  CVA (cerebral vascular accident): March 18th 2017, left side weakness ,s/p TPA  Atrial fibrillation  HTN (hypertension)  No significant past surgical history      MEDICATIONS  (STANDING):  amiodarone    Tablet 200 milliGRAM(s) Oral daily  apixaban 5 milliGRAM(s) Oral every 12 hours  chlorhexidine 4% Liquid 1 Application(s) Topical daily  collagenase Ointment 1 Application(s) Topical daily  digoxin     Tablet 0.125 milliGRAM(s) Oral daily  furosemide    Tablet 40 milliGRAM(s) Oral two times a day  metoprolol     tartrate 50 milliGRAM(s) Oral two times a day  nystatin Powder 1 Application(s) Topical daily  pantoprazole  Injectable 40 milliGRAM(s) IV Push daily  potassium acid phosphate/sodium acid phosphate tablet (K-PHOS No. 2) 1 Tablet(s) Oral four times a day with meals  potassium chloride   Powder 40 milliEquivalent(s) Oral every 4 hours    MEDICATIONS  (PRN):      FAMILY HISTORY:  No pertinent family history in first degree relatives  : Denies    SOCIAL HISTORY: Denies    REVIEW OF SYSTEMS: as above and  CONSTITUTIONAL: (+) Malaise  EYES: No acute change in vision.  ENT:  No difficulty hearing  NECK: No pain  RESPIRATORY: (+) shortness of breath  CARDIOVASCULAR: No chest pain, palpitations  GASTROINTESTINAL: No abdominal pain,   GENITOURINARY: No dysuria, frequency,  NEUROLOGICAL: No headaches  SKIN: No rashes or lesions   LYMPH NODES: No enlarged glands  ENDOCRINE: No heat or cold intolerance  MUSCULOSKELETAL: No arthralgia, myalgia   	    Vital Signs Last 24 Hrs  T(C): 36.1 (15 Feb 2018 12:00), Max: 37.2 (14 Feb 2018 15:38)  T(F): 97 (15 Feb 2018 12:00), Max: 98.9 (14 Feb 2018 15:38)  HR: 82 (15 Feb 2018 13:00) (63 - 93)  BP: 105/59 (15 Feb 2018 13:00) (86/42 - 137/58)  BP(mean): 71 (15 Feb 2018 13:00) (52 - 91)  RR: 20 (15 Feb 2018 13:00) (16 - 24)  SpO2: 100% (15 Feb 2018 13:00) (99% - 100%)    Constitutional: WD, WN, NAD  HEENT: NC, AT  Neck:  Supple  Respiratory:  mild rhonchi/rales.  No wheezing. Not using accessory muscles of respiration.  Adequate airflow b/l.   Cardiovascular:  RRR. No R/G. 2+ radial pulses b/l.   Gastrointestinal: Soft, NT, ND, normoactive bowel sounds.  No organomegaly, rigidity, or RT.  Extremities: WWP  Neurological:  Alert and awake.  Crude sensation intact.       LABS:                        12.8   4.1   )-----------( 81       ( 15 Feb 2018 03:51 )             41.8     02-15    142  |  97  |  13  ----------------------------<  82  2.7<LL>   |  44<H>  |  0.50    Ca    8.6      15 Feb 2018 03:51  Phos  1.9     02-15  Mg     1.8     02-15    TPro  6.0  /  Alb  1.6<L>  /  TBili  0.8  /  DBili  x   /  AST  74<H>  /  ALT  24  /  AlkPhos  192<H>  02-14        PT/INR - ( 15 Feb 2018 03:51 )   PT: 18.8 sec;   INR: 1.71 ratio           RADIOLOGY & ADDITIONAL STUDIES:  < from: Xray Chest 1 View- PORTABLE-Routine (02.15.18 @ 07:35) >  EXAM:  XR CHEST PORTABLE ROUTINE 1V                            PROCEDURE DATE:  02/15/2018          INTERPRETATION:  CLINICAL STATEMENT: Follow-up chest pain.    TECHNIQUE: AP view of the chest.    COMPARISON: 2/14/2018    FINDINGS/  IMPRESSION:  Small right pleural effusion with opacity stable to mildly increased   given differences in technique. Mild increased interstitial lung markings   without significant change.    Heart size cannot be accurately assessed in this projection. Questionable   mild prominence of right hilum without significant change        < end of copied text >

## 2018-02-15 NOTE — PROGRESS NOTE ADULT - SUBJECTIVE AND OBJECTIVE BOX
INTERVAL HPI/ OVERNIGHT EVENTS: Potassium was 2.7 this morning , will replace     PRESSORS: [ ] YES [x ] NO  WHICH:    ANTIBIOTICS:      none            DATE STARTED:  ANTIBIOTICS:                  DATE STARTED:  ANTIBIOTICS:                  DATE STARTED:    Antimicrobial:    Cardiovascular:  amiodarone    Tablet 200 milliGRAM(s) Oral daily  digoxin     Tablet 0.125 milliGRAM(s) Oral daily  furosemide    Tablet 40 milliGRAM(s) Oral two times a day  metoprolol     tartrate 50 milliGRAM(s) Oral two times a day    Pulmonary:    Hematalogic:  apixaban 5 milliGRAM(s) Oral every 12 hours    Other:  chlorhexidine 4% Liquid 1 Application(s) Topical daily  collagenase Ointment 1 Application(s) Topical daily  nystatin Powder 1 Application(s) Topical daily  pantoprazole  Injectable 40 milliGRAM(s) IV Push daily  potassium acid phosphate/sodium acid phosphate tablet (K-PHOS No. 2) 1 Tablet(s) Oral four times a day with meals  potassium chloride   Powder 40 milliEquivalent(s) Oral every 4 hours    amiodarone    Tablet 200 milliGRAM(s) Oral daily  apixaban 5 milliGRAM(s) Oral every 12 hours  chlorhexidine 4% Liquid 1 Application(s) Topical daily  collagenase Ointment 1 Application(s) Topical daily  digoxin     Tablet 0.125 milliGRAM(s) Oral daily  furosemide    Tablet 40 milliGRAM(s) Oral two times a day  metoprolol     tartrate 50 milliGRAM(s) Oral two times a day  nystatin Powder 1 Application(s) Topical daily  pantoprazole  Injectable 40 milliGRAM(s) IV Push daily  potassium acid phosphate/sodium acid phosphate tablet (K-PHOS No. 2) 1 Tablet(s) Oral four times a day with meals  potassium chloride   Powder 40 milliEquivalent(s) Oral every 4 hours    Drug Dosing Weight  Height (cm): 167.64 (10 Feb 2018 20:31)  Weight (kg): 90.7 (10 Feb 2018 20:31)  BMI (kg/m2): 32.3 (10 Feb 2018 20:31)  BSA (m2): 2 (10 Feb 2018 20:31)    CENTRAL LINE: [ ] YES [x ] NO  LOCATION:   DATE INSERTED:  REMOVE: [ ] YES [ ] NO  EXPLAIN:    MOBLEY: [x ] YES [ ] NO    DATE INSERTED:2/11  REMOVE:  [ ] YES [ ] NO  EXPLAIN:    A-LINE:  [ ] YES [x NO  LOCATION:   DATE INSERTED:  REMOVE:  [ ] YES [ ] NO  EXPLAIN:      PMH -reviewed admission note, no change since admission      ICU Vital Signs Last 24 Hrs  T(C): 36.5 (15 Feb 2018 00:00), Max: 37.2 (14 Feb 2018 15:38)  T(F): 97.7 (15 Feb 2018 00:00), Max: 98.9 (14 Feb 2018 15:38)  HR: 76 (15 Feb 2018 07:00) (63 - 93)  BP: 114/74 (15 Feb 2018 07:00) (92/79 - 137/58)  BP(mean): 84 (15 Feb 2018 07:00) (62 - 91)  ABP: --  ABP(mean): --  RR: 16 (15 Feb 2018 07:00) (16 - 24)  SpO2: 100% (15 Feb 2018 07:00) (96% - 100%)      ABG - ( 13 Feb 2018 14:28 )  pH: 7.45  /  pCO2: 53    /  pO2: 107   / HCO3: 37    / Base Excess: 10.9  /  SaO2: 98                    02-14 @ 07:01  -  02-15 @ 07:00  --------------------------------------------------------  IN: 350 mL / OUT: 2600 mL / NET: -2250 mL            PHYSICAL EXAM:    HEAD:  [x ]Atraumatic, [x ]Normocephalic  EYES: [ x]EOMI, [x ]PERRLA, [ x]conjunctiva and sclera clear  ENMT: [x ]No tonsillar erythema, exudates, or enlargement; [x ]Moist mucous membranes, [ ]Good dentition, [ ]No lesions  NECK: [x ]Supple, normal appearance,   NERVOUS SYSTEM:  [x ]Alert & Oriented X2 ; [ x]Motor Strength 5/5 B/L upper and lower extremities; [ x]DTRs 2+ intact and symmetric  CHEST/LUNG: [x ]No chest deformity; [x ]Normal percussion bilaterally;  wheezing + in bilateral upper lobes   HEART: [ x]Regular rate and rhythm; [x ]No murmurs, rubs, or gallops  ABDOMEN: [x ]Soft, Nontender, Nondistended; [x ]Bowel sounds present  EXTREMITIES:  [x ]2+ Peripheral Pulses, 2+ pitting pedal edema ,venous ulcer Left leg ( chronic ) , bilateral venous stasis   LYMPH: [ x]No lymphadenopathy noted  SKIN: [x ]No rashes or lesions; [ x]Good capillary refill      LABS:  CBC Full  -  ( 15 Feb 2018 03:51 )  WBC Count : 4.1 K/uL  Hemoglobin : 12.8 g/dL  Hematocrit : 41.8 %  Platelet Count - Automated : 81 K/uL  Mean Cell Volume : 97.2 fl  Mean Cell Hemoglobin : 29.7 pg  Mean Cell Hemoglobin Concentration : 30.5 gm/dL  Auto Neutrophil # : x  Auto Lymphocyte # : x  Auto Monocyte # : x  Auto Eosinophil # : x  Auto Basophil # : x  Auto Neutrophil % : x  Auto Lymphocyte % : x  Auto Monocyte % : x  Auto Eosinophil % : x  Auto Basophil % : x    02-15    142  |  97  |  13  ----------------------------<  82  2.7<LL>   |  44<H>  |  0.50    Ca    8.6      15 Feb 2018 03:51  Phos  1.9     02-15  Mg     1.8     02-15    TPro  6.0  /  Alb  1.6<L>  /  TBili  0.8  /  DBili  x   /  AST  74<H>  /  ALT  24  /  AlkPhos  192<H>  02-14    PT/INR - ( 15 Feb 2018 03:51 )   PT: 18.8 sec;   INR: 1.71 ratio           [x ]GOALS OF CARE DISCUSSION WITH PATIENT/FAMILY/PROXY: DNR / DNI    CRITICAL CARE TIME SPENT: 35 minutes
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
## 2018-02-16 DIAGNOSIS — I50.23 ACUTE ON CHRONIC SYSTOLIC (CONGESTIVE) HEART FAILURE: ICD-10-CM

## 2018-02-16 LAB
ANION GAP SERPL CALC-SCNC: 4 MMOL/L — LOW (ref 5–17)
BUN SERPL-MCNC: 12 MG/DL — SIGNIFICANT CHANGE UP (ref 7–18)
CALCIUM SERPL-MCNC: 8.1 MG/DL — LOW (ref 8.4–10.5)
CHLORIDE SERPL-SCNC: 96 MMOL/L — SIGNIFICANT CHANGE UP (ref 96–108)
CO2 SERPL-SCNC: 43 MMOL/L — HIGH (ref 22–31)
CREAT SERPL-MCNC: 0.49 MG/DL — LOW (ref 0.5–1.3)
CULTURE RESULTS: SIGNIFICANT CHANGE UP
GLUCOSE SERPL-MCNC: 76 MG/DL — SIGNIFICANT CHANGE UP (ref 70–99)
HCT VFR BLD CALC: 40.6 % — SIGNIFICANT CHANGE UP (ref 34.5–45)
HGB BLD-MCNC: 12.3 G/DL — SIGNIFICANT CHANGE UP (ref 11.5–15.5)
MAGNESIUM SERPL-MCNC: 1.7 MG/DL — SIGNIFICANT CHANGE UP (ref 1.6–2.6)
MCHC RBC-ENTMCNC: 29.6 PG — SIGNIFICANT CHANGE UP (ref 27–34)
MCHC RBC-ENTMCNC: 30.3 GM/DL — LOW (ref 32–36)
MCV RBC AUTO: 97.7 FL — SIGNIFICANT CHANGE UP (ref 80–100)
PHOSPHATE SERPL-MCNC: 2.2 MG/DL — LOW (ref 2.5–4.5)
PLATELET # BLD AUTO: 102 K/UL — LOW (ref 150–400)
POTASSIUM SERPL-MCNC: 3.1 MMOL/L — LOW (ref 3.5–5.3)
POTASSIUM SERPL-SCNC: 3.1 MMOL/L — LOW (ref 3.5–5.3)
RBC # BLD: 4.16 M/UL — SIGNIFICANT CHANGE UP (ref 3.8–5.2)
RBC # FLD: 17.7 % — HIGH (ref 10.3–14.5)
SODIUM SERPL-SCNC: 143 MMOL/L — SIGNIFICANT CHANGE UP (ref 135–145)
SPECIMEN SOURCE: SIGNIFICANT CHANGE UP
WBC # BLD: 4.2 K/UL — SIGNIFICANT CHANGE UP (ref 3.8–10.5)
WBC # FLD AUTO: 4.2 K/UL — SIGNIFICANT CHANGE UP (ref 3.8–10.5)

## 2018-02-16 RX ORDER — POTASSIUM CHLORIDE 20 MEQ
40 PACKET (EA) ORAL EVERY 4 HOURS
Qty: 0 | Refills: 0 | Status: COMPLETED | OUTPATIENT
Start: 2018-02-16 | End: 2018-02-16

## 2018-02-16 RX ORDER — SODIUM,POTASSIUM PHOSPHATES 278-250MG
1 POWDER IN PACKET (EA) ORAL
Qty: 0 | Refills: 0 | Status: COMPLETED | OUTPATIENT
Start: 2018-02-16 | End: 2018-02-17

## 2018-02-16 RX ORDER — COLLAGENASE CLOSTRIDIUM HIST. 250 UNIT/G
1 OINTMENT (GRAM) TOPICAL DAILY
Qty: 0 | Refills: 0 | Status: DISCONTINUED | OUTPATIENT
Start: 2018-02-16 | End: 2018-02-20

## 2018-02-16 RX ADMIN — Medication 1 TABLET(S): at 23:14

## 2018-02-16 RX ADMIN — MEROPENEM 100 MILLIGRAM(S): 1 INJECTION INTRAVENOUS at 06:54

## 2018-02-16 RX ADMIN — PANTOPRAZOLE SODIUM 40 MILLIGRAM(S): 20 TABLET, DELAYED RELEASE ORAL at 13:37

## 2018-02-16 RX ADMIN — Medication 1 TABLET(S): at 18:33

## 2018-02-16 RX ADMIN — AMIODARONE HYDROCHLORIDE 200 MILLIGRAM(S): 400 TABLET ORAL at 06:55

## 2018-02-16 RX ADMIN — Medication 50 MILLIGRAM(S): at 18:54

## 2018-02-16 RX ADMIN — APIXABAN 5 MILLIGRAM(S): 2.5 TABLET, FILM COATED ORAL at 06:55

## 2018-02-16 RX ADMIN — Medication 40 MILLIGRAM(S): at 18:33

## 2018-02-16 RX ADMIN — Medication 1 APPLICATION(S): at 13:25

## 2018-02-16 RX ADMIN — MEROPENEM 100 MILLIGRAM(S): 1 INJECTION INTRAVENOUS at 00:40

## 2018-02-16 RX ADMIN — NYSTATIN CREAM 1 APPLICATION(S): 100000 CREAM TOPICAL at 13:26

## 2018-02-16 RX ADMIN — CHLORHEXIDINE GLUCONATE 1 APPLICATION(S): 213 SOLUTION TOPICAL at 13:26

## 2018-02-16 RX ADMIN — Medication 40 MILLIEQUIVALENT(S): at 18:33

## 2018-02-16 RX ADMIN — APIXABAN 5 MILLIGRAM(S): 2.5 TABLET, FILM COATED ORAL at 19:12

## 2018-02-16 RX ADMIN — MEROPENEM 100 MILLIGRAM(S): 1 INJECTION INTRAVENOUS at 13:29

## 2018-02-16 RX ADMIN — Medication 40 MILLIGRAM(S): at 06:55

## 2018-02-16 RX ADMIN — Medication 40 MILLIEQUIVALENT(S): at 14:27

## 2018-02-16 NOTE — PROGRESS NOTE ADULT - PROBLEM SELECTOR PLAN 1
afebrile, hemodynamically stable  c/w diuresis via lasix  c/w cardiac meds including dig, bb  cardio recs appreciated  strict i/o  f/u labs and replace electrolytes prn

## 2018-02-16 NOTE — PROGRESS NOTE ADULT - PROBLEM SELECTOR PLAN 1
Respiratory failure 2/2 to CHF exacerbation   was on Bipap initially , off bipap since 10pm on 2/11 , saturating well on 4 L NC  diuresed with IV lasix 60 bid --> will now cut down to 40 iv bid --> will cut down to 40 bid oral today - patient is well diuresed  Patient has a net negative of - 0.76 L in last 12 hours   Patient is DNR/DNI

## 2018-02-16 NOTE — PROGRESS NOTE ADULT - SUBJECTIVE AND OBJECTIVE BOX
Patient is a 85y old  Female who presents with a chief complaint of Shortness of breath x 1week (10 Feb 2018 23:56)      Overnight Events: None    REVIEW OF SYSTEMS: patient says she's feeling better    CONSTITUTIONAL: No weakness, fevers or chills  RESPIRATORY: No cough, wheezing, hemoptysis; No shortness of breath  CARDIOVASCULAR: No chest pain or palpitations  GASTROINTESTINAL: No abdominal or epigastric pain. No nausea, vomiting, or hematemesis; No diarrhea or constipation. No melena or hematochezia.  NEUROLOGICAL: No numbness or weakness  All other review of systems is negative unless indicated above.    MEDICATIONS  (STANDING):  amiodarone    Tablet 200 milliGRAM(s) Oral daily  apixaban 5 milliGRAM(s) Oral every 12 hours  chlorhexidine 4% Liquid 1 Application(s) Topical daily  collagenase Ointment 1 Application(s) Topical daily  digoxin     Tablet 0.125 milliGRAM(s) Oral daily  furosemide    Tablet 40 milliGRAM(s) Oral two times a day  meropenem  IVPB      meropenem  IVPB 1000 milliGRAM(s) IV Intermittent every 8 hours  metoprolol     tartrate 50 milliGRAM(s) Oral two times a day  nystatin Powder 1 Application(s) Topical daily  pantoprazole  Injectable 40 milliGRAM(s) IV Push daily    MEDICATIONS  (PRN):    Vital Signs Last 24 Hrs  T(C): 36.7 (16 Feb 2018 05:14), Max: 36.8 (15 Feb 2018 15:31)  T(F): 98.1 (16 Feb 2018 05:14), Max: 98.3 (15 Feb 2018 15:31)  HR: 54 (16 Feb 2018 05:14) (54 - 98)  BP: 119/69 (16 Feb 2018 05:14) (92/44 - 123/60)  BP(mean): 57 (15 Feb 2018 18:00) (57 - 79)  RR: 18 (16 Feb 2018 05:14) (18 - 25)  SpO2: 93% (16 Feb 2018 05:14) (93% - 100%)    General: WN/WD NAD  Neurology: A&Ox3  Respiratory: bilateral basilar rhonchi  CV: RRR, S1S2, no murmur  Abdominal: Soft, NT, ND no palpable mass, bowel sounds positive  MSK: trace peripheral edema up to mid calf, + peripheral pulses, legs are very sensitive to touch - allodynia      Labs:                        12.3   4.2   )-----------( 102      ( 16 Feb 2018 06:20 )             40.6     02-16    143  |  96  |  12  ----------------------------<  76  3.1<L>   |  43<H>  |  0.49<L>    Ca    8.1<L>      16 Feb 2018 09:19  Phos  2.2     02-16  Mg     1.7     02-16    TPro  6.0  /  Alb  1.6<L>  /  TBili  0.8  /  DBili  x   /  AST  74<H>  /  ALT  24  /  AlkPhos  192<H>  02-14

## 2018-02-16 NOTE — PROGRESS NOTE ADULT - PROBLEM SELECTOR PLAN 2
Patient has hx of Systolic heart failure last echo showing EF 30-35% in march 2017  elevated bnp of 64362  Troponin negative   f/up Echo : EF of 10 % with grade 4 dd , fall from EF of 35 % in March 2017  Will follow up with Dr. Day for the same   c/w Lasix 40mg oral BID for now  Monitor Bun/Cr : wnl   Cardio : Dr. Chavis

## 2018-02-16 NOTE — PROGRESS NOTE ADULT - PROBLEM SELECTOR PLAN 3
Patient's CXR found to have RML infiltrate  f/up procalcitonin : 0.61   Lactate wnl now , vitals stable   Blood cultures negative to date   meropenem restarted by Dr. Cnadelaria yesterday, will follow up with her recommendations  Consult ID . Dr. Candelaria

## 2018-02-16 NOTE — PROGRESS NOTE ADULT - SUBJECTIVE AND OBJECTIVE BOX
84 y/o female seen at bedside in NAD. For left lower extremity venous stasis ulcers  Local wound care with santyl ointment  Admitted with Respiratory failure 2/2 to CHF exacerbation.  Transferred to [address] [place] [address]    Vital Signs Last 24 Hrs  T(C): 36.7 (16 Feb 2018 05:14), Max: 36.8 (15 Feb 2018 15:31)  T(F): 98.1 (16 Feb 2018 05:14), Max: 98.3 (15 Feb 2018 15:31)  HR: 54 (16 Feb 2018 05:14) (54 - 98)  BP: 119/69 (16 Feb 2018 05:14) (92/44 - 123/60)  BP(mean): 57 (15 Feb 2018 18:00) (57 - 79)  RR: 18 (16 Feb 2018 05:14) (18 - 25)  SpO2: 93% (16 Feb 2018 05:14) (93% - 100%)    O:   Integument:  Skin cool, dry, scaly with trophic changes bilateral.    Left medial lower leg ulcers x 2  measuring  4.0 cm x 2.0 cm, and distal ulcer measuring 3.0 cm x 2.5 cm both with mostly granular base, mixed with few fibrotic tissues, no active drainage, no purulent, no erythema, 1+ pitting edema, -fluctuance, -tracking/tunneling, -probe to bone. Chronic venous stasis changes with hyper-pigmentation noted bilateral LE  Toenails are elongated, thickened and dystrophic x 10  Vascular: Dorsalis Pedis 0/4 and Posterior Tibial pulses 0/4 right.  Doppler-able pedal pulses bilateral, Capillary re-fill time > 5 seconds digits x 10    Neuro: Protective sensation diminished bilateral  foot to S.W monofilament   MSK: Laterally deviated hallux bilateral,  dorsally contracted digits 2-5 bilateral, weak bilateral LE      LABS/DIAGNOSTIC TESTS             12.3   4.2   )-----------( 102      ( 16 Feb 2018 06:20 )             40.6     02-16    143  |  96  |  12  ----------------------------<  76  3.1<L>   |  43<H>  |  0.49<L>    Ca    8.1<L>      16 Feb 2018 09:19  Phos  2.2     02-16  Mg     1.7     02-16    TPro  6.0  /  Alb  1.6<L>  /  TBili  0.8  /  DBili  x   /  AST  74<H>  /  ALT  24  /  AlkPhos  192<H>  02-14

## 2018-02-16 NOTE — PROGRESS NOTE ADULT - SUBJECTIVE AND OBJECTIVE BOX
Patient seen and examined at bedside  No new acute events noted overnight  pt downgraded to AI unit overnight     HPI:  84 y/o F from [facility] pt w/ PMHx of A-fib, CVA, CHF, at home DNR/DNI p/w SOB x the past few hours as per nursing home reports onset which started in the afternoon. Patient has been complaining of difficulty breathing, but today it was worse and she was very lethargic. History is limited as patient is lethargic, and history was obtained by daughters at bedside, and Ed provider note. Pt was dyspneic in ED and was speaking only one word sentences. Pt was placed on Bipap on arrival to ED.       PAST MEDICAL & SURGICAL HISTORY:  Venous stasis: b/l leg, left calf ulcer  CVA (cerebral vascular accident): March 18th 2017, left side weakness ,s/p TPA  Atrial fibrillation  HTN (hypertension)  No significant past surgical history    penicillin (Unknown)     MEDICATIONS  (STANDING):  amiodarone    Tablet 200 milliGRAM(s) Oral daily  apixaban 5 milliGRAM(s) Oral every 12 hours  chlorhexidine 4% Liquid 1 Application(s) Topical daily  collagenase Ointment 1 Application(s) Topical daily  digoxin     Tablet 0.125 milliGRAM(s) Oral daily  furosemide    Tablet 40 milliGRAM(s) Oral two times a day  meropenem  IVPB      meropenem  IVPB 1000 milliGRAM(s) IV Intermittent every 8 hours  metoprolol     tartrate 50 milliGRAM(s) Oral two times a day  nystatin Powder 1 Application(s) Topical daily  pantoprazole  Injectable 40 milliGRAM(s) IV Push daily    MEDICATIONS  (PRN):        REVIEW OF SYSTEMS:  CONSTITUTIONAL: (+) malaise.   EYES: No acute change in vision or eye pain  ENT:  No difficulty hearing, tinnitus, vertigo  NECK: No pain or stiffness  RESPIRATORY: No hemoptysis  CARDIOVASCULAR: No chest pain, palpitations  GASTROINTESTINAL: No abdominal pain. No vomiting, hematemesis, diarrhea, melena, or hematochezia.  GENITOURINARY: No dysuria,  NEUROLOGICAL: No headaches, memory loss,  LYMPH Nodes: No enlarged glands  ENDOCRINE: No heat or cold intolerance; No hair loss      Vital Signs Last 24 Hrs  T(C): 36.7 (16 Feb 2018 05:14), Max: 36.8 (15 Feb 2018 15:31)  T(F): 98.1 (16 Feb 2018 05:14), Max: 98.3 (15 Feb 2018 15:31)  HR: 54 (16 Feb 2018 05:14) (54 - 98)  BP: 119/69 (16 Feb 2018 05:14) (86/42 - 123/60)  BP(mean): 57 (15 Feb 2018 18:00) (52 - 79)  RR: 18 (16 Feb 2018 05:14) (17 - 25)  SpO2: 93% (16 Feb 2018 05:14) (93% - 100%)    PHYSICAL EXAMINATION:   Constitutional: WD, WN, NAD  HEENT: NC, AT  Neck:  Supple. No JVD, bruits or thyromegaly  Respiratory:  mild basilar rales.  Adequate airflow b/l. Not using accessory muscles of respiration.  Cardiovascular:  RRR, systolic murmur. No R/G, 2+ pulses b/l  Gastrointestinal: Soft, NT, ND, normoactive b.s., no organomegaly/RT/rigidity  Extremities: WWP  Neurological:  Alert and awake.     Labs and imaging reviewed    LABS:                    Complete Blood Count Repeat Every 24 Hours X 30 Days (02.16.18 @ 06:20)    WBC Count: 4.2 K/uL    RBC Count: 4.16 M/uL    Hemoglobin: 12.3 g/dL    Hematocrit: 40.6 %    Mean Cell Volume: 97.7 fl    Mean Cell Hemoglobin: 29.6 pg    Mean Cell Hemoglobin Conc: 30.3 gm/dL    Red Cell Distrib Width: 17.7 %    Platelet Count - Automated: 102 K/uL        RADIOLOGY & ADDITIONAL STUDIES: reviewed

## 2018-02-17 LAB
ANION GAP SERPL CALC-SCNC: 4 MMOL/L — LOW (ref 5–17)
BUN SERPL-MCNC: 12 MG/DL — SIGNIFICANT CHANGE UP (ref 7–18)
CALCIUM SERPL-MCNC: 8.4 MG/DL — SIGNIFICANT CHANGE UP (ref 8.4–10.5)
CHLORIDE SERPL-SCNC: 93 MMOL/L — LOW (ref 96–108)
CO2 SERPL-SCNC: 41 MMOL/L — HIGH (ref 22–31)
CREAT SERPL-MCNC: 0.5 MG/DL — SIGNIFICANT CHANGE UP (ref 0.5–1.3)
GLUCOSE SERPL-MCNC: 105 MG/DL — HIGH (ref 70–99)
HCT VFR BLD CALC: 44.6 % — SIGNIFICANT CHANGE UP (ref 34.5–45)
HGB BLD-MCNC: 13.7 G/DL — SIGNIFICANT CHANGE UP (ref 11.5–15.5)
MAGNESIUM SERPL-MCNC: 1.6 MG/DL — SIGNIFICANT CHANGE UP (ref 1.6–2.6)
MCHC RBC-ENTMCNC: 29.1 PG — SIGNIFICANT CHANGE UP (ref 27–34)
MCHC RBC-ENTMCNC: 30.8 GM/DL — LOW (ref 32–36)
MCV RBC AUTO: 94.6 FL — SIGNIFICANT CHANGE UP (ref 80–100)
PHOSPHATE SERPL-MCNC: 2.4 MG/DL — LOW (ref 2.5–4.5)
PLATELET # BLD AUTO: 121 K/UL — LOW (ref 150–400)
POTASSIUM SERPL-MCNC: 3.3 MMOL/L — LOW (ref 3.5–5.3)
POTASSIUM SERPL-SCNC: 3.3 MMOL/L — LOW (ref 3.5–5.3)
RBC # BLD: 4.71 M/UL — SIGNIFICANT CHANGE UP (ref 3.8–5.2)
RBC # FLD: 17.4 % — HIGH (ref 10.3–14.5)
SODIUM SERPL-SCNC: 138 MMOL/L — SIGNIFICANT CHANGE UP (ref 135–145)
WBC # BLD: 3.8 K/UL — SIGNIFICANT CHANGE UP (ref 3.8–10.5)
WBC # FLD AUTO: 3.8 K/UL — SIGNIFICANT CHANGE UP (ref 3.8–10.5)

## 2018-02-17 RX ADMIN — CHLORHEXIDINE GLUCONATE 1 APPLICATION(S): 213 SOLUTION TOPICAL at 11:41

## 2018-02-17 RX ADMIN — AMIODARONE HYDROCHLORIDE 200 MILLIGRAM(S): 400 TABLET ORAL at 06:46

## 2018-02-17 RX ADMIN — Medication 0.12 MILLIGRAM(S): at 06:47

## 2018-02-17 RX ADMIN — NYSTATIN CREAM 1 APPLICATION(S): 100000 CREAM TOPICAL at 11:42

## 2018-02-17 RX ADMIN — Medication 50 MILLIGRAM(S): at 06:47

## 2018-02-17 RX ADMIN — PANTOPRAZOLE SODIUM 40 MILLIGRAM(S): 20 TABLET, DELAYED RELEASE ORAL at 11:41

## 2018-02-17 RX ADMIN — Medication 1 TABLET(S): at 11:41

## 2018-02-17 RX ADMIN — Medication 1 TABLET(S): at 08:42

## 2018-02-17 RX ADMIN — Medication 40 MILLIGRAM(S): at 06:47

## 2018-02-17 RX ADMIN — APIXABAN 5 MILLIGRAM(S): 2.5 TABLET, FILM COATED ORAL at 18:22

## 2018-02-17 RX ADMIN — APIXABAN 5 MILLIGRAM(S): 2.5 TABLET, FILM COATED ORAL at 06:46

## 2018-02-17 RX ADMIN — Medication 1 APPLICATION(S): at 11:41

## 2018-02-17 RX ADMIN — Medication 40 MILLIGRAM(S): at 18:22

## 2018-02-17 RX ADMIN — Medication 50 MILLIGRAM(S): at 18:22

## 2018-02-17 NOTE — PROGRESS NOTE ADULT - PROBLEM SELECTOR PLAN 2
abd adjusted, c/w levaquin  supportive care  supplemental oxygen prn to maintain spo2 > 90%  critical care, pulm, and id recs appreciated

## 2018-02-17 NOTE — PROGRESS NOTE ADULT - PROBLEM SELECTOR PLAN 6
Patient has chronic venous stasis in bilateral Le  Podiatry was consulted Patient has chronic venous stasis in bilateral Le  s/p excisional debridement of devitalized tissues with scalpel to and including subcutaneous level to left lower leg ulcers by Podiatry

## 2018-02-17 NOTE — PROGRESS NOTE ADULT - PROBLEM SELECTOR PLAN 1
Respiratory failure 2/2 to CHF exacerbation   Diuresing well on lasix 40 bid oral today   Patient is DNR/DNI

## 2018-02-17 NOTE — PROGRESS NOTE ADULT - SUBJECTIVE AND OBJECTIVE BOX
Patient is a 85y old  Female who presents with a chief complaint of Shortness of breath x 1week (10 Feb 2018 23:56)      INTERVAL HPI/OVERNIGHT EVENTS: No acute events overnight    MEDICATIONS  (STANDING):  amiodarone    Tablet 200 milliGRAM(s) Oral daily  apixaban 5 milliGRAM(s) Oral every 12 hours  chlorhexidine 4% Liquid 1 Application(s) Topical daily  collagenase Ointment 1 Application(s) Topical daily  collagenase Ointment 1 Application(s) Topical daily  digoxin     Tablet 0.125 milliGRAM(s) Oral daily  furosemide    Tablet 40 milliGRAM(s) Oral two times a day  levoFLOXacin  Tablet 500 milliGRAM(s) Oral every 24 hours  metoprolol     tartrate 50 milliGRAM(s) Oral two times a day  nystatin Powder 1 Application(s) Topical daily  pantoprazole  Injectable 40 milliGRAM(s) IV Push daily  potassium acid phosphate/sodium acid phosphate tablet (K-PHOS No. 2) 1 Tablet(s) Oral four times a day with meals    MEDICATIONS  (PRN):      Allergies    penicillin (Unknown)    Intolerances        REVIEW OF SYSTEMS: patient says she's feeling better  CONSTITUTIONAL: No weakness, fevers or chills  RESPIRATORY: No cough, wheezing, hemoptysis; No shortness of breath  CARDIOVASCULAR: No chest pain or palpitations  GASTROINTESTINAL: No abdominal or epigastric pain. No nausea, vomiting, or hematemesis; No diarrhea or constipation. No melena or hematochezia.  NEUROLOGICAL: No numbness or weakness  All other review of systems is negative unless indicated above.    Vital Signs Last 24 Hrs  T(C): 36.7 (17 Feb 2018 05:29), Max: 36.9 (16 Feb 2018 20:28)  T(F): 98.1 (17 Feb 2018 05:29), Max: 98.4 (16 Feb 2018 20:28)  HR: 92 (17 Feb 2018 05:29) (76 - 92)  BP: 123/79 (17 Feb 2018 05:29) (118/90 - 126/73)  BP(mean): --  RR: 16 (17 Feb 2018 05:29) (16 - 20)  SpO2: 94% (17 Feb 2018 05:29) (94% - 98%)    General: WN/WD NAD  Neurology: A&Ox3  Respiratory: bilateral basilar rhonchi  CV: RRR, S1S2, no murmur  Abdominal: Soft, NT, ND no palpable mass, bowel sounds positive  MSK: trace peripheral edema up to mid calf, + peripheral pulses, legs are very sensitive to touch - allodynia    LABS:                        12.3   4.2   )-----------( 102      ( 16 Feb 2018 06:20 )             40.6     02-16    143  |  96  |  12  ----------------------------<  76  3.1<L>   |  43<H>  |  0.49<L>    Ca    8.1<L>      16 Feb 2018 09:19  Phos  2.2     02-16  Mg     1.7     02-16          CAPILLARY BLOOD GLUCOSE          RADIOLOGY & ADDITIONAL TESTS:    Imaging Personally Reviewed:  [ ] YES  [ ] NO    Consultant(s) Notes Reviewed:  [ ] YES  [ ] NO    Care Discussed with Consultants/Other Providers [ ] YES  [ ] NO

## 2018-02-17 NOTE — PROGRESS NOTE ADULT - SUBJECTIVE AND OBJECTIVE BOX
Meds:    Allergies:  Allergies    penicillin (Unknown)    Intolerances        ROS  [  ] UNABLE TO ELICIT    General:  [  ] None  [  ] Fever  [  ] Chills  [ x ] Malaise    Skin:  [  ] None [  ] Rash  [  ] Wound  [ x ] Ulcer    HEENT:  [ x ] None  [  ] Sore Throat  [  ] Nasal congestion/ runny nose  [  ] Photophobia [  ] Neck pain      Chest:  [  ] None   [ x ] SOB  [ x ] Cough  [  ] None    Cardiovascular:   [ x ] None  [  ] CP  [  ] Palpitation    Gastrointestinal:  [ x ] None  [  ] Abd pain   [  ] Nausea    [  ] Vomiting   [  ] Diarrhea	     Genitourinary:  [ x ] None [  ] Polyuria   [  ] Urgency  [  ] Frequency  [  ] Dysuria    [  ]  Hematuria       Musculoskeletal:  [  ] None [  ] Back Pain	[  ] Body aches  [  ] Joint pain [ x ] Weakness    Neurological: [  ] None [  ]Dizziness  [  ]Visual Disturbance  [  ]Headaches   [ x ] Weakness          PHYSICAL EXAM:    Vital Signs Last 24 Hrs  T(C): 36.9 (17 Feb 2018 13:36), Max: 36.9 (16 Feb 2018 20:28)  T(F): 98.4 (17 Feb 2018 13:36), Max: 98.4 (16 Feb 2018 20:28)  HR: 55 (17 Feb 2018 13:36) (55 - 92)  BP: 116/52 (17 Feb 2018 13:36) (116/52 - 123/79)  BP(mean): --  RR: 14 (17 Feb 2018 13:36) (14 - 18)  SpO2: 95% (17 Feb 2018 13:36) (94% - 98%)    HEENT: [ x ] Wnl  [  ] Pharyngeal congestion    Neck:  [ x ] Supple  [  ]Lymphadenopathy  [ x ] No JVD   [  ] JVD  [  ] Masses   [  ] WNL    CHEST/Respiratory:  [  ]Clear to auscultation  [ x ] Rales   [  ] Rhonchi   [  ] Wheezing     [  ] Chest Tenderness      Cardiovascular:  [ x ] Reg S1 S2   [  ] Irreg S1 S2   [ x ]No Murmur  [  ] +ve Murmurs  [  ]Systolic [  ]Diastolic      Abdomen:  [ x ] Soft  [  ] No tendrerness  [  ] Tenderness  [  ] Organomegaly  [  ] ABD Distention  [  ] Rigidity                       [ x ] No Regidity                       [ x ] No Rebound Tenderness  [  ] No Guarding Rigidity  [  ] Rebound Tenderness[  ] Guarding Rigidity                          [ x ]  +ve Bowel Sounds  [  ] Decreased Bowel Sounds    [  ] Absent Bowel Sounds                            Extremities: [  ] No edema [ x ] Edema  [  ] Clubbing   [  ] Cyanosis                         [ x ] No Tender Calf muscles  [  ] Tender Calf muscles                        [ x ] Palpable peripheral pulses    Neurological: [  ] Awake  [  ] Alert  [  ] Oriented  x                             [  ] Confused  [  ] Drowzy  [  ] repond to painful stimuli  [  ] Unresponsive    Skin:  [  ] Intact [  ] Redness [  ] Thrombophlebitis  [  ] Rashes  [  ] Dry  [ x ] Ulcers Left leg with scab.          [ x ] Bilateral legs chronic skin changes.    Ortho:  [  ] Joint Swelling  [  ] Joint erythema [  ] Joint tenderness                [  ] Increased temp. to touch  [ x ] DJD [  ] WNL            LABS/DIAGNOSTIC TESTS                          13.7   3.8   )-----------( 121      ( 17 Feb 2018 10:05 )             44.6         02-17    138  |  93<L>  |  12  ----------------------------<  105<H>  3.3<L>   |  41<H>  |  0.50    Ca    8.4      17 Feb 2018 12:49  Phos  2.4     02-17  Mg     1.6     02-17              CULTURES:   Culture - Blood (02.11.18 @ 21:36)    Specimen Source: .Blood Blood-Peripheral    Culture Results:   No growth at 5 days.        RADIOLOGY  CXR:    EXAM:  XR CHEST PORTABLE ROUTINE 1V                            PROCEDURE DATE:  02/15/2018          INTERPRETATION:  CLINICAL STATEMENT: Follow-up chest pain.    TECHNIQUE: AP view of the chest.    COMPARISON: 2/14/2018    FINDINGS/  IMPRESSION:  Small right pleural effusion with opacity stable to mildly increased   given differences in technique. Mild increased interstitial lung markings   without significant change.    Heart size cannot be accurately assessed in this projection. Questionable   mild prominence of right hilum without significant change    Assessment and Plan:   Chart is reviewed  84 y/o F from [facility] pt w/ PMHx of A-fib, CVA, CHF, at home DNR/DNI p/w SOB few hours in nursing home . Patient has been complaining of difficulty breathing before, but was worsened the day of admission and she was very lethargic. Pt was dyspneic in ED and was speaking only one word sentences. Pt was placed on Bipap on arrival to ED.Ed course:EKG: Afib @ 148bpmbnp: [id number]: .027 (10 Feb 2018 23:56). cxr with congestion , seen by cardio, on high doses of lasix, rvp swab pos for HMPV POS      # viral infection with HMPV predisposing pt to CHF r/o post viral pneumonia, markedly elevated Pro- BNp , no fever.    # elevated procalcitonin r/o uti     PLAN  blood cx times 2 f/u to final reports   Obtain u/a and urine cx   Continue Meropenem pending above cx  CHF management as per cardiology.  CT chest to r/o post viral pneumonia.

## 2018-02-17 NOTE — PROGRESS NOTE ADULT - PROBLEM SELECTOR PLAN 2
Patient has hx of Systolic heart failure last echo showing EF 30-35% in march 2017  [place]vated bnp of 245[address]  Troponin negative   f/up Echo : EF of 10 % with grade 4 dd , fall from EF of 35 % in March 2017  c/w Lasix 40mg oral BID for now  Monitor Bun/Cr : wnl   Cardio : Dr. Chavis

## 2018-02-17 NOTE — PROGRESS NOTE ADULT - PROBLEM SELECTOR PLAN 3
Patient's CXR found to have RML infiltrate  f/up procalcitonin : 0.61   Lactate wnl now , vitals stable   Blood cultures negative to date   meropenem switched to po levaquin per Dr. Candelaria discussion this am  Consult ID . Dr. Candelaria

## 2018-02-18 LAB — 24R-OH-CALCIDIOL SERPL-MCNC: 48.8 NG/ML — SIGNIFICANT CHANGE UP (ref 30–80)

## 2018-02-18 RX ORDER — POTASSIUM CHLORIDE 20 MEQ
40 PACKET (EA) ORAL EVERY 4 HOURS
Qty: 0 | Refills: 0 | Status: COMPLETED | OUTPATIENT
Start: 2018-02-18 | End: 2018-02-18

## 2018-02-18 RX ORDER — POTASSIUM CHLORIDE 20 MEQ
40 PACKET (EA) ORAL EVERY 4 HOURS
Qty: 0 | Refills: 0 | Status: DISCONTINUED | OUTPATIENT
Start: 2018-02-18 | End: 2018-02-18

## 2018-02-18 RX ADMIN — Medication 1 APPLICATION(S): at 11:20

## 2018-02-18 RX ADMIN — Medication 50 MILLIGRAM(S): at 05:17

## 2018-02-18 RX ADMIN — APIXABAN 5 MILLIGRAM(S): 2.5 TABLET, FILM COATED ORAL at 05:17

## 2018-02-18 RX ADMIN — Medication 40 MILLIEQUIVALENT(S): at 08:21

## 2018-02-18 RX ADMIN — NYSTATIN CREAM 1 APPLICATION(S): 100000 CREAM TOPICAL at 11:20

## 2018-02-18 RX ADMIN — Medication 50 MILLIGRAM(S): at 18:17

## 2018-02-18 RX ADMIN — Medication 40 MILLIEQUIVALENT(S): at 05:15

## 2018-02-18 RX ADMIN — CHLORHEXIDINE GLUCONATE 1 APPLICATION(S): 213 SOLUTION TOPICAL at 11:20

## 2018-02-18 RX ADMIN — Medication 0.12 MILLIGRAM(S): at 05:16

## 2018-02-18 RX ADMIN — Medication 40 MILLIGRAM(S): at 05:16

## 2018-02-18 RX ADMIN — APIXABAN 5 MILLIGRAM(S): 2.5 TABLET, FILM COATED ORAL at 18:17

## 2018-02-18 RX ADMIN — AMIODARONE HYDROCHLORIDE 200 MILLIGRAM(S): 400 TABLET ORAL at 05:16

## 2018-02-18 RX ADMIN — PANTOPRAZOLE SODIUM 40 MILLIGRAM(S): 20 TABLET, DELAYED RELEASE ORAL at 11:20

## 2018-02-18 NOTE — PROGRESS NOTE ADULT - PROBLEM SELECTOR PLAN 3
Patient's CXR found to have RML infiltrate  f/up procalcitonin : 0.61   Lactate wnl now , vitals stable   Blood cultures negative to date   Discontinuing levaquin per Dr. Bonilla  Consult ID . Dr. Candelaria

## 2018-02-18 NOTE — PROGRESS NOTE ADULT - PROBLEM SELECTOR PLAN 1
afebrile, hemodynamically stable  c/w cardiac meds including lasix, dig, bb  cardio recs appreciated  strict i/o  f/u labs and replace electrolytes prn

## 2018-02-18 NOTE — PROGRESS NOTE ADULT - PROBLEM SELECTOR PLAN 6
Patient has chronic venous stasis in bilateral Le  s/p excisional debridement of devitalized tissues with scalpel to and including subcutaneous level to left lower leg ulcers by Podiatry

## 2018-02-18 NOTE — PROGRESS NOTE ADULT - SUBJECTIVE AND OBJECTIVE BOX
Patient is a 85y old  Female who presents with a chief complaint of Shortness of breath x 1week (10 Feb 2018 23:56)      INTERVAL HPI/OVERNIGHT EVENTS: None     MEDICATIONS  (STANDING):  amiodarone    Tablet 200 milliGRAM(s) Oral daily  apixaban 5 milliGRAM(s) Oral every 12 hours  chlorhexidine 4% Liquid 1 Application(s) Topical daily  collagenase Ointment 1 Application(s) Topical daily  collagenase Ointment 1 Application(s) Topical daily  digoxin     Tablet 0.125 milliGRAM(s) Oral daily  furosemide    Tablet 40 milliGRAM(s) Oral two times a day  metoprolol     tartrate 50 milliGRAM(s) Oral two times a day  nystatin Powder 1 Application(s) Topical daily  pantoprazole  Injectable 40 milliGRAM(s) IV Push daily  potassium chloride   Powder 40 milliEquivalent(s) Oral every 4 hours    MEDICATIONS  (PRN):      Allergies    penicillin (Unknown)        Vital Signs Last 24 Hrs  T(C): 36.1 (18 Feb 2018 05:23), Max: 36.9 (17 Feb 2018 13:36)  T(F): 97 (18 Feb 2018 05:23), Max: 98.4 (17 Feb 2018 13:36)  HR: 84 (18 Feb 2018 05:23) (55 - 84)  BP: 137/96 (18 Feb 2018 05:23) (116/52 - 137/96)  BP(mean): --  RR: 18 (18 Feb 2018 05:23) (14 - 18)  SpO2: 99% (18 Feb 2018 05:23) (95% - 99%)    REVIEW OF SYSTEMS:  patient says she's feeling better  CONSTITUTIONAL: No weakness, fevers or chills  RESPIRATORY: No cough, wheezing, hemoptysis; No shortness of breath  CARDIOVASCULAR: No chest pain or palpitations  GASTROINTESTINAL: No abdominal or epigastric pain. No nausea, vomiting, or hematemesis; No diarrhea or constipation. No melena or hematochezia.  NEUROLOGICAL: No numbness or weakness  All other review of systems is negative unless indicated above.    PHYSICAL EXAM  General: WN/WD NAD  Neurology: Alert  Respiratory: bilateral basilar rhonchi  CV: RRR, S1S2, no murmur  Abdominal: Soft, NT, ND no palpable mass, bowel sounds positive  MSK: trace peripheral edema up to mid calf, + peripheral pulses, legs are very sensitive to touch - allodynia    LABS:                        13.7   3.8   )-----------( 121      ( 17 Feb 2018 10:05 )             44.6     02-17    138  |  93<L>  |  12  ----------------------------<  105<H>  3.3<L>   |  41<H>  |  0.50    Ca    8.4      17 Feb 2018 12:49  Phos  2.4     02-17  Mg     1.6     02-17          CAPILLARY BLOOD GLUCOSE          RADIOLOGY & ADDITIONAL TESTS:    Imaging Personally Reviewed:  [ ] YES  [ ] NO    Consultant(s) Notes Reviewed:  [ ] YES  [ ] NO    Care Discussed with Consultants/Other Providers [ ] YES  [ ] NO

## 2018-02-18 NOTE — PROGRESS NOTE ADULT - SUBJECTIVE AND OBJECTIVE BOX
IM Attending Covering Medicine Today  **  will resume care Monday 2/19/18 as IM Attending    pt refused am labs  No new acute events noted overnight      HPI:  84 y/o F from [facility] pt w/ PMHx of A-fib, CVA, CHF, at home DNR/DNI p/w SOB x the past few hours as per nursing home reports onset which started in the afternoon. Patient has been complaining of difficulty breathing, but today it was worse and she was very lethargic. History is limited as patient is lethargic, and history was obtained by daughters at bedside, and Ed provider note. Pt was dyspneic in ED and was speaking only one word sentences. Pt was placed on Bipap on arrival to ED.       PAST MEDICAL & SURGICAL HISTORY:  Venous stasis: b/l leg, left calf ulcer  CVA (cerebral vascular accident): March 18th 2017, left side weakness ,s/p TPA  Atrial fibrillation  HTN (hypertension)  No significant past surgical history    penicillin (Unknown)     MEDICATIONS  (STANDING):  amiodarone    Tablet 200 milliGRAM(s) Oral daily  apixaban 5 milliGRAM(s) Oral every 12 hours  chlorhexidine 4% Liquid 1 Application(s) Topical daily  collagenase Ointment 1 Application(s) Topical daily  collagenase Ointment 1 Application(s) Topical daily  digoxin     Tablet 0.125 milliGRAM(s) Oral daily  furosemide    Tablet 40 milliGRAM(s) Oral two times a day  metoprolol     tartrate 50 milliGRAM(s) Oral two times a day  nystatin Powder 1 Application(s) Topical daily  pantoprazole  Injectable 40 milliGRAM(s) IV Push daily    MEDICATIONS  (PRN):          REVIEW OF SYSTEMS:  CONSTITUTIONAL: (+) malaise.   EYES: No acute change in vision or eye pain  ENT:  No difficulty hearing, tinnitus, vertigo  NECK: No pain or stiffness  RESPIRATORY: No hemoptysis  CARDIOVASCULAR: No chest pain, palpitations  GASTROINTESTINAL: No abdominal pain. No vomiting, hematemesis, diarrhea, melena, or hematochezia.  GENITOURINARY: No dysuria,  NEUROLOGICAL: No headaches, memory loss,  LYMPH Nodes: No enlarged glands  ENDOCRINE: No heat or cold intolerance; No hair loss      Vital Signs Last 24 Hrs  T(C): 36.4 (18 Feb 2018 13:44), Max: 36.4 (18 Feb 2018 13:44)  T(F): 97.6 (18 Feb 2018 13:44), Max: 97.6 (18 Feb 2018 13:44)  HR: 72 (18 Feb 2018 13:44) (72 - 84)  BP: 95/51 (18 Feb 2018 13:44) (95/51 - 137/96)  BP(mean): --  RR: 16 (18 Feb 2018 13:44) (16 - 18)  SpO2: 100% (18 Feb 2018 13:44) (99% - 100%)      PHYSICAL EXAMINATION:   Constitutional: WD, WN, NAD  HEENT: NC, AT  Neck:  Supple. No JVD, bruits or thyromegaly  Respiratory:  Adequate airflow b/l. Not using accessory muscles of respiration.  Cardiovascular:  RRR, systolic murmur. No R/G, 2+ pulses b/l  Gastrointestinal: Soft, NT, ND, normoactive b.s., no organomegaly/RT/rigidity  Extremities: WWP  Neurological:  Alert and awake.     Labs and imaging reviewed    LABS:   pt refused labs    RADIOLOGY & ADDITIONAL STUDIES: reviewed

## 2018-02-18 NOTE — PROGRESS NOTE ADULT - PROBLEM SELECTOR PLAN 2
Patient has hx of Systolic heart failure last echo showing EF 30-35% in march 2017  [place] bnp of 77[address]  Troponin negative   f/up Echo : EF of 10 % with grade 4 dd , fall from EF of 35 % in March 2017  c/w Lasix 40mg oral BID for now  Monitor Bun/Cr : wnl   Cardio : Dr. Chavis

## 2018-02-18 NOTE — PROGRESS NOTE ADULT - PROBLEM SELECTOR PLAN 2
improved  supportive care  supplemental oxygen prn to maintain spo2 > 90%  critical care, pulm, and id recs appreciated

## 2018-02-19 LAB
HCT VFR BLD CALC: 39.7 % — SIGNIFICANT CHANGE UP (ref 34.5–45)
HGB BLD-MCNC: 12.3 G/DL — SIGNIFICANT CHANGE UP (ref 11.5–15.5)
MCHC RBC-ENTMCNC: 29.7 PG — SIGNIFICANT CHANGE UP (ref 27–34)
MCHC RBC-ENTMCNC: 31 GM/DL — LOW (ref 32–36)
MCV RBC AUTO: 95.7 FL — SIGNIFICANT CHANGE UP (ref 80–100)
PLATELET # BLD AUTO: 135 K/UL — LOW (ref 150–400)
RBC # BLD: 4.15 M/UL — SIGNIFICANT CHANGE UP (ref 3.8–5.2)
RBC # FLD: 17.3 % — HIGH (ref 10.3–14.5)
WBC # BLD: 4 K/UL — SIGNIFICANT CHANGE UP (ref 3.8–10.5)
WBC # FLD AUTO: 4 K/UL — SIGNIFICANT CHANGE UP (ref 3.8–10.5)

## 2018-02-19 RX ADMIN — PANTOPRAZOLE SODIUM 40 MILLIGRAM(S): 20 TABLET, DELAYED RELEASE ORAL at 12:20

## 2018-02-19 RX ADMIN — Medication 40 MILLIGRAM(S): at 06:36

## 2018-02-19 RX ADMIN — Medication 1 APPLICATION(S): at 12:19

## 2018-02-19 RX ADMIN — Medication 40 MILLIGRAM(S): at 17:55

## 2018-02-19 RX ADMIN — NYSTATIN CREAM 1 APPLICATION(S): 100000 CREAM TOPICAL at 12:19

## 2018-02-19 RX ADMIN — APIXABAN 5 MILLIGRAM(S): 2.5 TABLET, FILM COATED ORAL at 06:36

## 2018-02-19 RX ADMIN — APIXABAN 5 MILLIGRAM(S): 2.5 TABLET, FILM COATED ORAL at 17:55

## 2018-02-19 RX ADMIN — AMIODARONE HYDROCHLORIDE 200 MILLIGRAM(S): 400 TABLET ORAL at 06:36

## 2018-02-19 RX ADMIN — Medication 0.12 MILLIGRAM(S): at 06:36

## 2018-02-19 RX ADMIN — Medication 50 MILLIGRAM(S): at 17:54

## 2018-02-19 RX ADMIN — Medication 50 MILLIGRAM(S): at 06:36

## 2018-02-19 NOTE — PROGRESS NOTE ADULT - NSHPATTENDINGPLANDISCUSS_GEN_ALL_CORE
Patient
primary team
icu team on rounds
icu team on r ounds
icu team on rounds

## 2018-02-19 NOTE — PROGRESS NOTE ADULT - PROBLEM SELECTOR PLAN 2
Patient has hx of Systolic heart failure last echo showing EF 30-35% in march 2017  elevated bnp of 884[address]  Troponin negative   f/up Echo : EF of 10 % with grade 4 dd , fall from EF of 35 % in March 2017  c/w Lasix 40mg oral BID for now  Monitor Bun/Cr : wnl   Cardio : Dr. Chavis

## 2018-02-19 NOTE — PROGRESS NOTE ADULT - SUBJECTIVE AND OBJECTIVE BOX
Patient seen and examined.       MEDICATIONS  (STANDING):  amiodarone    Tablet 200 milliGRAM(s) Oral daily  apixaban 5 milliGRAM(s) Oral every 12 hours  chlorhexidine 4% Liquid 1 Application(s) Topical daily  collagenase Ointment 1 Application(s) Topical daily  collagenase Ointment 1 Application(s) Topical daily  digoxin     Tablet 0.125 milliGRAM(s) Oral daily  furosemide    Tablet 40 milliGRAM(s) Oral two times a day  metoprolol     tartrate 50 milliGRAM(s) Oral two times a day  nystatin Powder 1 Application(s) Topical daily  pantoprazole  Injectable 40 milliGRAM(s) IV Push daily      MEDICATIONS  (PRN):   Medications up to date at time of exam.      PHYSICAL EXAMINATION:  Patient has no new complaints.  GENERAL: The patient is a well-developed, well-nourished, in no apparent distress.     Vital Signs Last 24 Hrs  T(C): 36.9 (19 Feb 2018 20:23), Max: 36.9 (19 Feb 2018 20:23)  T(F): 98.4 (19 Feb 2018 20:23), Max: 98.4 (19 Feb 2018 20:23)  HR: 81 (19 Feb 2018 20:23) (75 - 88)  BP: 102/56 (19 Feb 2018 20:23) (98/51 - 120/67)  BP(mean): --  RR: 22 (19 Feb 2018 20:23) (14 - 22)  SpO2: 100% (19 Feb 2018 20:23) (96% - 100%)   (if applicable)      HEENT: Head is normocephalic and atraumatic.     NECK: Supple, no palpable adenopathy.    LUNGS: Clear to auscultation, no wheezing, rales, or rhonchi.    HEART: Regular rate and rhythm + II/VI HSM.    ABDOMEN: Soft, nontender, and nondistended.  No hepatosplenomegaly is noted.    EXTREMITIES: Without any cyanosis, clubbing, rash, lesions or + B/L LE edema.    NEUROLOGIC: Awake, alert.    SKIN: Warm, dry, good turgor.      LABS:                        12.3   4.0   )-----------( 135      ( 19 Feb 2018 07:13 )             39.7       MICROBIOLOGY: (if applicable)    RADIOLOGY & ADDITIONAL STUDIES:  EKG:   CXR:  ECHO:    IMPRESSION: 85y Female PAST MEDICAL & SURGICAL HISTORY:  Venous stasis: b/l leg, left calf ulcer  CVA (cerebral vascular accident): March 18th 2017, left side weakness ,s/p TPA  Atrial fibrillation  HTN (hypertension)  No significant past surgical history         RECOMMENDATIONS:    Patient presents from NH with SOB due to HF exacerbation, reduced EF. Probnp 22,000.  Continue with IV lasix, bb, recommend low dose ACE I for HF.   Afib stable rate, continue with amiodarone, digoxin, anticoagulation.  Patient's EF now 10%, was 30% in 2017, family does not want ischemic evaluation at present time.

## 2018-02-19 NOTE — PROGRESS NOTE ADULT - SUBJECTIVE AND OBJECTIVE BOX
Meds:    Allergies:  Allergies    penicillin (Unknown)    Intolerances        ROS  [  ] UNABLE TO ELICIT    General:  [  ] None  [  ] Fever  [  ] Chills  [ x ] Malaise    Skin:  [  ] None [  ] Rash  [  ] Wound  [ x ] Ulcer    HEENT:  [ x ] None  [  ] Sore Throat  [  ] Nasal congestion/ runny nose  [  ] Photophobia [  ] Neck pain      Chest:  [  ] None   [ x ] SOB  [ x ] Cough  [  ] None    Cardiovascular:   [ x ] None  [  ] CP  [  ] Palpitation    Gastrointestinal:  [ x ] None  [  ] Abd pain   [  ] Nausea    [  ] Vomiting   [  ] Diarrhea	     Genitourinary:  [ x ] None [  ] Polyuria   [  ] Urgency  [  ] Frequency  [  ] Dysuria    [  ]  Hematuria       Musculoskeletal:  [  ] None [  ] Back Pain	[  ] Body aches  [  ] Joint pain [ x ] Weakness    Neurological: [  ] None [  ]Dizziness  [  ]Visual Disturbance  [  ]Headaches   [ x ] Weakness          PHYSICAL EXAM:  Vital Signs Last 24 Hrs  T(C): 36.4 (19 Feb 2018 05:15), Max: 36.6 (18 Feb 2018 20:29)  T(F): 97.6 (19 Feb 2018 05:15), Max: 97.9 (18 Feb 2018 20:29)  HR: 76 (19 Feb 2018 05:15) (72 - 83)  BP: 120/67 (19 Feb 2018 05:15) (95/51 - 120/67)  BP(mean): --  RR: 18 (19 Feb 2018 05:15) (16 - 18)  SpO2: 100% (19 Feb 2018 05:15) (95% - 100%)    HEENT: [ x ] Wnl  [  ] Pharyngeal congestion    Neck:  [ x ] Supple  [  ]Lymphadenopathy  [ x ] No JVD   [  ] JVD  [  ] Masses   [  ] WNL    CHEST/Respiratory:  [  ]Clear to auscultation  [ x ] Rales   [  ] Rhonchi   [  ] Wheezing     [  ] Chest Tenderness      Cardiovascular:  [ x ] Reg S1 S2   [  ] Irreg S1 S2   [ x ]No Murmur  [  ] +ve Murmurs  [  ]Systolic [  ]Diastolic      Abdomen:  [ x ] Soft  [  ] No tendrerness  [  ] Tenderness  [  ] Organomegaly  [  ] ABD Distention  [  ] Rigidity                       [ x ] No Regidity                       [ x ] No Rebound Tenderness  [  ] No Guarding Rigidity  [  ] Rebound Tenderness[  ] Guarding Rigidity                          [ x ]  +ve Bowel Sounds  [  ] Decreased Bowel Sounds    [  ] Absent Bowel Sounds                            Extremities: [  ] No edema [ x ] Edema  [  ] Clubbing   [  ] Cyanosis                         [ x ] No Tender Calf muscles  [  ] Tender Calf muscles                        [ x ] Palpable peripheral pulses    Neurological: [  ] Awake  [  ] Alert  [  ] Oriented  x                             [  ] Confused  [  ] Drowzy  [  ] repond to painful stimuli  [  ] Unresponsive    Skin:  [  ] Intact [  ] Redness [  ] Thrombophlebitis  [  ] Rashes  [  ] Dry  [ x ] Ulcers Left leg with scab.          [ x ] Bilateral legs chronic skin changes.    Ortho:  [  ] Joint Swelling  [  ] Joint erythema [  ] Joint tenderness                [  ] Increased temp. to touch  [ x ] DJD [  ] WNL            LABS/DIAGNOSTIC TESTS                        12.3   4.0   )-----------( 135      ( 19 Feb 2018 07:13 )             39.7       02-17    138  |  93<L>  |  12  ----------------------------<  105<H>  3.3<L>   |  41<H>  |  0.50    Ca    8.4      17 Feb 2018 12:49  Phos  2.4     02-17  Mg     1.6     02-17              CULTURES:   Culture - Blood (02.11.18 @ 21:36)    Specimen Source: .Blood Blood-Peripheral    Culture Results:   No growth at 5 days.        RADIOLOGY  CXR:    EXAM:  XR CHEST PORTABLE ROUTINE 1V                            PROCEDURE DATE:  02/15/2018          INTERPRETATION:  CLINICAL STATEMENT: Follow-up chest pain.    TECHNIQUE: AP view of the chest.    COMPARISON: 2/14/2018    FINDINGS/  IMPRESSION:  Small right pleural effusion with opacity stable to mildly increased   given differences in technique. Mild increased interstitial lung markings   without significant change.    Heart size cannot be accurately assessed in this projection. Questionable   mild prominence of right hilum without significant change    Assessment and Plan:   Chart is reviewed  84 y/o F from [facility] pt w/ PMHx of A-fib, CVA, CHF, at home DNR/DNI p/w SOB few hours in nursing home . Patient has been complaining of difficulty breathing before, but was worsened the day of admission and she was very lethargic. Pt was dyspneic in ED and was speaking only one word sentences. Pt was placed on Bipap on arrival to ED.Ed course:EKG: Afib @ 148bpmbnp: [id number]: .027 (10 Feb 2018 23:56). cxr with congestion , seen by cardio, on high doses of lasix, rvp swab pos for HMPV POS      # viral infection with HMPV predisposing pt to CHF r/o post viral pneumonia, markedly elevated Pro- BNp , no fever.    # elevated procalcitonin r/o uti     PLAN  blood cx times 2 f/u to final reports   Obtain u/a and urine cx   Restart Meropenem if patient develops fever or leukocytosis.  CHF management as per cardiology.  CT chest to r/o post viral pneumonia has been recommended.   CBC abd CMP for follow up.    Dr. Candelaria will be back covering from tomorrow 2/20/18.

## 2018-02-19 NOTE — PROGRESS NOTE ADULT - SUBJECTIVE AND OBJECTIVE BOX
[ X]DNR      INTERVAL HPI/OVERNIGHT EVENTS: No acute overnight events     PRESSORS: [ ] YES [X ] NO  ANTIBIOTICS: Meropenem                    ANTIBIOTICS:    Vancomycin                     amiodarone    Tablet 200 milliGRAM(s) Oral daily  apixaban 5 milliGRAM(s) Oral every 12 hours  chlorhexidine 4% Liquid 1 Application(s) Topical daily  collagenase Ointment 1 Application(s) Topical daily  collagenase Ointment 1 Application(s) Topical daily  digoxin     Tablet 0.125 milliGRAM(s) Oral daily  furosemide    Tablet 40 milliGRAM(s) Oral two times a day  metoprolol     tartrate 50 milliGRAM(s) Oral two times a day  nystatin Powder 1 Application(s) Topical daily  pantoprazole  Injectable 40 milliGRAM(s) IV Push daily      CENTRAL LINE: [ ] YES [X ] NO  LOCATION: [facility]      REMOVE: [ ] YES [X ] NO  EXPLAIN: IV access       PMH -reviewed admission note, no change since admission  PAST MEDICAL & SURGICAL HISTORY:  Venous stasis: b/l leg, left calf ulcer  CVA (cerebral vascular accident): March 18th 2017, left side weakness ,s/p TPA  Atrial fibrillation  HTN (hypertension)  No significant past surgical history      T(C): 36.4 (02-19-18 @ 05:15), Max: 36.6 (02-18-18 @ 20:29)  HR: 76 (02-19-18 @ 05:15)  BP: 120/67 (02-19-18 @ 05:15)  RR: 18 (02-19-18 @ 05:15)  SpO2: 100% (02-19-18 @ 05:15)      02-18 @ 07:01  -  02-19 @ 07:00  --------------------------------------------------------  IN: 0 mL / OUT: 1200 mL / NET: -1200 mL        PHYSICAL EXAM:    GENERAL: awake , no sign of distress   NECK: supple   NERVOUS SYSTEM:  AOx1-2 , no focal neurological deficit   CHEST/LUNG: scattered rhonchi   HEART: regular, tachycardia   ABDOMEN: soft, non tender   EXTREMITIES:  2+edema , dermatosis       LABS:  CBC Full  -  ( 19 Feb 2018 07:13 )  WBC Count : 4.0 K/uL  Hemoglobin : 12.3 g/dL  Hematocrit : 39.7 %  Platelet Count - Automated : 135 K/uL  Mean Cell Volume : 95.7 fl  Mean Cell Hemoglobin : 29.7 pg  Mean Cell Hemoglobin Concentration : 31.0 gm/dL  Auto Neutrophil # : x  Auto Lymphocyte # : x  Auto Monocyte # : x  Auto Eosinophil # : x  Auto Basophil # : x  Auto Neutrophil % : x  Auto Lymphocyte % : x  Auto Monocyte % : x  Auto Eosinophil % : x  Auto Basophil % : x    02-17    138  |  93<L>  |  12  ----------------------------<  105<H>  3.3<L>   |  41<H>  |  0.50    Ca    8.4      17 Feb 2018 12:49  Phos  2.4     02-17  Mg     1.6     02-17

## 2018-02-19 NOTE — PROGRESS NOTE ADULT - SUBJECTIVE AND OBJECTIVE BOX
· Subjective and Objective: 	  [ X]DNR      INTERVAL HPI/OVERNIGHT EVENTS: No acute overnight events     PRESSORS: [ ] YES [X ] NO  ANTIBIOTICS: Meropenem                    ANTIBIOTICS:    Vancomycin                     amiodarone    Tablet 200 milliGRAM(s) Oral daily  apixaban 5 milliGRAM(s) Oral every 12 hours  chlorhexidine 4% Liquid 1 Application(s) Topical daily  collagenase Ointment 1 Application(s) Topical daily  collagenase Ointment 1 Application(s) Topical daily  digoxin     Tablet 0.125 milliGRAM(s) Oral daily  furosemide    Tablet 40 milliGRAM(s) Oral two times a day  metoprolol     tartrate 50 milliGRAM(s) Oral two times a day  nystatin Powder 1 Application(s) Topical daily  pantoprazole  Injectable 40 milliGRAM(s) IV Push daily      CENTRAL LINE: [ ] YES [X ] NO  LOCATION: [facility]      REMOVE: [ ] YES [X ] NO  EXPLAIN: IV access       PMH -reviewed admission note, no change since admission  PAST MEDICAL & SURGICAL HISTORY:  Venous stasis: b/l leg, left calf ulcer  CVA (cerebral vascular accident): March 18th 2017, left side weakness ,s/p TPA  Atrial fibrillation  HTN (hypertension)  No significant past surgical history      T(C): 36.4 (02-19-18 @ 05:15), Max: 36.6 (02-18-18 @ 20:29)  HR: 76 (02-19-18 @ 05:15)  BP: 120/67 (02-19-18 @ 05:15)  RR: 18 (02-19-18 @ 05:15)  SpO2: 100% (02-19-18 @ 05:15)      02-18 @ 07:01  -  02-19 @ 07:00  --------------------------------------------------------  IN: 0 mL / OUT: 1200 mL / NET: -1200 mL        PHYSICAL EXAM:    GENERAL: awake , no sign of distress   NECK: supple   NERVOUS SYSTEM:  AOx1-2 , no focal neurological deficit   CHEST/LUNG: scattered rhonchi   HEART: regular, tachycardia   ABDOMEN: soft, non tender   EXTREMITIES:  2+edema , dermatosis       LABS:  CBC Full  -  ( 19 Feb 2018 07:13 )  WBC Count : 4.0 K/uL  Hemoglobin : 12.3 g/dL  Hematocrit : 39.7 %  Platelet Count - Automated : 135 K/uL  Mean Cell Volume : 95.7 fl  Mean Cell Hemoglobin : 29.7 pg  Mean Cell Hemoglobin Concentration : 31.0 gm/dL  Auto Neutrophil # : x  Auto Lymphocyte # : x  Auto Monocyte # : x  Auto Eosinophil # : x  Auto Basophil # : x  Auto Neutrophil % : x  Auto Lymphocyte % : x  Auto Monocyte % : x  Auto Eosinophil % : x  Auto Basophil % : x    02-17    138  |  93<L>  |  12  ----------------------------<  105<H>  3.3<L>   |  41<H>  |  0.50    Ca    8.4      17 Feb 2018 12:49  Phos  2.4     02-17  Mg     1.6     02-17              Assessment and Plan:   · Assessment		  86 y/o F from [facility] pt w/ PMHx of A-fib, CVA, CHF, at home DNR/DNI p/w SOB  admitted to ICU for Respiratory failure 2/2 to CHF exacerbation/PNA     Problem/Plan - 1:  ·  Problem: Respiratory failure.  Plan: Respiratory failure 2/2 to CHF exacerbation   Diuresing well on lasix 40 bid oral today   Patient is DNR/DNI.      Problem/Plan - 2:  ·  Problem: CHF exacerbation.  Plan: Patient has hx of Systolic heart failure last echo showing EF 30-35% in march 2017  [place] of 319[address]  Troponin negative   f/up Echo : EF of 10 % with grade 4 dd , fall from EF of 35 % in March 2017  c/w Lasix 40mg oral BID for now  Monitor Bun/Cr : wnl   Cardio : Dr. Chavis.      Problem/Plan - 3:  ·  Problem: HCAP (healthcare-associated pneumonia).  Plan: Patient's CXR found to have RML infiltrate  f/up procalcitonin : 0.61   Lactate wnl now , vitals stable   Blood cultures negative to date   off antibiotics   As per Dr. Candelaria restart Meropenem if spikes fever or develops leukocytosis.      Problem/Plan - 4:  ·  Problem: Rapid atrial fibrillation.  Plan: now rate controlled   s/p  Amiodarone loading for rate control  Continue on amiodarone maintenance dose 200 OD  Start Metoprolol 50 bid   s/p digoxin loading dose , on maintain dose now   Initially started on heparin drip , Off ac for now , due to supra therapeutic INR  takes elliquis at home   f/u inr : 1.17  Restarted Elliquis.      Problem/Plan - 5:  ·  Problem: HTN (hypertension).  Plan: bp well controlled on lasix and metoprolol.      Problem/Plan - 6:  Problem: Venous stasis. Plan: Patient has chronic venous stasis in bilateral Le  s/p excisional debridement of devitalized tissues with scalpel to and including subcutaneous level to left lower leg ulcers by Podiatry.     Problem/Plan - 7:  ·  Problem: Need for prophylactic measure.  Plan: GI ppx with protonix  Continue with full dose ac with elliquis  , no scd boots due to chronic venous stasis.

## 2018-02-19 NOTE — PROGRESS NOTE ADULT - PROBLEM SELECTOR PLAN 3
Patient's CXR found to have RML infiltrate  f/up procalcitonin : 0.61   Lactate wnl now , vitals stable   Blood cultures negative to date   off antibiotics   As per Dr. Candelaria restart Meropenem if spikes fever or develops leukocytosis

## 2018-02-20 ENCOUNTER — TRANSCRIPTION ENCOUNTER (OUTPATIENT)
Age: 83
End: 2018-02-20

## 2018-02-20 VITALS
HEART RATE: 67 BPM | SYSTOLIC BLOOD PRESSURE: 101 MMHG | OXYGEN SATURATION: 100 % | TEMPERATURE: 98 F | DIASTOLIC BLOOD PRESSURE: 52 MMHG | RESPIRATION RATE: 14 BRPM

## 2018-02-20 DIAGNOSIS — I48.91 UNSPECIFIED ATRIAL FIBRILLATION: ICD-10-CM

## 2018-02-20 DIAGNOSIS — Z71.89 OTHER SPECIFIED COUNSELING: ICD-10-CM

## 2018-02-20 PROCEDURE — 85610 PROTHROMBIN TIME: CPT

## 2018-02-20 PROCEDURE — 99291 CRITICAL CARE FIRST HOUR: CPT | Mod: 25

## 2018-02-20 PROCEDURE — 87581 M.PNEUMON DNA AMP PROBE: CPT

## 2018-02-20 PROCEDURE — 94640 AIRWAY INHALATION TREATMENT: CPT

## 2018-02-20 PROCEDURE — 82553 CREATINE MB FRACTION: CPT

## 2018-02-20 PROCEDURE — 87040 BLOOD CULTURE FOR BACTERIA: CPT

## 2018-02-20 PROCEDURE — 84484 ASSAY OF TROPONIN QUANT: CPT

## 2018-02-20 PROCEDURE — 93005 ELECTROCARDIOGRAM TRACING: CPT

## 2018-02-20 PROCEDURE — 71045 X-RAY EXAM CHEST 1 VIEW: CPT

## 2018-02-20 PROCEDURE — 82962 GLUCOSE BLOOD TEST: CPT

## 2018-02-20 PROCEDURE — 87798 DETECT AGENT NOS DNA AMP: CPT

## 2018-02-20 PROCEDURE — 97530 THERAPEUTIC ACTIVITIES: CPT

## 2018-02-20 PROCEDURE — 97116 GAIT TRAINING THERAPY: CPT

## 2018-02-20 PROCEDURE — 84443 ASSAY THYROID STIM HORMONE: CPT

## 2018-02-20 PROCEDURE — 82803 BLOOD GASES ANY COMBINATION: CPT

## 2018-02-20 PROCEDURE — 85730 THROMBOPLASTIN TIME PARTIAL: CPT

## 2018-02-20 PROCEDURE — 82306 VITAMIN D 25 HYDROXY: CPT

## 2018-02-20 PROCEDURE — 97163 PT EVAL HIGH COMPLEX 45 MIN: CPT

## 2018-02-20 PROCEDURE — 83735 ASSAY OF MAGNESIUM: CPT

## 2018-02-20 PROCEDURE — 82550 ASSAY OF CK (CPK): CPT

## 2018-02-20 PROCEDURE — 93306 TTE W/DOPPLER COMPLETE: CPT

## 2018-02-20 PROCEDURE — 83605 ASSAY OF LACTIC ACID: CPT

## 2018-02-20 PROCEDURE — 94660 CPAP INITIATION&MGMT: CPT

## 2018-02-20 PROCEDURE — 80048 BASIC METABOLIC PNL TOTAL CA: CPT

## 2018-02-20 PROCEDURE — 84100 ASSAY OF PHOSPHORUS: CPT

## 2018-02-20 PROCEDURE — 83880 ASSAY OF NATRIURETIC PEPTIDE: CPT

## 2018-02-20 PROCEDURE — 84145 PROCALCITONIN (PCT): CPT

## 2018-02-20 PROCEDURE — 85027 COMPLETE CBC AUTOMATED: CPT

## 2018-02-20 PROCEDURE — 80053 COMPREHEN METABOLIC PANEL: CPT

## 2018-02-20 PROCEDURE — 97110 THERAPEUTIC EXERCISES: CPT

## 2018-02-20 PROCEDURE — 92610 EVALUATE SWALLOWING FUNCTION: CPT

## 2018-02-20 PROCEDURE — 87486 CHLMYD PNEUM DNA AMP PROBE: CPT

## 2018-02-20 PROCEDURE — 87633 RESP VIRUS 12-25 TARGETS: CPT

## 2018-02-20 RX ORDER — COLLAGENASE CLOSTRIDIUM HIST. 250 UNIT/G
1 OINTMENT (GRAM) TOPICAL
Qty: 0 | Refills: 0 | COMMUNITY
Start: 2018-02-20

## 2018-02-20 RX ORDER — FUROSEMIDE 40 MG
3 TABLET ORAL
Qty: 0 | Refills: 0 | COMMUNITY

## 2018-02-20 RX ORDER — AMIODARONE HYDROCHLORIDE 400 MG/1
1 TABLET ORAL
Qty: 0 | Refills: 0 | COMMUNITY
Start: 2018-02-20

## 2018-02-20 RX ORDER — METOPROLOL TARTRATE 50 MG
1 TABLET ORAL
Qty: 0 | Refills: 0 | COMMUNITY

## 2018-02-20 RX ORDER — APIXABAN 2.5 MG/1
1 TABLET, FILM COATED ORAL
Qty: 0 | Refills: 0 | COMMUNITY
Start: 2018-02-20

## 2018-02-20 RX ORDER — METOPROLOL TARTRATE 50 MG
1 TABLET ORAL
Qty: 0 | Refills: 0 | COMMUNITY
Start: 2018-02-20

## 2018-02-20 RX ORDER — NYSTATIN CREAM 100000 [USP'U]/G
1 CREAM TOPICAL
Qty: 0 | Refills: 0 | COMMUNITY
Start: 2018-02-20

## 2018-02-20 RX ORDER — DIGOXIN 250 MCG
1 TABLET ORAL
Qty: 0 | Refills: 0 | COMMUNITY
Start: 2018-02-20

## 2018-02-20 RX ORDER — FUROSEMIDE 40 MG
1 TABLET ORAL
Qty: 0 | Refills: 0 | COMMUNITY
Start: 2018-02-20

## 2018-02-20 RX ADMIN — APIXABAN 5 MILLIGRAM(S): 2.5 TABLET, FILM COATED ORAL at 06:42

## 2018-02-20 RX ADMIN — PANTOPRAZOLE SODIUM 40 MILLIGRAM(S): 20 TABLET, DELAYED RELEASE ORAL at 13:19

## 2018-02-20 RX ADMIN — Medication 50 MILLIGRAM(S): at 06:41

## 2018-02-20 RX ADMIN — NYSTATIN CREAM 1 APPLICATION(S): 100000 CREAM TOPICAL at 13:17

## 2018-02-20 RX ADMIN — APIXABAN 5 MILLIGRAM(S): 2.5 TABLET, FILM COATED ORAL at 18:16

## 2018-02-20 RX ADMIN — Medication 0.12 MILLIGRAM(S): at 06:42

## 2018-02-20 RX ADMIN — AMIODARONE HYDROCHLORIDE 200 MILLIGRAM(S): 400 TABLET ORAL at 06:42

## 2018-02-20 RX ADMIN — Medication 1 APPLICATION(S): at 13:19

## 2018-02-20 NOTE — PROGRESS NOTE ADULT - SUBJECTIVE AND OBJECTIVE BOX
Patient seen and examined.     MEDICATIONS  (STANDING):  amiodarone    Tablet 200 milliGRAM(s) Oral daily  apixaban 5 milliGRAM(s) Oral every 12 hours  chlorhexidine 4% Liquid 1 Application(s) Topical daily  collagenase Ointment 1 Application(s) Topical daily  collagenase Ointment 1 Application(s) Topical daily  digoxin     Tablet 0.125 milliGRAM(s) Oral daily  furosemide    Tablet 40 milliGRAM(s) Oral two times a day  metoprolol     tartrate 50 milliGRAM(s) Oral two times a day  nystatin Powder 1 Application(s) Topical daily  pantoprazole  Injectable 40 milliGRAM(s) IV Push daily      MEDICATIONS  (PRN):   Medications up to date at time of exam.      PHYSICAL EXAMINATION:  Patient has no new complaints.  GENERAL: The patient is a well-developed, well-nourished, in no apparent distress.     Vital Signs Last 24 Hrs  T(C): 36.9 (20 Feb 2018 15:30), Max: 36.9 (19 Feb 2018 20:23)  T(F): 98.5 (20 Feb 2018 15:30), Max: 98.5 (20 Feb 2018 15:30)  HR: 67 (20 Feb 2018 15:30) (67 - 81)  BP: 101/52 (20 Feb 2018 15:30) (101/52 - 105/68)  BP(mean): --  RR: 14 (20 Feb 2018 15:30) (14 - 22)  SpO2: 100% (20 Feb 2018 15:30) (92% - 100%)   (if applicable)      HEENT: Head is normocephalic and atraumatic.     NECK: Supple, no palpable adenopathy.    LUNGS: Clear to auscultation, no wheezing, rales, or rhonchi.    HEART: Regular rate and rhythm + II/VI HSM murmur.    ABDOMEN: Soft, nontender, and nondistended.  No hepatosplenomegaly is noted.    EXTREMITIES: Without any cyanosis, clubbing, rash, lesions or + B/L LE edema.    NEUROLOGIC: Awake, alert.    SKIN: Warm, dry, good turgor.      LABS:                        12.3   4.0   )-----------( 135      ( 19 Feb 2018 07:13 )             39.7           MICROBIOLOGY: (if applicable)    RADIOLOGY & ADDITIONAL STUDIES:  EKG:   CXR:  ECHO:    IMPRESSION: 85y Female PAST MEDICAL & SURGICAL HISTORY:  Venous stasis: b/l leg, left calf ulcer  CVA (cerebral vascular accident): March 18th 2017, left side weakness ,s/p TPA  Atrial fibrillation  HTN (hypertension)  No significant past surgical history         Patient presents from NH with SOB due to HF exacerbation, reduced EF. Probnp 22,000.  Continue with IV lasix, bb, recommend low dose ACE I for HF.   Afib stable rate, continue with amiodarone, digoxin, anticoagulation.  Patient's EF now 10%, was 30% in 2017, family does not want ischemic evaluation at present time.

## 2018-02-20 NOTE — PROGRESS NOTE ADULT - PROBLEM SELECTOR PROBLEM 2
CHF exacerbation
Acute on chronic systolic congestive heart failure
CHF exacerbation
HCAP (healthcare-associated pneumonia)
CHF exacerbation
HCAP (healthcare-associated pneumonia)
HCAP (healthcare-associated pneumonia)

## 2018-02-20 NOTE — PROGRESS NOTE ADULT - ATTENDING COMMENTS
Agree with above assessment and plan as transcribed
**  will resume care Monday 2/19/18 as IM Attending
84 y/o F from [facility] pt w/ PMHx of A-fib, CVA, CHF, at home DNR/DNI p/w SOB  admitted to ICU for Respiratory failure 2/2 to CHF exacerbation/PNA CXR show pul edema     Problem/Plan - 1:  ·  Problem: Respiratory failure.  Plan: Respiratory failure 2/2 to CHF exacerbation   was on Bipap overnight, diuresed with IV lasix   Patient is DNR/DNI  c/w Lasix 40 mg IV BID     Problem/Plan - 2:  ·  Problem: CHF exacerbation.  Plan: Patient has hx of Systolic heart failure last echo showing EF 30-35% in march 2017  [place] bnp of [address]  T1: 0.027  Trend Cardiac enzymes  f/up Echo  c/w Lasix 60mg IV BID for now  Monitor Bun/Cr.      Problem/Plan - 3:  ·  Problem: HCAP (healthcare-associated pneumonia).  Plan: Patient's CXR found to have RML infiltrate, c/w IV meropenem for now  f/up procalcitonin, if negative will d/c abx.      Problem/Plan - 4:  ·  Problem: Rapid atrial fibrillation.  Plan: EKG- rapid Afib to 140s on admission.  patient is on metoprolol at home.  Started on  Amiodarone loading for rate control  Change eliquis to heparin gtt for now. .... d/c hep gtt. INR is therapeutic
84 y/o F from [facility] pt w/ PMHx of A-fib, CVA, CHF, at home DNR/DNI p/w SOB  admitted to ICU for Respiratory failure 2/2 to CHF exacerbation/PNA     Problem/Plan - 1:  ·  Problem: Respiratory failure.  Plan: Respiratory failure 2/2 to CHF exacerbation   was on Bipap initially , off bipap since 10pm on 2/11 , saturating well on 4 L NC  diuresed with IV lasix 60 bid --> will now cut down to 40 iv bid --> will cut down to 40 bid oral today   Patient has a net negative of - 2.2 L in last 12 hours   Patient is DNR/DNI.      Problem/Plan - 2:  ·  Problem: CHF exacerbation.  Plan: Patient has hx of Systolic heart failure last echo showing EF 30-35% in march 2017  [place]ated bnp of [address]  Troponin negative   f/up Echo : EF of 10 % with grade 4 dd , fall from EF of 35 % in March 2017  Will follow up with Dr. Day for the same   c/w Lasix 40mg oral BID for now  Monitor Bun/Cr : wnl   Could not get in touch with family for ischemic workup plan ?  Cardio : Dr. Chavis.
84 y/o F from [facility] pt w/ PMHx of A-fib, CVA, CHF, at home DNR/DNI p/w SOB  admitted to ICU for Respiratory failure 2/2 to CHF exacerbation/PNA     Problem/Plan - 1:  ·  Problem: Respiratory failure.  Plan: Respiratory failure 2/2 to CHF exacerbation   Diuresing well on lasix 40 bid oral today   Patient is DNR/DNI.      Problem/Plan - 2:  ·  Problem: CHF exacerbation.  Plan: Patient has hx of Systolic heart failure last echo showing EF 30-35% in march 2017  [place] b[place] of [address]  Troponin negative   f/up Echo : EF of 10 % with grade 4 dd , fall from EF of 35 % in March 2017  c/w Lasix 40mg oral BID for now  Monitor Bun/Cr : wnl   Cardio : Dr. Chavis.      Problem/Plan - 3:  ·  Problem: HCAP (healthcare-associated pneumonia).  Plan: Patient's CXR found to have RML infiltrate  f/up procalcitonin : 0.61   Lactate wnl now , vitals stable   Blood cultures negative to date   off antibiotics   As per Dr. Candelaria restart Meropenem if spikes fever or develops leukocytosis.
85 female from nursing home with hx of CHFrEF, afib on eliquis, CVA, presented to the ED with dyspnea and hypoxemia, was found to have hmpv infection, acute on chronic systolic CHF exacerbation, acute respiratory failure requiring bipap, Afib with RVR, as well as encephalopathy.  Now doing better.  Plan  - amio, metoprolol, digoxin for afib/ eliquis for AC  - abx per ID  - d/c planning
85 female from nursing home with hx of CHFrEF, afib on eliquis, CVA, presented to the ED with dyspnea and hypoxemia, was found to have hmpv infection, acute on chronic systolic CHF exacerbation, acute respiratory failure requiring bipap, Afib with RVR, as well as encephalopathy.  Now doing better.  Plan  - amio, metoprolol, digoxin for afib/ eliquis for AC  - abx per ID  Plan for discharge early next week
86 y/o F from [facility] pt w/ PMHx of A-fib, CVA, CHF, at home DNR/DNI p/w SOB  admitted to ICU for Respiratory failure 2/2 to CHF exacerbation/PNA. Downgraded to SCU doing better, DC planning     Problem/Plan - 1:  ·  Problem: Respiratory failure.  Plan: Respiratory failure 2/2 to CHF exacerbation   was on Bipap initially , off bipap since 2/11 , saturating well on 4 L NC.      Problem/Plan - 2:  ·  Problem: Acute on chronic systolic congestive heart failure.  Plan: Patient's EF now 10%, was 30% in 2017, family does not want ischemic evaluation at present time.  c/w oral diuretics.      Problem/Plan - 3:  ·  Problem: HCAP (healthcare-associated pneumonia).  Plan: Blood cultures negative to date   off antibiotics   afebrile  normal WBC  DC planning.      Problem/Plan - 4:  ·  Problem: Atrial fibrillation.  Plan: now rate controlled   s/p  Amiodarone loading for rate control  Continue on amiodarone, digoxin, lopressor and eliquis.      Problem/Plan - 5:  ·  Problem: Venous stasis.  Plan: Patient has chronic venous stasis in bilateral Le  s/p excisional debridement of devitalized tissues with scalpel to and including subcutaneous level to left lower leg ulcers by Podiatry  Continue daily dressing changes with santyl ointment to left leg ulcers.      Problem/Plan - 6:  Problem: HTN (hypertension). Plan: bp well controlled on lasix and metopro
85 female from nursing home with hx of CHFrEF, afib on eliquis, CVA, presented to the ED with dyspnea and hypoxemia, was found to have hmpv infection, acute on chronic systolic CHF exacerbation, acute respiratory failure requiring bipap, Afib with RVR, as well as encephalopathy.  Now doing better.  Plan  - amio, metoprolol, digoxin for afib/ eliquis for AC  - abx per ID  Plan for discharge early next week
86 y/o F from [facility] pt w/ PMHx of A-fib, CVA, CHF, at home DNR/DNI p/w SOB  admitted to ICU for Respiratory failure 2/2 to CHF exacerbation/PNA     Problem/Plan - 1:  ·  Problem: Respiratory failure.  Plan: Respiratory failure 2/2 to CHF exacerbation   was on Bipap initially , off bipap since 10pm on 2/11 , saturating well on 4 L NC  diuresed with IV lasix 60 bid --> will now cut down to 40 iv bid --> will cut down to 40 bid oral today   Patient has a net negative of - 5.9 L in last 12 hours   Patient is DNR/DNI.     Problem/Plan - 2:  ·  Problem: CHF exacerbation.  Plan: Patient has hx of Systolic heart failure last echo showing EF 30-35% in march 2017  [place]ated bnp of [address]  Troponin negative   f/up Echo : EF of 10 % with grade 4 dd , fall from EF of 35 % in March 2017  Will follow up with Dr. Day for the same   c/w Lasix 40mg oral BID for now  Monitor Bun/Cr : wnl   Could not get in touch with family for ischemic workup plan ?  Cardio : Dr. Chavis.
86 y/o F from [facility] pt w/ PMHx of A-fib, CVA, CHF, at home DNR/DNI p/w SOB  admitted to ICU for Respiratory failure 2/2 to CHF exacerbation/PNA     Problem/Plan - 1:  ·  Problem: Respiratory failure.  Plan: Respiratory failure 2/2 to CHF exacerbation   was on Bipap initially , off bipap since 10pm on 2/11 , saturating well on 4 L NC  diuresed with IV lasix 60 bid   Patient is DNR/DNI.     Problem/Plan - 2:  ·  Problem: CHF exacerbation.  Plan: Patient has hx of Systolic heart failure last echo showing EF 30-35% in march 2017  elevated bnp of 73753  Troponin negative   f/up Echo  c/w Lasix 60mg IV BID for now  Monitor Bun/Cr  Cardio : Dr. Chavis.     Problem/Plan - 3:  ·  Problem: HCAP (healthcare-associated pneumonia).  Plan: Patient's CXR found to have RML infiltrate, c/w IV meropenem for now  f/up procalcitonin  Patient meets sepsis criteria for now : hypothermia , Tachycardia , tachypnea   elevated Lactate   f/u am Lactate , however patient refusing labs  Consult ID . Dr. Candelaria.     Problem/Plan - 4:  ·  Problem: Rapid atrial fibrillation.  Plan: EKG- rapid Afib to 140s on admission.  s/p  Amiodarone loading for rate control  Continue on amiodarone maintainance dose 200 OD  Start Metoprolol 50 bid   Start digoxin Loading dose 0.5 iv   Initially started on heparin drip , Off ac for now , due to supra therapeutic INR  takes elliquis at home   Patient refused am labs.
-afib is better control  -on/off BiPaP

## 2018-02-20 NOTE — DISCHARGE NOTE ADULT - MEDICATION SUMMARY - MEDICATIONS TO TAKE
I will START or STAY ON the medications listed below when I get home from the hospital:    amiodarone 200 mg oral tablet  -- 1 tab(s) by mouth once a day  -- Indication: For Atrial fibrillation    digoxin 125 mcg (0.125 mg) oral tablet  -- 1 tab(s) by mouth once a day  -- Indication: For Atrial fibrillation    apixaban 5 mg oral tablet  -- 1 tab(s) by mouth every 12 hours  -- Indication: For Atrial fibrillation    metoprolol tartrate 50 mg oral tablet  -- 1 tab(s) by mouth 2 times a day  -- Indication: For HTN (hypertension)    collagenase 250 units/g topical ointment  -- 1 application on skin once a day  -- Indication: For left leg ulcer    nystatin 100,000 units/g topical powder  -- 1 application on skin once a day  -- Indication: For skin    furosemide 40 mg oral tablet  -- 1 tab(s) by mouth 2 times a day  -- Indication: For CHF exacerbation    pantoprazole 40 mg oral delayed release tablet  -- 1 tab(s) by mouth once a day (before a meal)  -- Indication: For GERD I will START or STAY ON the medications listed below when I get home from the hospital:    amiodarone 200 mg oral tablet  -- 1 tab(s) by mouth once a day  -- Indication: For Atrial fibrillation    digoxin 125 mcg (0.125 mg) oral tablet  -- 1 tab(s) by mouth once a day  -- Indication: For Atrial fibrillation    apixaban 5 mg oral tablet  -- 1 tab(s) by mouth every 12 hours  -- Indication: For Atrial fibrillation    metoprolol tartrate 50 mg oral tablet  -- 1 tab(s) by mouth 2 times a day  -- Indication: For Atrial fibrillation    collagenase 250 units/g topical ointment  -- 1 application on skin once a day  -- Indication: For ulcer    nystatin 100,000 units/g topical powder  -- 1 application on skin once a day  -- Indication: For Antifungal    furosemide 40 mg oral tablet  -- 1 tab(s) by mouth 2 times a day  -- Indication: For CHF exacerbation    pantoprazole 40 mg oral delayed release tablet  -- 1 tab(s) by mouth once a day (before a meal)  -- Indication: For Antiacid

## 2018-02-20 NOTE — PROGRESS NOTE ADULT - PROBLEM SELECTOR PLAN 5
hemodynamically stable
bp well controlled on lasix and metoprolol
hemodynamically stable
Holding anti-HTN medications for now, as patients BP is borderline normal to low normal  restart as needed
Patient has chronic venous stasis in bilateral Le  s/p excisional debridement of devitalized tissues with scalpel to and including subcutaneous level to left lower leg ulcers by Podiatry  Continue daily dressing changes with santyl ointment to left leg ulcers
bp well controlled on lasix and metoprolol
hemodynamically stable

## 2018-02-20 NOTE — DISCHARGE NOTE ADULT - SECONDARY DIAGNOSIS.
Acute on chronic systolic congestive heart failure HCAP (healthcare-associated pneumonia) Atrial fibrillation Goals of care, counseling/discussion

## 2018-02-20 NOTE — PROGRESS NOTE ADULT - PROBLEM SELECTOR PLAN 4
now rate controlled   s/p  Amiodarone loading for rate control  Continue on amiodarone, digoxin, lopressor and eliquis

## 2018-02-20 NOTE — DISCHARGE NOTE ADULT - NS AS DC HF EDUCATION INSTRUCTIONS
Low salt diet/Monitor Weight Daily/Activities as tolerated/Call Primary Care Provider for follow-up after discharge/Report weight gain of 2 or more pounds in one day or 3 or more pounds in one week, worsening shortness of breath, fatigue, weakness, increased swelling of hands and feet to primary care provider

## 2018-02-20 NOTE — PROGRESS NOTE ADULT - PROBLEM SELECTOR PLAN 1
Respiratory failure 2/2 to CHF exacerbation   was on Bipap initially , off bipap since 2/11 , saturating well on 4 L NC

## 2018-02-20 NOTE — PROGRESS NOTE ADULT - SUBJECTIVE AND OBJECTIVE BOX
Meds:    Allergies:  Allergies    penicillin (Unknown)    Intolerances        ROS  [  ] UNABLE TO ELICIT    General:  [  ] None  [  ] Fever  [  ] Chills  [ x ] Malaise    Skin:  [  ] None [  ] Rash  [  ] Wound  [ x ] Ulcer    HEENT:  [ x ] None  [  ] Sore Throat  [  ] Nasal congestion/ runny nose  [  ] Photophobia [  ] Neck pain      Chest:  [  ] None   [ x ] SOB  [ x ] Cough  [  ] None    Cardiovascular:   [ x ] None  [  ] CP  [  ] Palpitation    Gastrointestinal:  [ x ] None  [  ] Abd pain   [  ] Nausea    [  ] Vomiting   [  ] Diarrhea	     Genitourinary:  [ x ] None [  ] Polyuria   [  ] Urgency  [  ] Frequency  [  ] Dysuria    [  ]  Hematuria       Musculoskeletal:  [  ] None [  ] Back Pain	[  ] Body aches  [  ] Joint pain [ x ] Weakness    Neurological: [  ] None [  ]Dizziness  [  ]Visual Disturbance  [  ]Headaches   [ x ] Weakness          PHYSICAL EXAM:  Vital Signs Last 24 Hrs  T(C): 36.3 (20 Feb 2018 05:32), Max: 36.9 (19 Feb 2018 20:23)  T(F): 97.3 (20 Feb 2018 05:32), Max: 98.4 (19 Feb 2018 20:23)  HR: 75 (20 Feb 2018 05:32) (75 - 88)  BP: 105/68 (20 Feb 2018 05:32) (98/51 - 114/80)  BP(mean): --  RR: 17 (20 Feb 2018 05:32) (14 - 22)  SpO2: 100% (20 Feb 2018 05:32) (96% - 100%)    HEENT: [ x ] Wnl  [  ] Pharyngeal congestion    Neck:  [ x ] Supple  [  ]Lymphadenopathy  [ x ] No JVD   [  ] JVD  [  ] Masses   [  ] WNL    CHEST/Respiratory:  [  ]Clear to auscultation  [ x ] Rales   [  ] Rhonchi   [  ] Wheezing     [  ] Chest Tenderness      Cardiovascular:  [ x ] Reg S1 S2   [  ] Irreg S1 S2   [ x ]No Murmur  [  ] +ve Murmurs  [  ]Systolic [  ]Diastolic      Abdomen:  [ x ] Soft  [  ] No tendrerness  [  ] Tenderness  [  ] Organomegaly  [  ] ABD Distention  [  ] Rigidity                       [ x ] No Regidity                       [ x ] No Rebound Tenderness  [  ] No Guarding Rigidity  [  ] Rebound Tenderness[  ] Guarding Rigidity                          [ x ]  +ve Bowel Sounds  [  ] Decreased Bowel Sounds    [  ] Absent Bowel Sounds                            Extremities: [  ] No edema [ x ] Edema  [  ] Clubbing   [  ] Cyanosis                         [ x ] No Tender Calf muscles  [  ] Tender Calf muscles                        [ x ] Palpable peripheral pulses    Neurological: [  ] Awake  [  ] Alert  [  ] Oriented  x                             [  ] Confused  [  ] Drowzy  [  ] repond to painful stimuli  [  ] Unresponsive    Skin:  [  ] Intact [  ] Redness [  ] Thrombophlebitis  [  ] Rashes  [  ] Dry  [ x ] Ulcers Left leg with scab.          [ x ] Bilateral legs chronic skin changes.    Ortho:  [  ] Joint Swelling  [  ] Joint erythema [  ] Joint tenderness                [  ] Increased temp. to touch  [ x ] DJD [  ] WNL            LABS/DIAGNOSTIC TESTS                        12.3   4.0   )-----------( 135      ( 19 Feb 2018 07:13 )             39.7       02-17    138  |  93<L>  |  12  ----------------------------<  105<H>  3.3<L>   |  41<H>  |  0.50    Ca    8.4      17 Feb 2018 12:49  Phos  2.4     02-17  Mg     1.6     02-17              CULTURES:   Culture - Blood (02.11.18 @ 21:36)    Specimen Source: .Blood Blood-Peripheral    Culture Results:   No growth at 5 days.        RADIOLOGY  CXR:    EXAM:  XR CHEST PORTABLE ROUTINE 1V                            PROCEDURE DATE:  02/15/2018          INTERPRETATION:  CLINICAL STATEMENT: Follow-up chest pain.    TECHNIQUE: AP view of the chest.    COMPARISON: 2/14/2018    FINDINGS/  IMPRESSION:  Small right pleural effusion with opacity stable to mildly increased   given differences in technique. Mild increased interstitial lung markings   without significant change.    Heart size cannot be accurately assessed in this projection. Questionable   mild prominence of right hilum without significant change    Assessment and Plan:   Chart is reviewed  84 y/o F from [facility] pt w/ PMHx of A-fib, CVA, CHF, at home DNR/DNI p/w SOB few hours in nursing home . Patient has been complaining of difficulty breathing before, but was worsened the day of admission and she was very lethargic. Pt was dyspneic in ED and was speaking only one word sentences. Pt was placed on Bipap on arrival to ED.Ed course:EKG: Afib @ 148bpmbnp: [id number]: .027 (10 Feb 2018 23:56). cxr with congestion , seen by cardio, on high doses of lasix, rvp swab pos for HMPV POS      # viral infection with HMPV predisposing pt to CHF r/o post viral pneumonia, markedly elevated Pro- BNp , no fever.    # elevated procalcitonin r/o uti     PLAN  blood cx times 2, and f/u to final reports.  Obtain u/a and urine cx.   Restart Meropenem if patient develops fever or leukocytosis.  CHF management as per cardiology.  CT chest to r/o post viral pneumonia has been recommended.   CBC abd CMP for follow up.    Dr. Candelaria will be back covering from tomorrow 2/21/18.

## 2018-02-20 NOTE — PROGRESS NOTE ADULT - PROBLEM SELECTOR PROBLEM 4
Rapid atrial fibrillation
Atrial fibrillation
Rapid atrial fibrillation

## 2018-02-20 NOTE — PROGRESS NOTE ADULT - PROBLEM SELECTOR PROBLEM 3
Respiratory failure
HCAP (healthcare-associated pneumonia)
Respiratory failure
HCAP (healthcare-associated pneumonia)
Respiratory failure

## 2018-02-20 NOTE — PROGRESS NOTE ADULT - PROVIDER SPECIALTY LIST ADULT
Cardiology
Critical Care
Infectious Disease
Internal Medicine
Podiatry
Internal Medicine
Internal Medicine
Critical Care
Critical Care
Internal Medicine
Critical Care
Internal Medicine
Critical Care
Critical Care

## 2018-02-20 NOTE — DISCHARGE NOTE ADULT - CARE PLAN
Principal Discharge DX:	Respiratory failure  Goal:	remain stable  Assessment and plan of treatment:	Respiratory failure 2/2 to CHF exacerbation   was on Bipap initially , now off bipap since 2/11 , saturating well on NC.  Secondary Diagnosis:	Acute on chronic systolic congestive heart failure  Assessment and plan of treatment:	: Patient's EF now 10%, was 30% in 2017, family does not want ischemic evaluation at present time.  c/w oral diuretics.  Secondary Diagnosis:	HCAP (healthcare-associated pneumonia)  Assessment and plan of treatment:	Blood cultures negative to date   off antibiotics   afebrile  normal WBC  Secondary Diagnosis:	Atrial fibrillation  Assessment and plan of treatment:	rate controlled   s/p  Amiodarone loading for rate control  Continue on amiodarone, digoxin, lopressor and eliquis.  Secondary Diagnosis:	Goals of care, counseling/discussion  Assessment and plan of treatment:	patient is DNR/DNI; no artificial nutrition; MOLST form on chart

## 2018-02-20 NOTE — PROGRESS NOTE ADULT - PROBLEM SELECTOR PROBLEM 7
Need for prophylactic measure
Goals of care, counseling/discussion
Need for prophylactic measure

## 2018-02-20 NOTE — DISCHARGE NOTE ADULT - MEDICATION SUMMARY - MEDICATIONS TO STOP TAKING
I will STOP taking the medications listed below when I get home from the hospital:    spironolactone 25 mg oral tablet  -- 1 tab(s) by mouth once a day    potassium chloride 20 mEq oral tablet, extended release  -- 1 tab(s) by mouth once a day    ciprofloxacin 500 mg oral tablet  -- 1 tab(s) by mouth every 12 hours    Tamiflu 75 mg oral capsule  -- 1 cap(s) by mouth every 24 hours for 14 days from 2/6/18

## 2018-02-20 NOTE — PROGRESS NOTE ADULT - PROBLEM SELECTOR PLAN 2
Patient's EF now 10%, was 30% in 2017, family does not want ischemic evaluation at present time.  c/w oral diuretics

## 2018-02-20 NOTE — PROGRESS NOTE ADULT - SUBJECTIVE AND OBJECTIVE BOX
· Subjective and Objective: 	  DNR [ x]   DNI  [x  ]    INTERVAL HPI/OVERNIGHT EVENTS:  No acute events; reports feeling better  PRESSORS: [ ] YES [x ] NO  WHICH:      Cardiovascular:  Dilated LV with severe global left ventricular systolic dysfunction EF=10%. Normal left  ventricular internal dimensions and wall thicknesses. Grade IV diastolic dysfunction.    amiodarone    Tablet 200 milliGRAM(s) Oral daily  digoxin     Tablet 0.125 milliGRAM(s) Oral daily  furosemide    Tablet 40 milliGRAM(s) Oral two times a day  metoprolol     tartrate 50 milliGRAM(s) Oral two times a day    Pulmonary:    Hematalogic:  apixaban 5 milliGRAM(s) Oral every 12 hours    Other:  chlorhexidine 4% Liquid 1 Application(s) Topical daily  collagenase Ointment 1 Application(s) Topical daily  collagenase Ointment 1 Application(s) Topical daily  nystatin Powder 1 Application(s) Topical daily  pantoprazole  Injectable 40 milliGRAM(s) IV Push daily    amiodarone    Tablet 200 milliGRAM(s) Oral daily  apixaban 5 milliGRAM(s) Oral every 12 hours  chlorhexidine 4% Liquid 1 Application(s) Topical daily  collagenase Ointment 1 Application(s) Topical daily  collagenase Ointment 1 Application(s) Topical daily  digoxin     Tablet 0.125 milliGRAM(s) Oral daily  furosemide    Tablet 40 milliGRAM(s) Oral two times a day  metoprolol     tartrate 50 milliGRAM(s) Oral two times a day  nystatin Powder 1 Application(s) Topical daily  pantoprazole  Injectable 40 milliGRAM(s) IV Push daily    Drug Dosing Weight  Height (cm): 167.64 (10 Feb 2018 20:31)  Weight (kg): 90.7 (10 Feb 2018 20:31)  BMI (kg/m2): 32.3 (10 Feb 2018 20:31)  BSA (m2): 2 (10 Feb 2018 20:31)    CENTRAL LINE: [ ] YES [x ] NO  LOCATION:   DATE INSERTED:  REMOVE: [ ] YES [ ] NO  EXPLAIN:    MOBLEY: [ ] YES [x ] NO    DATE INSERTED:  REMOVE:  [ ] YES [ ] NO  EXPLAIN:    A-LINE:  [ ] YES [x ] NO  LOCATION:   DATE INSERTED:  REMOVE:  [ ] YES [ ] NO  EXPLAIN:    PAST MEDICAL & SURGICAL HISTORY:  Venous stasis: b/l leg, left calf ulcer  CVA (cerebral vascular accident): March 18th 2017, left side weakness ,s/p TPA  Atrial fibrillation  HTN (hypertension)  No significant past surgical history    PHYSICAL EXAM:    GENERAL: NAD, well-groomed,   HEAD:  Atraumatic, Normocephalic  EYES: EOMI, PERRLA, conjunctiva and sclera clear  ENMT: No tonsillar erythema, exudates, or enlargement; Moist mucous membranes, Good dentition, No lesions  NECK: Supple, No JVD, Normal thyroid  NERVOUS SYSTEM:  Alert & Oriented X3, Good concentration;CHEST/LUNG: Clear to percussion bilaterally; No rales, rhonchi, wheezing, or rubs  HEART: Regular rate and rhythm; No murmurs, rubs, or gallops  ABDOMEN: Soft, Nontender, Nondistended; Bowel sounds present  EXTREMITIES:  2+ Peripheral Pulses, No clubbing, cyanosis, or edema  LYMPH: No lymphadenopathy noted  SKIN: Venous Stasis Ulcers to the L. Lower Leg with red tissue, slough, and fibrinous exudate	    LABS:  CBC Full  -  ( 19 Feb 2018 07:13 )  WBC Count : 4.0 K/uL  Hemoglobin : 12.3 g/dL  Hematocrit : 39.7 %  Platelet Count - Automated : 135 K/uL  Mean Cell Volume : 95.7 fl  Mean Cell Hemoglobin : 29.7 pg  Mean Cell Hemoglobin Concentration : 31.0 gm/dL  Auto Neutrophil # : x  Auto Lymphocyte # : x  Auto Monocyte # : x  Auto Eosinophil # : x  Auto Basophil # : x  Auto Neutrophil % : x  Auto Lymphocyte % : x  Auto Monocyte % : x  Auto Eosinophil % : x  Auto Basophil % : x    [x  ]  DVT Prophylaxis - Full dose AC  [x ]  Nutrition, Brand, Rate - Mechanical soft, thin liquids         Goal Rate         Abdominal Nutritional Status -  Malnutrition   Cachexia      Morbid Obesity BMI >/=40    RADIOLOGY & ADDITIONAL STUDIES:  ***    [x  ] Goals of Care Discussion with Family/Proxy/Other           Elements of Conversation Discussed: Patient/Family understanding of current illness   Advanced Directives                                                                       Prognosis  Treatment Options  Care Aligned with patient's wishes                                             TIME SPENT: 35 minutes    Assessment and Plan:   · Assessment		  84 y/o F from [facility] pt w/ PMHx of A-fib, CVA, CHF, at home DNR/DNI p/w SOB  admitted to ICU for Respiratory failure 2/2 to CHF exacerbation/PNA. Downgraded to SCU doing better, DC planning     Problem/Plan - 1:  ·  Problem: Respiratory failure.  Plan: Respiratory failure 2/2 to CHF exacerbation   was on Bipap initially , off bipap since 2/11 , saturating well on 4 L NC.      Problem/Plan - 2:  ·  Problem: Acute on chronic systolic congestive heart failure.  Plan: Patient's EF now 10%, was 30% in 2017, family does not want ischemic evaluation at present time.  c/w oral diuretics.      Problem/Plan - 3:  ·  Problem: HCAP (healthcare-associated pneumonia).  Plan: Blood cultures negative to date   off antibiotics   afebrile  normal WBC  DC planning.      Problem/Plan - 4:  ·  Problem: Atrial fibrillation.  Plan: now rate controlled   s/p  Amiodarone loading for rate control  Continue on amiodarone, digoxin, lopressor and eliquis.      Problem/Plan - 5:  ·  Problem: Venous stasis.  Plan: Patient has chronic venous stasis in bilateral Le  s/p excisional debridement of devitalized tissues with scalpel to and including subcutaneous level to left lower leg ulcers by Podiatry  Continue daily dressing changes with santyl ointment to left leg ulcers.      Problem/Plan - 6:  Problem: HTN (hypertension). Plan: bp well controlled on lasix and metoprolol.     Problem/Plan - 7:  ·  Problem: Goals of care, counseling/discussion.  Plan: patient is DNR/DNI; no artificial nutrition; MOLST for reviewed.

## 2018-02-20 NOTE — PROGRESS NOTE ADULT - SUBJECTIVE AND OBJECTIVE BOX
DNR [ x]   DNI  [x  ]    INTERVAL HPI/OVERNIGHT EVENTS:  No acute events; reports feeling better  PRESSORS: [ ] YES [x ] NO  WHICH:      Cardiovascular:  Dilated LV with severe global left ventricular systolic dysfunction EF=10%. Normal left  ventricular internal dimensions and wall thicknesses. Grade IV diastolic dysfunction.    amiodarone    Tablet 200 milliGRAM(s) Oral daily  digoxin     Tablet 0.125 milliGRAM(s) Oral daily  furosemide    Tablet 40 milliGRAM(s) Oral two times a day  metoprolol     tartrate 50 milliGRAM(s) Oral two times a day    Pulmonary:    Hematalogic:  apixaban 5 milliGRAM(s) Oral every 12 hours    Other:  chlorhexidine 4% Liquid 1 Application(s) Topical daily  collagenase Ointment 1 Application(s) Topical daily  collagenase Ointment 1 Application(s) Topical daily  nystatin Powder 1 Application(s) Topical daily  pantoprazole  Injectable 40 milliGRAM(s) IV Push daily    amiodarone    Tablet 200 milliGRAM(s) Oral daily  apixaban 5 milliGRAM(s) Oral every 12 hours  chlorhexidine 4% Liquid 1 Application(s) Topical daily  collagenase Ointment 1 Application(s) Topical daily  collagenase Ointment 1 Application(s) Topical daily  digoxin     Tablet 0.125 milliGRAM(s) Oral daily  furosemide    Tablet 40 milliGRAM(s) Oral two times a day  metoprolol     tartrate 50 milliGRAM(s) Oral two times a day  nystatin Powder 1 Application(s) Topical daily  pantoprazole  Injectable 40 milliGRAM(s) IV Push daily    Drug Dosing Weight  Height (cm): 167.64 (10 Feb 2018 20:31)  Weight (kg): 90.7 (10 Feb 2018 20:31)  BMI (kg/m2): 32.3 (10 Feb 2018 20:31)  BSA (m2): 2 (10 Feb 2018 20:31)    CENTRAL LINE: [ ] YES [x ] NO  LOCATION:   DATE INSERTED:  REMOVE: [ ] YES [ ] NO  EXPLAIN:    MOBLEY: [ ] YES [x ] NO    DATE INSERTED:  REMOVE:  [ ] YES [ ] NO  EXPLAIN:    A-LINE:  [ ] YES [x ] NO  LOCATION:   DATE INSERTED:  REMOVE:  [ ] YES [ ] NO  EXPLAIN:    PAST MEDICAL & SURGICAL HISTORY:  Venous stasis: b/l leg, left calf ulcer  CVA (cerebral vascular accident): March 18th 2017, left side weakness ,s/p TPA  Atrial fibrillation  HTN (hypertension)  No significant past surgical history    PHYSICAL EXAM:    GENERAL: NAD, well-groomed,   HEAD:  Atraumatic, Normocephalic  EYES: EOMI, PERRLA, conjunctiva and sclera clear  ENMT: No tonsillar erythema, exudates, or enlargement; Moist mucous membranes, Good dentition, No lesions  NECK: Supple, No JVD, Normal thyroid  NERVOUS SYSTEM:  Alert & Oriented X3, Good concentration;CHEST/LUNG: Clear to percussion bilaterally; No rales, rhonchi, wheezing, or rubs  HEART: Regular rate and rhythm; No murmurs, rubs, or gallops  ABDOMEN: Soft, Nontender, Nondistended; Bowel sounds present  EXTREMITIES:  2+ Peripheral Pulses, No clubbing, cyanosis, or edema  LYMPH: No lymphadenopathy noted  SKIN: Venous Stasis Ulcers to the L. Lower Leg with red tissue, slough, and fibrinous exudate	    LABS:  CBC Full  -  ( 19 Feb 2018 07:13 )  WBC Count : 4.0 K/uL  Hemoglobin : 12.3 g/dL  Hematocrit : 39.7 %  Platelet Count - Automated : 135 K/uL  Mean Cell Volume : 95.7 fl  Mean Cell Hemoglobin : 29.7 pg  Mean Cell Hemoglobin Concentration : 31.0 gm/dL  Auto Neutrophil # : x  Auto Lymphocyte # : x  Auto Monocyte # : x  Auto Eosinophil # : x  Auto Basophil # : x  Auto Neutrophil % : x  Auto Lymphocyte % : x  Auto Monocyte % : x  Auto Eosinophil % : x  Auto Basophil % : x    [x  ]  DVT Prophylaxis - Full dose AC  [x ]  Nutrition, Brand, Rate - Mechanical soft, thin liquids         Goal Rate         Abdominal Nutritional Status -  Malnutrition   Cachexia      Morbid Obesity BMI >/=40    RADIOLOGY & ADDITIONAL STUDIES:  ***    [x  ] Goals of Care Discussion with Family/Proxy/Other           Elements of Conversation Discussed: Patient/Family understanding of current illness   Advanced Directives                                                                       Prognosis  Treatment Options  Care Aligned with patient's wishes                                             TIME SPENT: 35 minutes

## 2018-02-20 NOTE — PROGRESS NOTE ADULT - PROBLEM SELECTOR PLAN 7
protonix
GI ppx with protonix  Continue with full dose ac with elliquis  , no scd boots due to chronic venous stasis
protonix
GI ppx with protonix  Continue with full dose ac with elliquis  , no scd boots due to chronic venous stasis
patient is DNR/DNI; no artificial nutrition; MOLST for reviewed
protonix
GI ppx with protonix  No dvt ppx due to supra therapeutic INR , no scd boots due to chronic venous stasis

## 2018-02-20 NOTE — PROGRESS NOTE ADULT - PROBLEM SELECTOR PROBLEM 1
Respiratory failure
Acute on chronic systolic congestive heart failure
Respiratory failure
Acute on chronic systolic congestive heart failure
Acute on chronic systolic congestive heart failure
Respiratory failure

## 2018-02-20 NOTE — DISCHARGE NOTE ADULT - CARE PROVIDER_API CALL
Martinez Love), Internal Medicine  [address]  [place] [address]  Phone: [phone]  Fax: [phone]

## 2018-02-20 NOTE — PROGRESS NOTE ADULT - ASSESSMENT
84 y/o F from [facility] pt w/ PMHx of A-fib, CVA, CHF, at home DNR/DNI p/w SOB  admitted to ICU for Respiratory failure 2/2 to CHF exacerbation/PNA
86 y/o F from [facility] pt w/ PMHx of A-fib, CVA, CHF, at home DNR/DNI p/w SOB  admitted to ICU for Respiratory failure 2/2 to CHF exacerbation/PNA
86 y/o F from [facility] pt w/ PMHx of A-fib, CVA, CHF, at home DNR/DNI p/w SOB  admitted to ICU for Respiratory failure 2/2 to CHF exacerbation/PNA
86 y/o F from [facility] pt w/ PMHx of A-fib, CVA, CHF, at home DNR/DNI p/w SOB  admitted to ICU for Respiratory failure 2/2 to CHF exacerbation/PNA
84 y/o F from [facility] pt w/ PMHx of A-fib, CVA, CHF, at home DNR/DNI p/w SOB  admitted to ICU for Respiratory failure 2/2 to CHF exacerbation/PNA. Downgraded to SCU doing better, DC planning
84 y/o F from [facility] pt w/ PMHx of A-fib, CVA, CHF, at home DNR/DNI p/w SOB  admitted to ICU for Respiratory failure 2/2 to CHF exacerbation/PNA
84 y/o F from [facility] pt w/ PMHx of A-fib, CVA, CHF, at home DNR/DNI p/w SOB x the past few hours as per nursing home reports onset which started in the afternoon. Patient has been complaining of difficulty breathing, but today it was worse and she was very lethargic. History is limited as patient is lethargic, and history was obtained by daughters at bedside, and Ed provider note. Pt was dyspneic in ED and was speaking only one word sentences. Pt was placed on Bipap on arrival to ED.Ed course:EKG: Afib @ 148bpmbnp: [id number]: .027 (10 Feb 2018 23:56). cxr with congestion , seen by cardio, on high doses of lasix, rvp swab pos for HMPV POS      # viral infection with HMPV prediaposing pt to CHF r/o post viral pna . markedly elevated probnp , no fevers     # elevated procalcitonin r/o uti     PLAN  blood cx times 2 f/u ( neg )   u/a and urine cx   start merrem pending above cx  rx of chf as per cardio   ct chest to r/o post viral pna       thnx will f/u   d/w micu resident
84 y/o F from [facility] pt w/ PMHx of A-fib, CVA, CHF, at home DNR/DNI p/w SOB  admitted to ICU for Respiratory failure 2/2 to CHF exacerbation/PNA
86 y/o F from [facility] pt w/ PMHx of A-fib, CVA, CHF, at home DNR/DNI p/w SOB  admitted to ICU for Respiratory failure 2/2 to CHF exacerbation/PNA
86 y/o F from [facility] pt w/ PMHx of A-fib, CVA, CHF, at home DNR/DNI p/w SOB  admitted to ICU for Acute Respiratory failure 2/2 to Acute CHF exacerbation
A: Left lower leg venous stasis ulcers   Onychomycosis X 10, PVD, Xerosis  H/O A-fib, CVA, CHF, HTN    P:   Chart reviewed and Patient evaluated  s/p excisional debridement of devitalized tissues with scalpel to and including subcutaneous level to left lower leg ulcers  Cleaned with normal saline and applied santyl ointment with adaptic and wet to dry dressing  Continue daily dressing changes with santyl ointment to left leg ulcers  Offloading to bilateral Heels. And elevate bilateral LE
84 y/o F from [facility] pt w/ PMHx of A-fib, CVA, CHF, at home DNR/DNI p/w SOB  admitted to ICU for Respiratory failure 2/2 to CHF exacerbation/PNA
84 y/o F from [facility] pt w/ PMHx of A-fib, CVA, CHF, at home DNR/DNI p/w SOB  admitted to ICU for Acute Respiratory failure 2/2 to Acute CHF exacerbation
84 y/o F from [facility] pt w/ PMHx of A-fib, CVA, CHF, at home DNR/DNI p/w SOB  admitted to ICU for Respiratory failure 2/2 to CHF exacerbation/PNA
84 y/o F from [facility] pt w/ PMHx of A-fib, CVA, CHF, at home DNR/DNI p/w SOB  admitted to ICU for Acute Respiratory failure 2/2 to Acute CHF exacerbation

## 2018-02-20 NOTE — DISCHARGE NOTE ADULT - PATIENT PORTAL LINK FT
You can access the [facility] Patient Portal, offered by [facility], by registering with the following website: [email or web address]

## 2018-02-20 NOTE — DISCHARGE NOTE ADULT - PLAN OF CARE
remain stable Respiratory failure 2/2 to CHF exacerbation   was on Bipap initially , now off bipap since 2/11 , saturating well on NC. : Patient's EF now 10%, was 30% in 2017, family does not want ischemic evaluation at present time.  c/w oral diuretics. Blood cultures negative to date   off antibiotics   afebrile  normal WBC rate controlled   s/p  Amiodarone loading for rate control  Continue on amiodarone, digoxin, lopressor and eliquis. patient is DNR/DNI; no artificial nutrition; MOLST form on chart

## 2018-02-20 NOTE — DISCHARGE NOTE ADULT - HOSPITAL COURSE
85 female from nursing home with hx of CHFrEF, afib on eliquis, CVA, presented to the ED with dyspnea and hypoxemia, was found to have hmpv infection, acute on chronic systolic CHF exacerbation, acute respiratory failure requiring bipap, Afib with RVR, as well as encephalopathy.  Now doing better. Continue with - amio, metoprolol, digoxin for afib/ eliquis for AC. EF now 10%, was 30% in 2017, family does not want ischemic evaluation at present time. DNR/DNI MOLST form on chart. 85 female from nursing home with hx of CHFrEF, afib on eliquis, CVA, presented to the ED with dyspnea and hypoxemia, was found to have hmpv infection, acute on chronic systolic CHF exacerbation, acute respiratory failure requiring bipap, Afib with RVR, as well as encephalopathy.  Now doing better. Continue with - amio, metoprolol, digoxin for afib/ eliquis for AC. EF now 10%, was 30% in 2017, family does not want ischemic evaluation at present time. DNR/DNI MOLST form on chart.    Pt will be discharged to NH today. Discussed with  about the discharge plan.

## 2018-02-20 NOTE — DISCHARGE NOTE ADULT - MEDICATION SUMMARY - MEDICATIONS TO CHANGE
I will SWITCH the dose or number of times a day I take the medications listed below when I get home from the hospital:    furosemide 20 mg oral tablet  -- 3 tab(s) by mouth once a day I will SWITCH the dose or number of times a day I take the medications listed below when I get home from the hospital:  None

## 2018-03-15 NOTE — PATIENT PROFILE ADULT. - AS SC BRADEN NUTRITION
03/15/18 1628   Discharge Reassessment   Assessment Type Discharge Planning Reassessment   Provided patient/caregiver education on the expected discharge date and the discharge plan Yes   Do you have any problems affording any of your prescribed medications? No   Discharge Plan A Home with family   Discharge Plan B Home with family   Patient choice form signed by patient/caregiver N/A   Can the patient answer the patient profile reliably? Yes, cognitively intact   Pt remains in house, still neutropenic, on iv abx, morphine pca. Will follow for dc needs.   (3) adequate

## 2018-03-28 NOTE — ED ADULT TRIAGE NOTE - WEIGHT IN LBS
81 year old  male PMH of HTN, HFpEF (EF 65% on 2/2018), lumbar spinal stenosis, mild dementia, NPH, s/p ventriculostomy and recent hospitalization with PNA and recent diagnosed Atrial Flutter (started Pradaxa 3 weeks ago) presents to ED by EMS after patient fell to ground with prior symptom of dizziness and pt was found to be confused. Patient in ER was hypoxic to 84% on RA and noted to be rate controlled Atrial Flutter. Plan for MARYJANE/ Afib flutter ablation today. 81 year old  male PMH of HTN, HFpEF (EF 65% on 2/2018), lumbar spinal stenosis, mild dementia, NPH, s/p ventriculostomy and recent hospitalization with PNA and recent diagnosed Atrial Flutter (started Pradaxa 3 weeks ago) presents to ED by EMS after patient fell to ground with prior symptom of dizziness and pt was found to be confused. Patient in ER was hypoxic to 84% on RA and noted to be rate controlled Atrial Flutter. Plan for MARYJANE/ flutter ablation today. 199.9

## 2018-04-22 ENCOUNTER — INPATIENT (INPATIENT)
Facility: HOSPITAL | Age: 83
LOS: 3 days | Discharge: NURSING, REHAB OR LONG-TERM CARE | DRG: 101 | End: 2018-04-26
Attending: INTERNAL MEDICINE | Admitting: INTERNAL MEDICINE
Payer: MEDICARE

## 2018-04-22 VITALS
DIASTOLIC BLOOD PRESSURE: 60 MMHG | WEIGHT: 179.9 LBS | RESPIRATION RATE: 16 BRPM | SYSTOLIC BLOOD PRESSURE: 145 MMHG | HEIGHT: 64 IN | HEART RATE: 44 BPM | OXYGEN SATURATION: 100 %

## 2018-04-22 PROCEDURE — 71045 X-RAY EXAM CHEST 1 VIEW: CPT | Mod: 26

## 2018-04-22 RX ORDER — SODIUM CHLORIDE 9 MG/ML
1000 INJECTION INTRAMUSCULAR; INTRAVENOUS; SUBCUTANEOUS ONCE
Qty: 0 | Refills: 0 | Status: COMPLETED | OUTPATIENT
Start: 2018-04-22 | End: 2018-04-22

## 2018-04-22 RX ORDER — ACETAMINOPHEN 500 MG
650 TABLET ORAL ONCE
Qty: 0 | Refills: 0 | Status: COMPLETED | OUTPATIENT
Start: 2018-04-22 | End: 2018-04-22

## 2018-04-22 NOTE — ED PROVIDER NOTE - CRITICAL CARE PROVIDED
interpretation of diagnostic studies/consult w/ pt's family directly relating to pts condition/direct patient care (not related to procedure)/documentation/additional history taking/consultation with other physicians

## 2018-04-22 NOTE — ED PROVIDER NOTE - PHYSICAL EXAMINATION
systolic murmur free text in PE   LLE extremity healing ulcers Afebrile, bradycardic, hemodynamically stable, saturating well  NAD  Head NCAT  EOMI  MMM  No JVD  Christopher, reg rhythm, nml S1/S2, systolic murmur  Lungs CTAB, no w/r/r  Abd soft, NT, ND, nml BS, no rebound or guarding  AAO, CN's 3-12 grossly intact  WERNER spontaneously  Skin warm, dry, LLE extremity healing ulcers

## 2018-04-22 NOTE — ED PROVIDER NOTE - OBJECTIVE STATEMENT
85 y/o F pt w/ significant PMHx of A-fib, CHF, CVA, possible dementia and significant PSHx presents to ED from [facility] c/o seizures and ha. Per pt's daughter, pt had 2 seizures tonight that each lasted 30 seconds, which daughter witnessed. After first seizure, pt's daughter states that pt felt pain, pt's eyes rolled to back of her head, pt's arms were stretched out and pt felt out of it. Pt's daughter states that after first seizure, pt was given medications and tx. Pt's daughter notes that next seizure occurred 20 minutes later after pt felt pain in her head. Pt denies feeling confused, biting tongue, or urinating on herself. Pt states that she has never had a sz in past. Pt states having dizziness and ha in the that has been progressively worsening. At nursing home, pt's bp was 185 systolic/93 diastolic. In ED, pt states that she has slight cp and denies having dizziness, sob, or any other complaints. Medications include: Eliquis, amiodarone, metoprolol, nystatin, furosemide, digoxin, famotidine, hydralazine. PMD: Dr. House. Allergies: penicillin. 87 y/o F pt w/ significant PMHx of A-fib, CHF, CVA, ?dementia, DNR/DNI, presents to ED from [facility] c/o seizures and HA. Per pt's daughter, pt had 2 "seizures" tonight that each lasted 30 seconds, which daughter witnessed. This seizure consisted of eyes rolled to back of her head, pt's arms were stretched out and pt was not responsive. Pt's daughter states that after first seizure, pt reported mild HA. Pt's daughter notes that next episode occurred 20 minutes later after pt felt pain in her head. Pt denies feeling confused, biting tongue, or urinating on herself. Pt states that she has never had a sz in past. Pt states having intermittent dizziness and HA that has been progressively worsening. At nursing home, pt's BP was 185 systolic/93 diastolic. In ED, on ROS pt reports slight CP. Denies having dizziness, SOB, or any other complaints. Medications include: Eliquis, amiodarone, metoprolol, nystatin, furosemide, digoxin, famotidine, hydralazine. PMD: Dr. Oh. Allergies: penicillin.

## 2018-04-22 NOTE — ED PROVIDER NOTE - MEDICAL DECISION MAKING DETAILS
Had episode of apparent vfib on monitor during which she was unresponsive with a pulse, resolved spontaneously after approximately 1 minute, d/w cardiology who stated this appears to be afib with motion artifact and not afib. Noted hypokalemia, given K and fluids. Admitted for further monitoring, w/u, and care. Hemodynamically stable.  Other diagnoses: syncope, moderate dehydration, moderate protein-calorie malnutrition, DNR/DNI

## 2018-04-22 NOTE — ED PROVIDER NOTE - CARE PLAN
Principal Discharge DX:	Atrial fibrillation with RVR  Secondary Diagnosis:	Hypokalemia Principal Discharge DX:	Atrial fibrillation with RVR  Secondary Diagnosis:	Hypokalemia  Secondary Diagnosis:	QT prolongation

## 2018-04-23 DIAGNOSIS — R56.9 UNSPECIFIED CONVULSIONS: ICD-10-CM

## 2018-04-23 DIAGNOSIS — R51 HEADACHE: ICD-10-CM

## 2018-04-23 DIAGNOSIS — I48.91 UNSPECIFIED ATRIAL FIBRILLATION: ICD-10-CM

## 2018-04-23 DIAGNOSIS — I87.2 VENOUS INSUFFICIENCY (CHRONIC) (PERIPHERAL): ICD-10-CM

## 2018-04-23 DIAGNOSIS — I50.40 UNSPECIFIED COMBINED SYSTOLIC (CONGESTIVE) AND DIASTOLIC (CONGESTIVE) HEART FAILURE: ICD-10-CM

## 2018-04-23 DIAGNOSIS — E87.6 HYPOKALEMIA: ICD-10-CM

## 2018-04-23 DIAGNOSIS — G44.201 TENSION-TYPE HEADACHE, UNSPECIFIED, INTRACTABLE: ICD-10-CM

## 2018-04-23 DIAGNOSIS — Z29.9 ENCOUNTER FOR PROPHYLACTIC MEASURES, UNSPECIFIED: ICD-10-CM

## 2018-04-23 LAB
ALBUMIN SERPL ELPH-MCNC: 2.6 G/DL — LOW (ref 3.5–5)
ALP SERPL-CCNC: 80 U/L — SIGNIFICANT CHANGE UP (ref 40–120)
ALT FLD-CCNC: 13 U/L DA — SIGNIFICANT CHANGE UP (ref 10–60)
ANION GAP SERPL CALC-SCNC: 5 MMOL/L — SIGNIFICANT CHANGE UP (ref 5–17)
ANION GAP SERPL CALC-SCNC: 6 MMOL/L — SIGNIFICANT CHANGE UP (ref 5–17)
APTT BLD: 29.7 SEC — SIGNIFICANT CHANGE UP (ref 27.5–37.4)
AST SERPL-CCNC: 34 U/L — SIGNIFICANT CHANGE UP (ref 10–40)
BASOPHILS # BLD AUTO: 0 K/UL — SIGNIFICANT CHANGE UP (ref 0–0.2)
BASOPHILS # BLD AUTO: 0.1 K/UL — SIGNIFICANT CHANGE UP (ref 0–0.2)
BASOPHILS NFR BLD AUTO: 1.3 % — SIGNIFICANT CHANGE UP (ref 0–2)
BASOPHILS NFR BLD AUTO: 2 % — SIGNIFICANT CHANGE UP (ref 0–2)
BILIRUB SERPL-MCNC: 1.4 MG/DL — HIGH (ref 0.2–1.2)
BUN SERPL-MCNC: 7 MG/DL — SIGNIFICANT CHANGE UP (ref 7–18)
BUN SERPL-MCNC: 9 MG/DL — SIGNIFICANT CHANGE UP (ref 7–18)
CALCIUM SERPL-MCNC: 8.2 MG/DL — LOW (ref 8.4–10.5)
CALCIUM SERPL-MCNC: 8.5 MG/DL — SIGNIFICANT CHANGE UP (ref 8.4–10.5)
CHLORIDE SERPL-SCNC: 88 MMOL/L — LOW (ref 96–108)
CHLORIDE SERPL-SCNC: 93 MMOL/L — LOW (ref 96–108)
CHOLEST SERPL-MCNC: 201 MG/DL — HIGH (ref 10–199)
CO2 SERPL-SCNC: 40 MMOL/L — HIGH (ref 22–31)
CO2 SERPL-SCNC: 43 MMOL/L — HIGH (ref 22–31)
CREAT SERPL-MCNC: 0.7 MG/DL — SIGNIFICANT CHANGE UP (ref 0.5–1.3)
CREAT SERPL-MCNC: 0.72 MG/DL — SIGNIFICANT CHANGE UP (ref 0.5–1.3)
DIGOXIN SERPL-MCNC: 0.5 NG/ML — LOW (ref 0.8–2)
EOSINOPHIL # BLD AUTO: 0 K/UL — SIGNIFICANT CHANGE UP (ref 0–0.5)
EOSINOPHIL # BLD AUTO: 0 K/UL — SIGNIFICANT CHANGE UP (ref 0–0.5)
EOSINOPHIL NFR BLD AUTO: 0.2 % — SIGNIFICANT CHANGE UP (ref 0–6)
EOSINOPHIL NFR BLD AUTO: 0.3 % — SIGNIFICANT CHANGE UP (ref 0–6)
GLUCOSE SERPL-MCNC: 93 MG/DL — SIGNIFICANT CHANGE UP (ref 70–99)
GLUCOSE SERPL-MCNC: 95 MG/DL — SIGNIFICANT CHANGE UP (ref 70–99)
HBA1C BLD-MCNC: 5 % — SIGNIFICANT CHANGE UP (ref 4–5.6)
HCT VFR BLD CALC: 37.9 % — SIGNIFICANT CHANGE UP (ref 34.5–45)
HCT VFR BLD CALC: 40 % — SIGNIFICANT CHANGE UP (ref 34.5–45)
HDLC SERPL-MCNC: 39 MG/DL — LOW (ref 40–125)
HGB BLD-MCNC: 12.2 G/DL — SIGNIFICANT CHANGE UP (ref 11.5–15.5)
HGB BLD-MCNC: 12.2 G/DL — SIGNIFICANT CHANGE UP (ref 11.5–15.5)
INR BLD: 1.83 RATIO — HIGH (ref 0.88–1.16)
LACTATE SERPL-SCNC: 1.4 MMOL/L — SIGNIFICANT CHANGE UP (ref 0.7–2)
LIPID PNL WITH DIRECT LDL SERPL: 144 MG/DL — SIGNIFICANT CHANGE UP
LYMPHOCYTES # BLD AUTO: 1.1 K/UL — SIGNIFICANT CHANGE UP (ref 1–3.3)
LYMPHOCYTES # BLD AUTO: 1.7 K/UL — SIGNIFICANT CHANGE UP (ref 1–3.3)
LYMPHOCYTES # BLD AUTO: 35.2 % — SIGNIFICANT CHANGE UP (ref 13–44)
LYMPHOCYTES # BLD AUTO: 41 % — SIGNIFICANT CHANGE UP (ref 13–44)
MAGNESIUM SERPL-MCNC: 1.7 MG/DL — SIGNIFICANT CHANGE UP (ref 1.6–2.6)
MAGNESIUM SERPL-MCNC: 1.7 MG/DL — SIGNIFICANT CHANGE UP (ref 1.6–2.6)
MCHC RBC-ENTMCNC: 28.3 PG — SIGNIFICANT CHANGE UP (ref 27–34)
MCHC RBC-ENTMCNC: 28.9 PG — SIGNIFICANT CHANGE UP (ref 27–34)
MCHC RBC-ENTMCNC: 30.4 GM/DL — LOW (ref 32–36)
MCHC RBC-ENTMCNC: 32.1 GM/DL — SIGNIFICANT CHANGE UP (ref 32–36)
MCV RBC AUTO: 90.1 FL — SIGNIFICANT CHANGE UP (ref 80–100)
MCV RBC AUTO: 92.8 FL — SIGNIFICANT CHANGE UP (ref 80–100)
MONOCYTES # BLD AUTO: 0.4 K/UL — SIGNIFICANT CHANGE UP (ref 0–0.9)
MONOCYTES # BLD AUTO: 0.4 K/UL — SIGNIFICANT CHANGE UP (ref 0–0.9)
MONOCYTES NFR BLD AUTO: 10.9 % — SIGNIFICANT CHANGE UP (ref 2–14)
MONOCYTES NFR BLD AUTO: 9.8 % — SIGNIFICANT CHANGE UP (ref 2–14)
NEUTROPHILS # BLD AUTO: 1.7 K/UL — LOW (ref 1.8–7.4)
NEUTROPHILS # BLD AUTO: 1.9 K/UL — SIGNIFICANT CHANGE UP (ref 1.8–7.4)
NEUTROPHILS NFR BLD AUTO: 47.1 % — SIGNIFICANT CHANGE UP (ref 43–77)
NEUTROPHILS NFR BLD AUTO: 52.4 % — SIGNIFICANT CHANGE UP (ref 43–77)
PHOSPHATE SERPL-MCNC: 2.6 MG/DL — SIGNIFICANT CHANGE UP (ref 2.5–4.5)
PLATELET # BLD AUTO: 127 K/UL — LOW (ref 150–400)
PLATELET # BLD AUTO: 134 K/UL — LOW (ref 150–400)
POTASSIUM SERPL-MCNC: 2.4 MMOL/L — CRITICAL LOW (ref 3.5–5.3)
POTASSIUM SERPL-MCNC: 2.7 MMOL/L — CRITICAL LOW (ref 3.5–5.3)
POTASSIUM SERPL-SCNC: 2.4 MMOL/L — CRITICAL LOW (ref 3.5–5.3)
POTASSIUM SERPL-SCNC: 2.7 MMOL/L — CRITICAL LOW (ref 3.5–5.3)
PROT SERPL-MCNC: 6.1 G/DL — SIGNIFICANT CHANGE UP (ref 6–8.3)
PROTHROM AB SERPL-ACNC: 20.2 SEC — HIGH (ref 9.8–12.7)
RBC # BLD: 4.2 M/UL — SIGNIFICANT CHANGE UP (ref 3.8–5.2)
RBC # BLD: 4.3 M/UL — SIGNIFICANT CHANGE UP (ref 3.8–5.2)
RBC # FLD: 15.9 % — HIGH (ref 10.3–14.5)
RBC # FLD: 15.9 % — HIGH (ref 10.3–14.5)
SODIUM SERPL-SCNC: 137 MMOL/L — SIGNIFICANT CHANGE UP (ref 135–145)
SODIUM SERPL-SCNC: 138 MMOL/L — SIGNIFICANT CHANGE UP (ref 135–145)
TOTAL CHOLESTEROL/HDL RATIO MEASUREMENT: 5.2 RATIO — SIGNIFICANT CHANGE UP (ref 3.3–7.1)
TRIGL SERPL-MCNC: 88 MG/DL — SIGNIFICANT CHANGE UP (ref 10–149)
TSH SERPL-MCNC: 0.52 UU/ML — SIGNIFICANT CHANGE UP (ref 0.34–4.82)
WBC # BLD: 3.2 K/UL — LOW (ref 3.8–10.5)
WBC # BLD: 4.1 K/UL — SIGNIFICANT CHANGE UP (ref 3.8–10.5)
WBC # FLD AUTO: 3.2 K/UL — LOW (ref 3.8–10.5)
WBC # FLD AUTO: 4.1 K/UL — SIGNIFICANT CHANGE UP (ref 3.8–10.5)

## 2018-04-23 PROCEDURE — 70450 CT HEAD/BRAIN W/O DYE: CPT | Mod: 26

## 2018-04-23 PROCEDURE — 99291 CRITICAL CARE FIRST HOUR: CPT

## 2018-04-23 PROCEDURE — 99223 1ST HOSP IP/OBS HIGH 75: CPT

## 2018-04-23 RX ORDER — FUROSEMIDE 40 MG
40 TABLET ORAL
Qty: 0 | Refills: 0 | Status: DISCONTINUED | OUTPATIENT
Start: 2018-04-23 | End: 2018-04-26

## 2018-04-23 RX ORDER — DIGOXIN 250 MCG
0.12 TABLET ORAL DAILY
Qty: 0 | Refills: 0 | Status: DISCONTINUED | OUTPATIENT
Start: 2018-04-23 | End: 2018-04-26

## 2018-04-23 RX ORDER — FAMOTIDINE 10 MG/ML
40 INJECTION INTRAVENOUS AT BEDTIME
Qty: 0 | Refills: 0 | Status: DISCONTINUED | OUTPATIENT
Start: 2018-04-23 | End: 2018-04-26

## 2018-04-23 RX ORDER — NYSTATIN CREAM 100000 [USP'U]/G
1 CREAM TOPICAL
Qty: 0 | Refills: 0 | Status: DISCONTINUED | OUTPATIENT
Start: 2018-04-23 | End: 2018-04-26

## 2018-04-23 RX ORDER — FAMOTIDINE 10 MG/ML
1 INJECTION INTRAVENOUS
Qty: 0 | Refills: 0 | COMMUNITY

## 2018-04-23 RX ORDER — APIXABAN 2.5 MG/1
5 TABLET, FILM COATED ORAL EVERY 12 HOURS
Qty: 0 | Refills: 0 | Status: DISCONTINUED | OUTPATIENT
Start: 2018-04-23 | End: 2018-04-26

## 2018-04-23 RX ORDER — POTASSIUM CHLORIDE 20 MEQ
40 PACKET (EA) ORAL ONCE
Qty: 0 | Refills: 0 | Status: COMPLETED | OUTPATIENT
Start: 2018-04-23 | End: 2018-04-23

## 2018-04-23 RX ORDER — POTASSIUM CHLORIDE 20 MEQ
10 PACKET (EA) ORAL
Qty: 0 | Refills: 0 | Status: COMPLETED | OUTPATIENT
Start: 2018-04-23 | End: 2018-04-23

## 2018-04-23 RX ORDER — AMIODARONE HYDROCHLORIDE 400 MG/1
200 TABLET ORAL DAILY
Qty: 0 | Refills: 0 | Status: DISCONTINUED | OUTPATIENT
Start: 2018-04-23 | End: 2018-04-26

## 2018-04-23 RX ORDER — METOPROLOL TARTRATE 50 MG
25 TABLET ORAL
Qty: 0 | Refills: 0 | Status: DISCONTINUED | OUTPATIENT
Start: 2018-04-23 | End: 2018-04-25

## 2018-04-23 RX ORDER — POTASSIUM CHLORIDE 20 MEQ
40 PACKET (EA) ORAL EVERY 4 HOURS
Qty: 0 | Refills: 0 | Status: COMPLETED | OUTPATIENT
Start: 2018-04-23 | End: 2018-04-23

## 2018-04-23 RX ORDER — POTASSIUM CHLORIDE 20 MEQ
10 PACKET (EA) ORAL ONCE
Qty: 0 | Refills: 0 | Status: COMPLETED | OUTPATIENT
Start: 2018-04-23 | End: 2018-04-23

## 2018-04-23 RX ADMIN — Medication 100 MILLIEQUIVALENT(S): at 02:47

## 2018-04-23 RX ADMIN — Medication 100 MILLIEQUIVALENT(S): at 16:51

## 2018-04-23 RX ADMIN — Medication 100 MILLIEQUIVALENT(S): at 14:24

## 2018-04-23 RX ADMIN — APIXABAN 5 MILLIGRAM(S): 2.5 TABLET, FILM COATED ORAL at 17:01

## 2018-04-23 RX ADMIN — NYSTATIN CREAM 1 APPLICATION(S): 100000 CREAM TOPICAL at 09:40

## 2018-04-23 RX ADMIN — Medication 40 MILLIEQUIVALENT(S): at 10:45

## 2018-04-23 RX ADMIN — Medication 40 MILLIGRAM(S): at 07:11

## 2018-04-23 RX ADMIN — Medication 100 MILLIEQUIVALENT(S): at 15:24

## 2018-04-23 RX ADMIN — SODIUM CHLORIDE 2000 MILLILITER(S): 9 INJECTION INTRAMUSCULAR; INTRAVENOUS; SUBCUTANEOUS at 02:47

## 2018-04-23 RX ADMIN — Medication 40 MILLIGRAM(S): at 17:01

## 2018-04-23 RX ADMIN — Medication 40 MILLIEQUIVALENT(S): at 02:47

## 2018-04-23 RX ADMIN — Medication 1 APPLICATION(S): at 17:01

## 2018-04-23 RX ADMIN — Medication 650 MILLIGRAM(S): at 02:47

## 2018-04-23 RX ADMIN — Medication 40 MILLIEQUIVALENT(S): at 07:10

## 2018-04-23 RX ADMIN — AMIODARONE HYDROCHLORIDE 200 MILLIGRAM(S): 400 TABLET ORAL at 10:47

## 2018-04-23 RX ADMIN — Medication 0.12 MILLIGRAM(S): at 10:47

## 2018-04-23 NOTE — H&P ADULT - PROBLEM SELECTOR PLAN 7
IMPROVE VTE Individual Risk Assessment          RISK                                                          Points  [  ] Previous VTE                                                3  [  ] Thrombophilia                                             2  [  ] Lower limb paralysis                                   2        (unable to hold up >15 seconds)    [  ] Current Cancer                                             2         (within 6 months)  [ x ] Immobilization > 24 hrs                              1  [  ] ICU/CCU stay > 24 hours                             1  [ x ] Age > 60                                                         1    IMPROVE VTE Score: 2  On Eliquis.

## 2018-04-23 NOTE — H&P ADULT - HISTORY OF PRESENT ILLNESS
86 years old female from [facility], ambulates with walker, PMH of A-fib (on Eliquis), CHF (Feb 2018, EF of 10%, grade 4 diastolic dysfunction, severe MR and TR and moderately increased RVSP; on 2 L nasal canula), CVA and chronic venous stasis of bilateral lower extremities sent to ED from [facility] with concern of seizures and headache. Per pt's daughter, patient has been complaining of severe headache yesterday. Later daughter noticed patient had 2 episodes where patient was staring with eyes wide open and each episode lasted for 30 seconds and were 20 minutes apart. Daughter was concerned patient was having seizure though she has no history of seizure. Patient and daughter denies feeling confused, eye rolling movements, bitting of tongue, frothing at mouth or urinating / defecating on herself. Per NH papers, BP was 185/93, was given Hydralazine 25 mg and Tylenol.  Patient also complained of some chest pain before which was midline, non radiating and dull in nature. Says feeling better now but still has headache. Denies any chest pain, palpitations sob, nausea, vomiting, abdominal pain, diarrhea, urinary complains or any other complains. 86 years old female from [facility], ambulates with walker, PMH of A-fib (on Eliquis), CHF (Feb 2018, EF of 10%, grade 4 diastolic dysfunction, severe MR and TR and moderately increased RVSP; on 2 L nasal canula), CVA and chronic venous stasis of bilateral lower extremities sent to ED from [facility] with concern of seizures and headache. Per patient's daughter, patient has been complaining of severe headache yesterday. Later daughter noticed patient had 2 episodes where patient was staring with eyes wide open and each episode lasted for 30 seconds and were 20 minutes apart. Daughter was concerned patient was having seizure though she has no history of seizure. Patient and daughter denies feeling confused, eye rolling movements, bitting of tongue, frothing at mouth or urinating / defecating on herself. Per NH papers, BP was 185/93, was given Hydralazine 25 mg and Tylenol.  Patient also complained of some chest pain before which was midline, non radiating and dull in nature. Says feeling better now but still has headache. Denies any chest pain, palpitations sob, nausea, vomiting, abdominal pain, diarrhea, urinary complains or any other complains.

## 2018-04-23 NOTE — H&P ADULT - ASSESSMENT
86 years old female from [facility], ambulates with walker, PMH of A-fib (on Eliquis), CHF (Feb 2018, EF of 10%, grade 4 diastolic dysfunction, severe MR and TR and moderately increased RVSP; on 2 L nasal canula), CVA and chronic venous stasis of bilateral lower extremities sent to ED from [facility] with concern of seizures and headache.

## 2018-04-23 NOTE — H&P ADULT - PROBLEM SELECTOR PLAN 4
-c/w Eliquis 5 mg bid at home dose.   -c/w digoxin at home dose of 0.125 mg daily and metoprolol 25 mg which is half the home dose for rate control as patient is currently bradycardic though daughter mentions she is always bradycardic.   -f/u Cardio recommendations. -c/w Eliquis 5 mg bid at home dose.   -c/w digoxin at home dose of 0.125 mg daily and metoprolol 25 mg which is half the home dose for rate control as patient is currently bradycardic though daughter mentions she is always bradycardic.

## 2018-04-23 NOTE — H&P ADULT - PROBLEM SELECTOR PLAN 3
-K of 2.7  -EKG: sinus bradycardia.   -appropriately replaced   -could be likely due to poor appetite.   -Monitor routine BMP -patient complained of temporal headache for past few days.   -CT head negative for any acute events. -patient complained of temporal headache for past few days.   -denies photophobia or phonophobia  -no prior history of migraine or any similar symptoms   -CT head negative for any acute events.  -currently symptoms has resolved.

## 2018-04-23 NOTE — H&P ADULT - PROBLEM SELECTOR PLAN 1
-patient had 2 episodes where patient was starring with eyes wide open and each episode lasted for 30 seconds and were 20 minutes apart. Daughter was concerned patient was having seizure though she has no history of seizure. Patient and daughter denies feeling confused, eye rolling movements, bitting of tongue, frothing at mouth or urinating / defecating on herself. -patient had 2 episodes where patient was starring with eyes wide open and each episode lasted for 30 seconds and were 20 minutes apart. Daughter was concerned patient was having seizure, no prior history of seizure.   -Patient and daughter denies any confused, eye rolling movements, bitting of tongue, frothing at mouth or urinating / defecating on herself.  -Per ED attending , patient was noted to be in afib with RVR while in ED and that could have been cause for the above episodes  -Will have neuro evaluation by Uriah  -f/u EEG.

## 2018-04-23 NOTE — H&P ADULT - PROBLEM SELECTOR PLAN 2
-patient had 2 episodes where patient was starring with eyes wide open and each episode lasted for 30 seconds and were 20 minutes apart. Daughter was concerned patient was having seizure though she has no history of seizure. Patient and daughter denies feeling confused, eye rolling movements, bitting of tongue, frothing at mouth or urinating / defecating on herself. -K of 2.7  -EKG: sinus bradycardia.   -appropriately replaced   -could be likely due to poor appetite.   -Monitor routine BMP

## 2018-04-23 NOTE — H&P ADULT - PROBLEM SELECTOR PLAN 5
-Not in exacerbation, c/w Lasix at home dose  -c/w supplemental oxygen (on 2 L via nasal canula at home)   -Was evaluated by Cardiologist on previous admission in Feb 2018, had ECHO which showed EF of 10%, grade 4 diastolic dysfunction, severe MR and TR and moderately increased RVSP  -Family refused further ischemic work up at that time  -continue with other home cardiac meds  -consider starting low dose Lisinopril -Not in exacerbation, c/w Lasix at home dose  -c/w supplemental oxygen (on 2 L via nasal canula at home)   -Was evaluated by Cardiologist on previous admission in Feb 2018, had ECHO which showed EF of 10%, grade 4 diastolic dysfunction, severe MR and TR and moderately increased RVSP  -Family refused further ischemic work up at that time  -continue with other home cardiac meds  -consider starting low dose Lisinopril  -no cardio consult for now as per the attending.

## 2018-04-24 DIAGNOSIS — I87.8 OTHER SPECIFIED DISORDERS OF VEINS: ICD-10-CM

## 2018-04-24 DIAGNOSIS — I63.9 CEREBRAL INFARCTION, UNSPECIFIED: ICD-10-CM

## 2018-04-24 DIAGNOSIS — I10 ESSENTIAL (PRIMARY) HYPERTENSION: ICD-10-CM

## 2018-04-24 DIAGNOSIS — Z02.9 ENCOUNTER FOR ADMINISTRATIVE EXAMINATIONS, UNSPECIFIED: ICD-10-CM

## 2018-04-24 LAB
ALBUMIN SERPL ELPH-MCNC: 2.7 G/DL — LOW (ref 3.5–5)
ALP SERPL-CCNC: 91 U/L — SIGNIFICANT CHANGE UP (ref 40–120)
ALT FLD-CCNC: 14 U/L DA — SIGNIFICANT CHANGE UP (ref 10–60)
ANION GAP SERPL CALC-SCNC: 3 MMOL/L — LOW (ref 5–17)
AST SERPL-CCNC: 25 U/L — SIGNIFICANT CHANGE UP (ref 10–40)
BASOPHILS # BLD AUTO: 0.1 K/UL — SIGNIFICANT CHANGE UP (ref 0–0.2)
BASOPHILS NFR BLD AUTO: 1.3 % — SIGNIFICANT CHANGE UP (ref 0–2)
BILIRUB SERPL-MCNC: 0.9 MG/DL — SIGNIFICANT CHANGE UP (ref 0.2–1.2)
BUN SERPL-MCNC: 7 MG/DL — SIGNIFICANT CHANGE UP (ref 7–18)
CALCIUM SERPL-MCNC: 8.4 MG/DL — SIGNIFICANT CHANGE UP (ref 8.4–10.5)
CHLORIDE SERPL-SCNC: 96 MMOL/L — SIGNIFICANT CHANGE UP (ref 96–108)
CO2 SERPL-SCNC: 39 MMOL/L — HIGH (ref 22–31)
CREAT SERPL-MCNC: 0.82 MG/DL — SIGNIFICANT CHANGE UP (ref 0.5–1.3)
EOSINOPHIL # BLD AUTO: 0.1 K/UL — SIGNIFICANT CHANGE UP (ref 0–0.5)
EOSINOPHIL NFR BLD AUTO: 1.4 % — SIGNIFICANT CHANGE UP (ref 0–6)
GLUCOSE SERPL-MCNC: 93 MG/DL — SIGNIFICANT CHANGE UP (ref 70–99)
HCT VFR BLD CALC: 40 % — SIGNIFICANT CHANGE UP (ref 34.5–45)
HGB BLD-MCNC: 12.6 G/DL — SIGNIFICANT CHANGE UP (ref 11.5–15.5)
LYMPHOCYTES # BLD AUTO: 2.1 K/UL — SIGNIFICANT CHANGE UP (ref 1–3.3)
LYMPHOCYTES # BLD AUTO: 47.1 % — HIGH (ref 13–44)
MCHC RBC-ENTMCNC: 28.9 PG — SIGNIFICANT CHANGE UP (ref 27–34)
MCHC RBC-ENTMCNC: 31.4 GM/DL — LOW (ref 32–36)
MCV RBC AUTO: 92 FL — SIGNIFICANT CHANGE UP (ref 80–100)
MONOCYTES # BLD AUTO: 0.4 K/UL — SIGNIFICANT CHANGE UP (ref 0–0.9)
MONOCYTES NFR BLD AUTO: 9.8 % — SIGNIFICANT CHANGE UP (ref 2–14)
NEUTROPHILS # BLD AUTO: 1.8 K/UL — SIGNIFICANT CHANGE UP (ref 1.8–7.4)
NEUTROPHILS NFR BLD AUTO: 40.4 % — LOW (ref 43–77)
PLATELET # BLD AUTO: 131 K/UL — LOW (ref 150–400)
POTASSIUM SERPL-MCNC: 3.1 MMOL/L — LOW (ref 3.5–5.3)
POTASSIUM SERPL-SCNC: 3.1 MMOL/L — LOW (ref 3.5–5.3)
PROT SERPL-MCNC: 6.4 G/DL — SIGNIFICANT CHANGE UP (ref 6–8.3)
RBC # BLD: 4.35 M/UL — SIGNIFICANT CHANGE UP (ref 3.8–5.2)
RBC # FLD: 16.8 % — HIGH (ref 10.3–14.5)
SODIUM SERPL-SCNC: 138 MMOL/L — SIGNIFICANT CHANGE UP (ref 135–145)
WBC # BLD: 4.4 K/UL — SIGNIFICANT CHANGE UP (ref 3.8–10.5)
WBC # FLD AUTO: 4.4 K/UL — SIGNIFICANT CHANGE UP (ref 3.8–10.5)

## 2018-04-24 PROCEDURE — 70551 MRI BRAIN STEM W/O DYE: CPT | Mod: 26

## 2018-04-24 PROCEDURE — 95819 EEG AWAKE AND ASLEEP: CPT | Mod: 26

## 2018-04-24 RX ORDER — POTASSIUM CHLORIDE 20 MEQ
20 PACKET (EA) ORAL DAILY
Qty: 0 | Refills: 0 | Status: DISCONTINUED | OUTPATIENT
Start: 2018-04-24 | End: 2018-04-25

## 2018-04-24 RX ORDER — ACETAMINOPHEN 500 MG
650 TABLET ORAL EVERY 6 HOURS
Qty: 0 | Refills: 0 | Status: DISCONTINUED | OUTPATIENT
Start: 2018-04-24 | End: 2018-04-26

## 2018-04-24 RX ORDER — POTASSIUM CHLORIDE 20 MEQ
40 PACKET (EA) ORAL EVERY 4 HOURS
Qty: 0 | Refills: 0 | Status: DISCONTINUED | OUTPATIENT
Start: 2018-04-24 | End: 2018-04-24

## 2018-04-24 RX ORDER — POTASSIUM CHLORIDE 20 MEQ
40 PACKET (EA) ORAL EVERY 4 HOURS
Qty: 0 | Refills: 0 | Status: COMPLETED | OUTPATIENT
Start: 2018-04-24 | End: 2018-04-24

## 2018-04-24 RX ORDER — POTASSIUM CHLORIDE 20 MEQ
20 PACKET (EA) ORAL DAILY
Qty: 0 | Refills: 0 | Status: DISCONTINUED | OUTPATIENT
Start: 2018-04-24 | End: 2018-04-24

## 2018-04-24 RX ADMIN — Medication 40 MILLIEQUIVALENT(S): at 12:19

## 2018-04-24 RX ADMIN — Medication 40 MILLIEQUIVALENT(S): at 13:10

## 2018-04-24 RX ADMIN — Medication 1 APPLICATION(S): at 17:26

## 2018-04-24 RX ADMIN — Medication 1 APPLICATION(S): at 05:13

## 2018-04-24 RX ADMIN — Medication 25 MILLIGRAM(S): at 17:26

## 2018-04-24 RX ADMIN — AMIODARONE HYDROCHLORIDE 200 MILLIGRAM(S): 400 TABLET ORAL at 05:12

## 2018-04-24 RX ADMIN — Medication 650 MILLIGRAM(S): at 09:00

## 2018-04-24 RX ADMIN — APIXABAN 5 MILLIGRAM(S): 2.5 TABLET, FILM COATED ORAL at 05:11

## 2018-04-24 RX ADMIN — Medication 40 MILLIGRAM(S): at 05:12

## 2018-04-24 RX ADMIN — NYSTATIN CREAM 1 APPLICATION(S): 100000 CREAM TOPICAL at 08:56

## 2018-04-24 RX ADMIN — Medication 40 MILLIGRAM(S): at 17:26

## 2018-04-24 RX ADMIN — FAMOTIDINE 40 MILLIGRAM(S): 10 INJECTION INTRAVENOUS at 00:01

## 2018-04-24 RX ADMIN — APIXABAN 5 MILLIGRAM(S): 2.5 TABLET, FILM COATED ORAL at 17:26

## 2018-04-24 RX ADMIN — FAMOTIDINE 40 MILLIGRAM(S): 10 INJECTION INTRAVENOUS at 22:49

## 2018-04-24 NOTE — PROGRESS NOTE ADULT - SUBJECTIVE AND OBJECTIVE BOX
Patient is a 86y old  Female who presents with a chief complaint of headache and ? seizure (23 Apr 2018 06:25)    penicillin (Unknown)      INTERVAL HPI/OVERNIGHT EVENTS: no acute     T(C): 36.4 (04-24-18 @ 11:15), Max: 36.9 (04-23-18 @ 21:58)  HR: 52 (04-24-18 @ 11:15) (48 - 55)  BP: 163/57 (04-24-18 @ 11:15) (129/60 - 164/61)  RR: 18 (04-24-18 @ 11:15) (16 - 18)  SpO2: 98% (04-24-18 @ 11:15) (95% - 100%)  I&O's Summary    Vital Signs Last 24 Hrs  T(C): 36.4 (24 Apr 2018 11:15), Max: 36.9 (23 Apr 2018 21:58)  T(F): 97.6 (24 Apr 2018 11:15), Max: 98.4 (23 Apr 2018 21:58)  HR: 52 (24 Apr 2018 11:15) (48 - 55)  BP: 163/57 (24 Apr 2018 11:15) (129/60 - 164/61)  BP(mean): --  RR: 18 (24 Apr 2018 11:15) (16 - 18)  SpO2: 98% (24 Apr 2018 11:15) (95% - 100%)  PAST MEDICAL & SURGICAL HISTORY:  Venous stasis: b/l leg, left calf ulcer  CVA (cerebral vascular accident): March 18th 2017, left side weakness ,s/p TPA  Atrial fibrillation  HTN (hypertension)  No significant past surgical history          LABS:                        12.6   4.4   )-----------( 131      ( 24 Apr 2018 05:39 )             40.0     04-24    138  |  96  |  7   ----------------------------<  93  3.1<L>   |  39<H>  |  0.82    Ca    8.4      24 Apr 2018 05:39  Phos  2.6     04-23  Mg     1.7     04-23    TPro  6.4  /  Alb  2.7<L>  /  TBili  0.9  /  DBili  x   /  AST  25  /  ALT  14  /  AlkPhos  91  04-24    PT/INR - ( 23 Apr 2018 00:45 )   PT: 20.2 sec;   INR: 1.83 ratio         PTT - ( 23 Apr 2018 00:45 )  PTT:29.7 sec  CAPILLARY BLOOD GLUCOSE                MEDICATIONS  (STANDING):  amiodarone    Tablet 200 milliGRAM(s) Oral daily  apixaban 5 milliGRAM(s) Oral every 12 hours  digoxin     Tablet 0.125 milliGRAM(s) Oral daily  famotidine    Tablet 40 milliGRAM(s) Oral at bedtime  furosemide    Tablet 40 milliGRAM(s) Oral two times a day  metoprolol tartrate 25 milliGRAM(s) Oral two times a day  nystatin Powder 1 Application(s) Topical <User Schedule>  potassium chloride   Powder 40 milliEquivalent(s) Oral every 4 hours  silver sulfADIAZINE 1% Cream 1 Application(s) Topical two times a day    MEDICATIONS  (PRN):  acetaminophen   Tablet 650 milliGRAM(s) Oral every 6 hours PRN pain      REVIEW OF SYSTEMS:  CONSTITUTIONAL: No fever, weight loss, or fatigue  EYES: No eye pain, visual disturbances, or discharge  ENMT:  No difficulty hearing, tinnitus, vertigo; No sinus or throat pain  NECK: No pain or stiffness  RESPIRATORY: No cough, wheezing, chills or hemoptysis; No shortness of breath  CARDIOVASCULAR: No chest pain, palpitations, dizziness, or leg swelling  GASTROINTESTINAL: No abdominal or epigastric pain. No nausea, vomiting, or hematemesis; No diarrhea or constipation. No melena or hematochezia.  GENITOURINARY: No dysuria, frequency, hematuria, or incontinence  NEUROLOGICAL: No headaches, memory loss, loss of strength, numbness, or tremors  SKIN: No itching, burning, rashes, or lesions   LYMPH NODES: No enlarged glands  ENDOCRINE: No heat or cold intolerance; No hair loss  MUSCULOSKELETAL: No joint pain or swelling; No muscle, back, or extremity pain  PSYCHIATRIC: No depression, anxiety, mood swings, or difficulty sleeping  HEME/LYMPH: No easy bruising, or bleeding gums  ALLERGY AND IMMUNOLOGIC: No hives or eczema    RADIOLOGY & ADDITIONAL TESTS:      Consultant(s) Notes Reviewed:  [ ] YES  [ ] NO    PHYSICAL EXAM:  GENERAL: NAD, well-groomed, well-developed  HEAD:  Atraumatic, Normocephalic  EYES: EOMI, PERRLA, conjunctiva and sclera clear  ENMT: No tonsillar erythema, exudates, or enlargement; Moist mucous membranes, Good dentition, No lesions  NECK: Supple, No JVD, Normal thyroid  NERVOUS SYSTEM:  Alert & Oriented X3, Good concentration; Motor Strength 5/5 B/L upper and lower extremities; DTRs 2+ intact and symmetric  CHEST/LUNG: Good air movement bilaterally; No rales, rhonchi, wheezing, or rubs  HEART: S1, S2; No murmurs, rubs, or gallops  ABDOMEN: Soft, Nontender, Nondistended; Bowel sounds present  EXTREMITIES:  2+ Peripheral Pulses, No clubbing, cyanosis, or edema  LYMPH: No lymphadenopathy noted  SKIN: No rashes or lesions    Care Collaborated Discussed with Consultants/Other Providers [ ] YES  [ ] NO Patient is a 86y old  Female who presents with a chief complaint of headache and ? seizure (23 Apr 2018 06:25)    penicillin (Unknown)      INTERVAL HPI /OVERNIGHT EVENTS: no acute overnight events. On encounter patient offers no new complaints. Remains afebrile.     T(C): 36.4 (04-24-18 @ 11:15), Max: 36.9 (04-23-18 @ 21:58)  HR: 52 (04-24-18 @ 11:15) (48 - 55)  BP: 163/57 (04-24-18 @ 11:15) (129/60 - 164/61)  RR: 18 (04-24-18 @ 11:15) (16 - 18)  SpO2: 98% (04-24-18 @ 11:15) (95% - 100%)  I&O's Summary    Vital Signs Last 24 Hrs  T(C): 36.4 (24 Apr 2018 11:15), Max: 36.9 (23 Apr 2018 21:58)  T(F): 97.6 (24 Apr 2018 11:15), Max: 98.4 (23 Apr 2018 21:58)  HR: 52 (24 Apr 2018 11:15) (48 - 55)  BP: 163/57 (24 Apr 2018 11:15) (129/60 - 164/61)  BP(mean): --  RR: 18 (24 Apr 2018 11:15) (16 - 18)  SpO2: 98% (24 Apr 2018 11:15) (95% - 100%)  PAST MEDICAL & SURGICAL HISTORY:  Venous stasis: b/l leg, left calf ulcer  CVA (cerebral vascular accident): March 18th 2017, left side weakness ,s/p TPA  Atrial fibrillation  HTN (hypertension)  No significant past surgical history          LABS:                        12.6   4.4   )-----------( 131      ( 24 Apr 2018 05:39 )             40.0     04-24    138  |  96  |  7   ----------------------------<  93  3.1<L>   |  39<H>  |  0.82    Ca    8.4      24 Apr 2018 05:39  Phos  2.6     04-23  Mg     1.7     04-23    TPro  6.4  /  Alb  2.7<L>  /  TBili  0.9  /  DBili  x   /  AST  25  /  ALT  14  /  AlkPhos  91  04-24    PT/INR - ( 23 Apr 2018 00:45 )   PT: 20.2 sec;   INR: 1.83 ratio         PTT - ( 23 Apr 2018 00:45 )  PTT:29.7 sec  CAPILLARY BLOOD GLUCOSE                MEDICATIONS  (STANDING):  amiodarone    Tablet 200 milliGRAM(s) Oral daily  apixaban 5 milliGRAM(s) Oral every 12 hours  digoxin     Tablet 0.125 milliGRAM(s) Oral daily  famotidine    Tablet 40 milliGRAM(s) Oral at bedtime  furosemide    Tablet 40 milliGRAM(s) Oral two times a day  metoprolol tartrate 25 milliGRAM(s) Oral two times a day  nystatin Powder 1 Application(s) Topical <User Schedule>  potassium chloride   Powder 40 milliEquivalent(s) Oral every 4 hours  silver sulfADIAZINE 1% Cream 1 Application(s) Topical two times a day    MEDICATIONS  (PRN):  acetaminophen   Tablet 650 milliGRAM(s) Oral every 6 hours PRN pain      REVIEW OF SYSTEMS:  CONSTITUTIONAL: No fever  EYES: No eye pain, or discharge  ENMT:  No difficulty hearing. No sinus or throat pain  NECK: No pain or stiffness  RESPIRATORY: No cough, or wheezing. No shortness of breath  CARDIOVASCULAR: No chest pain.  GASTROINTESTINAL: No abdominal or epigastric pain. No vomiting. No diarrhea.  NEUROLOGICAL: No headaches, memory loss, loss of strength, numbness, or tremors  SKIN: No itching, burning, rashes, or lesions   LYMPH NODES: No enlarged glands  ENDOCRINE: No heat or cold intolerance; No hair loss  MUSCULOSKELETAL: No joint pain or swelling; No muscle, back, or extremity pain  PSYCHIATRIC: No depression, anxiety, mood swings, or difficulty sleeping  HEME/LYMPH: No easy bruising, or bleeding gums  ALLERGY AND IMMUNOLOGIC: No hives or eczema    RADIOLOGY & ADDITIONAL TESTS:      Consultant(s) Notes Reviewed:  [ ] YES  [ ] NO    PHYSICAL EXAM:  GENERAL: NAD, well-groomed, well-developed  HEAD:  Atraumatic, Normocephalic  EYES: EOMI, PERRLA, conjunctiva and sclera clear  ENMT: No tonsillar erythema, exudates, or enlargement; Moist mucous membranes, Good dentition, No lesions  NECK: Supple, No JVD, Normal thyroid  NERVOUS SYSTEM:  Alert & Oriented X3, Good concentration; Motor Strength 5/5 B/L upper and lower extremities; DTRs 2+ intact and symmetric  CHEST/LUNG: Good air movement bilaterally; No rales, rhonchi, wheezing, or rubs  HEART: S1, S2; No murmurs, rubs, or gallops  ABDOMEN: Soft, Nontender, Nondistended; Bowel sounds present  EXTREMITIES:  2+ Peripheral Pulses, No clubbing, cyanosis, or edema  LYMPH: No lymphadenopathy noted  SKIN: No rashes or lesions    Care Collaborated Discussed with Consultants/Other Providers [ ] YES  [ ] NO Patient is a 86y old  Female who presents with a chief complaint of headache and ? seizure (23 Apr 2018 06:25)    penicillin (Unknown)      INTERVAL HPI /OVERNIGHT EVENTS: no acute overnight events. On encounter patient offers no new complaints. Remains afebrile.     T(C): 36.4 (04-24-18 @ 11:15), Max: 36.9 (04-23-18 @ 21:58)  HR: 52 (04-24-18 @ 11:15) (48 - 55)  BP: 163/57 (04-24-18 @ 11:15) (129/60 - 164/61)  RR: 18 (04-24-18 @ 11:15) (16 - 18)  SpO2: 98% (04-24-18 @ 11:15) (95% - 100%)  I&O's Summary    Vital Signs Last 24 Hrs  T(C): 36.4 (24 Apr 2018 11:15), Max: 36.9 (23 Apr 2018 21:58)  T(F): 97.6 (24 Apr 2018 11:15), Max: 98.4 (23 Apr 2018 21:58)  HR: 52 (24 Apr 2018 11:15) (48 - 55)  BP: 163/57 (24 Apr 2018 11:15) (129/60 - 164/61)  BP(mean): --  RR: 18 (24 Apr 2018 11:15) (16 - 18)  SpO2: 98% (24 Apr 2018 11:15) (95% - 100%)  PAST MEDICAL & SURGICAL HISTORY:  Venous stasis: b/l leg, left calf ulcer  CVA (cerebral vascular accident): March 18th 2017, left side weakness ,s/p TPA  Atrial fibrillation  HTN (hypertension)  No significant past surgical history          LABS:                        12.6   4.4   )-----------( 131      ( 24 Apr 2018 05:39 )             40.0     04-24    138  |  96  |  7   ----------------------------<  93  3.1<L>   |  39<H>  |  0.82    Ca    8.4      24 Apr 2018 05:39  Phos  2.6     04-23  Mg     1.7     04-23    TPro  6.4  /  Alb  2.7<L>  /  TBili  0.9  /  DBili  x   /  AST  25  /  ALT  14  /  AlkPhos  91  04-24    PT/INR - ( 23 Apr 2018 00:45 )   PT: 20.2 sec;   INR: 1.83 ratio         PTT - ( 23 Apr 2018 00:45 )  PTT:29.7 sec  CAPILLARY BLOOD GLUCOSE                MEDICATIONS  (STANDING):  amiodarone    Tablet 200 milliGRAM(s) Oral daily  apixaban 5 milliGRAM(s) Oral every 12 hours  digoxin     Tablet 0.125 milliGRAM(s) Oral daily  famotidine    Tablet 40 milliGRAM(s) Oral at bedtime  furosemide    Tablet 40 milliGRAM(s) Oral two times a day  metoprolol tartrate 25 milliGRAM(s) Oral two times a day  nystatin Powder 1 Application(s) Topical <User Schedule>  potassium chloride   Powder 40 milliEquivalent(s) Oral every 4 hours  silver sulfADIAZINE 1% Cream 1 Application(s) Topical two times a day    MEDICATIONS  (PRN):  acetaminophen   Tablet 650 milliGRAM(s) Oral every 6 hours PRN pain      REVIEW OF SYSTEMS:  CONSTITUTIONAL: No fever  EYES: No eye pain, or discharge  ENMT:  No difficulty hearing. No sinus or throat pain  NECK: No pain or stiffness  RESPIRATORY: No cough, or wheezing. No shortness of breath  CARDIOVASCULAR: No chest pain.  GASTROINTESTINAL: No abdominal or epigastric pain. No vomiting. No diarrhea.  NEUROLOGICAL: +headaches  SKIN: No itching, or burning  LYMPH NODES: No enlarged glands  MUSCULOSKELETAL: No joint pain. No muscle, back, or extremity pain  HEME/LYMPH: No easy bruising, or bleeding gums  ALLERGY AND IMMUNOLOGIC: No hives or eczema    RADIOLOGY & ADDITIONAL TESTS:  < from: MR Head No Cont (04.24.18 @ 11:12) >  FINDINGS:  There is moderate diffuse parenchymal volume loss. There are T2   prolongation signal abnormalities in the periventricular subcortical   white matter likely related to mild chronic microvascular ischemic   changes. Chronic right frontal infarct noted.  There is no acute parenchymal hemorrhage, parenchymal mass, mass effect   or midline shift. There is no extra-axial fluid collection.  There is no   hydrocephalus.  There is no acute infarct.  Mucosal thickening paranasal sinuses.  IMPRESSION:  No acute intracranial hemorrhage or acute infarct.    < from: CT Head No Cont (04.23.18 @ 06:44) >  IMPRESSION:  No acute intracranial hemorrhage, mass effect or evidence for large   vascular territory infarct. New yet chronic appearing tiny left pontine   infarct.    < from: Xray Chest 1 View-PORTABLE IMMEDIATE (04.22.18 @ 22:33) >  IMPRESSION: No acute disease.      < from: Transthoracic Echocardiogram (02.12.18 @ 11:21) >  CONCLUSIONS:  1. Normal mitral valve. Severe mitral regurgitation.  2. Calcified trileaflet aortic valve with normal opening.  Mild aortic insufficiency.  3. Aortic Root: 3.6 cm.  4. Mild left atrial enlargement.  5. Normal left ventricular internal dimensions and wall  thicknesses.  6. Dilated LV with severe global left ventricular systolic  dysfunction EF=10%.  7. Grade IV diastolic dysfunction.  8. Moderate right atrial enlargement.  9. Right ventricular enlargement with decreased RV  function.  10. RV systolic pressure is moderately increased at  46 mm  Hg.  11. There is severe tricuspid regurgitation.  12. There is mild pulmonic regurgitation.  13. Normal pericardium with no pericardial effusion.  ------------------------------------------------------------------------  Confirmed on  2/13/2018 - 07:59:24 by Halley Fuentes MD    < from: 12 Lead ECG (02.10.18 @ 20:33) >  Diagnosis Line *** Poor data quality, interpretation may be adversely affected  Atrial fibrillation withrapid ventricular response with premature ventricular or aberrantly conducted complexes  Left axis deviation  Nonspecific T wave abnormality , probably digitalis effect  Abnormal ECG  Confirmed by HALLEY FUENTES MD ([id number]) on 11-Feb-2018 09:10:47              Consultant(s) Notes Reviewed:  [x] YES  [ ] NO    PHYSICAL EXAM:  GENERAL: NAD female awake in bed  HEAD:  Atraumatic, Normocephalic  EYES: conjunctiva and sclera clear  ENMT: No tonsillar erythema, exudates, or enlargement; Moist mucous membranes. No lesions  NECK: Supple, No JVD, Normal thyroid  NERVOUS SYSTEM:  Alert & Oriented X3  CHEST/LUNG: Good air movement bilaterally; No rales, rhonchi, wheezing, or rubs  HEART: S1, S2  ABDOMEN: Soft, Nontender, Nondistended; Bowel sounds present  EXTREMITIES:  2+ Peripheral Pulses    Care Collaborated Discussed with Consultants/Other Providers [x] YES  [ ] NO

## 2018-04-24 NOTE — PROGRESS NOTE ADULT - PROBLEM SELECTOR PLAN 1
patient had 2 episodes of starring with eyes wide open and each episode lasted for 30 seconds and were 20 minutes apart. Daughter was concerned patient was having seizure. no prior history of seizure. Denies any eye rolling movements, bitting of tongue, frothing at mouth or urinating / defecating on herself.  -Per ED attending , patient was noted to be in afib with RVR while in ED -- could be cause for the above episodes  neuro Kruse consulted --MR Head No Cont --Chronic right frontal infarct noted. No acute intracranial hemorrhage or acute infarct.  follow up EEG

## 2018-04-24 NOTE — PROGRESS NOTE ADULT - PROBLEM SELECTOR PLAN 3
continue lasix and supplemental O2  1L fluid restriction ordered  continue DASH / TLC diet  Transthoracic Echocardiogram (02.12.18 @ 11:21) with Severe mitral regurgitation,  Mild aortic insufficiency, Dilated LV with severe global left ventricular systolic  dysfunction EF=10%, Grade IV diastolic dysfunction, There is severe tricuspid regurgitation, There is mild pulmonic regurgitation.  no cardio consult for now as per the attending

## 2018-04-24 NOTE — EEG REPORT - NS EEG TEXT BOX
[facility]  Report of Routine EEG with Video    [facility]: [address], [address], [place] [address], Phone: [phone]  [facility]: [phone] [place] [address], [place] [address], Phone: [phone]  Office: [address] [facility], [facility] [address], [place] [address], Phone: [phone]    Patient Name: CHESTER JONES  Age: 86 y  : 1932  Patient ID: -, MRN #: -, Location: -  Referring Physician: DR ADAMS    EEG #:   Study Date: 2018		    Technical Information:					  On Instrument: -  Placement and Labeling of Electrodes:  The EEG was performed utilizing 20 channels referential EEG connections (coronal over temporal over parasagittal montage) using all standard 10-20 electrode placements with EKG.  Recording was at a sampling rate of 256 samples per second per channel.  Time synchronized digital video recording was done simultaneously with EEG recording.  A low light infrared camera was used for low light recording.  Noel and seizure detection algorithms were utilized.    History:  -Headache and 2 events of starring spells at nursing facility   -Concern for seizures     Medication	  No AEDs	    Study Interpretation:    FINDINGS:  The background was continuous, spontaneously variable and reactive. During wakefulness, the posteriorly dominant rhythm consisted of symmetric, 8 Hz activity, with amplitude to 30 uV, that attenuated to eye opening. Low amplitude frontal beta was noted in wakefulness.    Background Slowing:  Diffuse polymorphic delta and theta activities     Focal Slowing:   None was present.    Sleep Background:  Drowsiness was characterized by fragmentation, attenuation, and slowing of the background activity.    Stage II sleep transients were not recorded.    Other Non-Epileptiform Findings:  None were present.    Interictal Epileptiform Activity:   None were present.    Events:  Clinical events: None recorded.  Seizures: None recorded.    Activation Procedures:   Hyperventilation was not performed.    Photic stimulation was performed and did not elicit any abnormalities.      Artifacts:  Intermittent myogenic and movement artifacts were noted.    ECG:  The heart rate on single channel ECG was predominantly between 45-50 BPM.    EEG Summary / Classification:  Abnormal EEG in the awake and drowsy states.  -Mild to moderate generalized slowing     EEG Impression / Clinical Correlate:  Abnormal EEG in a awake and drowsy pt.  1. Mild to moderate diffuse or multifocal cerebral dysfunction   2. There were no epileptiform abnormalities recorded.        Preliminary Fellow Read  Juliana Perez MD  ________________________________________  Neurophysiology/Epilepsy Fellow, [facility] [place]

## 2018-04-24 NOTE — PROGRESS NOTE ADULT - PROBLEM SELECTOR PROBLEM 3
CVA (cerebral vascular accident) Combined systolic and diastolic congestive heart failure, unspecified HF chronicity

## 2018-04-24 NOTE — ED ADULT NURSE REASSESSMENT NOTE - NS ED NURSE REASSESS COMMENT FT1
Patient was received from AVEL Siddiqui. Patient is in no acute distress, on seizure precautions, verbalizes no medical complaints.

## 2018-04-24 NOTE — EEG REPORT - NS EEG TEXT BOX
[facility]  Report of Routine EEG     [facility]: [address], [address], [place] [address], Phone: [phone]  [facility]: [phone] [place] [address], Phone: [phone]  Office: [address] [facility] [facility] [address], [place] [address], Phone: [phone]    Patient Name: CHESTER JONES  Age: 86 y  : 1932  Patient ID: -, MRN #: -, Location: -  Referring Physician: DR ADAMS    EEG #:   Study Date: 2018		    Technical Information:					  On Instrument: -  Placement and Labeling of Electrodes:  The EEG was performed utilizing 20 channels referential EEG connections (coronal over temporal over parasagittal montage) using all standard 10-20 electrode placements with EKG.  Recording was at a sampling rate of 256 samples per second per channel.  Time synchronized digital video recording was done simultaneously with EEG recording.  A low light infrared camera was used for low light recording.  Noel and seizure detection algorithms were utilized.    History:  -Headache and 2 events of starring spells at nursing facility   -Concern for seizures     Medication	  No AEDs	    Study Interpretation:    FINDINGS:  The background was continuous, spontaneously variable and reactive. During wakefulness, the posteriorly dominant rhythm consisted of symmetric, 8 Hz activity, with amplitude to 30 uV, that attenuated to eye opening. Low amplitude frontal beta was noted in wakefulness.    Background Slowing:  Diffuse polymorphic delta and theta activities     Focal Slowing:   None was present.    Sleep Background:  Drowsiness was characterized by fragmentation, attenuation, and slowing of the background activity.    Stage I sleep transients characterized by posterior vertex waves.     Other Non-Epileptiform Findings:  None were present.    Interictal Epileptiform Activity:   None were present.    Events:  Clinical events: None recorded.  Seizures: None recorded.    Activation Procedures:   Hyperventilation was not performed.    Photic stimulation was performed and did not elicit any abnormalities.      Artifacts:  Intermittent myogenic and movement artifacts were noted.    ECG:  The heart rate on single channel ECG was predominantly between 45-50 BPM.    EEG Summary / Classification:  Abnormal EEG in the awake and drowsy states.  -Mild to moderate generalized slowing     EEG Impression / Clinical Correlate:  Abnormal EEG in a awake and drowsy pt.  1. Mild to moderate diffuse or multifocal cerebral dysfunction   2. There were no epileptiform abnormalities recorded.        Preliminary Fellow Read  Juliana Perez MD  ________________________________________  Neurophysiology/Epilepsy Fellow, [facility] [place]

## 2018-04-25 LAB
ALBUMIN SERPL ELPH-MCNC: 3 G/DL — LOW (ref 3.5–5)
ALP SERPL-CCNC: 102 U/L — SIGNIFICANT CHANGE UP (ref 40–120)
ALT FLD-CCNC: 15 U/L DA — SIGNIFICANT CHANGE UP (ref 10–60)
ANION GAP SERPL CALC-SCNC: 6 MMOL/L — SIGNIFICANT CHANGE UP (ref 5–17)
AST SERPL-CCNC: 25 U/L — SIGNIFICANT CHANGE UP (ref 10–40)
BILIRUB SERPL-MCNC: 1 MG/DL — SIGNIFICANT CHANGE UP (ref 0.2–1.2)
BUN SERPL-MCNC: 7 MG/DL — SIGNIFICANT CHANGE UP (ref 7–18)
CALCIUM SERPL-MCNC: 9.2 MG/DL — SIGNIFICANT CHANGE UP (ref 8.4–10.5)
CHLORIDE SERPL-SCNC: 98 MMOL/L — SIGNIFICANT CHANGE UP (ref 96–108)
CO2 SERPL-SCNC: 32 MMOL/L — HIGH (ref 22–31)
CREAT SERPL-MCNC: 0.9 MG/DL — SIGNIFICANT CHANGE UP (ref 0.5–1.3)
CRP SERPL-MCNC: 0.6 MG/DL — HIGH (ref 0–0.4)
ERYTHROCYTE [SEDIMENTATION RATE] IN BLOOD: 9 MM/HR — SIGNIFICANT CHANGE UP (ref 0–20)
GLUCOSE SERPL-MCNC: 64 MG/DL — LOW (ref 70–99)
HCT VFR BLD CALC: 46.6 % — HIGH (ref 34.5–45)
HGB BLD-MCNC: 14 G/DL — SIGNIFICANT CHANGE UP (ref 11.5–15.5)
LYMPHOCYTES # BLD AUTO: 69 % — HIGH (ref 13–44)
MCHC RBC-ENTMCNC: 28.3 PG — SIGNIFICANT CHANGE UP (ref 27–34)
MCHC RBC-ENTMCNC: 30.2 GM/DL — LOW (ref 32–36)
MCV RBC AUTO: 93.9 FL — SIGNIFICANT CHANGE UP (ref 80–100)
MONOCYTES NFR BLD AUTO: 11 % — SIGNIFICANT CHANGE UP (ref 2–14)
NEUTROPHILS NFR BLD AUTO: 20 % — LOW (ref 43–77)
PLATELET # BLD AUTO: 142 K/UL — LOW (ref 150–400)
POTASSIUM SERPL-MCNC: 3.3 MMOL/L — LOW (ref 3.5–5.3)
POTASSIUM SERPL-SCNC: 3.3 MMOL/L — LOW (ref 3.5–5.3)
PROT SERPL-MCNC: 7.4 G/DL — SIGNIFICANT CHANGE UP (ref 6–8.3)
RBC # BLD: 4.96 M/UL — SIGNIFICANT CHANGE UP (ref 3.8–5.2)
RBC # FLD: 16.9 % — HIGH (ref 10.3–14.5)
SODIUM SERPL-SCNC: 136 MMOL/L — SIGNIFICANT CHANGE UP (ref 135–145)
WBC # BLD: 5.7 K/UL — SIGNIFICANT CHANGE UP (ref 3.8–10.5)
WBC # FLD AUTO: 5.7 K/UL — SIGNIFICANT CHANGE UP (ref 3.8–10.5)

## 2018-04-25 RX ORDER — POTASSIUM CHLORIDE 20 MEQ
20 PACKET (EA) ORAL DAILY
Qty: 0 | Refills: 0 | Status: DISCONTINUED | OUTPATIENT
Start: 2018-04-26 | End: 2018-04-26

## 2018-04-25 RX ORDER — POTASSIUM CHLORIDE 20 MEQ
40 PACKET (EA) ORAL EVERY 4 HOURS
Qty: 0 | Refills: 0 | Status: COMPLETED | OUTPATIENT
Start: 2018-04-25 | End: 2018-04-25

## 2018-04-25 RX ADMIN — Medication 40 MILLIGRAM(S): at 05:59

## 2018-04-25 RX ADMIN — AMIODARONE HYDROCHLORIDE 200 MILLIGRAM(S): 400 TABLET ORAL at 05:59

## 2018-04-25 RX ADMIN — Medication 1 APPLICATION(S): at 06:00

## 2018-04-25 RX ADMIN — APIXABAN 5 MILLIGRAM(S): 2.5 TABLET, FILM COATED ORAL at 17:30

## 2018-04-25 RX ADMIN — APIXABAN 5 MILLIGRAM(S): 2.5 TABLET, FILM COATED ORAL at 05:59

## 2018-04-25 RX ADMIN — Medication 40 MILLIEQUIVALENT(S): at 12:46

## 2018-04-25 RX ADMIN — Medication 1 APPLICATION(S): at 17:30

## 2018-04-25 RX ADMIN — NYSTATIN CREAM 1 APPLICATION(S): 100000 CREAM TOPICAL at 09:00

## 2018-04-25 RX ADMIN — Medication 40 MILLIEQUIVALENT(S): at 15:21

## 2018-04-25 NOTE — PROGRESS NOTE ADULT - PROBLEM SELECTOR PLAN 4
-continue metoprolol 25BID. If BP elevated can consider increase to home dose of 50mg BID (however patient bradycardic at baseline)  -continue to monitor BP per floor protocol  -continue DASH diet
continue metoprolol 25BID. If BP elevated can consider increase to home dose of 50mg BID (however patient bradycardic at baseline)  continue to monitor BP per floor protocol  continue DASH diet

## 2018-04-25 NOTE — PROGRESS NOTE ADULT - ASSESSMENT
86 years old female from [facility], ambulates with walker, PMH of A-fib (on Eliquis), CHF (Feb 2018, EF of 10%, grade 4 diastolic dysfunction, severe MR and TR and moderately increased RVSP; on 2 L nasal canula), CVA and chronic venous stasis of bilateral lower extremities sent to ED from [facility] with concern of seizures and headache.    -EEG neg for seizure activity  -MR Head No Cont --Chronic right frontal infarct noted. No acute intracranial hemorrhage or acute infarct.
86 years old female from [facility], ambulates with walker, PMH of A-fib (on Eliquis), CHF (Feb 2018, EF of 10%, grade 4 diastolic dysfunction, severe MR and TR and moderately increased RVSP; on 2 L nasal canula), CVA and chronic venous stasis of bilateral lower extremities sent to ED from [facility] with concern of seizures and headache. Per patient's daughter, patient had been complaining of severe headache. Later daughter noticed patient had 2 episodes where patient was staring with eyes wide open and each episode lasted for 30 seconds and were 20 minutes apart. Daughter was concerned patient was having seizure though she has no history of seizure. Patient and daughter denies feeling confused, eye rolling movements, bitting of tongue, frothing at mouth or urinating / defecating on herself. Per NH papers, BP was 185/93, was given Hydralazine 25 mg and Tylenol.  Patient also complained of some chest pain before which was midline, non radiating and dull in nature. Says feeling better but still has headache. Denies any chest pain, palpitations sob, nausea, vomiting, abdominal pain, diarrhea, urinary complains or any other complains.    Admitted for further work-up

## 2018-04-25 NOTE — PROGRESS NOTE ADULT - PROBLEM SELECTOR PLAN 1
-2 seizure like episodes reported  -MR Head No Cont --Chronic right frontal infarct noted. No acute intracranial hemorrhage or acute infarct.  -EEG neg for seizure activity  -Hypernatremic and hypokalemic on admission  -Neuro: Dr. Kruse -2 seizure like episodes reported  -MR Head No Cont --Chronic right frontal infarct noted. No acute intracranial hemorrhage or acute infarct.  -EEG neg for seizure activity  -Neuro: Dr. Kruse

## 2018-04-25 NOTE — PROGRESS NOTE ADULT - PROBLEM SELECTOR PLAN 6
-Follow up AM level  -c/w potassium 20mg daily while on lasix
Level 3.1 today; supplements. Follow up AM level

## 2018-04-25 NOTE — PROGRESS NOTE ADULT - PROBLEM SELECTOR PLAN 2
-patient complained of temporal headache for past few days  -denies photophobia or phonophobia  -no prior history of migraine or any similar symptoms   -CT head negative for any acute events.  -currently symptoms has resolved.  -MR Head No Cont --Chronic right frontal infarct noted. No acute intracranial hemorrhage or acute infarct  -f/u MADONNA, ESR and CRP   -Neuro Kruse following - Fioricet 1 tab every 4-6 hours as needed.
patient complained of temporal headache for past few days  -denies photophobia or phonophobia  -no prior history of migraine or any similar symptoms   -CT head negative for any acute events.  -currently symptoms has resolved.  -Neuro garibay following --  Fioricet 1 tab every 4-6 hours as needed. MADONNA, ESR and CRP ordered -- follow up results

## 2018-04-25 NOTE — PROGRESS NOTE ADULT - PROBLEM SELECTOR PLAN 9
[] Previous VTE                                                3  [] Thrombophilia                                             2  [] Lower limb paralysis                                   2    [] Current Cancer                                             2   [x] Immobilization > 24 hrs                              1  [] ICU/CCU stay > 24 hours                             1  [x] Age > 60                                                         1    IMPROVE VTE Score: 2; c/w eliquis therapy

## 2018-04-25 NOTE — PROGRESS NOTE ADULT - PROBLEM SELECTOR PLAN 3
-continue lasix and supplemental O2  -1L fluid restriction ordered  -continue DASH / TLC diet  -Transthoracic Echocardiogram (02.12.18 @ 11:21) with Severe mitral regurgitation,  Mild aortic insufficiency, Dilated LV with severe global left ventricular systolic  dysfunction EF=10%, Grade IV diastolic dysfunction, There is severe tricuspid -continue lasix and supplemental O2  -1L fluid restriction ordered  -continue DASH / TLC diet  -Transthoracic Echocardiogram (02.12.18 @ 11:21) with Severe mitral regurgitation,  Mild aortic insufficiency, Dilated LV with severe global left ventricular systolic  dysfunction EF=10%, Grade IV diastolic dysfunction, There is severe tricuspid  -Christopher on tele  Cardio: Dr. Mayer

## 2018-04-25 NOTE — PROGRESS NOTE ADULT - SUBJECTIVE AND OBJECTIVE BOX
PGY 1 Note discussed with supervising resident and primary attending    Patient is a 86y old  Female who presents with a chief complaint of headache and ? seizure (23 Apr 2018 06:25)    INTERVAL HPI/OVERNIGHT EVENTS: No acute events reported overnight.  Today pt presents in no acute distress.  Pt found resting comfortably in bed. Pt denies any lethargy, or seizure like activity. Pt denies headache or nausea. No new complaints offered today.  MEDICATIONS  (STANDING):  amiodarone    Tablet 200 milliGRAM(s) Oral daily  apixaban 5 milliGRAM(s) Oral every 12 hours  digoxin     Tablet 0.125 milliGRAM(s) Oral daily  famotidine    Tablet 40 milliGRAM(s) Oral at bedtime  furosemide    Tablet 40 milliGRAM(s) Oral two times a day  metoprolol tartrate 25 milliGRAM(s) Oral two times a day  nystatin Powder 1 Application(s) Topical <User Schedule>  potassium chloride    Tablet ER 20 milliEquivalent(s) Oral daily  silver sulfADIAZINE 1% Cream 1 Application(s) Topical two times a day    MEDICATIONS  (PRN):  acetaminophen   Tablet 650 milliGRAM(s) Oral every 6 hours PRN pain  _____________________________________________  REVIEW OF SYSTEMS:    CONSTITUTIONAL: No fever,   EYES: no acute visual disturbances  NECK: No pain or stiffness  RESPIRATORY: No cough; No shortness of breath  CARDIOVASCULAR: No chest pain, no palpitations  GASTROINTESTINAL: No pain. No nausea or vomiting; No diarrhea   NEUROLOGICAL: No headache or numbness, no tremors  MUSCULOSKELETAL: No joint pain, no muscle pain    Vital Signs Last 24 Hrs  T(C): 36.2 (25 Apr 2018 04:59), Max: 36.6 (24 Apr 2018 23:17)  T(F): 97.1 (25 Apr 2018 04:59), Max: 97.9 (24 Apr 2018 23:17)  HR: 42 (25 Apr 2018 04:59) (42 - 99)  BP: 125/49 (25 Apr 2018 04:59) (125/49 - 163/57)  RR: 16 (25 Apr 2018 04:59) (16 - 20)  SpO2: 99% (25 Apr 2018 04:59) (97% - 100%)    ________________________________________________  PHYSICAL EXAM:  GENERAL: NAD  HEENT:Normocephalic;  conjunctivae and sclerae clear; moist mucous membranes;   NECK : supple  CHEST/LUNG: Clear to auscuitation bilaterally with good air entry   HEART: S1 S2  regular; no murmurs, gallops or rubs  ABDOMEN: Soft, Nontender, Nondistended; Bowel sounds present  EXTREMITIES: no cyanosis; no edema; no calf tenderness  NERVOUS SYSTEM:  Awake and alert; Oriented  to place, person and time ; no new deficits    _________________________________________________  LABS:                   CAPILLARY BLOOD GLUCOSE            RADIOLOGY & ADDITIONAL TESTS:    Imaging Personally Reviewed:  YES    Consultant(s) Notes Reviewed:   YES    Care Discussed with Consultants :     Plan of care was discussed with patient and /or primary care giver; all questions and concerns were addressed and care was aligned with patient's wishes. PGY 1 Note discussed with supervising resident and primary attending    Patient is a 86y old  Female who presents with a chief complaint of headache and ? seizure (23 Apr 2018 06:25)    INTERVAL HPI/OVERNIGHT EVENTS: No acute events reported overnight.  Today pt presents in no acute distress.  Pt found resting comfortably in bed. Pt denies any lethargy, or seizure like activity. Pt denies headache or nausea. No new complaints offered today.    MEDICATIONS  (STANDING):  amiodarone    Tablet 200 milliGRAM(s) Oral daily  apixaban 5 milliGRAM(s) Oral every 12 hours  digoxin     Tablet 0.125 milliGRAM(s) Oral daily  famotidine    Tablet 40 milliGRAM(s) Oral at bedtime  furosemide    Tablet 40 milliGRAM(s) Oral two times a day  metoprolol tartrate 25 milliGRAM(s) Oral two times a day  nystatin Powder 1 Application(s) Topical <User Schedule>  potassium chloride    Tablet ER 20 milliEquivalent(s) Oral daily  silver sulfADIAZINE 1% Cream 1 Application(s) Topical two times a day    MEDICATIONS  (PRN):  acetaminophen   Tablet 650 milliGRAM(s) Oral every 6 hours PRN pain  _____________________________________________  REVIEW OF SYSTEMS:    CONSTITUTIONAL: No fever,   EYES: no acute visual disturbances  NECK: No pain or stiffness  RESPIRATORY: No cough; No shortness of breath  CARDIOVASCULAR: No chest pain, no palpitations  GASTROINTESTINAL: No pain. No nausea or vomiting; No diarrhea   NEUROLOGICAL: No headache or numbness, no tremors  MUSCULOSKELETAL: No joint pain, no muscle pain    Vital Signs Last 24 Hrs  T(C): 36.2 (25 Apr 2018 04:59), Max: 36.6 (24 Apr 2018 23:17)  T(F): 97.1 (25 Apr 2018 04:59), Max: 97.9 (24 Apr 2018 23:17)  HR: 42 (25 Apr 2018 04:59) (42 - 99)  BP: 125/49 (25 Apr 2018 04:59) (125/49 - 163/57)  RR: 16 (25 Apr 2018 04:59) (16 - 20)  SpO2: 99% (25 Apr 2018 04:59) (97% - 100%)    ________________________________________________  PHYSICAL EXAM:  GENERAL: NAD  HEENT: Normocephalic;  conjunctivae and sclerae clear; moist mucous membranes;   NECK : supple  CHEST/LUNG: Clear to auscultation bilaterally with good air entry   HEART: S1 S2  regular; no murmurs, gallops or rubs  ABDOMEN: Soft, Nontender, Nondistended; Bowel sounds present  EXTREMITIES: no cyanosis; no edema; no calf tenderness  NERVOUS SYSTEM:  Awake and alert; Oriented  to place, person and time ; no new deficits    _________________________________________________  LABS:                               14.0   5.7   )-----------( 142      ( 25 Apr 2018 08:24 )             46.6     04-25    136  |  98  |  7   ----------------------------<  64<L>  3.3<L>   |  32<H>  |  0.90    Ca    9.2      25 Apr 2018 08:24    TPro  7.4  /  Alb  3.0<L>  /  TBili  1.0  /  DBili  x   /  AST  25  /  ALT  15  /  AlkPhos  102  04-25              CAPILLARY BLOOD GLUCOSE            RADIOLOGY & ADDITIONAL TESTS:    Imaging Personally Reviewed:  YES    Consultant(s) Notes Reviewed:   YES    Care Discussed with Consultants :     Plan of care was discussed with patient and /or primary care giver; all questions and concerns were addressed and care was aligned with patient's wishes.

## 2018-04-26 ENCOUNTER — TRANSCRIPTION ENCOUNTER (OUTPATIENT)
Age: 83
End: 2018-04-26

## 2018-04-26 VITALS
DIASTOLIC BLOOD PRESSURE: 60 MMHG | HEART RATE: 64 BPM | RESPIRATION RATE: 18 BRPM | SYSTOLIC BLOOD PRESSURE: 126 MMHG | OXYGEN SATURATION: 100 % | TEMPERATURE: 98 F

## 2018-04-26 LAB
ANION GAP SERPL CALC-SCNC: 8 MMOL/L — SIGNIFICANT CHANGE UP (ref 5–17)
BUN SERPL-MCNC: 11 MG/DL — SIGNIFICANT CHANGE UP (ref 7–18)
CALCIUM SERPL-MCNC: 9.6 MG/DL — SIGNIFICANT CHANGE UP (ref 8.4–10.5)
CHLORIDE SERPL-SCNC: 99 MMOL/L — SIGNIFICANT CHANGE UP (ref 96–108)
CO2 SERPL-SCNC: 31 MMOL/L — SIGNIFICANT CHANGE UP (ref 22–31)
CREAT SERPL-MCNC: 0.88 MG/DL — SIGNIFICANT CHANGE UP (ref 0.5–1.3)
GLUCOSE SERPL-MCNC: 79 MG/DL — SIGNIFICANT CHANGE UP (ref 70–99)
HCT VFR BLD CALC: 41.9 % — SIGNIFICANT CHANGE UP (ref 34.5–45)
HGB BLD-MCNC: 12.8 G/DL — SIGNIFICANT CHANGE UP (ref 11.5–15.5)
MAGNESIUM SERPL-MCNC: 1.7 MG/DL — SIGNIFICANT CHANGE UP (ref 1.6–2.6)
MCHC RBC-ENTMCNC: 28.4 PG — SIGNIFICANT CHANGE UP (ref 27–34)
MCHC RBC-ENTMCNC: 30.6 GM/DL — LOW (ref 32–36)
MCV RBC AUTO: 92.6 FL — SIGNIFICANT CHANGE UP (ref 80–100)
PHOSPHATE SERPL-MCNC: 1.9 MG/DL — LOW (ref 2.5–4.5)
PLATELET # BLD AUTO: 142 K/UL — LOW (ref 150–400)
POTASSIUM SERPL-MCNC: 4.1 MMOL/L — SIGNIFICANT CHANGE UP (ref 3.5–5.3)
POTASSIUM SERPL-SCNC: 4.1 MMOL/L — SIGNIFICANT CHANGE UP (ref 3.5–5.3)
RBC # BLD: 4.52 M/UL — SIGNIFICANT CHANGE UP (ref 3.8–5.2)
RBC # FLD: 16.6 % — HIGH (ref 10.3–14.5)
SODIUM SERPL-SCNC: 138 MMOL/L — SIGNIFICANT CHANGE UP (ref 135–145)
WBC # BLD: 4.6 K/UL — SIGNIFICANT CHANGE UP (ref 3.8–10.5)
WBC # FLD AUTO: 4.6 K/UL — SIGNIFICANT CHANGE UP (ref 3.8–10.5)

## 2018-04-26 PROCEDURE — 93010 ELECTROCARDIOGRAM REPORT: CPT

## 2018-04-26 PROCEDURE — 80061 LIPID PANEL: CPT

## 2018-04-26 PROCEDURE — 93005 ELECTROCARDIOGRAM TRACING: CPT

## 2018-04-26 PROCEDURE — 99291 CRITICAL CARE FIRST HOUR: CPT | Mod: 25

## 2018-04-26 PROCEDURE — 86140 C-REACTIVE PROTEIN: CPT

## 2018-04-26 PROCEDURE — 85027 COMPLETE CBC AUTOMATED: CPT

## 2018-04-26 PROCEDURE — 96374 THER/PROPH/DIAG INJ IV PUSH: CPT

## 2018-04-26 PROCEDURE — 85652 RBC SED RATE AUTOMATED: CPT

## 2018-04-26 PROCEDURE — 82962 GLUCOSE BLOOD TEST: CPT

## 2018-04-26 PROCEDURE — 85610 PROTHROMBIN TIME: CPT

## 2018-04-26 PROCEDURE — 71045 X-RAY EXAM CHEST 1 VIEW: CPT

## 2018-04-26 PROCEDURE — 95819 EEG AWAKE AND ASLEEP: CPT

## 2018-04-26 PROCEDURE — 86038 ANTINUCLEAR ANTIBODIES: CPT

## 2018-04-26 PROCEDURE — 80162 ASSAY OF DIGOXIN TOTAL: CPT

## 2018-04-26 PROCEDURE — 70450 CT HEAD/BRAIN W/O DYE: CPT

## 2018-04-26 PROCEDURE — 83735 ASSAY OF MAGNESIUM: CPT

## 2018-04-26 PROCEDURE — 84443 ASSAY THYROID STIM HORMONE: CPT

## 2018-04-26 PROCEDURE — 85730 THROMBOPLASTIN TIME PARTIAL: CPT

## 2018-04-26 PROCEDURE — 84100 ASSAY OF PHOSPHORUS: CPT

## 2018-04-26 PROCEDURE — 95957 EEG DIGITAL ANALYSIS: CPT

## 2018-04-26 PROCEDURE — 80053 COMPREHEN METABOLIC PANEL: CPT

## 2018-04-26 PROCEDURE — 83036 HEMOGLOBIN GLYCOSYLATED A1C: CPT

## 2018-04-26 PROCEDURE — 83605 ASSAY OF LACTIC ACID: CPT

## 2018-04-26 PROCEDURE — 70551 MRI BRAIN STEM W/O DYE: CPT

## 2018-04-26 PROCEDURE — 80048 BASIC METABOLIC PNL TOTAL CA: CPT

## 2018-04-26 RX ORDER — POTASSIUM CHLORIDE 20 MEQ
1 PACKET (EA) ORAL
Qty: 15 | Refills: 0 | OUTPATIENT
Start: 2018-04-26 | End: 2018-05-10

## 2018-04-26 RX ORDER — POTASSIUM PHOSPHATE, MONOBASIC POTASSIUM PHOSPHATE, DIBASIC 236; 224 MG/ML; MG/ML
15 INJECTION, SOLUTION INTRAVENOUS ONCE
Qty: 0 | Refills: 0 | Status: DISCONTINUED | OUTPATIENT
Start: 2018-04-26 | End: 2018-04-26

## 2018-04-26 RX ORDER — FUROSEMIDE 40 MG
1 TABLET ORAL
Qty: 0 | Refills: 0 | COMMUNITY
Start: 2018-04-26

## 2018-04-26 RX ORDER — SODIUM,POTASSIUM PHOSPHATES 278-250MG
1 POWDER IN PACKET (EA) ORAL
Qty: 8 | Refills: 0 | OUTPATIENT
Start: 2018-04-26 | End: 2018-04-27

## 2018-04-26 RX ORDER — SODIUM,POTASSIUM PHOSPHATES 278-250MG
1 POWDER IN PACKET (EA) ORAL
Qty: 0 | Refills: 0 | Status: DISCONTINUED | OUTPATIENT
Start: 2018-04-26 | End: 2018-04-26

## 2018-04-26 RX ORDER — METOPROLOL TARTRATE 50 MG
25 TABLET ORAL DAILY
Qty: 0 | Refills: 0 | Status: DISCONTINUED | OUTPATIENT
Start: 2018-04-26 | End: 2018-04-26

## 2018-04-26 RX ORDER — LISINOPRIL 2.5 MG/1
1 TABLET ORAL
Qty: 30 | Refills: 0 | OUTPATIENT
Start: 2018-04-26 | End: 2018-05-25

## 2018-04-26 RX ORDER — METOPROLOL TARTRATE 50 MG
1 TABLET ORAL
Qty: 0 | Refills: 0 | COMMUNITY
Start: 2018-04-26

## 2018-04-26 RX ORDER — LISINOPRIL 2.5 MG/1
2.5 TABLET ORAL DAILY
Qty: 0 | Refills: 0 | Status: DISCONTINUED | OUTPATIENT
Start: 2018-04-26 | End: 2018-04-26

## 2018-04-26 RX ADMIN — APIXABAN 5 MILLIGRAM(S): 2.5 TABLET, FILM COATED ORAL at 17:41

## 2018-04-26 RX ADMIN — NYSTATIN CREAM 1 APPLICATION(S): 100000 CREAM TOPICAL at 09:35

## 2018-04-26 RX ADMIN — Medication 0.12 MILLIGRAM(S): at 05:33

## 2018-04-26 RX ADMIN — APIXABAN 5 MILLIGRAM(S): 2.5 TABLET, FILM COATED ORAL at 05:33

## 2018-04-26 RX ADMIN — Medication 1 APPLICATION(S): at 17:41

## 2018-04-26 RX ADMIN — AMIODARONE HYDROCHLORIDE 200 MILLIGRAM(S): 400 TABLET ORAL at 05:33

## 2018-04-26 RX ADMIN — Medication 1 APPLICATION(S): at 05:37

## 2018-04-26 RX ADMIN — Medication 1 TABLET(S): at 17:40

## 2018-04-26 RX ADMIN — Medication 20 MILLIEQUIVALENT(S): at 11:51

## 2018-04-26 NOTE — DISCHARGE NOTE ADULT - HOSPITAL COURSE
86 years old female from [facility], ambulates with walker, PMH of A-fib (on Eliquis), CHF (Feb 2018, EF of 10%, grade 4 diastolic dysfunction, severe MR and TR and moderately increased RVSP; on 2 L nasal canula), CVA and chronic venous stasis of bilateral lower extremities sent to ED from [facility] with concern of seizures and headache. Per patient's daughter, patient has been complaining of severe headache yesterday. Later daughter noticed patient had 2 episodes where patient was staring with eyes wide open and each episode lasted for 30 seconds and were 20 minutes apart. Daughter was concerned patient was having seizure though she has no history of seizure. Patient and daughter denies feeling confused, eye rolling movements, bitting of tongue, frothing at mouth or urinating / defecating on herself. Per NH papers, BP was 185/93, was given Hydralazine 25 mg and Tylenol.  Patient also complained of some chest pain before which was midline, non radiating and dull in nature. Says feeling better now but still has headache.     Pt admitted to tele for Seizure activity.  Telemetry remained uneventful, and pt remained sinus bradycardic.  EEG neg for seizure activity  MRI neg for acute intracranial event  Pt cleared for dc by neuro- Dr. Kruse    Pt bradycardic on tele and was evaluated by cardio, Dr. Henderson.  Pt remained asymptomatic.  B-blocker dose reduced, and digoxin held.    Lisinopril added for systolic CHF.    Pt currently stable for discharge with instruction to schedule close follow up with PCP and cardiologist in 1 week.

## 2018-04-26 NOTE — DISCHARGE NOTE ADULT - CARE PLAN
Principal Discharge DX:	Seizure-like activity  Goal:	Please continue current medication regimen, and schedule close follow up with your PCP  Assessment and plan of treatment:	You presented from nursing home with seizure like activity.  Your EEG was neg for seizure activity.  MRI neg for acute intracerebral process.  You remained asymptomatic during stay.  Please schedule close follow up with your PCP within 1 week of discharge.  Secondary Diagnosis:	Hypokalemia  Goal:	Please monitor BMP in 2 days for Potassium level  Assessment and plan of treatment:	You presented with hypokalemia-K of 2.7, likely from diuretic therapy.  Please routinely check BMP while on Lasix therapy, and replete K as necessary.  Secondary Diagnosis:	Combined systolic and diastolic congestive heart failure, unspecified HF chronicity  Goal:	Please continue current medication regimen, and schedule close follow up with your PCP  Assessment and plan of treatment:	Please continue diuretic therapy, and schedule a close follow up with your PCP  Secondary Diagnosis:	Atrial fibrillation  Goal:	Please continue rate control therapy, and schedule close follow up with your PCP  Assessment and plan of treatment:	You have a history of afib, and your medication regimen, was altered.  Please continue lower dose metoprolol, anticoagulation.  Please schedule a close follow up with your OP cardiologist within 1 week for follow up  Secondary Diagnosis:	Bradycardia  Assessment and plan of treatment:	You were bradycardic on telemetry.  Your metoprolol was reduced and digoxin was discontinued.  Please schedule a close follow up with your PCP within 1 week of discharge for follow up. Principal Discharge DX:	Seizure-like activity  Goal:	Please continue current medication regimen, and schedule close follow up with your PCP  Assessment and plan of treatment:	You presented from nursing home with seizure like activity.  Your EEG was neg for seizure activity.  MRI neg for acute intracerebral process.  You remained asymptomatic during stay.  Please schedule close follow up with your PCP within 1 week of discharge.  Secondary Diagnosis:	Hypokalemia  Goal:	Please monitor BMP in 2 days for Potassium level  Assessment and plan of treatment:	You presented with hypokalemia-K of 2.7, likely from diuretic therapy.  Please routinely check BMP while on Lasix therapy, and replete K as necessary.  Please continue potassium phosphate 20meq daily while on lasix therapy, monitor BMP weekly.  Secondary Diagnosis:	Combined systolic and diastolic congestive heart failure, unspecified HF chronicity  Goal:	Please continue current medication regimen, and schedule close follow up with your PCP  Assessment and plan of treatment:	Please continue diuretic therapy, and schedule a close follow up with your PCP  Secondary Diagnosis:	Atrial fibrillation  Goal:	Please continue rate control therapy, and schedule close follow up with your PCP  Assessment and plan of treatment:	You have a history of afib, and your medication regimen, was altered.  Please continue lower dose metoprolol, anticoagulation.  Please schedule a close follow up with your OP cardiologist within 1 week for follow up  Secondary Diagnosis:	Bradycardia  Assessment and plan of treatment:	You were bradycardic on telemetry.  Your metoprolol was reduced and digoxin was discontinued.  Please schedule a close follow up with your PCP within 1 week of discharge for follow up.

## 2018-04-26 NOTE — DISCHARGE NOTE ADULT - CARE PROVIDER_API CALL
Santi Henderson (ROSALINDA), Cardiology  [address] [id number]  Phone: [phone]  Fax: [phone]

## 2018-04-26 NOTE — CONSULT NOTE ADULT - ASSESSMENT
· Assessment		  86 years old female from [facility], ambulates with walker, PMH of A-fib (on Eliquis), CHF (Feb 2018, EF of 10%, grade 4 diastolic dysfunction, severe MR and TR and moderately increased RVSP; on 2 L nasal canula), CVA and chronic venous stasis of bilateral lower extremities sent to ED from [facility] with concern of seizures and headache. Noted episodes bradycardia on telemetry-asymptomatic.        Problem/Plan - :  ·  Problem: Combined systolic and diastolic congestive heart failure, unspecified HF chronicity.  Plan: -Not in exacerbation, c/w Lasix at home dose  -c/w supplemental oxygen (on 2 L via nasal canula at home)  -Family refused further ischemic work up at that time.  -Start low dose ACEI.  -Would continue Metoprolol 25mg daily.  -Discontinue Digoxin.
1) Headaches- ? needs blood test for ESR, CRP, MADONNA. May use Fioricet 1 tab every 4-6 hours as needed.  2) Starring episode- ? partial seizure- Needs MRI-brain and EEG

## 2018-04-26 NOTE — DISCHARGE NOTE ADULT - MEDICATION SUMMARY - MEDICATIONS TO TAKE
I will START or STAY ON the medications listed below when I get home from the hospital:    lisinopril 2.5 mg oral tablet  -- 1 tab(s) by mouth once a day  -- Indication: For HTN (hypertension)    amiodarone 200 mg oral tablet  -- 1 tab(s) by mouth once a day  -- Indication: For Atrial fibrillation    apixaban 5 mg oral tablet  -- 1 tab(s) by mouth every 12 hours  -- Indication: For Atrial fibrillation    metoprolol succinate 25 mg oral tablet, extended release  -- 1 tab(s) by mouth once a day  -- Indication: For Combined systolic and diastolic congestive heart failure, unspecified HF chronicity    Silvadene 1% topical cream  -- Apply on skin to affected area 2 times a day  -- Indication: For Venous stasis    nystatin 100,000 units/g topical powder  -- 1 application on skin once a day  -- Indication: For Prophylactic measure    furosemide 40 mg oral tablet  -- 1 tab(s) by mouth 2 times a day  -- Indication: For Combined systolic and diastolic congestive heart failure, unspecified HF chronicity    famotidine 40 mg oral tablet  -- 1 tab(s) by mouth once a day (at bedtime)  -- Indication: For Prophylactic measure I will START or STAY ON the medications listed below when I get home from the hospital:    lisinopril 2.5 mg oral tablet  -- 1 tab(s) by mouth once a day  -- Indication: For HTN (hypertension)    amiodarone 200 mg oral tablet  -- 1 tab(s) by mouth once a day  -- Indication: For Atrial fibrillation    apixaban 5 mg oral tablet  -- 1 tab(s) by mouth every 12 hours  -- Indication: For Atrial fibrillation    metoprolol succinate 25 mg oral tablet, extended release  -- 1 tab(s) by mouth once a day  -- Indication: For Combined systolic and diastolic congestive heart failure, unspecified HF chronicity    nystatin 100,000 units/g topical powder  -- 1 application on skin once a day  -- Indication: For Prophylactic measure    Silvadene 1% topical cream  -- Apply on skin to affected area 2 times a day  -- Indication: For Venous stasis    furosemide 40 mg oral tablet  -- 1 tab(s) by mouth 2 times a day  -- Indication: For Combined systolic and diastolic congestive heart failure, unspecified HF chronicity    famotidine 40 mg oral tablet  -- 1 tab(s) by mouth once a day (at bedtime)  -- Indication: For Prophylactic measure    potassium chloride 20 mEq oral tablet, extended release  -- 1 tab(s) by mouth once a day   -- It is very important that you take or use this exactly as directed.  Do not skip doses or discontinue unless directed by your doctor.  Medication should be taken with plenty of water.  Take with food or milk.    -- Indication: For Hypokalemia I will START or STAY ON the medications listed below when I get home from the hospital:    lisinopril 2.5 mg oral tablet  -- 1 tab(s) by mouth once a day  -- Indication: For HTN (hypertension)    amiodarone 200 mg oral tablet  -- 1 tab(s) by mouth once a day  -- Indication: For Atrial fibrillation    apixaban 5 mg oral tablet  -- 1 tab(s) by mouth every 12 hours  -- Indication: For Atrial fibrillation    metoprolol succinate 25 mg oral tablet, extended release  -- 1 tab(s) by mouth once a day  -- Indication: For Combined systolic and diastolic congestive heart failure, unspecified HF chronicity    nystatin 100,000 units/g topical powder  -- 1 application on skin once a day  -- Indication: For Prophylactic measure    Silvadene 1% topical cream  -- Apply on skin to affected area 2 times a day  -- Indication: For Venous stasis    furosemide 40 mg oral tablet  -- 1 tab(s) by mouth 2 times a day  -- Indication: For Combined systolic and diastolic congestive heart failure, unspecified HF chronicity    famotidine 40 mg oral tablet  -- 1 tab(s) by mouth once a day (at bedtime)  -- Indication: For Prophylactic measure    potassium chloride 20 mEq oral tablet, extended release  -- 1 tab(s) by mouth once a day   -- It is very important that you take or use this exactly as directed.  Do not skip doses or discontinue unless directed by your doctor.  Medication should be taken with plenty of water.  Take with food or milk.    -- Indication: For Hypokalemia    potassium phosphate-sodium phosphate 305 mg-700 mg oral tablet  -- 1 tab(s) by mouth 4 times a day (with meals and at bedtime)  -- Indication: For Prophylactic measure

## 2018-04-26 NOTE — CONSULT NOTE ADULT - ATTENDING COMMENTS
Thank you for the courtesy of the consultation,I would be available for any further discussion if needed.  Santi Henderson MD,FACC.  [address] [place], [place] [id number] [phone]
see above for work- up

## 2018-04-26 NOTE — DISCHARGE NOTE ADULT - PLAN OF CARE
Please continue current medication regimen, and schedule close follow up with your PCP You presented from nursing home with seizure like activity.  Your EEG was neg for seizure activity.  MRI neg for acute intracerebral process.  You remained asymptomatic during stay.  Please schedule close follow up with your PCP within 1 week of discharge. Please monitor BMP in 2 days for Potassium level You presented with hypokalemia-K of 2.7, likely from diuretic therapy.  Please routinely check BMP while on Lasix therapy, and replete K as necessary. Please continue diuretic therapy, and schedule a close follow up with your PCP Please continue rate control therapy, and schedule close follow up with your PCP You have a history of afib, and your medication regimen, was altered.  Please continue lower dose metoprolol, anticoagulation.  Please schedule a close follow up with your OP cardiologist within 1 week for follow up You were bradycardic on telemetry.  Your metoprolol was reduced and digoxin was discontinued.  Please schedule a close follow up with your PCP within 1 week of discharge for follow up. You presented with hypokalemia-K of 2.7, likely from diuretic therapy.  Please routinely check BMP while on Lasix therapy, and replete K as necessary.  Please continue potassium phosphate 20meq daily while on lasix therapy, monitor BMP weekly.

## 2018-04-26 NOTE — DISCHARGE NOTE ADULT - SECONDARY DIAGNOSIS.
Hypokalemia Combined systolic and diastolic congestive heart failure, unspecified HF chronicity Atrial fibrillation Bradycardia

## 2018-04-26 NOTE — CONSULT NOTE ADULT - SUBJECTIVE AND OBJECTIVE BOX
CHIEF COMPLAINT:    HPI:--86 years old female from [facility], ambulates with walker, PMH of Atrial-Fib (on Eliquis), HFrEF (Feb 2018, EF of 10%, grade IV diastolic dysfunction, severe MR and TR and moderately increased RVSP; on 2 L nasal canula), CVA and chronic venous stasis of bilateral lower extremities sent to ED from [facility] with concern of seizures and headache. Per patient's daughter, patient has been complaining of severe headache yesterday. Later daughter noticed patient had 2 episodes where patient was staring with eyes wide open and each episode lasted for 30 seconds and were 20 minutes apart. Daughter was concerned patient was having seizure though she has no history of seizure. Patient and daughter denies feeling confused, eye rolling movements, bitting of tongue, frothing at mouth or urinating / defecating on herself. Per NH papers, BP was 185/93, was given Hydralazine 25 mg and Tylenol.  Patient also complained of some chest pain before which was midline, non radiating and dull in nature. Says feeling better now but still has headache. Denies any chest pain, palpitations sob, nausea, vomiting, abdominal pain, diarrhea, urinary complains or any other complains. Noted episodes [id number] held.    PAST MEDICAL & SURGICAL HISTORY:  Venous stasis: b/l leg, left calf ulcer  CVA (cerebral vascular accident): March 18th 2017, left side weakness ,s/p TPA  Atrial fibrillation  HTN (hypertension)  No significant past surgical history      MEDICATIONS  (STANDING):  amiodarone    Tablet 200 milliGRAM(s) Oral daily  apixaban 5 milliGRAM(s) Oral every 12 hours  digoxin     Tablet 0.125 milliGRAM(s) Oral daily  famotidine    Tablet 40 milliGRAM(s) Oral at bedtime  furosemide    Tablet 40 milliGRAM(s) Oral two times a day  nystatin Powder 1 Application(s) Topical <User Schedule>  potassium chloride   Powder 20 milliEquivalent(s) Oral daily  silver sulfADIAZINE 1% Cream 1 Application(s) Topical two times a day    MEDICATIONS  (PRN):  acetaminophen   Tablet 650 milliGRAM(s) Oral every 6 hours PRN pain      FAMILY HISTORY:  No pertinent family history in first degree relatives    No family history of premature coronary artery disease or sudden cardiac death    SOCIAL HISTORY:  Smoking-Non smoker  Alcohol-Denies  Ilicit Drug use-Denies    REVIEW OF SYSTEMS:  Constitutional: [ ] fever, [ ]weight loss, [ ]fatigue Activity [ ] Bedbound,[x] Ambulates [ ] Unassisted[x ] Cane/Walker [ ] Assistence.  Eyes: [ ] visual changes  Respiratory: [x ]shortness of breath;  [ ] cough, [ ]wheezing, [ ]chills, [ ]hemoptysis  Cardiovascular: [ ] chest pain, [ ]palpitations, [ ]dizziness,  [ ]leg swelling[ ]orthopnea [ ]PND  Gastrointestinal: [ ] abdominal pain, [ ]nausea, [ ]vomiting,  [ ]diarrhea,[ ]constipation  Genitourinary: [ ] dysuria, [ ] hematuria  Neurologic: [ ] headaches [ ] tremors[x ] weakness  Skin: [ ] itching, [ ]burning, [ ] rashes  Endocrine: [ ] heat or cold intolerance  Musculoskeletal: [ ] joint pain or swelling; [ ] muscle, back, or extremity pain  Psychiatric: [ ] depression, [ ]anxiety, [ ]mood swings, or [ ]difficulty sleeping  Hematologic: [ ] easy bruising, [ ] bleeding gums       [ x] All others negative	  [ ] Unable to obtain    Vital Signs Last 24 Hrs  T(C): 37 (26 Apr 2018 07:36), Max: 37 (26 Apr 2018 07:36)  T(F): 98.6 (26 Apr 2018 07:36), Max: 98.6 (26 Apr 2018 07:36)  HR: 66 (26 Apr 2018 07:36) (49 - 84)  BP: 131/58 (26 Apr 2018 07:36) (82/48 - 132/61)  RR: 18 (26 Apr 2018 07:36) (16 - 18)  SpO2: 100% (26 Apr 2018 07:36) (98% - 100%)      25 Apr 2018 07:01  -  26 Apr 2018 07:00  --------------------------------------------------------  IN: 430 mL / OUT: 0 mL / NET: 430 mL        PHYSICAL EXAM:  General: No acute distress BMI-30.9  HEENT: EOMI, PERRL[ ] Icteric  Neck: Supple, No JVD  Lungs: Equal air entry bilaterally; [ ] Rales [ ] Rhonchi [ ] Wheezing  Heart: Regular rate and rhythm;[x ] Murmurs-  2 /6 [x ] Systolic [ ] Diastolic [ ] Radiation,No rubs, or gallops  Abdomen: Nontender, bowel sounds present  Extremities: No clubbing, cyanosis, trace edema[ ] Calf tenderness  Nervous system:  Alert & Oriented X3, no focal deficits  Psychiatric: Normal affect  Skin: No rashes or lesions      LABS:  04-25    136  |  98  |  7   ----------------------------<  64<L>  3.3<L>   |  32<H>  |  0.90    Ca    9.2      25 Apr 2018 08:24    TPro  7.4  /  Alb  3.0<L>  /  TBili  1.0  /  DBili  x   /  AST  25  /  ALT  15  /  AlkPhos  102  04-25    Creatinine Trend: 0.90<--, 0.82<--, 0.72<--, 0.70<--                        14.0   5.7   )-----------( 142      ( 25 Apr 2018 08:24 )             46.6         04-23 [id number] 5.0      RADIOLOGY:XR CHEST PORTABLE IMMED 1V IMPRESSION: No acute disease.        ECG [my interpretation]:Sinus bradycardia     TELEMETRY:Sinus bradycardia PAF    ECHO:2/2018  Dilated LV with severe global left ventricular systolic dysfunction EF=10%.  Grade IV diastolic dysfunction.  RV systolic pressure is moderately increased at  46 mm Hg.   Severe mitral regurgitation.
History of Present Illness:    HPI:  86 years old female from [facility], ambulates with walker, PMH of A-fib (on Eliquis), CHF (Feb 2018, EF of 10%, grade 4 diastolic dysfunction, severe MR and TR and moderately increased RVSP; on 2 L nasal canula), CVA and chronic venous stasis of bilateral lower extremities sent to ED from [facility] with concern of seizures and headache. Per patient's daughter, patient has been complaining of severe headache yesterday. Later daughter noticed patient had 2 episodes where patient was staring with eyes wide open and each episode lasted for 30 seconds and were 20 minutes apart. Daughter was concerned patient was having seizure though she has no history of seizure. Patient and daughter denies feeling confused, eye rolling movements, bitting of tongue, frothing at mouth or urinating / defecating on herself. Per NH papers, BP was 185/93, was given Hydralazine 25 mg and Tylenol.  Patient also complained of some chest pain before which was midline, non radiating and dull in nature. Says feeling better now but still has headache. Denies any chest pain, palpitations sob, nausea, vomiting, abdominal pain, diarrhea, urinary complains or any other complains. (23 Apr 2018 06:25)    Later in ED: pt presented with symptoms of headaches and as per ED chard she had two times episodes of starring spell and each one was lasted for 30 seconds.  During this episode she did not have bladder or bowel incontinence or tongue biting. No focal weakness although she is c/o left wrist weakness.     Allergies    penicillin (Unknown)    Intolerances      PAST MEDICAL & SURGICAL HISTORY:  Venous stasis: b/l leg, left calf ulcer  CVA (cerebral vascular accident): March 18th 2017, left side weakness ,s/p TPA  Atrial fibrillation  HTN (hypertension)  No significant past surgical history    Social History: [ ] Tobacco use, [ ] Alcohol use.  none    MEDICATIONS  (STANDING):  amiodarone    Tablet 200 milliGRAM(s) Oral daily  apixaban 5 milliGRAM(s) Oral every 12 hours  digoxin     Tablet 0.125 milliGRAM(s) Oral daily  famotidine    Tablet 40 milliGRAM(s) Oral at bedtime  furosemide    Tablet 40 milliGRAM(s) Oral two times a day  metoprolol tartrate 25 milliGRAM(s) Oral two times a day  nystatin Powder 1 Application(s) Topical <User Schedule>  silver sulfADIAZINE 1% Cream 1 Application(s) Topical two times a day    Family:  FAMILY HISTORY:  No pertinent family history in first degree relatives    Review of Systems:  General: [ ] None, [ ] chills,[ ] fatigue,[ ] fevers  Skin: [ ] None, [ ] rash present  HEENT: [ ] None, [ ] head injury,[ ] blurred vision, [ ] double vision, [ ] eye pain, [ ] visual loss, [ ] hearing loss, [ ] deafness, [ ] ear pain, [ ] ringing in the ears, [ ] vertigo, [ ] sinus pain, [ ] voice changes  Neck: [ ] None, [ ] Neck stiffness  Respiratory: [ ]  None, [ ] cough, [ ] difficulty breathing  Cardiovascular: [ ] None,  [ ] calf cramps, [ ] chest pain, [ ] leg pain, [ ] swelling, [ ] rapid heart rate, [ ] shortness of breath  Gastrointestinal: [ ] None, [ ] abdominal pain, [ ] nausea, [ ] vomiting  Musculoskeletal: [ ] None, [ ] back pain, [ ] joint pain, [ ] joint stiffness, [ ] leg cramps, [ ] muscle atrophy, [ ] muscle cramps, [ ] muscle weakness, [ ] swelling of extremities  Neurological: [ ] None,  [ ] Dizziness, [ ] decreased memory, [ ] fainting, [ ] focal neurological symptoms, [ x] headaches, [ ] incontinence of stool, [ ] incontinence of urine, [ ] loss of consciousness, [ ] numbness, [ ] seizures, [ ] spinning sensation, [ ] stroke, [ ] trouble walking, [ ] unsteadiness, [ ] visual changes,  [ ] weakness, [ ] tremors, [ ] rigidity, [ ] slowness, [x] starring episode  Psychiatric: [ ] None,  [ ] depression, [ ] anxiety, [ ] hallucinations, [ ] inability to concentrate,[ ] mood changes, [ ] panic attacks  Hematology: [ ] None, [ ] blood clots, [ ] spontaneous bleeding    [x ]  None except marked above      Vital Signs:    T(C): 36.7 (04-23-18 @ 10:49), Max: 36.7 (04-22-18 @ 23:57)  HR: 68 (04-23-18 @ 10:49) (44 - 68)  BP: 105/63 (04-23-18 @ 10:49) (105/63 - 145/60)  RR: 17 (04-23-18 @ 10:49) (14 - 17)  SpO2: 100% (04-23-18 @ 10:49) (100% - 100%)    Physical Exam:  General:  General Appearance - Well groomed, Not sickly   Build and nutrition - Well nourished and Well developed  Posture - Normal posture    Head and Neck:  Head - normocephalic, atraumatic with no lesions or palpable masses    Chest and Lung exam:  Quiet, even and easy respiratory effort with no use of accessory muscles and on ausculation, normal breath sounds, no adventitious sounds and normal vocal resonance    Cardiovascular:  Auscultation: Normal heart sounds  Murmurs & Other heart sounds: Auscultation of the heart reveals- no murmurs  Carotid arteris: No Carotid bruits    Abdomen:  Palpation/Percussion: Non Tender, No Rebound tenderness, No hepatosplenomegaly, and No palpable abdominal masses    Peripheral Vascular:  Lower extremity: Inspection - Bilateral - No varicose veins  Palpation: Tenderness - bilateral - Non tender  Dorsalis pedis pulse - bilateral - normal  Edema - bilateral - no edema    Neurologic Exam:  Mental Status -  Alert, Awake  Fund of Knowledge – Normal  Affect- appropriate  Recent Memory – Normal  Remote Memory – Normal  Attention Span – Normal  Concentration –  Normal  Cognitive function – Normal  Speech – Normal  Thought content/perception – Normal    Cranial Nerves:  II Optic: Visual acuity – Bilateral - Normal ; Visual fields – normal ; Fundi – Bilateral – no optic atrophy or Papilledema  III Oculomotor – Normal bilaterally  IV Trochlear – Bilateral – Normal  V Trigeminal: Ophthalmic – Bilateral – Normal;  Maxillary – Bilateral- Normal; Mandibular – Bilateral - Normal  VI Abducens – Bilateral - Normal  VII Facial: Normal bilaterally  VIII Acoustic - Bilateral – hearing normal and (hearing tested by finger rub)  IX Glossopharyngeal / X Vagus: Uvula – Normal  XI Accessory: Normal Shoulder Shrug  XII Hypoglossal – Bilaterally Normal  Eye Movements: Gaze – Bilateral - Normal    Nystagmus – Bilateral – None  Motor:  Bulk and Contour: Normal  Tone: Normal  Strength: Moves all four extremities.  General Assessment of Reflexes: Right Wrist - 1+ ;  Left Wrist - 1+ ; Right Elbow - 1+ ;  Left Elbow - 1+ ;  Right Knee - 1+ ;  Left Knee - 1+ ;   Right Ankle – 0 ; Left Ankle – 0  Plantar Reflexes(Babinski) (L4-S2) – Bilateral – Flexion  Sensory:  Light Touch: Intact – Globally  Pain: Intact – Globally  Temperature: Intact – Globally

## 2018-04-28 LAB — ANA TITR SER: NEGATIVE — SIGNIFICANT CHANGE UP

## 2018-08-08 NOTE — ED PROVIDER NOTE - DURATION
Called patient with Biopsy results 0R/AMR0.  Writer reviewed biopsy results with Dr. Holguin because of the following stains. No changes per MD.       Stain: C3d        -  Interpretation: Negative in interstitial capillaries     Stain:  C4d        -  Interpretation: Negative in interstitial capillaries     Stain: MULTIPLEX for CD68 and CD31        -  Interpretation: CD31 positive in endothelial cells but without     intraluminal CD68 positive inflammatory cells.         Controls stain properly. There is no histologic evidence of acute cellular or     antibody mediated rejection. There is focal cytoplasmic complement staining     adjacent to a recent previous biopsy site with some organizing fibrin     material. Trav Penny MD has reviewed this case and concurs with the       Immunosuppresion Meds: Tacrolimus  Patient Notified: 08/08/18  Patient Notified Time: 1027  Was Provider Consulted: elodia  Last Creatinine: 2.28  Tacrolimus Result Date: 08/08/18  Tacrolimus Test Result: 10.0  Current Dose: 2.5 mg BID  Current Range Goal: 9-12  New Dose: no change  Comments: recheck in 1 week   No Data Recorded    interpretation.      today

## 2018-08-24 ENCOUNTER — EMERGENCY (EMERGENCY)
Facility: HOSPITAL | Age: 83
LOS: 1 days | Discharge: HOME | End: 2018-08-24
Attending: EMERGENCY MEDICINE | Admitting: EMERGENCY MEDICINE
Payer: MEDICARE

## 2018-08-24 VITALS
RESPIRATION RATE: 16 BRPM | TEMPERATURE: 98 F | SYSTOLIC BLOOD PRESSURE: 223 MMHG | HEART RATE: 63 BPM | DIASTOLIC BLOOD PRESSURE: 92 MMHG | OXYGEN SATURATION: 100 %

## 2018-08-24 VITALS
RESPIRATION RATE: 17 BRPM | OXYGEN SATURATION: 98 % | TEMPERATURE: 97 F | SYSTOLIC BLOOD PRESSURE: 199 MMHG | DIASTOLIC BLOOD PRESSURE: 83 MMHG | HEART RATE: 80 BPM

## 2018-08-24 LAB
ALBUMIN SERPL ELPH-MCNC: 4 G/DL — SIGNIFICANT CHANGE UP (ref 3.3–5)
ALP SERPL-CCNC: 142 U/L — HIGH (ref 40–120)
ALT FLD-CCNC: 19 U/L — SIGNIFICANT CHANGE UP (ref 4–33)
APPEARANCE UR: CLEAR — SIGNIFICANT CHANGE UP
APTT BLD: 25.5 SEC — LOW (ref 27.5–37.4)
AST SERPL-CCNC: 40 U/L — HIGH (ref 4–32)
BACTERIA # UR AUTO: NEGATIVE — SIGNIFICANT CHANGE UP
BASOPHILS # BLD AUTO: 0.04 K/UL — SIGNIFICANT CHANGE UP (ref 0–0.2)
BASOPHILS NFR BLD AUTO: 1 % — SIGNIFICANT CHANGE UP (ref 0–2)
BILIRUB SERPL-MCNC: 0.4 MG/DL — SIGNIFICANT CHANGE UP (ref 0.2–1.2)
BILIRUB UR-MCNC: NEGATIVE — SIGNIFICANT CHANGE UP
BLOOD UR QL VISUAL: SIGNIFICANT CHANGE UP
BUN SERPL-MCNC: 16 MG/DL — SIGNIFICANT CHANGE UP (ref 7–23)
CALCIUM SERPL-MCNC: 9.9 MG/DL — SIGNIFICANT CHANGE UP (ref 8.4–10.5)
CHLORIDE SERPL-SCNC: 103 MMOL/L — SIGNIFICANT CHANGE UP (ref 98–107)
CO2 SERPL-SCNC: 25 MMOL/L — SIGNIFICANT CHANGE UP (ref 22–31)
COLOR SPEC: COLORLESS — SIGNIFICANT CHANGE UP
CREAT SERPL-MCNC: 0.97 MG/DL — SIGNIFICANT CHANGE UP (ref 0.5–1.3)
EOSINOPHIL # BLD AUTO: 0.04 K/UL — SIGNIFICANT CHANGE UP (ref 0–0.5)
EOSINOPHIL NFR BLD AUTO: 1 % — SIGNIFICANT CHANGE UP (ref 0–6)
GLUCOSE SERPL-MCNC: 92 MG/DL — SIGNIFICANT CHANGE UP (ref 70–99)
GLUCOSE UR-MCNC: NEGATIVE — SIGNIFICANT CHANGE UP
HCT VFR BLD CALC: 37.2 % — SIGNIFICANT CHANGE UP (ref 34.5–45)
HGB BLD-MCNC: 12 G/DL — SIGNIFICANT CHANGE UP (ref 11.5–15.5)
HYALINE CASTS # UR AUTO: NEGATIVE — SIGNIFICANT CHANGE UP
IMM GRANULOCYTES # BLD AUTO: 0.01 # — SIGNIFICANT CHANGE UP
IMM GRANULOCYTES NFR BLD AUTO: 0.2 % — SIGNIFICANT CHANGE UP (ref 0–1.5)
INR BLD: 1.35 — HIGH (ref 0.88–1.17)
KETONES UR-MCNC: NEGATIVE — SIGNIFICANT CHANGE UP
LEUKOCYTE ESTERASE UR-ACNC: NEGATIVE — SIGNIFICANT CHANGE UP
LYMPHOCYTES # BLD AUTO: 1.64 K/UL — SIGNIFICANT CHANGE UP (ref 1–3.3)
LYMPHOCYTES # BLD AUTO: 39.8 % — SIGNIFICANT CHANGE UP (ref 13–44)
MCHC RBC-ENTMCNC: 27.8 PG — SIGNIFICANT CHANGE UP (ref 27–34)
MCHC RBC-ENTMCNC: 32.3 % — SIGNIFICANT CHANGE UP (ref 32–36)
MCV RBC AUTO: 86.3 FL — SIGNIFICANT CHANGE UP (ref 80–100)
MONOCYTES # BLD AUTO: 0.42 K/UL — SIGNIFICANT CHANGE UP (ref 0–0.9)
MONOCYTES NFR BLD AUTO: 10.2 % — SIGNIFICANT CHANGE UP (ref 2–14)
NEUTROPHILS # BLD AUTO: 1.97 K/UL — SIGNIFICANT CHANGE UP (ref 1.8–7.4)
NEUTROPHILS NFR BLD AUTO: 47.8 % — SIGNIFICANT CHANGE UP (ref 43–77)
NITRITE UR-MCNC: NEGATIVE — SIGNIFICANT CHANGE UP
NRBC # FLD: 0 — SIGNIFICANT CHANGE UP
PH UR: 8 — SIGNIFICANT CHANGE UP (ref 5–8)
PLATELET # BLD AUTO: 143 K/UL — LOW (ref 150–400)
PMV BLD: 11.1 FL — SIGNIFICANT CHANGE UP (ref 7–13)
POTASSIUM SERPL-MCNC: 5 MMOL/L — SIGNIFICANT CHANGE UP (ref 3.5–5.3)
POTASSIUM SERPL-SCNC: 5 MMOL/L — SIGNIFICANT CHANGE UP (ref 3.5–5.3)
PROT SERPL-MCNC: 7.9 G/DL — SIGNIFICANT CHANGE UP (ref 6–8.3)
PROT UR-MCNC: NEGATIVE — SIGNIFICANT CHANGE UP
PROTHROM AB SERPL-ACNC: 15.6 SEC — HIGH (ref 9.8–13.1)
RBC # BLD: 4.31 M/UL — SIGNIFICANT CHANGE UP (ref 3.8–5.2)
RBC # FLD: 14.8 % — HIGH (ref 10.3–14.5)
RBC CASTS # UR COMP ASSIST: HIGH (ref 0–?)
SODIUM SERPL-SCNC: 141 MMOL/L — SIGNIFICANT CHANGE UP (ref 135–145)
SP GR SPEC: 1.01 — SIGNIFICANT CHANGE UP (ref 1–1.04)
SQUAMOUS # UR AUTO: SIGNIFICANT CHANGE UP
UROBILINOGEN FLD QL: NORMAL — SIGNIFICANT CHANGE UP
WBC # BLD: 4.12 K/UL — SIGNIFICANT CHANGE UP (ref 3.8–10.5)
WBC # FLD AUTO: 4.12 K/UL — SIGNIFICANT CHANGE UP (ref 3.8–10.5)
WBC UR QL: SIGNIFICANT CHANGE UP (ref 0–?)

## 2018-08-24 PROCEDURE — 71045 X-RAY EXAM CHEST 1 VIEW: CPT | Mod: 26

## 2018-08-24 PROCEDURE — 99284 EMERGENCY DEPT VISIT MOD MDM: CPT | Mod: GC

## 2018-08-24 RX ORDER — LISINOPRIL 2.5 MG/1
1 TABLET ORAL
Qty: 30 | Refills: 0 | OUTPATIENT
Start: 2018-08-24 | End: 2018-09-22

## 2018-08-24 RX ORDER — LISINOPRIL 2.5 MG/1
2.5 TABLET ORAL ONCE
Qty: 0 | Refills: 0 | Status: COMPLETED | OUTPATIENT
Start: 2018-08-24 | End: 2018-08-24

## 2018-08-24 RX ADMIN — LISINOPRIL 2.5 MILLIGRAM(S): 2.5 TABLET ORAL at 18:16

## 2018-08-24 NOTE — ED PROVIDER NOTE - CHIEF COMPLAINT
The patient is a 86y Female complaining of The patient is a 86y Female complaining of elevated blood pressure

## 2018-08-24 NOTE — ED ADULT NURSE NOTE - CHIEF COMPLAINT
The patient is a 86y Female complaining of The patient is a 86y Female complaining of high blood pressure at home.

## 2018-08-24 NOTE — ED PROVIDER NOTE - PROGRESS NOTE DETAILS
AK: Still asymptomatic. Checked urine, complaining increased frequency(on lasix). Negative urine. DC with PMD f/u. Also sent prescription for lisinopril which they ran out of.

## 2018-08-24 NOTE — ED PROVIDER NOTE - MEDICAL DECISION MAKING DETAILS
Hypertension.  Symptomatic.  On chart review, pt has had multiple episodes of BP in the 170s-180s on previous visits ([facility]).  Will give enalapril and reassess.

## 2018-08-24 NOTE — ED ADULT TRIAGE NOTE - CHIEF COMPLAINT QUOTE
brought in by EMS from home for elevated /93, taken by visiting physical therapist. Denies pain or dizziness. Pt states bilateral ankle swelling x 3 days. Hx HTN, no longer on BP meds. Hx CHF, Afib on Eliquis

## 2018-08-24 NOTE — ED PROVIDER NOTE - OBJECTIVE STATEMENT
Attendinyo female presents with hypertension.  Pt has her BP taken by physical therapist before therapy.  Today she took it and BP was 200 systolic.  Pt has no symptoms or complaints today.  She would not be here if the therapist did not check her BP.  states she feels fine.  BP meds were stopped recently, but family and pt unsure why. Attendinyo female presents with hypertension.  Pt has her BP taken by physical therapist before therapy.  Today she took it and BP was 200 systolic.  Pt has no symptoms or complaints today.  She would not be here if the therapist did not check her BP.  states she feels fine.  BP meds were stopped recently, but family and pt unsure why.      Esme PGY2: 85 yo F recently discharged from rehab (x5 months) hx a fib, CHF, HTN (taken off of BP meds), presenting for elevated SBP at home. Denies headache, nausea, vomiting, chest pain, or shortness of breath.

## 2018-08-24 NOTE — ED ADULT NURSE NOTE - OBJECTIVE STATEMENT
86 year old female, A&Ox3, hx of A-fib on Eliquis, HTN, CVA, c/o elevated blood pressure at home. Pt. Reports she was recently taken off of her blood pressure medications. States her physical therapist was at home and took a routine blood pressure which was found to be elevated at "201/90's". And told patient to come to ER. Pt. denies headache, dizziness, blurry vision, chest pain, SOB, fever, N/V. Respirations even, unlabored. Labs sent. Medicated as ordered. Will continue to monitor.

## 2018-08-25 PROBLEM — I87.8 OTHER SPECIFIED DISORDERS OF VEINS: Chronic | Status: ACTIVE | Noted: 2017-03-23

## 2018-08-25 PROBLEM — I48.91 UNSPECIFIED ATRIAL FIBRILLATION: Chronic | Status: ACTIVE | Noted: 2017-03-23

## 2018-08-25 PROBLEM — I63.9 CEREBRAL INFARCTION, UNSPECIFIED: Chronic | Status: ACTIVE | Noted: 2017-03-23

## 2018-08-26 LAB
BACTERIA UR CULT: SIGNIFICANT CHANGE UP
SPECIMEN SOURCE: SIGNIFICANT CHANGE UP

## 2018-08-27 NOTE — ED POST DISCHARGE NOTE - DETAILS
[phone] - Discussed UCX result with patient.  Pt c/o urinary frequency.  Recommended to follow up with PMD for repeat UA/UCX.

## 2018-09-04 NOTE — ED ADULT TRIAGE NOTE - MEANS OF ARRIVAL
Post Anesthetic Evaluation


Cardiovascular Status: Normal, Stable


Respiratory Status: Normal, Stable


Level of Consciousness/Mental Status: Can Participate in Eval


Pain Control: Adequate, Prn Tx Ordered


Nausea/Vomiting Control: Adequate, Prn Tx Ordered


Complications Possibly Related to Anesthesia: None Noted ambulatory

## 2018-09-06 NOTE — PATIENT PROFILE ADULT. - HEALTHCARE QUESTIONS, PROFILE
Problem: Patient Care Overview  Goal: Plan of Care Review  Outcome: Ongoing (interventions implemented as appropriate)  POC reviewed with pt at 0500. Pt verbalized understanding. Questions and concerns addressed. No acute events overnight. Pt progressing toward goals. Will continue to monitor. See flowsheets for full assessment and VS info          none

## 2018-09-17 NOTE — PHYSICAL THERAPY INITIAL EVALUATION ADULT - RISK REDUCTION/PREVENTION, PT EVAL
Patient: Hiral Koehler Date: 2018   : 1942 Attending: Bryan Jones MD   76 year old female      CARDIOLOGY CONSULTATION:  2018       ATTENDING     Bryan Jones MD    CHIEF COMPLAINT     Generalized weakness  Shortness of breath  Lower extremity edema  Vomiting    HISTORY     Hiral Koehler is a 76 year old female who is seen at the request of Dr.Amit CORAL Jones MD for shortness of breath lower extremity edema .  Patient with cardiac rehabilitation today and began walking on a treadmill and very weak. She then started to feel nauseous and started to throw up. She is brought to the emergency room via wheelchair. Patient is followed by Dr. Amaro. She has a history of hypertension, hypercholesterolemia, diabetes mellitus, hypothyroid, COPD, coronary artery disease status post myocardial revascularization x3 in  and cardiac catheter in May 2018 showing patent LIMA graft and patent vein graft to second marginal as well as 100% stenosis of the vein graft to the mid RCA with collaterals from the distal circumflex, aortic stenosis s/p #29 Carbomedic Top Hat Mechanical Valve in , severe mitral valve regurgitation status post CarboMedics OptiForm size 25 mechanical mitral valve as well as tricuspid valve repair with classic size 28 ring in 2018, chronic atrial fibrillation with long-term use of anticoagulation -warfarin.  Most recent echocardiogram showed decreased left ventricular systolic function with an EF 46% in May 2018.   Patient recently underwent mitral valve replacement/tricuspic valve repair at [facility] and was discharged on 18.  She had been doing well while at home until about a week ago when she started noticing lower extremity swelling and shortness of breath with activity. She also gets very fatigued easily and has trouble catching her breath when she lies flat.  Upon further evaluation, chest x-ray shows mild increase in bilateral pleural effusions with  associated atelectasis or infiltrate on the pulmonary vascular congestion. Pro BNP is elevated greater than 9000.  Troponin is within normal limits. INR is elevated at 5.5.  EKG shows atrial flutter with heart is in the 120s. Blood pressure stable and 138/73.      Cardiac cath 5/31/18:   Gonzalez Findings/Plan     The patient is a 75 year old female who presents with chest pain, cardiomyopathy, atrial flutter, severe mitral regurgitation. Her artery disease.  Coronary angiogram demonstrated:      of VG to right coronary artery  80% right coronary artery   of OM   Patient VG to OM  Patent LIMA to Left anterior descending artery   80% prox Left anterior descending artery      RHC  HEMODYNAMICS FROM RIGHT HEART CATHETERIZATION:   1. RA (right atrial) pressure 12 mmHg.   2. Right ventricular 52/13*.   3. PA (pulmonary artery) pressure 52/33  with a mean of 36   4.  CO (thermodilution cardiac output) 2.6 liters per minute. CI (cardiac index)1.7 liters per minute per meter squared.      Plan:  Stable coronary artery disease patent LIMA. Pulmonary hypertension from severe mitral regurgitation.*Heparin drip 3 hours post sheath removal with no bolus.  Sheath removal, bedrest, hydration, continue medical management. Continue workup for MV  replacement.     Coronary Findings     Dominance: Right   Left Main   Ost LM lesion. , 40% stenosed.   Left Anterior Descending   Ost LAD to Prox LAD lesion. , 70% stenosed.   Ramus Intermedius   The vessel is free of disease.   Left Circumflex   Prox Cx to Mid Cx lesion. , 50% stenosed.   Second Obtuse Marginal Branch   Ost 2nd Mrg to 2nd Mrg lesion. , 100% stenosed.   Right Coronary Artery   Ost RCA to Prox RCA lesion. , 80% stenosed.   Right Posterior Descending Artery   RPDA filled by collaterals from Dist Cx.   Graft Angiography   Vein Graft to Mid RCA   Origin lesion. , 100% stenosed.   Vein Graft to 2nd Mrg   The graft is moderate in size. The graft is free of disease.   LIMA Graft  to Dist LAD   The graft is moderate in size. The graft is free of disease.   Coronary Diagrams     Diagnostic Diagram             Echo 5/12/18:   Normal left ventricular cavity size. Mildly increased left ventricular wall thickness. Mildly decreased left ventricular systolic  function. Left ventricular ejection fraction, 46 %. Severely increased left ventricular filling pressure.  Mitral annular calcification. No mitral valve stenosis. Severe mitral valve regurgitation.  Moderate-to-severe tricuspid valve regurgitation.    MEDICAL HISTORY     Past Medical History:   Diagnosis Date   • Acute on chronic combined systolic and diastolic congestive heart failure (CMS/HCC) 5/11/2018   • Anemia    • Anxiety    • Arthritis     Hands and back   • COPD (chronic obstructive pulmonary disease) (CMS/HCC)    • Coronary artery disease    • DM (diabetes mellitus) (CMS/[facility], located within [facility])    • Essential hypertension    • Full dentures    • Gastroesophageal reflux disease    • Hearing aid worn    • Heart murmur    • Hypercholesterolemia    • Hypothyroidism    • Myocardial infarction (CMS/[facility], located within [facility]) 2005   • Neuropathy    • Transfusion history week of 2/15/16    2 units In [place] was weak, sweaty and couldn't walk Also needed Potassium   • Urinary tract infection    • Wears glasses        SURGICAL HISTORY     Past Surgical History:   Procedure Laterality Date   • Aortic valve replacement  08/12/2005    AVR #29 Carbomedic Top Hat Mechanical Valve   • Breast surgery Right 12/2014    Biopsy   • Cardiac catherization     • Cardiac surgery  8/12/2005    CABG 3 Vessels   • Cardiac surgery  8/12/2005    Mitral valve   • Cdl cath poss ptca  02/26/2016   • Echo haja  06/04/2018   • Esophagogastroduodenoscopy  09/11/2017   • Rotator cuff repair Right 2003   • Service to gastroenterology  5/12/2015    Colonoscopy   • Stress test  2/2016    In [place] Failed stress test   • Thoracoscopy surg wedg resec lung Right 7/22/15    , wedge resection of upper lobe  lesion and biopsies of middle lobe lesion        SOCIAL HISTORY     Social History     Social History   • Marital status:      Spouse name: N/A   • Number of children: N/A   • Years of education: N/A     Occupational History   • retired       Social History Main Topics   • Smoking status: Former Smoker     Packs/day: 0.25     Years: 45.00     Types: Cigarettes     Start date: 1/1/1959     Quit date: 10/12/2005   • Smokeless tobacco: Never Used   • Alcohol use No   • Drug use: No   • Sexual activity: No     Other Topics Concern   •  Service No   • Occupational Exposure No   • Seat Belt Yes     Social History Narrative   • No narrative on file       FAMILY HISTORY     Family History   Problem Relation Age of Onset   • Early death Mother    • Asthma Mother    • Heart disease Father    • COPD Father    • Stroke Father    • Arthritis Sister    • Cancer Sister         ovarian   • Diabetes Sister    • Diabetes Brother    • Stroke Brother    • High blood pressure Brother    • Diabetes Sister    • Birth defects Neg Hx    • Depression Neg Hx    • Hearing Loss Neg Hx    • High cholesterol Neg Hx    • Kidney disease Neg Hx    • Learning disabilities Neg Hx    • Psychiatric Neg Hx    • Mental retardation Neg Hx    • Miscarriages / Stillbirths Neg Hx    • Substance abuse Neg Hx    • Vision Loss Neg Hx        MEDICATIONS     No current facility-administered medications for this encounter.        ALLERGIES     ALLERGIES:   Allergen Reactions   • Latex Other (See Comments)     Tape    Blisters       REVIEW OF SYSTEMS     Constitutional:  Patient denies fever, chills. Positive for fatigue  Eyes:  Denies change in visual acuity, pain, burning or itching.    Immunologic:  Denies hives, seasonal allergies.    HENT:  Denies sinus problems, ear infections, nasal congestion or sore throat.    Respiratory:  Positive for shortness of breath  Cardiovascular:  Positive for palpitations. Denies chest pain.     Gastrointestinal:  Positive for nausea and vomiting  Genitourinary:  Denies urine retention, painful urination, urinary frequency, blood in urine or nocturia.    Musculoskeletal:  Denies back pain, neck pain, joint pain. Positive for lower extremity edema   Integument:  Denies rash, itching.    Neurologic:  Denies headache, focal weakness or sensory changes.    Endocrine:  Denies polyuria, polydipsia or temperature intolerance.    Lymphatic:  Denies swollen glands, weight loss.    All other systems reviewed and negative      PHYSICAL EXAM     Vital Signs:    Vitals:    09/17/18 1320 09/17/18 1325 09/17/18 1348 09/17/18 1357   BP: 124/71   124/78   Pulse: 102 102 99 98   Resp:  18 25 18   Temp:       TempSrc:       SpO2: 92% 92% 97% 97%   Weight:       Height:       , Ht Readings from Last 1 Encounters:   09/17/18 5' 2\" (1.575 m)   , Wt Readings from Last 1 Encounters:   09/17/18 54.4 kg   , Body mass index is 21.95 kg/m².  General:  No acute distress, pleasant   HEENT:  Normocephalic atraumatic, EOM (extraocular movements) intact,  no scleral icterus or conjunctival pallor, no nasal discharge  Neck:  no carotid bruits, no JVD  (jugular venous distension). Normal carotid upstroke.  Pulmonary:  No retractions or increased work of breathing, decreased breath sounds at the bases  Cardiovascular:  Irregularly irregular. Variable S1 normal S2; crisp click at the upper right sternal border and apex.    Abdomen:  Bowel sounds normoactive, soft, nontender, nondistended  Extremities:  No edema or cyanosis, peripheral pulses 2+ and symmetric.  Musculoskeletal:  1+ lower extremity edema bilaterally  Skin:  No rashes, jaundice or other lesions.   Neuro:  Alert and oriented X3 CN (cranial nerves) 2-12 grossly intact, no gross motor or sensory deficits.   HENT:  Normocephalic. Sclerae nonicteric. No xanthomas.  Neck:  No JVD (jugular venous distention). No carotid bruits.   Cardiovascular:  Irregularly irregular, respiratory of aortic and mitral  valve prostheses. No gallops or rubs appreciated. PMI (Point of Maximal Impulse) is nondisplaced.  Respiratory:  Lungs with diminished breath sounds in bases, otherwise clear. Normal respiratory effort.   Gastrointestinal:  Bowel sounds normal. Soft. No tenderness.   Extremities:  1-2+ lower extremity edema.      DIAGNOSTIC STUDIES    XR Chest AP or PA   Final Result   IMPRESSION: Mild increase in bilateral pleural effusions with associated   atelectasis or infiltrate.      Pulmonary vascular congestion is also noted.         CT Head Brain   Final Result   IMPRESSION:  No acute intracranial abnormality is detected.      Sphenoid sinus mucosal thickening.      Old right cerebellar infarct.      Volume loss, slightly greater than expected for age.              //Location Code: [facility]             EKG:  Results for orders placed or performed during the hospital encounter of 09/17/18   ECG   Result Value Ref Range    Ventricular Rate EKG/Min (BPM) 104     Atrial Rate (BPM) 256     QRS-Interval (MSEC) 110     QT-Interval (MSEC) 396     QTc 520     P Axis (Degrees) 88     R Axis (Degrees) 61     T Axis (Degrees) -177     REPORT TEXT       Atrial flutter  with variable AV block  Septal infarct  , age undetermined  ST And  T wave abnormality, consider lateral ischemia  Abnormal ECG  When compared with ECG of  17-SEP-2018 10:35,  Nonspecific T wave abnormality has replaced inverted T waves in  Inferior leads           EKG:        Laboratory Results:      Recent Labs  Lab 09/17/18  1340 09/17/18  1223 09/17/18  1110 09/14/18  1334 09/11/18   SODIUM  --  137  --   --   --    POTASSIUM  --  3.8  --   --   --    BUN  --  14  --   --   --    CREATININE  --  0.78  --   --   --    GLUCOSE  --  155*  --   --   --    WBC  --   --  8.5  --   --    HGB  --   --  8.9*  --   --    HCT  --   --  30.5*  --   --    PLT  --   --  359  --   --    INR  --  5.5*  --   --  3.0   PTT  --  34*  --   --   --    RAPDTR <0.02  --  <0.02  --   --    TSH   --   --   --  1.444  --          ASSESSMENT     1. Acute on chronic heart failure with reduced ejection fraction  2. Coronary artery C status post CABG ×3 in 2005 the most recent cardiac catheterization in 5/2018 showing patent VG to OM, patent LIMA to LAD, total occlusion of the VG to the right coronary artery with right posterior descending artery filled by collaterals from the distal circumflex  3. Aortic stenosis status post aortic valve replacement in 2005 (#29 Carbomedic Top Hat Mechanical Valve)  4. Severe mitral regurgitation status post valve replacement in 7/2018 (CarboMedics OptiForm size 25 mechanical )  5. Tricuspid regurgitation status post valve repair in 7/2018  6. Chronic atrial fibrillation with rapid ventricular response  7. Long-term anticoagulation use warfarin  8. Supratherapeutic INR  9. Hypertension- stable  10. Hyperlipidemia- on statin  11. Diabetes mellitus  12. Chronic anemia    PLAN     Discussed with patient lab results. Progressively increasing shortness of breath over the past 2 weeks.   IV diuresis.  Monitor I/O's.  Strict input 1500ml fluid and 2gram sodium restriction.  Daily standing weights.  Supplement potassium >4.0 and magnesium >2.0.   Echocardiogram ordered to assess cardiac function.    Patient is in atrial fibrillation with heart rates in the 90's.  Continue home dose diltiazem and metoprolol to maintain rate control.    Hold warfarin due to supratherapeutic INR.      Thank you for allowing me to participate in the care of this patient.    Jasmyne Collins PA-C / Demetrio Covington MD, FACC, FACP  [facility]   risk factors

## 2019-02-12 NOTE — ED ADULT NURSE NOTE - PRO INTERPRETER NEED 2
DATE OF EXAM  2/11/2019    CHIEF COMPLAINT/REASON FOR VISIT  Annual exam.    HISTORY OF PRESENT ILLNESS  Landy REYMUNDO Ewing Ace presents today for routine annual physical examination.      - Hx of primary biliary cirrhosis: Has not seen specialist in a few years and Sexual Activity      Alcohol use:  Yes        Alcohol/week: 0.0 oz        Comment: 0-1 drink per month      Drug use: No    Other Topics      Concerns:        Caffeine Concern: Yes          6oz daily        Exercise: Yes          3 d/wk        Seat Belt currently breastfeeding. Body mass index is 40.25 kg/m². GENERAL: Well-nourished, well-developed 55year old year old in no apparent distress. Awake, alert, age appropriate. SKIN:  Warm, pink, and dry. No rashes, excoriations, open areas.   HEENT: H Future    3. Vitamin D deficiency  Recommend that she take vitamin D 50,000 units weekly x 3 months then vitamin D 1000 units daily for maintenance. - ergocalciferol 78647 units Oral Cap; Take 1 capsule (50,000 Units total) by mouth once a week.   Portia Good English

## 2019-02-27 ENCOUNTER — APPOINTMENT (OUTPATIENT)
Dept: PHARMACY | Facility: CLINIC | Age: 84
End: 2019-02-27
Payer: SELF-PAY

## 2019-02-27 PROCEDURE — V5010 ASSESSMENT FOR HEARING AID: CPT | Mod: NC

## 2019-04-05 NOTE — PHARMACY COMMUNICATION NOTE - COMMENTS
[facility] pharmacy sent a fax regarding one touch ultra test strip informing the provider that one touch ultra strips are not covered by the patients insurance the alternative is accu-chek therefore a Rx was sent using the alternative strips.   spoke to dr spain 7/1/17, 4pm, verified dosing is once not daily

## 2020-05-08 NOTE — PATIENT PROFILE ADULT. - FUNCTIONAL LEVEL PRIOR: BATHING
CARE TRANSITIONS (HRTIC)    Attempted to contact patient regarding enrollment in Care Transitions program post discharge. Left voicemail. Will attempt at a later date/time.     (2) assistive person

## 2020-05-15 NOTE — ED ADULT NURSE NOTE - NSFALLRSKASSESSTYPE_ED_ALL_ED
Pulmonary, Critical Care, and Sleep Medicine Pulmonary Progress Note Name: Lilia Khan. : 1973 MRN: [id number] Date: 5/15/2020 [x]I have reviewed the flowsheet and previous days notes. Events, vitals, medications and notes from last 24 hours reviewed. Subjective/History:  
72-year-old male with morbid obesity, ex-smoker, history of asthma, history of severe obstructive sleep apnea on CPAP, questionable component compliance, history of congestive heart failure with reduced ejection fraction atrial fibrillation atrial flutter on amiodarone, Xarelto, history of chronic renal insufficiency, cellulitis of lower extremities and anemia. As per family did not have any fever or upper respiratory tract infections and no flulike symptoms. On Lasix, CPAP not clear if missed. Presented with acute shortness of breath worsening edema of lower extremities. He was found to be in acute hypercarbic hypoxemic respiratory failure initially had a trial of BiPAP but failed and then eventually was intubated. He was hypotensive. Moderate amount of secretions. Morbid obesity. Patient compliant with Xarelto. Patient self extubated and had PEA arrest. 
 
Subjective:  
05/15/20 Patient intubated  for hypoxemic resp failure. Remains in ICU, orally intubated, on ventilator. Off sedation. Awake and follows all commands. No fever. Diuresing well on lasix drip at 8 mg/hr Metabolic alkalosis noted. Will change lasix drip to diamox. Leg edema improving. BP elevated, started on norvasc. HR in 40's intermittently. I/O negative now. ETT bloody secretions improved, now has mild dark clotted blood. Drips: Lasix Intake/Output Summary (Last 24 hours) at 5/15/2020 1032 Last data filed at 5/15/2020 5491 Gross per 24 hour Intake 3051.43 ml Output 6920 ml Net -3868.57 ml  
 
 
ROS: Review of systems not obtained due to patient factors. Past Medical History: Past Medical History:  
Diagnosis Date  Arrhythmia  Arthritis   
 knees  CHF (congestive heart failure) ([facility] [address]  Chronic back pain  Chronic renal insufficiency, stage II (mild)  History of atrial flutter Dx 2016 - anticoagulated by Carey Thakkar  Hypertension 2015  Limited mobility  Migraine headache  Morbid obesity ([facility] [address]  Morbid obesity with body mass index of 60.0-69.9 in adult [facility])  Sleep apnea   
 uses c-pap instructed to bring day of surgery  Smoking   
 very passive / cigars only Allergy: No Known Allergies Vital Signs:   
Blood pressure 188/86, pulse (!) 51, temperature 99 °F (37.2 °C), resp. rate 17, height 5' 10\" (1.778 m), weight (!) 234 kg (515 lb 14 oz), SpO2 94 %. Body mass index is 74.02 kg/m². O2 Device: Endotracheal tube O2 Flow Rate (L/min): 16 l/min Temp (24hrs), Av.5 °F (36.9 °C), Min:98.3 °F (36.8 °C), Max:99 °F (37.2 °C) Intake/Output:  
Last shift:      05/15 07 - 05/15 1900 In: -  
Out:  [WILMR:5238] Last 3 shifts: 1901 - 05/15 07 In: 3957.4 [I.V.:801.4] Out: Arelis Kaufman [APLTT:7662; Drains:170] Intake/Output Summary (Last 24 hours) at 5/15/2020 1032 Last data filed at 5/15/2020 6119 Gross per 24 hour Intake 3051.43 ml Output 6920 ml Net -3868.57 ml Ventilator Settings: 
Mode Rate Tidal Volume Pressure FiO2 PEEP Pressure support   580 ml  15 cm H2O 50 % 14 cm H20 Peak airway pressure: 30 cm H2O Minute ventilation: 11.5 l/min Physical Exam: 
General: obese male; no cyanosis; alert awake and following all commands. on ventilator HEENT: LIBBY, orally intubated; orogastric tube; no bleeding; pupils not dilated, reactive Neck: thick short obese neck Chest: normal, obese chest wall Lungs: moderate air entry, obese chest limits exam; decreased BS bases; symmetrical chest expansion Heart: S1S2 present or without murmur or extra heart sounds; JVD not elevated Abdomen: obese, bowel sounds normoactive, soft without significant tenderness; no organomegaly; no guarding or rigidity Extremity: 1+, pitting bilateral thigh/leg and arm edema; negative for cyanosis, clubbing. Can see wrinkles in feet and leg since edema improving. Capillary refill: normal 
Neuro:  Alert awake and following all commands, moving all 4 limbs and following all commands. Skin: Skin color, texture, turgor fair. Skin dry, warm, non-diaphoretic DATA:  
Current Facility-Administered Medications Medication Dose Route Frequency  hydrALAZINE (APRESOLINE) 20 mg/mL injection 20 mg  20 mg IntraVENous Q6H PRN  
 amLODIPine (NORVASC) tablet 10 mg  10 mg Oral DAILY  albumin human 25% (BUMINATE) solution 25 g  25 g IntraVENous Q6H  
 methylPREDNISolone (PF) (SOLU-MEDROL) injection 40 mg  40 mg IntraVENous Q8H  
 [Held by provider] apixaban (ELIQUIS) tablet 2.5 mg  2.5 mg Oral BID  scopolamine (TRANSDERM-SCOP) 1 mg over 3 days 1 Patch  1 Patch TransDERmal Q72H  fentaNYL (DURAGESIC) 50 mcg/hr patch 1 Patch  1 Patch TransDERmal Q72H  furosemide (LASIX) 100 mg in 0.9% sodium chloride 100 mL infusion  8 mg/hr IntraVENous CONTINUOUS  
 LORazepam (ATIVAN) injection 2 mg  2 mg IntraVENous Q4H PRN  
 docusate sodium (COLACE) capsule 100 mg  100 mg Oral BID  polyethylene glycol (MIRALAX) packet 17 g  17 g Oral BID  heparin (porcine) 100 unit/mL injection 500 Units  500 Units InterCATHeter Q8H PRN  
 fentaNYL citrate (PF) injection 50 mcg  50 mcg IntraVENous Q3H PRN  chlorhexidine (PERIDEX) 0.12 % mouthwash 10 mL  10 mL Oral Q12H  
 albuterol-ipratropium (DUO-NEB) 2.5 MG-0.5 MG/3 ML  3 mL Nebulization Q6H PRN  
 acetaminophen (TYLENOL) tablet 650 mg  650 mg Oral Q6H PRN  
 ELECTROLYTE REPLACEMENT PROTOCOL - Calcium   1 Each Other PRN  
 ELECTROLYTE REPLACEMENT PROTOCOL - Magnesium   1 Each Other PRN  
 ELECTROLYTE REPLACEMENT PROTOCOL - Potassium Standard Dosing   1 Each Other PRN  
  ELECTROLYTE REPLACEMENT PROTOCOL - Phosphorus  Standard Dosing  1 Each Other PRN  pantoprazole (PROTONIX) 40 mg in 0.9% sodium chloride 10 mL injection  40 mg IntraVENous DAILY  midazolam in normal saline (VERSED) 1 mg/mL infusion  0-5 mg/hr IntraVENous TITRATE  [Held by provider] aspirin chewable tablet 81 mg  81 mg Oral DAILY  sodium chloride (NS) flush 5-40 mL  5-40 mL IntraVENous Q8H  
 sodium chloride (NS) flush 5-40 mL  5-40 mL IntraVENous PRN Facility-Administered Medications Ordered in Other Encounters Medication Dose Route Frequency  ePHEDrine (PF) (MISTOLE) 10 mg/mL in NS syringe   IntraVENous PRN Telemetry: [x]Sinus []A-flutter []Paced []A-fib []Multiple PVCs Labs: 
Recent Results (from the past 24 hour(s)) GLUCOSE, POC Collection Time: 05/14/20  7:05 PM  
Result Value Ref Range Glucose (POC) 115 (H) 70 - 110 mg/dL METABOLIC PANEL, COMPREHENSIVE Collection Time: 05/15/20  4:25 AM  
Result Value Ref Range Sodium 142 136 - 145 mmol/L Potassium 3.9 3.5 - 5.5 mmol/L Chloride 103 100 - 111 mmol/L  
 CO2 34 (H) 21 - 32 mmol/L Anion gap 5 3.0 - 18 mmol/L Glucose 116 (H) 74 - 99 mg/dL BUN 28 (H) 7.0 - 18 MG/DL Creatinine 0.76 0.6 - 1.3 MG/DL  
 BUN/Creatinine ratio 37 (H) 12 - 20 GFR est AA >60 >60 ml/min/1.73m2 GFR est non-AA >60 >60 ml/min/1.73m2 Calcium 8.4 (L) 8.5 - 10.1 MG/DL Bilirubin, total 1.4 (H) 0.2 - 1.0 MG/DL  
 ALT (SGPT) 31 16 - 61 U/L  
 AST (SGOT) 29 10 - 38 U/L Alk. phosphatase 40 (L) 45 - 117 U/L Protein, total 6.4 6.4 - 8.2 g/dL Albumin 3.2 (L) 3.4 - 5.0 g/dL Globulin 3.2 2.0 - 4.0 g/dL A-G Ratio 1.0 0.8 - 1.7    
CBC WITH AUTOMATED DIFF Collection Time: 05/15/20  4:25 AM  
Result Value Ref Range WBC 8.2 4.6 - 13.2 K/uL  
 RBC 3.74 (L) 4.70 - 5.50 M/uL  
 HGB 10.0 (L) 13.0 - 16.0 g/dL HCT 32.3 (L) 36.0 - 48.0 %  MCV 86.4 74.0 - 97.0 FL  
 MCH 26.7 24.0 - 34.0 PG  
 MCHC 31.0 31.0 - 37.0 g/dL  
 RDW 16.7 (H) 11.6 - 14.5 % PLATELET 891 117 - 585 K/uL MPV 10.7 9.2 - 11.8 FL  
 NEUTROPHILS 90 (H) 40 - 73 % LYMPHOCYTES 5 (L) 21 - 52 % MONOCYTES 5 3 - 10 % EOSINOPHILS 0 0 - 5 % BASOPHILS 0 0 - 2 %  
 ABS. NEUTROPHILS 7.4 1.8 - 8.0 K/UL  
 ABS. LYMPHOCYTES 0.4 (L) 0.9 - 3.6 K/UL  
 ABS. MONOCYTES 0.4 0.05 - 1.2 K/UL  
 ABS. EOSINOPHILS 0.0 0.0 - 0.4 K/UL  
 ABS. BASOPHILS 0.0 0.0 - 0.1 K/UL  
 DF AUTOMATED MAGNESIUM Collection Time: 05/15/20  4:25 AM  
Result Value Ref Range Magnesium 2.1 1.6 - 2.6 mg/dL CALCIUM, IONIZED Collection Time: 05/15/20  4:25 AM  
Result Value Ref Range Ionized Calcium 1.10 (L) 1.12 - 1.32 MMOL/L  
PTT Collection Time: 05/15/20  4:25 AM  
Result Value Ref Range aPTT 28.0 23.0 - 36.4 SEC PROTHROMBIN TIME + INR Collection Time: 05/15/20  4:25 AM  
Result Value Ref Range Prothrombin time 17.2 (H) 11.5 - 15.2 sec INR 1.4 (H) 0.8 - 1.2 PHOSPHORUS Collection Time: 05/15/20  4:25 AM  
Result Value Ref Range Phosphorus 2.6 2.5 - 4.9 MG/DL  
POC G3 Collection Time: 05/15/20  5:26 AM  
Result Value Ref Range Device: VENT    
 FIO2 (POC) 50 % pH (POC) 7.512 (H) 7.35 - 7.45    
 pCO2 (POC) 43.1 35.0 - 45.0 MMHG  
 pO2 (POC) 78 (L) 80 - 100 MMHG  
 HCO3 (POC) 34.6 (H) 22 - 26 MMOL/L  
 sO2 (POC) 96 92 - 97 % Base excess (POC) 12 mmol/L Mode ASSIST CONTROL Set Rate 16 bpm  
 PEEP/CPAP (POC) 14 cmH2O  
 PIP (POC) 15 Allens test (POC) N/A Total resp. rate 19 Site DRAWN FROM ARTERIAL LINE Specimen type (POC) ARTERIAL Performed by Josy Russell   
 Pressure control YES    
GLUCOSE, POC Collection Time: 05/15/20  6:07 AM  
Result Value Ref Range Glucose (POC) 109 70 - 110 mg/dL Recent Labs 05/15/20 
2431 05/14/20 
6217 05/13/20 
7388 FIO2I 50 0.65 0.7 HCO3I 34.6* 30.9* 34.1*  
PCO2I 43.1 43.8 51.3* PHI 7.512* 7.453* 7.430 PO2I 78* 106* 83  
 
 
 All Micro Results Procedure Component Value Units Date/Time CULTURE, RESPIRATORY/SPUTUM/BRONCH Acosta Kayser STAIN [[id number]]  (Abnormal) Collected:  05/08/20 1630 Order Status:  Completed Specimen:  Sputum from Tracheal Aspirate Updated:  05/10/20 1214 Special Requests: NO SPECIAL REQUESTS     
  GRAM STAIN RARE WBCS SEEN     
      
  RARE EPITHELIAL CELLS SEEN  
     
   NO ORGANISMS SEEN Culture result: RARE YEAST     
      
  NO NORMAL RESPIRATORY BRYN ISOLATED  
     
 CULTURE, RESPIRATORY/SPUTUM/BRONCH W Ijeoma Burris [[id number]] Collected:  05/04/20 1315 Order Status:  Canceled Specimen:  Sputum from Tracheal Aspirate CULTURE, RESPIRATORY/SPUTUM/BRONCH Acosta Kayser STAIN [[id number]]  (Abnormal)  (Susceptibility) Collected:  04/22/20 1018 Order Status:  Completed Specimen:  Sputum from Tracheal Aspirate Updated:  04/25/20 5375 Special Requests: NO SPECIAL REQUESTS     
  GRAM STAIN FEW WBCS SEEN     
      
  OCCASIONAL EPITHELIAL CELLS SEEN  
     
      
  FEW GRAM POSITIVE COCCI IN CLUSTERS  
     
   RARE GRAM NEGATIVE RODS Culture result:    
  LIGHT STAPHYLOCOCCUS AUREUS  
     
      
  LIGHT NORMAL RESPIRATORY BRYN  
     
 CULTURE, BLOOD [[id number]] Collected:  04/24/20 1015 Order Status:  Canceled Specimen:  Blood Imaging: 
[x]I have personally reviewed the patients chest radiographs images and report Results from [facility] Encounter encounter on 04/21/20 XR ABD (KUB) Narrative EXAM: SUPINE ABDOMINAL RADIOGRAPH 
 
CLINICAL INDICATION/HISTORY: NGT placement post procedure 
-Additional: None COMPARISON: 5/7/2020 TECHNIQUE: Single supine view of the abdomen. 
 
_______________ FINDINGS: 
 
BOWEL GAS PATTERN: Markedly limited study secondary to body habitus. No definite 
enteric tube visualized. Overall paucity of bowel gas. 
 
_______________  Impression IMPRESSION: 
 
 Markedly limited study secondary to body habitus. No definite enteric tube 
visualized. Overall paucity of bowel gas. US LEs 4/22/2020 Interpretation Summary · No evidence of deep vein thrombosis in the right lower extremity veins assessed. · No evidence of deep vein thrombosis in the left lower extremity veins assessed. Echo 4/22 Result status: Edited Result - FINAL Addendum by Hilton Stone MD on Wed Apr 22, 2020  5:12 PM  
· Left Ventricle: Normal wall thickness and systolic function (ejection fraction normal). Mildly dilated left ventricle. The estimated ejection fraction is 45 - 50%. There is moderate (grade 2) left ventricular diastolic dysfunction E/e' Ratio = 17.09. Wall Scoring: The left ventricular wall motion is globally hypokinetic. · Right Ventricle: Not well visualized. Normal global systolic function. Moderately dilated right ventricle. · Right Atrium: Right atrium not well visualized. Moderately dilated right atrium. · IVC/Hepatic Veins: Dilated inferior vena cava. Mechanically ventilated; cannot use inferior caval vein diameter to estimate central venous pressure. HU 5/12/20: 
Result status: Final result · Image quality: good. The procedure, risks and alternatives were explained. Informed consent was obtained. . Topical anesthetic not used. . Sedation was achieved by conscious sedation. 4 mg Versed was administered. 100 mcg of Fentanyl was administered. No complications. HU probe was removed. Estimated blood loss: 0 mL. Color flow Doppler was performed and pulse wave and/or continuous wave Doppler was performed. Saline contrast was given to evaluate for intracardiac shunt. No shunt seen. · Left Ventricle: Normal cavity size and wall thickness. Mild systolic dysfunction. The estimated ejection fraction is 45 - 50%. Visually measured ejection fraction. Abnormal left ventricular septal motion.  There is mild (grade 1) left ventricular diastolic dysfunction. Wall Scoring: The left ventricular wall motion is globally hypokinetic. · Interatrial Septum: Agitated saline contrast study was performed. There was no shunting at baseline or with Valsalva. No atrial septal defect present. cxr 5/15/20 reviewed: improving alveolar densities and congestion. IMPRESSION:  
Patient Active Problem List  
Diagnosis Code  Acute on chronic respiratory failure with hypoxia and hypercapnia ([facility]) J96.21, J96.22  
 Acute on chronic systolic and diastolic CHF (congestive heart failure) ([facility]) I50.43 Aspiration pneumonitis from multiple patient initiated ET tube displacement events J69.0  Cardiac arrest ([facility] [address] I46.9 MSSA in sputum culture A49.01  
 Elevated troponin, NSTEMI R79.89, I21.4  Chronic a-fib I48.20  Atrial flutter ([facility] [address] I48.92 Prolonged QTc R94.31  
 Chronic renal insufficiency, stage II (mild) N18.2 Thrombocytopenia D69.6  Smoking F17.200  Arthritis M19.90  Migraine G43.909  Chronic back pain M54.9, G89.29  
 Obstructive Sleep apnea G47.33  
 Morbid obesity ([facility]) E66.01  
  
PLAN:  
RS -intubated, on ventilator since 4/25/2020. HU negative for any cardiac causes for severe hypoxemia or shunting. He could have pulmonary hemorrhage knowing bloody/pinkish ETT secretions and so started on Solu-Medrol 40 mg IV every 8 hour since 5/12/2020. FiO2 improved to 50% and PEEP to 14 after started on lasix drip and steroids. ETT secretions improved. I/o improved, leg edema improved on lasix drip. Tolerating lasix drip at 8 mg/hr, BP stable; but developed metabolic alkalosis. Will stop lasix drip and give diamox 500 gm q8 hrs x3 doses and repeat ABG in am.  
Monitor I's and O's, renal functions, electrolytes and replace per ICU protocol. Continue albumin.  
Morbidly obese and suspect to have MARIA FERNANDA/OHS with chronic hypercapnic respiratory failure. D/w pt who stated that he is diagnosed with MARIA FERNANDA. He agreed for trach placement. Patient cannot have CT chest due to body weight limitations. C/w vent support, vent and sedation bundle. VAP prevention bundle, head of the bed at 30' all times Patient is off paralytic medication since 5/4/2020 Sedation- On fentanyl patch. Off versed drip since 5/14/20. Continue bronchodilators prn; pulmonary hygiene care. D/w Dr Serina Parnell in ENT on 5/14/20: tentative trach planned on 5/18/20. ASA held. Continue to hold Eliquis. CVS - 
HU unremarkable for any cause for persistent hypoxemia, bubble study negative. Only grade 1 diastolic dysfunction with LVEF 45 to 50%. No pulmonary hypertension. US legs negative for DVT. Diuretics as above. ASA held for trach plan. Eliquis held for possible pulmonary hemorrhage. Dr. Oscar Regalado on board. ID - SARS-COV-2 negative x 2 Antibiotic: s/p Zosyn completed on 5/12/2020 10 days antibiotic MSSA in resp cxs from before; respiratory cultures 5/8 so far no growth. Procalcitonin repeat is negative. No leukocytosis. No fever. Hem - HIT panel and LEYDI Ab NEG on 4/28. He is definitely high risk for PEs; CTA chest cannot be done due to limitations due to weight; stopped argatroban 5/4; INR 2 [suspect fatty liver/GASTELUM]; Eliquis held due to bloody secretions from the ET tube. Vasc - PVL bl LE negative Renal -continue monitor BUN and creatinine stable; potassium replacement per protocol. Nephrology Dr. Gabby Bill hs signed off.   
GI - GI placed OG tube; good position confirmed with abdominal x-ray using Gastrografin; patient on tube feedings; LFTs remain normal 
Neurology: moving extremities and follows all commands. No focal deficit. Overall poor prognosis. Patient chronically vent dependent. Full code.   
 
Will defer respective systems problem management to primary and other consultant and follow patient in ICU with primary and other medical team 
 Quality Care: PPI, DVT prophylaxis, HOB elevated, Infection control all reviewed and addressed. PAIN AND SEDATION: As above · Skin/Wound:  Blister on foot, does not appear pressure sore. Continue local care per wound care consult. · Nutrition:  Start tube feeding · Prophylaxis: DVT and GI Prophylaxis reviewed. · Restraints: prn 
· PT/OT eval and treat: as needed when stable · Lines/Tubes: A-line 4/23/20; daniel 4/21/20, ET tube 4/25/20; PICC line left arm 5/5 ADVANCE DIRECTIVE: Full code DISCUSSION: spoke with RN, RT, ICU staff, MDR Events and notes from last 24 hours reviewed. Care plan discussed with nursing Total CC time: 33 minutes. High complexity review and management Mallory Parmar MD  
5/15/2020 Initial (On Arrival)

## 2020-07-10 NOTE — ED PROVIDER NOTE - CRTICAL CARE TIME SPENT (MIN)
Athletic Training Outreach Program Note    Subjective Evaluation    History of Present Illness  Date of onset: 7/6/2020  Mechanism of injury: Athlete was pitching during a baseball game when an opposing player hit a line drive ball back up the middle, striking the patient in the forearm. Athlete finished out the play and  and  came out to the mound.     Athlete c/o pain and weakness in his left arm which is his throwing arm.         Objective  Visible redness and immediate swelling 1 inch distal to the olecranon process, over the proximal wrist extensor muscle group. POP over the same area. Athlete denies numbness/tingling down the arm or into the hand.     Elbow AROM WNL.   Wrist AAROM WNL    Strength    strength limited for about 1 minute post injury.  strength WNL after above time frame.   Wrist Extension WNL, tightness felt with MMT.   Wrist Flexion WNL    No special testing indicated at this time.     Functional Testing-   Fastball #1, form WNL, weakness felt  Fastball #2, form WNL, weakness present but better than pitch 1  Fastball #3 WNL   Slider WNL        Assessment:   Athlete shows s/s of a distal forearm contusion.     Plan:   Continue play as tolerated.   Manual massage over area on contact will be performed by  between innings until athlete is done pitching.     Post-Play-   After athlete finished pitching, he completed his post game routine and saw AT after for plan of care.     Obvious welt over the area of contact.   \"Spider\" style KT tape placed over the welt.   Ice cup recommended over welt (avoid ice bag) PRN.   Ibuprofen PRN for pain.    Athlete will not be traveling for the two-day overnight trip. If pain persists or is not getting any better within the two off days, f/u with .   
45

## 2020-07-18 NOTE — PHYSICAL THERAPY INITIAL EVALUATION ADULT - PHYSICAL ASSIST/NONPHYSICAL ASSIST: SIT/STAND, REHAB EVAL
PC:acute on chronic diastolic heart failure, pulm htn , acute on chronic renal failure, acute hypoxemic resp failure     [place]: Pt with KELSEA this am, he appears quite tired, was off BiPAP overnight. ABG reviewed this am-  needs to go back on BiPAP again, pt is getting quite tired. Cardene drip stopped at 1 am overnight     Pt said he feels very tired, he was coherent and quite appropriate. Mentioned that overall, he feels better, SOB improved compared to when he first came in. He thinks leg swelling is better, but arms still seem \"puffy\"    Past Medical History:   Diagnosis Date   • Anxiety    • Benign hypertension with chronic kidney disease, stage III (CMS/[facility]) 1/30/2013 4/26/16 renal biopsy: HTNsive nephroangiosclerosis with  46% global glomerulosclerosis and moderate chronic   tubular interstitial changes  Proteinuric stage 3 chronic kidney disease-07/01/2020 crea 1.7 GFR 41 f/u Dr. Anglin    • CKD (chronic kidney disease) stage 3, GFR 30-59 ml/min (CMS/[facility])     4/26/16 renal biopsy: HTNsive nephroangiosclerosis with  46% global glomerulosclerosis and moderate chronic   tubular interstitial changes  Proteinuric stage 3 chronic kidney disease-07/01/2020 crea 1.7 GFR 41 f/u Dr. Anglin    • Colonic polyp 1/30/2013 7/5/2010.     • Ganglion of right wrist 12/23/2016   • Generalized anxiety disorder 12/21/2015   • Generalized Edema 7/13/2020   • GERD (gastroesophageal reflux disease)    • Hyperkalemia    • Hyperlipidemia, mixed 1/30/2013    10 near ASCVD risk 7.6% decreases to 5.7%.    • Hyperparathyroidism, secondary renal (CMS/[facility])    • Hypertension    • Hypertensive renal disease     biopsy-proven hypertensive nephroangiosclerosis with adhesion formation and early nodular glomerular changes with 46% global glomerulosclerosis and moderate chronic tubular interstitial changes per the pathology report.  4/26/16 renal biopsy: HTNsive nephroangiosclerosis with  46% global glomerulosclerosis and moderate chronic    tubular interstitial changes  Proteinuric stage 3 chronic kidney dise   • Left medial knee pain 5/23/2017   • Polyp of colon    • Post-traumatic osteoarthritis of left knee 05/23/2017   • Proteinuria 4/11/2016 4/26/16 renal biopsy: HTNsive nephroangiosclerosis with  46% global glomerulosclerosis and moderate chronic   tubular interstitial changes  Proteinuric stage 3 chronic kidney disease-07/01/2020 crea 1.7 GFR 41 f/u Dr. Anglin    • Severe Pulmonary Hypertension 7/13/2020    Sev. P. HTN:6/02/20ECHO:  LVEF 70%,Mod. LVH.Sev P. HTN, RVSP 56.1 mmHg.Sev incr LA volume 57.5 ml/m².Trace MV regurg. Small pericardial effusion.   • Umbilical hernia    • Vitamin D deficiency 04/23/2018       Past Surgical History:   Procedure Laterality Date   • Basal cell carcinoma excision Right 01/2019    Right lower eyelid.   • Colonoscopy  07/05/2010    FOCAL HYPERPLASTIC CHANGE.    • Knee surgery Left 1979    Left knee surgery medial collateral ligament.   • Renal biopsy  4/29/2016    Hypertensive nephroangiosclerosis with adhesion formation and early nodular glomerular changes with 46% global glomerulosclerosis and moderate chronic tubular interstitial changes.   • Reverse total shoulder arthroplasty Left 06/18/2018    Dr. Mode Ahumada @ [facility]   • Reverse total shoulder arthroplasty Right 09/25/2018    Rotator cuff tear arthropathy of right shoulder, s/p right reverse total shoulder arthroplasty. Dr. Ahumada   • Shoulder arthroscopy  12/15/2009   • Total knee arthroplasty Left 08/14/2017    Performed by Dr. Ahumada at [facility].       Family History   Problem Relation Age of Onset   • Stroke Mother    • Cancer Father         Throat cancer   • Congestive Heart Failure Sister    • Diabetes Sister    • Diabetes Brother    • Diabetes Brother    • Diabetes Brother    • Hypertension Brother    • Hypertension Brother    • Hypertension Brother        Social History     Socioeconomic History   • Marital status: /Civil Union     Spouse name: Not  on file   • Number of children: Not on file   • Years of education: Not on file   • Highest education level: Not on file   Occupational History   • Not on file   Social Needs   • Financial resource strain: Not on file   • Food insecurity:     Worry: Not on file     Inability: Not on file   • Transportation needs:     Medical: Not on file     Non-medical: Not on file   Tobacco Use   • Smoking status: Former Smoker     Years: 25.00     Types: Cigars   • Smokeless tobacco: Never Used   • Tobacco comment: not a current smoker   Substance and Sexual Activity   • Alcohol use: Not Currently     Alcohol/week: 8.0 standard drinks     Types: 8 Cans of beer per week     Frequency: 4 or more times a week     Drinks per session: 3 or 4     Binge frequency: Never   • Drug use: No   • Sexual activity: Not on file   Lifestyle   • Physical activity:     Days per week: Not on file     Minutes per session: Not on file   • Stress: Not on file   Relationships   • Social connections:     Talks on phone: Not on file     Gets together: Not on file     Attends Quaker service: Not on file     Active member of club or organization: Not on file     Attends meetings of clubs or organizations: Not on file     Relationship status: Not on file   • Intimate partner violence:     Fear of current or ex partner: Not on file     Emotionally abused: Not on file     Physically abused: Not on file     Forced sexual activity: Not on file   Other Topics Concern   • Not on file   Social History Narrative    Social History         Tobacco Use    • Smoking status: Never Smoker    • Smokeless tobacco: Never Used    Substance Use Topics    • Alcohol use: Not Currently        Alcohol/week: 10.0 standard drinks        Types: 10 Cans of beer per week        Frequency: 4 or more times a week        Drinks per session: 3 or 4        Binge frequency: Never        Comment: \"3-4 beers in a couple months\"    • Drug use: No    LAURABRITTANY XIE Spouse [phone]      PMH:    4/26/16 renal biopsy: 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 Anglin    Sev. P. HTN:6/02/20ECHO:  LVEF 70%,Mod. LVH.Sev P. HTN, RVSP 56.1 mmHg.Sev incr LA volume 57.5 ml/m².Trace MV regurg.    Small pericardial effusion.       Eye Problem(s):negative  ENT Problem(s):negative  Cardiovascular problem(s):as above  Respiratory problem(s):as above  Gastro-intestinal problem(s):negative GI  Genito-urinary problem(s):negative  Musculoskeletal problem(s):negative  Integumentary problem(s):negative  Neurological problem(s):negative  Psychiatric problem(s):negative  Endocrine problem(s):negative  Hematologic and/or Lymphatic problem(s):negative    Current Facility-Administered Medications   Medication   • sodium chloride (NORMAL SALINE) 0.9 % bolus 100-200 mL   • predniSONE (DELTASONE) tablet 40 mg   • colchicine (COLCRYS) tablet 0.3 mg   • albumin human (SPA) 25 % injection 12.5 g   • heparin (porcine) injection 5,000 Units   • hydrALAZINE (APRESOLINE) injection 15 mg   • morphine injection 1-2 mg   • aspirin (ECOTRIN) enteric coated tablet 81 mg   • atorvastatin (LIPITOR) tablet 40 mg   • calcitRIOL (ROCALTROL) capsule 0.25 mcg   • carvedilol (COREG) tablet 25 mg   • doxazosin (CARDURA) tablet 2 mg   • hydrALAZINE (APRESOLINE) tablet 100 mg   • NIFEdipine XL (PROCARDIA XL) ER tablet 60 mg   • acetaminophen (TYLENOL) tablet 650 mg   • ondansetron (ZOFRAN) injection 4 mg       O/E:  Visit Vitals  BP (!) 153/68   Pulse 75   Temp 98.1 °F (36.7 °C) (Temporal)   Resp (!) 22   Ht 5' 9\" (1.753 m)   Wt 87.7 kg   SpO2 97%   BMI 28.55 kg/m²       Examination of the patient reveals:     GENERAL: alert, is in no apparent distress and is well developed and well nourished  LYMPH NODES: no cervical adenopathy, no supraclavicular adenopathy, no axillary adenopathy and no  inguinal adenopathy  SKIN normal color, normal texture, normal turgor, no skin rashes, no atypical appearing skin lesions and no bruises  HEAD: normocephalic  EYES: pupils are equal and reactive to light and accomodation extraocular movements are full sclera and conjunctiva are normal lids and lashes are normal  EARS: pinna and external ear is normal bilaterally, external auditory canals are normal and auditory acuity is grossly normal  NOSE: external nose is normal to inspection and no septal deviation  MOUTH/THROAT: tongue is midline and appears normal, oropharynx appears normal, soft palate and uvula are normal and oral mucosa is normal  NECK: neck is supple, no thyromegaly, no anterior cervical adenopathy, no posterior cervical adenopathy and no supraclavicular adenopathy  CHEST: contour is normal with normal AP diameter, normal respiratory excursion and respiratory effort is not labored  LUNGS: ABNORMAL FINDINGS>> decreased bs in both lung bases  HEART: normal PMI, normal rate and rhythm, no palpable heaves or thrills, S1 and S2 normal, no S3 or S4, no murmurs and no extra heart sounds  ABDOMEN: abdomen is soft, normal active bowel sounds, nontender, without masses, without hepatomegaly, without splenomegaly and no pulsatile masses  BACK: inspection shows no curvature, no discomfort to palpation of the midline and no costovertebral angle discomfort to palpation  NEUROLOGIC: cranial nerves 2 through 12 normal, motor strength normal, gait and station normal, coordination normal, DTR's normal and symmetric and no tremor noted  EXTREMITIES: edema+++    WBC (K/mcL)   Date Value   07/17/2020 7.0   08/05/2019 7.4     RBC (mil/mcL)   Date Value   07/17/2020 3.48 (L)   08/05/2019 4.11 (L)     HCT (%)   Date Value   07/17/2020 30.8 (L)   08/05/2019 37.6 (L)     HGB (g/dL)   Date Value   07/17/2020 9.8 (L)   08/05/2019 12.0 (L)     PLT (K/mcL)   Date Value   07/17/2020 108 (L)   08/05/2019 123 (L)   02/28/2012 216        Sodium (mmol/L)   Date Value   07/18/2020 132 (L)   11/06/2019 136     Potassium (mmol/L)   Date Value   07/18/2020 4.8   11/06/2019 4.9     Chloride (mmol/L)   Date Value   07/18/2020 99   11/06/2019 106     Glucose (mg/dL)   Date Value   07/18/2020 137 (H)   11/06/2019 92     CALCIUM (mg/dL)   Date Value   11/06/2019 9.0     Calcium (mg/dL)   Date Value   07/18/2020 9.2     Carbon Dioxide (mmol/L)   Date Value   07/18/2020 27   11/06/2019 25     BUN (mg/dL)   Date Value   07/18/2020 56 (H)   11/06/2019 41 (H)     Creatinine (mg/dL)   Date Value   07/18/2020 3.14 (H)   11/06/2019 1.60 (H)       CXR reviewed by myself personally  XR CHEST AP OR PA     HISTORY:  Fever, shortness of breath      COMPARISON:  June 2, 2020     TECHNIQUE:  1 frontal view        FINDINGS/IMPRESSION:       Low inspiratory volume. The cardiomediastinal silhouette is stable in size.  Prominent central pulmonary vasculature.     Small right and moderate size left pleural effusions with superimposed  atelectasis/infiltrate. No definitive pneumothorax.     Bilateral shoulder arthroplasties.      ECHO  Normal left ventricular size and systolic function.  Moderate left ventricular hypertrophy.  Left ventricular ejection fraction, 70 %.  LV Global longitudinal strain -22 %.  Severe pulmonary hypertension, RVSP 56.1 mmHg.  Severely increased left atrial volume 57.5 ml/m².  Trace mitral valve regurgitation.  Small pericardial effusion.  Mildly dilated IVC.    Assessment/Plan:  1. Neuro: somewhat encephalopathic  2. CVS: acute on chronic diastolic heart failure, Pulm HTN (likely mostly post-capillary) . On lasix gtt.   HTN: On nicardipine and NTG drip.  He needs a lot of diuresis (likely around 10 kg +), may need dilaysis  3. Resp: Acute hypoxemic resp failure, pulm edema , bilateral pleural effusions  On BiPAP , wean fio2 as toelrated.   May need thoracentesis if pleural effusion is not improving with diuresis  Would recommend sleep apnea  workup as well as an outpt which he can do with his primary pulmonologist.    4. Renal: acute on chronic renal failure, Cardio-renal syndrome , continue diuresis and monitor BUN/Cr, if it worsens further then he may need SLED/UF etc   5. GI: NPO except meds for now till resp status improves  6. Endo: ISS/Accuckecks per ICU protocol  7. Proph: SCD, Protonix,lovenox    Total crit care time: 40 mins    Livia Velasco MD       supervision/1 person assist/verbal cues

## 2020-12-11 NOTE — ED ADULT NURSE NOTE - NS PRO PASSIVE SMOKE EXP
Morning rounds done with ICU physician and NP. Plan of care discussed including lowering dose rate of Ketamine. No further orders at this time.    1200: Spoke with daughter regarding plan of care. All questions answered.     1230: Urinalysis, sputum culture and blood culture sent down to laboratory.     1600: Weaned patient to 0.5 mg of Ketamine. Patient WOB increased. Notified ICU physician and NP. Received orders to maintain current sedation rate and bolus prn. Will carry out orders.    No

## 2021-02-03 NOTE — ED ADULT NURSE NOTE - THOUGHTS OF HOMICIDE/VIOLENCE TOWARDS OTHERS YN, MLM
From: Jaden Roa  To: Freda Ferrer, EDVIN  Sent: 2/3/2021 2:58 PM CST  Subject: Other    I answered all the questions already she sent to me yesterday and the kolonipin doesn't help when I do have one I've had 3 attacks since my last visit so michel if the other med is working. I wanna see if Freda and order me a heart monitor through a heart doctor we can try?   No

## 2021-02-08 NOTE — PROGRESS NOTE ADULT - PROBLEM SELECTOR PROBLEM 5
HTN (hypertension)
Venous stasis
Adult
HTN (hypertension)
HTN (hypertension)
0 = independent

## 2021-08-23 NOTE — PROGRESS NOTE ADULT - PROBLEM SELECTOR PROBLEM 7
Called patient and left MSG ,     Jessica Behrens DO  [address] , [place] [address]  Phone: [phone]   Seizure-like activity Venous stasis

## 2021-11-18 NOTE — ED ADULT TRIAGE NOTE - TEMPERATURE IN FAHRENHEIT (DEGREES F)
97.7
Quality 110: Preventive Care And Screening: Influenza Immunization: Influenza Immunization previously received during influenza season
Detail Level: Detailed
Quality 226: Preventive Care And Screening: Tobacco Use: Screening And Cessation Intervention: Patient screened for tobacco use and is an ex/non-smoker
Quality 431: Preventive Care And Screening: Unhealthy Alcohol Use - Screening: Patient not identified as an unhealthy alcohol user when screened for unhealthy alcohol use using a systematic screening method

## 2022-04-23 NOTE — ED ADULT NURSE REASSESSMENT NOTE - TEMPLATE LIST FOR HEAD TO TOE ASSESSMENT
General
aerobic capacity/endurance/arousal, attention, and cognition/gait, locomotion, and balance/muscle strength

## 2022-06-18 NOTE — DISCHARGE NOTE ADULT - MEDICATION SUMMARY - MEDICATIONS TO TAKE
[FreeTextEntry1] : 67 year old male with HTN, HLD, depression and atrial flutter s/p ablation (typical) 3/2021, with syncope s/p ILR, who presents for follow up.  ILR interrogation with no significant arrhythmias / atrial fibrillation since last follow up.   He is on oral anticoagulation.  No evidence of scarlett arrhythmias.  Remote monitoring working.  He will follow up in 6 months or sooner if needed.  He knows to call with any questions or concerns.  
I will START or STAY ON the medications listed below when I get home from the hospital:    spironolactone 25 mg oral tablet  -- 1 tab(s) by mouth once a day  -- Indication: For Essential hypertension    Entresto 24 mg-26 mg oral tablet  -- 1 tab(s) by mouth 2 times a day  -- Indication: For Hold till seen by pmd 2/2 hypotension    digoxin 125 mcg (0.125 mg) oral tablet  -- 1 tab(s) by mouth once a day  -- Indication: For Chronic atrial fibrillation    apixaban 2.5 mg oral tablet  -- 1 tab(s) by mouth 2 times a day  -- Indication: For Chronic atrial fibrillation    furosemide 40 mg oral tablet  -- 1 tab(s) by mouth once a day  -- Indication: For Essential hypertension    pantoprazole 40 mg oral delayed release tablet  -- 1 tab(s) by mouth once a day (before a meal)  -- Indication: For gastritis

## 2022-07-05 NOTE — DIETITIAN INITIAL EVALUATION ADULT. - ETIOLOGY
Care After Your Knee Replacement  Joaquim Alonzo D.O.  [phone] or [phone]  Activity  Use crutches or a walker to get around as needed.  You may weight-bear as tolerated.  Apply a large ice bag to the affected extremity for 20 minutes every hour to decrease pain and swelling. Do not keep ice on for more than 20 minutes. This will cause freezer burn to the skin.  Elevate the operative leg to chest level whenever possible to decrease swelling.   Do not place pillows under knees (i.e. Do not maintain knee in flexed or bent position), but rather place pillows under foot/ankle    Medications  Medications may cause drowsiness and constipation.  They are best taken with food.    Increase your fluid intake.  Discontinue pain medication if it upsets your stomach or causes a rash.   Call us if you are experiencing problems so that we may change your medication.   You may resume your home medications as outlined on your medication summary sheet.    Bathing  You may shower, and wash around the surgical site with soap and water.    Make certain to dry the area after showering.  Do not submerge the surgical site in the bath tub, hot tub, pool, etc.    Care of your dressing/incision  Occasionally the dressing may spot with blood.  This is usually self-limiting.  If the spotting becomes more extensive, call us so we can decide if you need to come in for a dressing change.  You may remove the dressing 7 days after surgery - or we will remove the dressing in the office when you come in for your post-op visit.  Please do not place any ointments, lotions, or creams directly over the incision until incision is well healed approximately 2-3 weeks.  No soaking incision (hot tubs, baths, swimming pools, etc)    Special instructions  Keep your affected extremity elevated at the level of your heart as often as possible.    Apply a large ice bag to the affected extremity for 20 minutes every hour to decrease pain and  swelling.    Perform heel pumps to improve blood flow and decrease swelling.     Call the doctor if you have:  Any problems or questions.   Severe pain.  Numbness.  Fever greater than 101 degrees.  Change in temperature or color of the affected extremity.  Calf pain (this may signify a blood clot in your calf).   medical status (critical/ advanced illness)

## 2022-08-04 NOTE — ED ADULT NURSE NOTE - CAS DISCH ACCOMP BY
35 F denies pmh p/w neck pain since 3am w/ LLE numbness described as tingling sensation; NO sensory deficits UEs. no trauma.  no dysarthria/dysphagia.  on exam pt uncomfortable but nad, clear speech, symmetric smile, Neck: no midline or paraspinal ttp, pain limited ROM, neg kernig, no bruit, diminished sens L5/S1 distribution LLE, otherwise steady gait, equal strength.  r/o dissection, ?torticollis/sciatica, do not suspect CVA/fracture, low suspicion cord compression, will treat pain and reassess. obtain labs and CTA head/neck Family

## 2022-09-17 NOTE — H&P ADULT. - PROBLEM SELECTOR PLAN 5
Group Topic: BH Coping Skills Education    Date: 9/17/2022  Start Time: 1300  End Time: 1345  Facilitators: LOUANN Samuels    Focus: Games  Number in attendance: 2  Patients discussed the benefits of playing board games and how it is a coping skill. Then they engaged in a game together.     Pt was recruited for group but did not attend. Efforts to encourage participation in programming on the unit will continue.  LOUANN Byers, LESTERW      --chronic, L anterior shin stasis ulcer  --wound care

## 2022-09-23 NOTE — PROGRESS NOTE ADULT - PROBLEM SELECTOR PROBLEM 8
Faxed to [facility]  
Please fill out form patient would like vision correction at [facility] in [place].  Patient had primary physician and all is good to go.  
Discharge planning issues
Discharge planning issues

## 2022-11-16 NOTE — H&P CARDIOLOGY - PMH
Atrial fibrillation    CVA (cerebral vascular accident)  March 18th 2017, left side weakness ,s/p TPA  HTN (hypertension)    Venous stasis  b/l leg, left calf ulcer
125

## 2023-01-14 NOTE — ED PROVIDER NOTE - AXIS
ADULT NUTRITION REASSESSMENT    RECOMMENDATIONS TO MD:  CPM    Pt upgraded to high nutrition risk. Patient does meet moderate malnutrition criteria. ADMITTING DIAGNOSIS: Hyponatremia [E87. 1]Metabolic acidosis [N79.8]  PERTINENT PAST MEDICAL HISTORY: dry unproductive increased, therefore ONS adjusted this AM by MD. However pt reports disliking Nepro. Will adjust to Ensure Clear. Pt agreeable. 8/3/2021 Update: At visit, pt is more confused.  Documentation of po showed ave 52% po intake in the last 7 days but need to (ONS) to maximize  Percent Meals Eaten (last 3 days)     Date/Time Percent Meals Eaten (%)    08/03/21 1342  25 %    08/03/21 1700  40 %    08/05/21 0830  0 %    08/05/21 0930  0 %    08/05/21 1300  0 %    08/05/21 1400  0 %           Food Allergies: No  C HISTORY:  Patient Weight(s) for the past 336 hrs:   Weight   08/06/21 0621 53.5 kg (118 lb)   08/04/21 0552 51 kg (112 lb 8 oz)   08/03/21 0500 49.5 kg (109 lb 3.2 oz)   08/02/21 0524 51.1 kg (112 lb 11.2 oz)   08/01/21 0619 53.1 kg (117 lb)   07/31/21 050 provider: monitor plans    MONITOR AND EVALUATE/NUTRITION GOALS:  - Food and Nutrient Intake: Monitor: adequacy of PO intake, tolerance of PO intake and adequacy of supplement intake.   - Food and Nutrient Administration: Monitor: need for temporary enteral Left Deviation

## 2023-04-03 NOTE — PHYSICAL THERAPY INITIAL EVALUATION ADULT - PAIN SENSATION, LLE, REHAB EVAL
Chelita Davies was admitted to  from ED via cart accompanied by RN.   Reason for hospitalization is cellulitis, sepsis.   Upon arrival, patient is stable. Patient has history significant for   Past Medical History:   Diagnosis Date   • Absolute anemia    • Arthritis     RA   • Chronic atrial fibrillation (CMD)     av junction ablation with pacemaker   • Chronic diastolic heart failure (CMD)    • Chronic kidney disease    • HTN (hypertension)    • Hypertrophic cardiomyopathy (CMD)    • MDS (myelodysplastic syndrome) (CMD) 10/19/2018   • Osteoporosis, unspecified 05/11/2011   • Other and unspecified hyperlipidemia    • Personal history of traumatic fracture    • PONV (postoperative nausea and vomiting)    • Rheumatoid arthritis(714.0)    • Unspecified hypothyroidism      .  Patient oriented to bed, call light, room and unit.  Patient provided with the following educational materials upon admission:safety, infection control and pain.   Level of understanding patient verbalized understanding.   Admission orders received at this time.   Dr. Celeste notified of patient arrival.   See Epic documentation for patient individualized nursing care plan.   within normal limits

## 2023-07-24 NOTE — H&P ADULT. - ENDOCRINE
Ambulatory Care Coordination Note    ACM attempted to reach patient for introduction to Associate Care Management related to post ED assessment. HIPAA compliant message left requesting a return phone call at patient convenience. Plan for follow-up call in 3-5 days      No future appointments. Yan HAMMER, RN   Ambulatory Care Manager  Associate Care Management  Cell [phone]  [email or web address] details…

## 2023-11-01 NOTE — DISCHARGE NOTE ADULT - PATIENT PORTAL LINK FT
show
You can access the [facility], offered by [facility], by registering with the following website: [email or web address]

## 2023-12-12 NOTE — ED PROVIDER NOTE - PROGRESS NOTE DETAILS
PE:  nontoxic appearing, good color, good tone, NARD, no nasal flaring, no grunting, TMs normal, throat normal, neck supple, no intercostal retractions, chest CTA, heart regular, abd soft and nontender.  + croup cough heard on exam informed by the nurse that pt is now altered, pt was found with food in her mouth, left facial droop and left sided weakness, not as responsive, she is able to move the right side and follow simple commands, code stroke called, rapid response called, dr grabiel og, rapid response team at the bedside, pt initial in NSR, now in rapid atrial fib, pt's daughter states that she was diagnosed with atrial fib few years ago, prescrbed coumadin but never took it because she did not like the side effects of it dr sasson called , will see pt, few sutures will be placed in the er, will see pt on the floor dr leonardo called, wants to be called with ct results informed by the nurse that pt is now altered, pt was immediately seen and evaluated, pt was found with food in her mouth, left facial droop and left sided weakness, not as responsive, altered compared to her initial presentation, she is able to move the right side and follow simple commands, code stroke called, rapid response called, dr grabiel og, rapid response team at the bedside, pt initial EKG shows a NSR @ 70 bpm, ischemic changes noted in the lateral leads, now in rapid atrial fib with a rate up to 180's, pt's daughter states that she was diagnosed with atrial fib few years ago, prescribed coumadin but never took it because she did not like the side effects of it, repeat ct ordered for possible tpa dr leonardo called, wants to be called with ct results, icu and nurse supervisor at the bedside, pt taken to ct informed by the nurse that pt is now altered, pt was immediately seen and evaluated, pt was found with food in her mouth, left facial droop and left sided weakness, not as responsive, altered compared to her initial presentation, she is able to move the right side and follow simple commands, code stroke called, rapid response called, dr grabiel og, rapid response team at the bedside, pt initial EKG shows a NSR @ 70 bpm, ischemic changes noted in the lateral leads, now in rapid atrial fib with a rate up to 180's, accucheck- 151, pt's daughter states that she was diagnosed with atrial fib few years ago, prescribed coumadin but never took it because she did not like the side effects of it, repeat ct ordered for possible tpa dr leonardo called, wants to be called with ct results, icu and nurse supervisor at the bedside, pt taken to ct with cardiac monitor

## 2024-06-07 NOTE — ED ADULT NURSE NOTE - NS ED STAT RN HAND OFF 2
[facility]  Pain Medicine  [address] [place] [address], [address] [place] [id number]  Phone: [phone]  Fax: [phone]  Follow Up Time:    Additional handoff
